# Patient Record
Sex: FEMALE | Race: WHITE | NOT HISPANIC OR LATINO | URBAN - METROPOLITAN AREA
[De-identification: names, ages, dates, MRNs, and addresses within clinical notes are randomized per-mention and may not be internally consistent; named-entity substitution may affect disease eponyms.]

---

## 2017-12-06 ENCOUNTER — EMERGENCY (EMERGENCY)
Facility: HOSPITAL | Age: 82
LOS: 1 days | Discharge: HOME | End: 2017-12-06
Attending: EMERGENCY MEDICINE | Admitting: EMERGENCY MEDICINE
Payer: MEDICARE

## 2017-12-06 VITALS
OXYGEN SATURATION: 97 % | TEMPERATURE: 97 F | SYSTOLIC BLOOD PRESSURE: 163 MMHG | HEART RATE: 66 BPM | RESPIRATION RATE: 18 BRPM | DIASTOLIC BLOOD PRESSURE: 84 MMHG

## 2017-12-06 VITALS
RESPIRATION RATE: 18 BRPM | HEART RATE: 69 BPM | DIASTOLIC BLOOD PRESSURE: 63 MMHG | TEMPERATURE: 98 F | SYSTOLIC BLOOD PRESSURE: 127 MMHG

## 2017-12-06 DIAGNOSIS — R11.2 NAUSEA WITH VOMITING, UNSPECIFIED: ICD-10-CM

## 2017-12-06 DIAGNOSIS — Z88.0 ALLERGY STATUS TO PENICILLIN: ICD-10-CM

## 2017-12-06 DIAGNOSIS — N39.0 URINARY TRACT INFECTION, SITE NOT SPECIFIED: ICD-10-CM

## 2017-12-06 DIAGNOSIS — Z79.01 LONG TERM (CURRENT) USE OF ANTICOAGULANTS: ICD-10-CM

## 2017-12-06 DIAGNOSIS — Z84.89 FAMILY HISTORY OF OTHER SPECIFIED CONDITIONS: Chronic | ICD-10-CM

## 2017-12-06 DIAGNOSIS — Z79.899 OTHER LONG TERM (CURRENT) DRUG THERAPY: ICD-10-CM

## 2017-12-06 DIAGNOSIS — Z91.018 ALLERGY TO OTHER FOODS: ICD-10-CM

## 2017-12-06 DIAGNOSIS — Z88.8 ALLERGY STATUS TO OTHER DRUGS, MEDICAMENTS AND BIOLOGICAL SUBSTANCES STATUS: ICD-10-CM

## 2017-12-06 DIAGNOSIS — R19.7 DIARRHEA, UNSPECIFIED: ICD-10-CM

## 2017-12-06 LAB
ALBUMIN SERPL ELPH-MCNC: 4 G/DL — SIGNIFICANT CHANGE UP (ref 3.3–5)
ALP SERPL-CCNC: 69 U/L — SIGNIFICANT CHANGE UP (ref 40–120)
ALT FLD-CCNC: 19 U/L — SIGNIFICANT CHANGE UP (ref 10–45)
ANION GAP SERPL CALC-SCNC: 16 MMOL/L — SIGNIFICANT CHANGE UP (ref 5–17)
APPEARANCE UR: CLEAR — SIGNIFICANT CHANGE UP
APTT BLD: 38.4 SEC — HIGH (ref 27.5–37.4)
AST SERPL-CCNC: 29 U/L — SIGNIFICANT CHANGE UP (ref 10–40)
BASOPHILS NFR BLD AUTO: 0.2 % — SIGNIFICANT CHANGE UP (ref 0–2)
BILIRUB DIRECT SERPL-MCNC: <0.2 MG/DL — SIGNIFICANT CHANGE UP (ref 0–0.2)
BILIRUB INDIRECT FLD-MCNC: >0.1 MG/DL — LOW (ref 0.2–1)
BILIRUB SERPL-MCNC: 0.3 MG/DL — SIGNIFICANT CHANGE UP (ref 0.2–1.2)
BILIRUB SERPL-MCNC: 0.3 MG/DL — SIGNIFICANT CHANGE UP (ref 0.2–1.2)
BILIRUB UR-MCNC: NEGATIVE — SIGNIFICANT CHANGE UP
BUN SERPL-MCNC: 23 MG/DL — SIGNIFICANT CHANGE UP (ref 7–23)
CALCIUM SERPL-MCNC: 9.8 MG/DL — SIGNIFICANT CHANGE UP (ref 8.4–10.5)
CHLORIDE SERPL-SCNC: 101 MMOL/L — SIGNIFICANT CHANGE UP (ref 96–108)
CO2 SERPL-SCNC: 21 MMOL/L — LOW (ref 22–31)
COLOR SPEC: YELLOW — SIGNIFICANT CHANGE UP
CREAT SERPL-MCNC: 0.79 MG/DL — SIGNIFICANT CHANGE UP (ref 0.5–1.3)
DIFF PNL FLD: (no result)
EOSINOPHIL NFR BLD AUTO: 1.9 % — SIGNIFICANT CHANGE UP (ref 0–6)
GLUCOSE SERPL-MCNC: 116 MG/DL — HIGH (ref 70–99)
GLUCOSE UR QL: NEGATIVE — SIGNIFICANT CHANGE UP
HCT VFR BLD CALC: 41.2 % — SIGNIFICANT CHANGE UP (ref 34.5–45)
HGB BLD-MCNC: 13.6 G/DL — SIGNIFICANT CHANGE UP (ref 11.5–15.5)
INR BLD: 1.89 — HIGH (ref 0.88–1.16)
KETONES UR-MCNC: NEGATIVE — SIGNIFICANT CHANGE UP
LACTATE SERPL-SCNC: 1.7 MMOL/L — SIGNIFICANT CHANGE UP (ref 0.5–2)
LEUKOCYTE ESTERASE UR-ACNC: (no result)
LIDOCAIN IGE QN: 60 U/L — SIGNIFICANT CHANGE UP (ref 7–60)
LYMPHOCYTES # BLD AUTO: 16.8 % — SIGNIFICANT CHANGE UP (ref 13–44)
MAGNESIUM SERPL-MCNC: 2.1 MG/DL — SIGNIFICANT CHANGE UP (ref 1.6–2.6)
MCHC RBC-ENTMCNC: 31.9 PG — SIGNIFICANT CHANGE UP (ref 27–34)
MCHC RBC-ENTMCNC: 33 G/DL — SIGNIFICANT CHANGE UP (ref 32–36)
MCV RBC AUTO: 96.7 FL — SIGNIFICANT CHANGE UP (ref 80–100)
MONOCYTES NFR BLD AUTO: 7 % — SIGNIFICANT CHANGE UP (ref 2–14)
NEUTROPHILS NFR BLD AUTO: 74.1 % — SIGNIFICANT CHANGE UP (ref 43–77)
NITRITE UR-MCNC: POSITIVE
PH UR: 6 — SIGNIFICANT CHANGE UP (ref 5–8)
PLATELET # BLD AUTO: 198 K/UL — SIGNIFICANT CHANGE UP (ref 150–400)
POTASSIUM SERPL-MCNC: 4.3 MMOL/L — SIGNIFICANT CHANGE UP (ref 3.5–5.3)
POTASSIUM SERPL-SCNC: 4.3 MMOL/L — SIGNIFICANT CHANGE UP (ref 3.5–5.3)
PROT SERPL-MCNC: 8 G/DL — SIGNIFICANT CHANGE UP (ref 6–8.3)
PROT UR-MCNC: NEGATIVE MG/DL — SIGNIFICANT CHANGE UP
PROTHROM AB SERPL-ACNC: 21.3 SEC — HIGH (ref 9.8–12.7)
RBC # BLD: 4.26 M/UL — SIGNIFICANT CHANGE UP (ref 3.8–5.2)
RBC # FLD: 14.3 % — SIGNIFICANT CHANGE UP (ref 10.3–16.9)
SODIUM SERPL-SCNC: 138 MMOL/L — SIGNIFICANT CHANGE UP (ref 135–145)
SP GR SPEC: 1.02 — SIGNIFICANT CHANGE UP (ref 1–1.03)
UROBILINOGEN FLD QL: 0.2 E.U./DL — SIGNIFICANT CHANGE UP
WBC # BLD: 9.8 K/UL — SIGNIFICANT CHANGE UP (ref 3.8–10.5)
WBC # FLD AUTO: 9.8 K/UL — SIGNIFICANT CHANGE UP (ref 3.8–10.5)

## 2017-12-06 PROCEDURE — 83735 ASSAY OF MAGNESIUM: CPT

## 2017-12-06 PROCEDURE — 96374 THER/PROPH/DIAG INJ IV PUSH: CPT | Mod: XU

## 2017-12-06 PROCEDURE — 36415 COLL VENOUS BLD VENIPUNCTURE: CPT

## 2017-12-06 PROCEDURE — 82248 BILIRUBIN DIRECT: CPT

## 2017-12-06 PROCEDURE — 74177 CT ABD & PELVIS W/CONTRAST: CPT | Mod: 26

## 2017-12-06 PROCEDURE — 80053 COMPREHEN METABOLIC PANEL: CPT

## 2017-12-06 PROCEDURE — 85730 THROMBOPLASTIN TIME PARTIAL: CPT

## 2017-12-06 PROCEDURE — 93010 ELECTROCARDIOGRAM REPORT: CPT

## 2017-12-06 PROCEDURE — 81001 URINALYSIS AUTO W/SCOPE: CPT

## 2017-12-06 PROCEDURE — 74177 CT ABD & PELVIS W/CONTRAST: CPT

## 2017-12-06 PROCEDURE — 82247 BILIRUBIN TOTAL: CPT

## 2017-12-06 PROCEDURE — 93005 ELECTROCARDIOGRAM TRACING: CPT

## 2017-12-06 PROCEDURE — 99284 EMERGENCY DEPT VISIT MOD MDM: CPT | Mod: 25

## 2017-12-06 PROCEDURE — 83690 ASSAY OF LIPASE: CPT

## 2017-12-06 PROCEDURE — 87086 URINE CULTURE/COLONY COUNT: CPT

## 2017-12-06 PROCEDURE — 85610 PROTHROMBIN TIME: CPT

## 2017-12-06 PROCEDURE — 96375 TX/PRO/DX INJ NEW DRUG ADDON: CPT

## 2017-12-06 PROCEDURE — 85025 COMPLETE CBC W/AUTO DIFF WBC: CPT

## 2017-12-06 PROCEDURE — 83605 ASSAY OF LACTIC ACID: CPT

## 2017-12-06 RX ORDER — SODIUM CHLORIDE 9 MG/ML
1500 INJECTION INTRAMUSCULAR; INTRAVENOUS; SUBCUTANEOUS ONCE
Qty: 0 | Refills: 0 | Status: COMPLETED | OUTPATIENT
Start: 2017-12-06 | End: 2017-12-06

## 2017-12-06 RX ORDER — ONDANSETRON 8 MG/1
4 TABLET, FILM COATED ORAL ONCE
Qty: 0 | Refills: 0 | Status: COMPLETED | OUTPATIENT
Start: 2017-12-06 | End: 2017-12-06

## 2017-12-06 RX ORDER — CIPROFLOXACIN LACTATE 400MG/40ML
400 VIAL (ML) INTRAVENOUS EVERY 12 HOURS
Qty: 0 | Refills: 0 | Status: DISCONTINUED | OUTPATIENT
Start: 2017-12-06 | End: 2017-12-06

## 2017-12-06 RX ORDER — CIPROFLOXACIN LACTATE 400MG/40ML
400 VIAL (ML) INTRAVENOUS ONCE
Qty: 0 | Refills: 0 | Status: COMPLETED | OUTPATIENT
Start: 2017-12-06 | End: 2017-12-06

## 2017-12-06 RX ORDER — IOHEXOL 300 MG/ML
50 INJECTION, SOLUTION INTRAVENOUS ONCE
Qty: 0 | Refills: 0 | Status: COMPLETED | OUTPATIENT
Start: 2017-12-06 | End: 2017-12-06

## 2017-12-06 RX ADMIN — SODIUM CHLORIDE 1500 MILLILITER(S): 9 INJECTION INTRAMUSCULAR; INTRAVENOUS; SUBCUTANEOUS at 03:05

## 2017-12-06 RX ADMIN — ONDANSETRON 4 MILLIGRAM(S): 8 TABLET, FILM COATED ORAL at 03:00

## 2017-12-06 RX ADMIN — Medication 200 MILLIGRAM(S): at 03:15

## 2017-12-06 RX ADMIN — IOHEXOL 50 MILLILITER(S): 300 INJECTION, SOLUTION INTRAVENOUS at 03:14

## 2017-12-06 NOTE — ED PROVIDER NOTE - ATTENDING CONTRIBUTION TO CARE
89yo F n/v/d. seen by pa, not presented to me but I was available for consultation if needed. 88yo F n/v/d. VSS, abdomen soft nontende, pt awake and alert. labs wnl, ct neg, dc home w/ plan for abx and outpatient followup

## 2017-12-06 NOTE — ED PROVIDER NOTE - MEDICAL DECISION MAKING DETAILS
supportive care + labs CT study and antibiotics in ED with expectant management and plan per pmd supportive care + labs CT ( neg )  study and antibiotics in ED with expectant management and plan per pmd to d/c home now and will f/u urine culture for continued abx course as needed

## 2017-12-07 LAB
CULTURE RESULTS: SIGNIFICANT CHANGE UP
SPECIMEN SOURCE: SIGNIFICANT CHANGE UP

## 2019-02-07 ENCOUNTER — INPATIENT (INPATIENT)
Facility: HOSPITAL | Age: 84
LOS: 7 days | Discharge: HOME HEALTH | DRG: 243 | End: 2019-02-15
Attending: INTERNAL MEDICINE | Admitting: INTERNAL MEDICINE
Payer: MEDICARE

## 2019-02-07 VITALS
DIASTOLIC BLOOD PRESSURE: 88 MMHG | OXYGEN SATURATION: 92 % | TEMPERATURE: 98 F | SYSTOLIC BLOOD PRESSURE: 153 MMHG | RESPIRATION RATE: 24 BRPM | HEART RATE: 91 BPM

## 2019-02-07 DIAGNOSIS — I50.813 ACUTE ON CHRONIC RIGHT HEART FAILURE: ICD-10-CM

## 2019-02-07 DIAGNOSIS — I10 ESSENTIAL (PRIMARY) HYPERTENSION: ICD-10-CM

## 2019-02-07 DIAGNOSIS — I50.33 ACUTE ON CHRONIC DIASTOLIC (CONGESTIVE) HEART FAILURE: ICD-10-CM

## 2019-02-07 DIAGNOSIS — Z84.89 FAMILY HISTORY OF OTHER SPECIFIED CONDITIONS: Chronic | ICD-10-CM

## 2019-02-07 DIAGNOSIS — E03.9 HYPOTHYROIDISM, UNSPECIFIED: ICD-10-CM

## 2019-02-07 DIAGNOSIS — Z98.890 OTHER SPECIFIED POSTPROCEDURAL STATES: ICD-10-CM

## 2019-02-07 DIAGNOSIS — I48.0 PAROXYSMAL ATRIAL FIBRILLATION: ICD-10-CM

## 2019-02-07 DIAGNOSIS — E78.00 PURE HYPERCHOLESTEROLEMIA, UNSPECIFIED: ICD-10-CM

## 2019-02-07 DIAGNOSIS — N39.0 URINARY TRACT INFECTION, SITE NOT SPECIFIED: ICD-10-CM

## 2019-02-07 DIAGNOSIS — I50.9 HEART FAILURE, UNSPECIFIED: ICD-10-CM

## 2019-02-07 LAB
ANION GAP SERPL CALC-SCNC: 13 MMOL/L — SIGNIFICANT CHANGE UP (ref 5–17)
APPEARANCE UR: CLEAR — SIGNIFICANT CHANGE UP
APTT BLD: 37.1 SEC — HIGH (ref 27.5–36.3)
BACTERIA # UR AUTO: PRESENT /HPF
BASE EXCESS BLDV CALC-SCNC: -3.2 MMOL/L — SIGNIFICANT CHANGE UP
BASOPHILS # BLD AUTO: 0 K/UL — SIGNIFICANT CHANGE UP (ref 0–0.2)
BASOPHILS NFR BLD AUTO: 0 % — SIGNIFICANT CHANGE UP (ref 0–2)
BILIRUB UR-MCNC: NEGATIVE — SIGNIFICANT CHANGE UP
BUN SERPL-MCNC: 20 MG/DL — SIGNIFICANT CHANGE UP (ref 7–23)
CALCIUM SERPL-MCNC: 9.4 MG/DL — SIGNIFICANT CHANGE UP (ref 8.4–10.5)
CHLORIDE SERPL-SCNC: 109 MMOL/L — HIGH (ref 96–108)
CO2 SERPL-SCNC: 19 MMOL/L — LOW (ref 22–31)
COLOR SPEC: YELLOW — SIGNIFICANT CHANGE UP
CREAT SERPL-MCNC: 0.86 MG/DL — SIGNIFICANT CHANGE UP (ref 0.5–1.3)
DIFF PNL FLD: ABNORMAL
EOSINOPHIL # BLD AUTO: 0 K/UL — SIGNIFICANT CHANGE UP (ref 0–0.5)
EOSINOPHIL NFR BLD AUTO: 0 % — SIGNIFICANT CHANGE UP (ref 0–6)
EPI CELLS # UR: ABNORMAL /HPF (ref 0–5)
GIANT PLATELETS BLD QL SMEAR: PRESENT — SIGNIFICANT CHANGE UP
GLUCOSE SERPL-MCNC: 140 MG/DL — HIGH (ref 70–99)
GLUCOSE UR QL: NEGATIVE — SIGNIFICANT CHANGE UP
HCO3 BLDV-SCNC: 23 MMOL/L — SIGNIFICANT CHANGE UP (ref 20–27)
HCT VFR BLD CALC: 41.5 % — SIGNIFICANT CHANGE UP (ref 34.5–45)
HGB BLD-MCNC: 13.1 G/DL — SIGNIFICANT CHANGE UP (ref 11.5–15.5)
INR BLD: 1.82 — HIGH (ref 0.88–1.16)
KETONES UR-MCNC: NEGATIVE — SIGNIFICANT CHANGE UP
LEUKOCYTE ESTERASE UR-ACNC: ABNORMAL
LYMPHOCYTES # BLD AUTO: 0.58 K/UL — LOW (ref 1–3.3)
LYMPHOCYTES # BLD AUTO: 4.3 % — LOW (ref 13–44)
MANUAL SMEAR VERIFICATION: SIGNIFICANT CHANGE UP
MCHC RBC-ENTMCNC: 31.6 GM/DL — LOW (ref 32–36)
MCHC RBC-ENTMCNC: 32.1 PG — SIGNIFICANT CHANGE UP (ref 27–34)
MCV RBC AUTO: 101.7 FL — HIGH (ref 80–100)
MONOCYTES # BLD AUTO: 0.35 K/UL — SIGNIFICANT CHANGE UP (ref 0–0.9)
MONOCYTES NFR BLD AUTO: 2.6 % — SIGNIFICANT CHANGE UP (ref 2–14)
NEUTROPHILS # BLD AUTO: 12.5 K/UL — HIGH (ref 1.8–7.4)
NEUTROPHILS NFR BLD AUTO: 88.8 % — HIGH (ref 43–77)
NEUTS BAND # BLD: 4.3 % — SIGNIFICANT CHANGE UP (ref 0–8)
NITRITE UR-MCNC: NEGATIVE — SIGNIFICANT CHANGE UP
NRBC # BLD: 1 /100 — HIGH (ref 0–0)
NRBC # BLD: SIGNIFICANT CHANGE UP /100 WBCS (ref 0–0)
NT-PROBNP SERPL-SCNC: 326 PG/ML — HIGH (ref 0–300)
OVALOCYTES BLD QL SMEAR: SLIGHT — SIGNIFICANT CHANGE UP
PCO2 BLDV: 47 MMHG — SIGNIFICANT CHANGE UP (ref 41–51)
PH BLDV: 7.31 — LOW (ref 7.32–7.43)
PH UR: 6 — SIGNIFICANT CHANGE UP (ref 5–8)
PLAT MORPH BLD: ABNORMAL
PLATELET # BLD AUTO: 163 K/UL — SIGNIFICANT CHANGE UP (ref 150–400)
PO2 BLDV: 29 MMHG — SIGNIFICANT CHANGE UP
POTASSIUM SERPL-MCNC: 4.1 MMOL/L — SIGNIFICANT CHANGE UP (ref 3.5–5.3)
POTASSIUM SERPL-SCNC: 4.1 MMOL/L — SIGNIFICANT CHANGE UP (ref 3.5–5.3)
PROT UR-MCNC: NEGATIVE MG/DL — SIGNIFICANT CHANGE UP
PROTHROM AB SERPL-ACNC: 21 SEC — HIGH (ref 10–12.9)
RAPID RVP RESULT: SIGNIFICANT CHANGE UP
RBC # BLD: 4.08 M/UL — SIGNIFICANT CHANGE UP (ref 3.8–5.2)
RBC # FLD: 14 % — SIGNIFICANT CHANGE UP (ref 10.3–14.5)
RBC BLD AUTO: ABNORMAL
RBC CASTS # UR COMP ASSIST: ABNORMAL /HPF
SAO2 % BLDV: 46 % — SIGNIFICANT CHANGE UP
SODIUM SERPL-SCNC: 141 MMOL/L — SIGNIFICANT CHANGE UP (ref 135–145)
SP GR SPEC: 1.02 — SIGNIFICANT CHANGE UP (ref 1–1.03)
TROPONIN T SERPL-MCNC: <0.01 NG/ML — SIGNIFICANT CHANGE UP (ref 0–0.01)
UROBILINOGEN FLD QL: 0.2 E.U./DL — SIGNIFICANT CHANGE UP
WBC # BLD: 13.43 K/UL — HIGH (ref 3.8–10.5)
WBC # FLD AUTO: 13.43 K/UL — HIGH (ref 3.8–10.5)
WBC UR QL: < 5 /HPF — SIGNIFICANT CHANGE UP

## 2019-02-07 PROCEDURE — 71046 X-RAY EXAM CHEST 2 VIEWS: CPT | Mod: 26

## 2019-02-07 PROCEDURE — 99285 EMERGENCY DEPT VISIT HI MDM: CPT | Mod: 25

## 2019-02-07 PROCEDURE — 93010 ELECTROCARDIOGRAM REPORT: CPT

## 2019-02-07 PROCEDURE — 71275 CT ANGIOGRAPHY CHEST: CPT | Mod: 26

## 2019-02-07 RX ORDER — ONDANSETRON 8 MG/1
4 TABLET, FILM COATED ORAL EVERY 8 HOURS
Qty: 0 | Refills: 0 | Status: DISCONTINUED | OUTPATIENT
Start: 2019-02-07 | End: 2019-02-15

## 2019-02-07 RX ORDER — ONDANSETRON 8 MG/1
4 TABLET, FILM COATED ORAL ONCE
Qty: 0 | Refills: 0 | Status: COMPLETED | OUTPATIENT
Start: 2019-02-07 | End: 2019-02-07

## 2019-02-07 RX ORDER — FUROSEMIDE 40 MG
60 TABLET ORAL ONCE
Qty: 0 | Refills: 0 | Status: COMPLETED | OUTPATIENT
Start: 2019-02-07 | End: 2019-02-07

## 2019-02-07 RX ORDER — CEFTRIAXONE 500 MG/1
1 INJECTION, POWDER, FOR SOLUTION INTRAMUSCULAR; INTRAVENOUS ONCE
Qty: 0 | Refills: 0 | Status: COMPLETED | OUTPATIENT
Start: 2019-02-07 | End: 2019-02-07

## 2019-02-07 RX ORDER — ATORVASTATIN CALCIUM 80 MG/1
20 TABLET, FILM COATED ORAL AT BEDTIME
Qty: 0 | Refills: 0 | Status: DISCONTINUED | OUTPATIENT
Start: 2019-02-07 | End: 2019-02-15

## 2019-02-07 RX ORDER — LEVOTHYROXINE SODIUM 125 MCG
75 TABLET ORAL DAILY
Qty: 0 | Refills: 0 | Status: DISCONTINUED | OUTPATIENT
Start: 2019-02-07 | End: 2019-02-15

## 2019-02-07 RX ORDER — SERTRALINE 25 MG/1
50 TABLET, FILM COATED ORAL DAILY
Qty: 0 | Refills: 0 | Status: DISCONTINUED | OUTPATIENT
Start: 2019-02-07 | End: 2019-02-15

## 2019-02-07 RX ORDER — AZITHROMYCIN 500 MG/1
500 TABLET, FILM COATED ORAL ONCE
Qty: 0 | Refills: 0 | Status: COMPLETED | OUTPATIENT
Start: 2019-02-07 | End: 2019-02-07

## 2019-02-07 RX ORDER — ACYCLOVIR SODIUM 500 MG
400 VIAL (EA) INTRAVENOUS DAILY
Qty: 0 | Refills: 0 | Status: DISCONTINUED | OUTPATIENT
Start: 2019-02-08 | End: 2019-02-15

## 2019-02-07 RX ORDER — RALOXIFENE HYDROCHLORIDE 60 MG/1
60 TABLET, COATED ORAL DAILY
Qty: 0 | Refills: 0 | Status: DISCONTINUED | OUTPATIENT
Start: 2019-02-07 | End: 2019-02-11

## 2019-02-07 RX ADMIN — ONDANSETRON 4 MILLIGRAM(S): 8 TABLET, FILM COATED ORAL at 19:21

## 2019-02-07 RX ADMIN — CEFTRIAXONE 100 GRAM(S): 500 INJECTION, POWDER, FOR SOLUTION INTRAMUSCULAR; INTRAVENOUS at 12:50

## 2019-02-07 RX ADMIN — SERTRALINE 50 MILLIGRAM(S): 25 TABLET, FILM COATED ORAL at 17:16

## 2019-02-07 RX ADMIN — Medication 75 MICROGRAM(S): at 17:15

## 2019-02-07 RX ADMIN — ATORVASTATIN CALCIUM 20 MILLIGRAM(S): 80 TABLET, FILM COATED ORAL at 21:34

## 2019-02-07 RX ADMIN — AZITHROMYCIN 255 MILLIGRAM(S): 500 TABLET, FILM COATED ORAL at 09:49

## 2019-02-07 RX ADMIN — RALOXIFENE HYDROCHLORIDE 60 MILLIGRAM(S): 60 TABLET, COATED ORAL at 17:15

## 2019-02-07 RX ADMIN — ONDANSETRON 4 MILLIGRAM(S): 8 TABLET, FILM COATED ORAL at 07:53

## 2019-02-07 RX ADMIN — Medication 60 MILLIGRAM(S): at 07:54

## 2019-02-07 NOTE — H&P ADULT - HISTORY OF PRESENT ILLNESS
Ms. Oconnor is a 89 year-old female PMHx HTN, HLD, hypothyroidism, s/p MVr with Dr. 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 Upon arrival to the ER, mild leukocytosis WBC 13.4, INR 1.82, , Cr 0.86, troponin NEG, UA trace leuks NEG nitrites. EKG SR with 1st degree AVB - no acute ischemic changes noted. CXR with R sided patchy infiltrate and vascular congestion. CT-A NEG for PE but small bilateral pleural effusions. Treated with 60mg IV lasix with brisk UOP and 4mg Zofran for nausea. Seen and examined by  team for admission. Patient accompanied by her regular HHA. Briefly, report R side chest discomfort and nausea last night with dry heaves. Her aid called 911 after pt seemed to be shorter of breath. Of note, pt denies recent illness/sick contacts and has traveled to [facility] over the last few weeks. (+) flu and pneumonia vaccines. Uses a rollator and WC at times and reports an exercise tolerance of 1-1.5 blocks before tiring. Denies any recent changes to ET and denies recent episodes like the one overnight. Unable to find report of most recent TTE and cardiac w/u in the past in records. At this time patient is clinically stable and awaiting transfer to  for further management. Ms. Oconnor is a 89 year-old female PMHx HTN, HLD, hypothyroidism, s/p MVr with Dr. 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 Upon arrival to the ER, mild leukocytosis WBC 13.4, INR 1.82, , Cr 0.86, troponin NEG, UA trace leuks NEG nitrites. EKG SR with 1st degree AVB - no acute ischemic changes noted. CXR with R sided patchy infiltrate and vascular congestion. CT-A NEG for PE but small bilateral pleural effusions. Treated with 60mg IV lasix with brisk UOP and 4mg Zofran for nausea. Seen and examined by  team for admission. Patient accompanied by her regular HHA. Briefly, report R side chest discomfort and nausea last night with dry heaves. Her aid called 911 after pt seemed to be shorter of breath. Of note, pt denies recent illness/sick contacts and has traveled to [facility] over the last few weeks. (+) flu and pneumonia vaccines. Uses a rollator and WC at times and reports an exercise tolerance of 1-1.5 blocks before tiring. Denies any recent changes to ET and denies recent episodes like the one overnight. Unable to find report of most recent TTE and cardiac w/u in the past in records. At this time patient is clinically stable and awaiting transfer to  for further management. Ms. Oconnor is a 89 year-old female PMHx HTN, HLD, hypothyroidism, s/p MVr with Dr. 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 Upon arrival to the ER, mild leukocytosis WBC 13.4, INR 1.82, , Cr 0.86, troponin NEG, UA trace leuks NEG nitrites. EKG SR with 1st degree AVB - no acute ischemic changes noted. CXR with R sided patchy infiltrate and vascular congestion. CT-A NEG for PE but small bilateral pleural effusions. Treated with 60mg IV lasix with brisk UOP, 1 gram IV rocephin/500mg Zithromax, and 4mg Zofran for nausea. Seen and examined by  team for admission. Patient accompanied by her regular HHA. Briefly, report R side chest discomfort and nausea last night with dry heaves. Her aid called 911 after pt seemed to be shorter of breath. Of note, pt denies recent illness/sick contacts and has traveled to [facility] over the last few weeks. (+) flu and pneumonia vaccines. Uses a rollator and WC at times and reports an exercise tolerance of 1-1.5 blocks before tiring. Denies any recent changes to ET and denies recent episodes like the one overnight. Unable to find report of most recent TTE and cardiac w/u in the past in records. At this time patient is clinically stable and awaiting transfer to  for further management.

## 2019-02-07 NOTE — ED ADULT NURSE NOTE - NSIMPLEMENTINTERV_GEN_ALL_ED
Implemented All Universal Safety Interventions:  [place] to call system. Call bell, personal items and telephone within reach. Instruct patient to call for assistance. Room bathroom lighting operational. Non-slip footwear when patient is off stretcher. Physically safe environment: no spills, clutter or unnecessary equipment. Stretcher in lowest position, wheels locked, appropriate side rails in place.

## 2019-02-07 NOTE — PROGRESS NOTE ADULT - SUBJECTIVE AND OBJECTIVE BOX
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 non-edematous weight gain.  she's maintained on warfarin related to a.fib history, and takes  furosemide ?? every other day   She presents now with nausea since botox injection to bladder on 2/6, and  aide says she had blue lips this am at home and called 911  CXR/CTA-suggests CHF, and she got I-V furosemide in ER  Exam tonite-few rales at bases normally not there  agree with at least mild CHF  will direneae  give zofran for nausea

## 2019-02-07 NOTE — ED ADULT NURSE NOTE - OBJECTIVE STATEMENT
89 y/o female c/o sob, nausea and weakness. As per home health aide, pt was given antibiotics for treatment for UTI last night. Pt was fine, went to sleep. Pt was found early this morning w/ blue lips and SOB. Pt has cardiac history w/ ablation. Pt was given O2 by NC 4L by EMS on arrival. Pt sats 90% w/ Oxygen but w/o 84-81%. Pt speaks clear, MAEx4, non ambulatory, unlabored breathing at this time. Abd obese soft nt but pt reports distended. Skin dry warm.

## 2019-02-07 NOTE — ED PROVIDER NOTE - PROGRESS NOTE DETAILS
INR subtherapeutic, will consider cta if no other source for pt's sob/tachypnea. Trop, bnp neg, cxr w patchy infiltrate - abx ordered; inr subtherapeutic.  CTA ordered to eval for pe and abnl cxr.  UA w/o wbc, + leuk esterase.  RVP pending. INR subtherapeutic, will consider cta if no other source for pt's sob/tachypnea.  Pt feeling better after lasix. Pt discussed w Dr North - reports h/o uti after last botox injection; pt w/o wbc in ua but + leuk esterase; pt received abx that should cover uti, u cx pending.  CT w chf appearance.  Pt discussed w Dr Dorsey - agrees w admission and wants pt admitted to tele bed.

## 2019-02-07 NOTE — H&P ADULT - PROBLEM SELECTOR PROBLEM 1
Acute on chronic congestive heart failure, unspecified heart failure type Acute on chronic diastolic (congestive) heart failure

## 2019-02-07 NOTE — H&P ADULT - NSHPSOCIALHISTORY_GEN_ALL_CORE
[place] [facility] two [place] a [place] and has a  at her home three times a week.   Occasional wine spritzer  No smoking or illicits hx

## 2019-02-07 NOTE — ED PROVIDER NOTE - DIAGNOSTIC INTERPRETATION
ER Physician:  Delia Director  CHEST XRAY INTERPRETATION: patchy infiltrate r and possibly l, heart shadow normal, bony structures intact

## 2019-02-07 NOTE — H&P ADULT - ASSESSMENT
89 year-old female PMHx HTN, HLD, hypothyroidism, s/p MVr with Dr. 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 CXR with R sided patchy infiltrate and vascular congestion. CT-A NEG for PE but small bilateral pleural effusions. Treated with 60mg IV lasix now stable and admitted to [facility] for further management. 89 year-old female PMHx HTN, HLD, hypothyroidism, s/p MVr with Dr. 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 CXR with R sided patchy infiltrate and vascular congestion. CT-A NEG for PE but small bilateral pleural effusions. Treated with 60mg IV lasix now stable and admitted to [facility] for further management.

## 2019-02-07 NOTE — ED PROVIDER NOTE - MEDICAL DECISION MAKING DETAILS
Pt c/o resolved n/v w/o abd pain, dysuria since botox injection and noted to be tachypneic w low sat on exam (improves w o2).  Pt w crackles on exam.  ? chf, ? viral uri (reports mild uri sx x sev wks), pna (afebrile), doubt pe since on coumadin.  Pt also w ? uti s/p botox procedure.  Plan labs, ua/cx, rvp, cxr, monitor, lasix for presumed chf, ekg, reassess.

## 2019-02-07 NOTE — H&P ADULT - PROBLEM SELECTOR PLAN 1
SOB overnight, CXR and CT with vascular congestion now s/p 60mg IV lasix with resolution of symptoms. Bilateral LE edema  -continue lasix at 40mg QD, goal net neg 500ml-1L  -continue spironolactone, K+ wnl  -strict I&O  -fluid restrict 1L

## 2019-02-07 NOTE — H&P ADULT - NSHPPHYSICALEXAM_GEN_ALL_CORE
PHYSICAL EXAM:  Awake, talkative, in NAD  AXOX3, follows commands, appropriate  Neck supple, no JVD noted  PEERL, nasal/buccal mucosa moist and well perfused  BS diminished bases bilaterally, unlabored, symmetrical  S1/S2, no S3, RRR, no M/G/R noted  Abdomen soft, non tender, non distended. last BM 2/6  2+ bilateral LE edema noted, perfusion brisk  Pulses palpable throughout  Skin warm and dry, no rashes/lesions noted

## 2019-02-07 NOTE — H&P ADULT - PROBLEM SELECTOR PLAN 4
UA trace leuks, (+) bacteria, (NEG) nitrites. Given IV abx in ER  -urine culture pending  -continue abx  -Will call Dr. Amos office to update.

## 2019-02-07 NOTE — ED ADULT TRIAGE NOTE - CHIEF COMPLAINT QUOTE
pt. c/o vomiting, diarrhea, shortness of breath, pt. id on 4 l/min by ems, with saturation 92%, pt. c/o back pain.

## 2019-02-07 NOTE — ED PROVIDER NOTE - OBJECTIVE STATEMENT
89 yo female h/o hld, htn, hypothyroid, s/p mitral valve replacement on coumadin, bladder dysfunction s/p botox injection yesterday c/o n/v x 1 followed by dry heaves x 2 (nonbloody) w nl bm this am and no abd pain.  + mild dysuria.  Pt received abx yest before procedure and is on macrobid now.  Per hha, pt w similar reaction after last injection.  Pt also c/o mild sob since last pm w/o cp, palpitations.  + le edema similar to baseline, on lasix.  + slight dry cough x 1 wk and rhinorrhea x 2 wk.  No fever, travel, sick contacts.  Pt notes itching R mid back w/o pain. 89 yo female h/o hld, htn, hypothyroid, s/p mitral valve replacement on coumadin, bladder dysfunction s/p botox injection yesterday c/o n/v x 1 followed by dry heaves x 2 (nonbloody) w nl bm this am and no abd pain.  No n/v now.  + mild dysuria.  Pt received abx yest before procedure and is on macrobid now.   Per hha, pt w similar reaction after last injection.  Pt also c/o mild sob since last pm w/o cp, palpitations.  + le edema similar to baseline, on lasix.  + slight dry cough x 1 wk and rhinorrhea x 2 wk.  No fever, travel, sick contacts.  Pt notes itching R mid back w/o pain.

## 2019-02-07 NOTE — H&P ADULT - NSHPLABSRESULTS_GEN_ALL_CORE
LABS:                          13.1   13.43 )-----------( 163      ( 07 Feb 2019 07:50 )             41.5                              02-07    141  |  109<H>  |  20  ----------------------------<  140<H>  4.1   |  19<L>  |  0.86    Ca    9.4      07 Feb 2019 07:50                              PT/INR - ( 07 Feb 2019 07:50 )   PT: 21.0 sec;   INR: 1.82          PTT - ( 07 Feb 2019 07:50 )  PTT:37.1 sec  CAPILLARY BLOOD GLUCOSE        CARDIAC MARKERS ( 07 Feb 2019 07:50 )  x     / <0.01 ng/mL / x     / x     / x

## 2019-02-08 DIAGNOSIS — I50.9 HEART FAILURE, UNSPECIFIED: ICD-10-CM

## 2019-02-08 LAB
ALBUMIN SERPL ELPH-MCNC: 3.7 G/DL — SIGNIFICANT CHANGE UP (ref 3.3–5)
ALP SERPL-CCNC: 57 U/L — SIGNIFICANT CHANGE UP (ref 40–120)
ALT FLD-CCNC: 12 U/L — SIGNIFICANT CHANGE UP (ref 10–45)
ANION GAP SERPL CALC-SCNC: 10 MMOL/L — SIGNIFICANT CHANGE UP (ref 5–17)
AST SERPL-CCNC: 17 U/L — SIGNIFICANT CHANGE UP (ref 10–40)
BASOPHILS # BLD AUTO: 0.01 K/UL — SIGNIFICANT CHANGE UP (ref 0–0.2)
BASOPHILS NFR BLD AUTO: 0.1 % — SIGNIFICANT CHANGE UP (ref 0–2)
BILIRUB SERPL-MCNC: 0.7 MG/DL — SIGNIFICANT CHANGE UP (ref 0.2–1.2)
BUN SERPL-MCNC: 19 MG/DL — SIGNIFICANT CHANGE UP (ref 7–23)
CALCIUM SERPL-MCNC: 9.2 MG/DL — SIGNIFICANT CHANGE UP (ref 8.4–10.5)
CHLORIDE SERPL-SCNC: 105 MMOL/L — SIGNIFICANT CHANGE UP (ref 96–108)
CO2 SERPL-SCNC: 26 MMOL/L — SIGNIFICANT CHANGE UP (ref 22–31)
CREAT SERPL-MCNC: 0.93 MG/DL — SIGNIFICANT CHANGE UP (ref 0.5–1.3)
CULTURE RESULTS: NO GROWTH — SIGNIFICANT CHANGE UP
EOSINOPHIL # BLD AUTO: 0.28 K/UL — SIGNIFICANT CHANGE UP (ref 0–0.5)
EOSINOPHIL NFR BLD AUTO: 3.3 % — SIGNIFICANT CHANGE UP (ref 0–6)
GLUCOSE SERPL-MCNC: 109 MG/DL — HIGH (ref 70–99)
HCT VFR BLD CALC: 36 % — SIGNIFICANT CHANGE UP (ref 34.5–45)
HGB BLD-MCNC: 11.5 G/DL — SIGNIFICANT CHANGE UP (ref 11.5–15.5)
IMM GRANULOCYTES NFR BLD AUTO: 0.1 % — SIGNIFICANT CHANGE UP (ref 0–1.5)
INR BLD: 1.29 — HIGH (ref 0.88–1.16)
LYMPHOCYTES # BLD AUTO: 1.7 K/UL — SIGNIFICANT CHANGE UP (ref 1–3.3)
LYMPHOCYTES # BLD AUTO: 20.3 % — SIGNIFICANT CHANGE UP (ref 13–44)
MAGNESIUM SERPL-MCNC: 2 MG/DL — SIGNIFICANT CHANGE UP (ref 1.6–2.6)
MCHC RBC-ENTMCNC: 31.9 GM/DL — LOW (ref 32–36)
MCHC RBC-ENTMCNC: 32.3 PG — SIGNIFICANT CHANGE UP (ref 27–34)
MCV RBC AUTO: 101.1 FL — HIGH (ref 80–100)
MONOCYTES # BLD AUTO: 0.64 K/UL — SIGNIFICANT CHANGE UP (ref 0–0.9)
MONOCYTES NFR BLD AUTO: 7.7 % — SIGNIFICANT CHANGE UP (ref 2–14)
NEUTROPHILS # BLD AUTO: 5.72 K/UL — SIGNIFICANT CHANGE UP (ref 1.8–7.4)
NEUTROPHILS NFR BLD AUTO: 68.5 % — SIGNIFICANT CHANGE UP (ref 43–77)
PLATELET # BLD AUTO: 169 K/UL — SIGNIFICANT CHANGE UP (ref 150–400)
POTASSIUM SERPL-MCNC: 3.4 MMOL/L — LOW (ref 3.5–5.3)
POTASSIUM SERPL-SCNC: 3.4 MMOL/L — LOW (ref 3.5–5.3)
PROT SERPL-MCNC: 6.6 G/DL — SIGNIFICANT CHANGE UP (ref 6–8.3)
PROTHROM AB SERPL-ACNC: 14.7 SEC — HIGH (ref 10–12.9)
RBC # BLD: 3.56 M/UL — LOW (ref 3.8–5.2)
RBC # FLD: 14 % — SIGNIFICANT CHANGE UP (ref 10.3–14.5)
SODIUM SERPL-SCNC: 141 MMOL/L — SIGNIFICANT CHANGE UP (ref 135–145)
SPECIMEN SOURCE: SIGNIFICANT CHANGE UP
WBC # BLD: 8.36 K/UL — SIGNIFICANT CHANGE UP (ref 3.8–10.5)
WBC # FLD AUTO: 8.36 K/UL — SIGNIFICANT CHANGE UP (ref 3.8–10.5)

## 2019-02-08 RX ORDER — FUROSEMIDE 40 MG
40 TABLET ORAL ONCE
Qty: 0 | Refills: 0 | Status: COMPLETED | OUTPATIENT
Start: 2019-02-08 | End: 2019-02-08

## 2019-02-08 RX ORDER — WARFARIN SODIUM 2.5 MG/1
6 TABLET ORAL ONCE
Qty: 0 | Refills: 0 | Status: DISCONTINUED | OUTPATIENT
Start: 2019-02-08 | End: 2019-02-08

## 2019-02-08 RX ORDER — FUROSEMIDE 40 MG
40 TABLET ORAL DAILY
Qty: 0 | Refills: 0 | Status: DISCONTINUED | OUTPATIENT
Start: 2019-02-09 | End: 2019-02-10

## 2019-02-08 RX ORDER — WARFARIN SODIUM 2.5 MG/1
3 TABLET ORAL ONCE
Qty: 0 | Refills: 0 | Status: COMPLETED | OUTPATIENT
Start: 2019-02-08 | End: 2019-02-08

## 2019-02-08 RX ORDER — POTASSIUM CHLORIDE 20 MEQ
40 PACKET (EA) ORAL ONCE
Qty: 0 | Refills: 0 | Status: COMPLETED | OUTPATIENT
Start: 2019-02-08 | End: 2019-02-08

## 2019-02-08 RX ADMIN — Medication 400 MILLIGRAM(S): at 11:29

## 2019-02-08 RX ADMIN — Medication 40 MILLIGRAM(S): at 09:10

## 2019-02-08 RX ADMIN — SERTRALINE 50 MILLIGRAM(S): 25 TABLET, FILM COATED ORAL at 11:29

## 2019-02-08 RX ADMIN — ATORVASTATIN CALCIUM 20 MILLIGRAM(S): 80 TABLET, FILM COATED ORAL at 21:47

## 2019-02-08 RX ADMIN — Medication 40 MILLIEQUIVALENT(S): at 09:22

## 2019-02-08 RX ADMIN — RALOXIFENE HYDROCHLORIDE 60 MILLIGRAM(S): 60 TABLET, COATED ORAL at 11:29

## 2019-02-08 RX ADMIN — WARFARIN SODIUM 3 MILLIGRAM(S): 2.5 TABLET ORAL at 21:47

## 2019-02-08 RX ADMIN — Medication 75 MICROGRAM(S): at 09:09

## 2019-02-08 NOTE — PHYSICAL THERAPY INITIAL EVALUATION ADULT - ADDITIONAL COMMENTS
Pt lives at home with 24 hr/7days live in with elevator access, no AWA. PTA: supervision/assist as needed with functional mobility with RW.

## 2019-02-08 NOTE — PHYSICAL THERAPY INITIAL EVALUATION ADULT - GENERAL OBSERVATIONS, REHAB EVAL
Rec'd pt supine in bed, in no acute distress, +heplock, cleared for PT and OOB activity by SARA Gomez.

## 2019-02-08 NOTE — PROGRESS NOTE ADULT - SUBJECTIVE AND OBJECTIVE BOX
CARDIOLOGY NP PROGRESS NOTE    Subjective: Patient seen and examined by me. Feels better after diuresis, hypoxic on RA to 88%. Denies complaints at this time.  Remainder ROS otherwise negative.    Overnight Events: Diuresed overnight.     TELEMETRY: SR 80's    VITAL SIGNS:  T(C): 36.7 (02-08-19 @ 15:19), Max: 36.7 (02-08-19 @ 15:19)  HR: 86 (02-08-19 @ 15:19) (70 - 86)  BP: 128/55 (02-08-19 @ 15:19) (111/60 - 130/70)  RR: 20 (02-08-19 @ 15:19) (16 - 20)  SpO2: 92% (02-08-19 @ 15:19) (90% - 96%)      PHYSICAL EXAM:  Awake, talkative, in NAD  AXOX3, follows commands, appropriate  Neck supple, no JVD noted  PEERL, nasal/buccal mucosa moist and well perfused  BS clear right, LLL crackles, unlabored, symmetrical, 2L NC  S1/S2, no S3, RRR, no M/G/R noted  Abdomen soft, non tender, non distended  No edema noted, perfusion brisk  Pulses palpable throughout  Skin warm and dry, no rashes/lesions noted    LABS:                   11.5   8.36  )-----------( 169      ( 08 Feb 2019 06:01 )             36.0                              02-08    141  |  105  |  19  ----------------------------<  109<H>  3.4<L>   |  26  |  0.93    Ca    9.2      08 Feb 2019 06:01  Mg     2.0     02-08    TPro  6.6  /  Alb  3.7  /  TBili  0.7  /  DBili  x   /  AST  17  /  ALT  12  /  AlkPhos  57  02-08    LIVER FUNCTIONS - ( 08 Feb 2019 06:01 )  Alb: 3.7 g/dL / Pro: 6.6 g/dL / ALK PHOS: 57 U/L / ALT: 12 U/L / AST: 17 U/L / GGT: x                                 PT/INR - ( 07 Feb 2019 07:50 )   PT: 21.0 sec;   INR: 1.82          PTT - ( 07 Feb 2019 07:50 )  PTT:37.1 sec  CAPILLARY BLOOD GLUCOSE    CARDIAC MARKERS ( 07 Feb 2019 07:50 )  x     / <0.01 ng/mL / x     / x     / x        Allergies:  chlorhexidine topical (Rash)  fish (Other)  penicillin (Hives)    MEDICATIONS  (STANDING):  acyclovir   Oral Tab/Cap 400 milliGRAM(s) Oral daily  atorvastatin 20 milliGRAM(s) Oral at bedtime  levothyroxine 75 MICROGram(s) Oral daily  raloxifene 60 milliGRAM(s) Oral daily  sertraline 50 milliGRAM(s) Oral daily    MEDICATIONS  (PRN):  ondansetron Injectable 4 milliGRAM(s) IV Push every 8 hours PRN Nausea and/or Vomiting      DIAGNOSTIC TESTS:

## 2019-02-08 NOTE — PHYSICAL THERAPY INITIAL EVALUATION ADULT - PERTINENT HX OF CURRENT PROBLEM, REHAB EVAL
89 yo F presented to [facility] ER with report of abdominal discomfort, nausea and 1 episode of vomiting (dry heaves) around midnight.

## 2019-02-09 LAB
ALBUMIN SERPL ELPH-MCNC: 4 G/DL — SIGNIFICANT CHANGE UP (ref 3.3–5)
ALP SERPL-CCNC: 62 U/L — SIGNIFICANT CHANGE UP (ref 40–120)
ALT FLD-CCNC: 13 U/L — SIGNIFICANT CHANGE UP (ref 10–45)
ANION GAP SERPL CALC-SCNC: 11 MMOL/L — SIGNIFICANT CHANGE UP (ref 5–17)
APTT BLD: 29.2 SEC — SIGNIFICANT CHANGE UP (ref 27.5–36.3)
APTT BLD: 52.7 SEC — HIGH (ref 27.5–36.3)
APTT BLD: 57.8 SEC — HIGH (ref 27.5–36.3)
AST SERPL-CCNC: 18 U/L — SIGNIFICANT CHANGE UP (ref 10–40)
BILIRUB SERPL-MCNC: 0.8 MG/DL — SIGNIFICANT CHANGE UP (ref 0.2–1.2)
BUN SERPL-MCNC: 20 MG/DL — SIGNIFICANT CHANGE UP (ref 7–23)
CALCIUM SERPL-MCNC: 9.5 MG/DL — SIGNIFICANT CHANGE UP (ref 8.4–10.5)
CHLORIDE SERPL-SCNC: 102 MMOL/L — SIGNIFICANT CHANGE UP (ref 96–108)
CO2 SERPL-SCNC: 26 MMOL/L — SIGNIFICANT CHANGE UP (ref 22–31)
CREAT SERPL-MCNC: 0.88 MG/DL — SIGNIFICANT CHANGE UP (ref 0.5–1.3)
GLUCOSE SERPL-MCNC: 107 MG/DL — HIGH (ref 70–99)
HCT VFR BLD CALC: 39.5 % — SIGNIFICANT CHANGE UP (ref 34.5–45)
HGB BLD-MCNC: 12.6 G/DL — SIGNIFICANT CHANGE UP (ref 11.5–15.5)
INR BLD: 1.18 — HIGH (ref 0.88–1.16)
MAGNESIUM SERPL-MCNC: 2 MG/DL — SIGNIFICANT CHANGE UP (ref 1.6–2.6)
MCHC RBC-ENTMCNC: 31.9 GM/DL — LOW (ref 32–36)
MCHC RBC-ENTMCNC: 32.4 PG — SIGNIFICANT CHANGE UP (ref 27–34)
MCV RBC AUTO: 101.5 FL — HIGH (ref 80–100)
NRBC # BLD: 0 /100 WBCS — SIGNIFICANT CHANGE UP (ref 0–0)
PLATELET # BLD AUTO: 166 K/UL — SIGNIFICANT CHANGE UP (ref 150–400)
POTASSIUM SERPL-MCNC: 3.9 MMOL/L — SIGNIFICANT CHANGE UP (ref 3.5–5.3)
POTASSIUM SERPL-SCNC: 3.9 MMOL/L — SIGNIFICANT CHANGE UP (ref 3.5–5.3)
PROT SERPL-MCNC: 7.2 G/DL — SIGNIFICANT CHANGE UP (ref 6–8.3)
PROTHROM AB SERPL-ACNC: 13.4 SEC — HIGH (ref 10–12.9)
RBC # BLD: 3.89 M/UL — SIGNIFICANT CHANGE UP (ref 3.8–5.2)
RBC # FLD: 13.8 % — SIGNIFICANT CHANGE UP (ref 10.3–14.5)
SODIUM SERPL-SCNC: 139 MMOL/L — SIGNIFICANT CHANGE UP (ref 135–145)
WBC # BLD: 8.1 K/UL — SIGNIFICANT CHANGE UP (ref 3.8–10.5)
WBC # FLD AUTO: 8.1 K/UL — SIGNIFICANT CHANGE UP (ref 3.8–10.5)

## 2019-02-09 PROCEDURE — 93010 ELECTROCARDIOGRAM REPORT: CPT

## 2019-02-09 PROCEDURE — 71045 X-RAY EXAM CHEST 1 VIEW: CPT | Mod: 26

## 2019-02-09 RX ORDER — METOPROLOL TARTRATE 50 MG
5 TABLET ORAL ONCE
Qty: 0 | Refills: 0 | Status: COMPLETED | OUTPATIENT
Start: 2019-02-09 | End: 2019-02-09

## 2019-02-09 RX ORDER — HEPARIN SODIUM 5000 [USP'U]/ML
4400 INJECTION INTRAVENOUS; SUBCUTANEOUS ONCE
Qty: 0 | Refills: 0 | Status: COMPLETED | OUTPATIENT
Start: 2019-02-09 | End: 2019-02-09

## 2019-02-09 RX ORDER — METOPROLOL TARTRATE 50 MG
50 TABLET ORAL ONCE
Qty: 0 | Refills: 0 | Status: COMPLETED | OUTPATIENT
Start: 2019-02-09 | End: 2019-02-09

## 2019-02-09 RX ORDER — WARFARIN SODIUM 2.5 MG/1
7.5 TABLET ORAL ONCE
Qty: 0 | Refills: 0 | Status: COMPLETED | OUTPATIENT
Start: 2019-02-09 | End: 2019-02-09

## 2019-02-09 RX ORDER — MAGNESIUM HYDROXIDE 400 MG/1
30 TABLET, CHEWABLE ORAL DAILY
Qty: 0 | Refills: 0 | Status: DISCONTINUED | OUTPATIENT
Start: 2019-02-09 | End: 2019-02-15

## 2019-02-09 RX ORDER — SENNA PLUS 8.6 MG/1
2 TABLET ORAL AT BEDTIME
Qty: 0 | Refills: 0 | Status: DISCONTINUED | OUTPATIENT
Start: 2019-02-09 | End: 2019-02-15

## 2019-02-09 RX ORDER — HEPARIN SODIUM 5000 [USP'U]/ML
900 INJECTION INTRAVENOUS; SUBCUTANEOUS
Qty: 25000 | Refills: 0 | Status: DISCONTINUED | OUTPATIENT
Start: 2019-02-09 | End: 2019-02-09

## 2019-02-09 RX ORDER — HEPARIN SODIUM 5000 [USP'U]/ML
1100 INJECTION INTRAVENOUS; SUBCUTANEOUS
Qty: 25000 | Refills: 0 | Status: DISCONTINUED | OUTPATIENT
Start: 2019-02-09 | End: 2019-02-11

## 2019-02-09 RX ORDER — LANOLIN ALCOHOL/MO/W.PET/CERES
3 CREAM (GRAM) TOPICAL ONCE
Qty: 0 | Refills: 0 | Status: COMPLETED | OUTPATIENT
Start: 2019-02-09 | End: 2019-02-09

## 2019-02-09 RX ORDER — HEPARIN SODIUM 5000 [USP'U]/ML
1000 INJECTION INTRAVENOUS; SUBCUTANEOUS
Qty: 25000 | Refills: 0 | Status: DISCONTINUED | OUTPATIENT
Start: 2019-02-09 | End: 2019-02-09

## 2019-02-09 RX ORDER — METOPROLOL TARTRATE 50 MG
50 TABLET ORAL EVERY 6 HOURS
Qty: 0 | Refills: 0 | Status: DISCONTINUED | OUTPATIENT
Start: 2019-02-09 | End: 2019-02-10

## 2019-02-09 RX ORDER — DOCUSATE SODIUM 100 MG
100 CAPSULE ORAL
Qty: 0 | Refills: 0 | Status: DISCONTINUED | OUTPATIENT
Start: 2019-02-09 | End: 2019-02-15

## 2019-02-09 RX ADMIN — Medication 50 MILLIGRAM(S): at 23:17

## 2019-02-09 RX ADMIN — HEPARIN SODIUM 11 UNIT(S)/HR: 5000 INJECTION INTRAVENOUS; SUBCUTANEOUS at 22:54

## 2019-02-09 RX ADMIN — MAGNESIUM HYDROXIDE 30 MILLILITER(S): 400 TABLET, CHEWABLE ORAL at 11:21

## 2019-02-09 RX ADMIN — Medication 5 MILLIGRAM(S): at 15:37

## 2019-02-09 RX ADMIN — Medication 40 MILLIGRAM(S): at 06:22

## 2019-02-09 RX ADMIN — ATORVASTATIN CALCIUM 20 MILLIGRAM(S): 80 TABLET, FILM COATED ORAL at 21:26

## 2019-02-09 RX ADMIN — RALOXIFENE HYDROCHLORIDE 60 MILLIGRAM(S): 60 TABLET, COATED ORAL at 11:21

## 2019-02-09 RX ADMIN — WARFARIN SODIUM 7.5 MILLIGRAM(S): 2.5 TABLET ORAL at 21:26

## 2019-02-09 RX ADMIN — HEPARIN SODIUM 4400 UNIT(S): 5000 INJECTION INTRAVENOUS; SUBCUTANEOUS at 11:51

## 2019-02-09 RX ADMIN — Medication 50 MILLIGRAM(S): at 10:50

## 2019-02-09 RX ADMIN — Medication 400 MILLIGRAM(S): at 11:21

## 2019-02-09 RX ADMIN — Medication 5 MILLIGRAM(S): at 11:51

## 2019-02-09 RX ADMIN — HEPARIN SODIUM 9 UNIT(S)/HR: 5000 INJECTION INTRAVENOUS; SUBCUTANEOUS at 11:51

## 2019-02-09 RX ADMIN — Medication 3 MILLIGRAM(S): at 23:17

## 2019-02-09 RX ADMIN — SENNA PLUS 2 TABLET(S): 8.6 TABLET ORAL at 21:26

## 2019-02-09 RX ADMIN — SERTRALINE 50 MILLIGRAM(S): 25 TABLET, FILM COATED ORAL at 11:20

## 2019-02-09 RX ADMIN — Medication 100 MILLIGRAM(S): at 21:26

## 2019-02-09 RX ADMIN — Medication 50 MILLIGRAM(S): at 17:39

## 2019-02-09 RX ADMIN — Medication 100 MILLIGRAM(S): at 11:22

## 2019-02-09 RX ADMIN — Medication 75 MICROGRAM(S): at 06:22

## 2019-02-09 NOTE — PROGRESS NOTE ADULT - SUBJECTIVE AND OBJECTIVE BOX
CARDIOLOGY NP PROGRESS NOTE    Subjective:    Overnight Events:     TELEMETRY:  EKG:      VITAL SIGNS:  T(C): 36.7 (02-09-19 @ 18:17), Max: 36.8 (02-08-19 @ 20:40)  HR: 112 (02-09-19 @ 17:40) (68 - 138)  BP: 116/69 (02-09-19 @ 17:40) (105/51 - 159/89)  RR: 18 (02-09-19 @ 17:40) (18 - 21)  SpO2: 92% (02-09-19 @ 17:40) (90% - 97%)  Wt(kg): --    I&O's Summary    08 Feb 2019 07:01  -  09 Feb 2019 07:00  --------------------------------------------------------  IN: 0 mL / OUT: 1100 mL / NET: -1100 mL    09 Feb 2019 07:01  -  09 Feb 2019 19:07  --------------------------------------------------------  IN: 434 mL / OUT: 280 mL / NET: 154 mL          PHYSICAL EXAM:    General: A/ox 3, No acute Distress  Neck: Supple, NO JVD  Cardiac: S1 S2, No M/R/G  Pulmonary: CTAB, Breathing unlabored, No Rhonchi/Rales/Wheezing  Abdomen: Soft, Non -tender, +BS x 4 quads  Extremities: No Rashes, No edema  Neuro: A/o x 3, No focal deficits          LABS:                          12.6   8.10  )-----------( 166      ( 09 Feb 2019 07:06 )             39.5                              02-09    139  |  102  |  20  ----------------------------<  107<H>  3.9   |  26  |  0.88    Ca    9.5      09 Feb 2019 07:05  Mg     2.0     02-09    TPro  7.2  /  Alb  4.0  /  TBili  0.8  /  DBili  x   /  AST  18  /  ALT  13  /  AlkPhos  62  02-09    LIVER FUNCTIONS - ( 09 Feb 2019 07:05 )  Alb: 4.0 g/dL / Pro: 7.2 g/dL / ALK PHOS: 62 U/L / ALT: 13 U/L / AST: 18 U/L / GGT: x                                 PT/INR - ( 09 Feb 2019 07:06 )   PT: 13.4 sec;   INR: 1.18          PTT - ( 09 Feb 2019 18:19 )  PTT:52.7 sec  CAPILLARY BLOOD GLUCOSE                  chlorhexidine topical (Rash)  fish (Other)  penicillin (Hives)    MEDICATIONS  (STANDING):  acyclovir   Oral Tab/Cap 400 milliGRAM(s) Oral daily  atorvastatin 20 milliGRAM(s) Oral at bedtime  docusate sodium 100 milliGRAM(s) Oral two times a day  furosemide   Injectable 40 milliGRAM(s) IV Push daily  heparin  Infusion 900 Unit(s)/Hr (9 mL/Hr) IV Continuous <Continuous>  levothyroxine 75 MICROGram(s) Oral daily  metoprolol tartrate 50 milliGRAM(s) Oral every 6 hours  raloxifene 60 milliGRAM(s) Oral daily  senna 2 Tablet(s) Oral at bedtime  sertraline 50 milliGRAM(s) Oral daily  warfarin 7.5 milliGRAM(s) Oral once    MEDICATIONS  (PRN):  magnesium hydroxide Suspension 30 milliLiter(s) Oral daily PRN Constipation  ondansetron Injectable 4 milliGRAM(s) IV Push every 8 hours PRN Nausea and/or Vomiting        DIAGNOSTIC TESTS: CARDIOLOGY NP PROGRESS NOTE    Subjective: Pt seen and examined this AM. States feeling good, reports feeling a little weak. Instructed pt to get out of the bed to the chair with meals. Denies chest pain, pressure, palpitations, SOB, dizziness, or lightheadedness.     Overnight Events: none    TELEMETRY: Atachy/flutter rates up to 140s this AM       VITAL SIGNS:  T(C): 36.7 (02-09-19 @ 18:17), Max: 36.8 (02-08-19 @ 20:40)  HR: 112 (02-09-19 @ 17:40) (68 - 138)  BP: 116/69 (02-09-19 @ 17:40) (105/51 - 159/89)  RR: 18 (02-09-19 @ 17:40) (18 - 21)  SpO2: 92% (02-09-19 @ 17:40) (90% - 97%)  Wt(kg): --    I&O's Summary    08 Feb 2019 07:01  -  09 Feb 2019 07:00  --------------------------------------------------------  IN: 0 mL / OUT: 1100 mL / NET: -1100 mL    09 Feb 2019 07:01  -  09 Feb 2019 19:07  --------------------------------------------------------  IN: 434 mL / OUT: 280 mL / NET: 154 mL          PHYSICAL EXAM:    General: A/ox 3, No acute Distress  Neck: Supple, NO JVD  Cardiac: S1 S2, No M/R/G  Pulmonary: bibasilar crackles noted, no wheezing or rhonchi   Abdomen: Soft, Non -tender, +BS x 4 quads  Extremities: No Rashes, No edema  Neuro: A/o x 3, No focal deficits          LABS:                          12.6   8.10  )-----------( 166      ( 09 Feb 2019 07:06 )             39.5                              02-09    139  |  102  |  20  ----------------------------<  107<H>  3.9   |  26  |  0.88    Ca    9.5      09 Feb 2019 07:05  Mg     2.0     02-09    TPro  7.2  /  Alb  4.0  /  TBili  0.8  /  DBili  x   /  AST  18  /  ALT  13  /  AlkPhos  62  02-09    LIVER FUNCTIONS - ( 09 Feb 2019 07:05 )  Alb: 4.0 g/dL / Pro: 7.2 g/dL / ALK PHOS: 62 U/L / ALT: 13 U/L / AST: 18 U/L / GGT: x                                 PT/INR - ( 09 Feb 2019 07:06 )   PT: 13.4 sec;   INR: 1.18          PTT - ( 09 Feb 2019 18:19 )  PTT:52.7 sec  CAPILLARY BLOOD GLUCOSE                  chlorhexidine topical (Rash)  fish (Other)  penicillin (Hives)    MEDICATIONS  (STANDING):  acyclovir   Oral Tab/Cap 400 milliGRAM(s) Oral daily  atorvastatin 20 milliGRAM(s) Oral at bedtime  docusate sodium 100 milliGRAM(s) Oral two times a day  furosemide   Injectable 40 milliGRAM(s) IV Push daily  heparin  Infusion 900 Unit(s)/Hr (9 mL/Hr) IV Continuous <Continuous>  levothyroxine 75 MICROGram(s) Oral daily  metoprolol tartrate 50 milliGRAM(s) Oral every 6 hours  raloxifene 60 milliGRAM(s) Oral daily  senna 2 Tablet(s) Oral at bedtime  sertraline 50 milliGRAM(s) Oral daily  warfarin 7.5 milliGRAM(s) Oral once    MEDICATIONS  (PRN):  magnesium hydroxide Suspension 30 milliLiter(s) Oral daily PRN Constipation  ondansetron Injectable 4 milliGRAM(s) IV Push every 8 hours PRN Nausea and/or Vomiting        DIAGNOSTIC TESTS:

## 2019-02-09 NOTE — PROGRESS NOTE ADULT - SUBJECTIVE AND OBJECTIVE BOX
popped into a.tach/flutter this am-with high rates-  given I-V and PO metoprolol and heparinized given INR of 1.1  exam and CXR OK  keep rate <100 with beta blockers  keep heparin umtil INR >2 for 24 hours  allow OOB as tolerated

## 2019-02-09 NOTE — PROGRESS NOTE ADULT - PROBLEM SELECTOR PLAN 4
UA trace leuks, (+) bacteria, (NEG) nitrites. Given IV abx in ER  -urine culture pending  -continue abx  -Will call Dr. Amos office to update. UA trace leuks, (+) bacteria, (NEG) nitrites. Given IV abx in ER  -urine culture pending  -Received 2 doses of Ceftriaxone   -Will call Dr. Amos office to update.

## 2019-02-10 DIAGNOSIS — Z02.9 ENCOUNTER FOR ADMINISTRATIVE EXAMINATIONS, UNSPECIFIED: ICD-10-CM

## 2019-02-10 DIAGNOSIS — R11.0 NAUSEA: ICD-10-CM

## 2019-02-10 DIAGNOSIS — R63.8 OTHER SYMPTOMS AND SIGNS CONCERNING FOOD AND FLUID INTAKE: ICD-10-CM

## 2019-02-10 LAB
ANION GAP SERPL CALC-SCNC: 11 MMOL/L — SIGNIFICANT CHANGE UP (ref 5–17)
APTT BLD: 70.9 SEC — HIGH (ref 27.5–36.3)
BUN SERPL-MCNC: 23 MG/DL — SIGNIFICANT CHANGE UP (ref 7–23)
CALCIUM SERPL-MCNC: 9.7 MG/DL — SIGNIFICANT CHANGE UP (ref 8.4–10.5)
CHLORIDE SERPL-SCNC: 101 MMOL/L — SIGNIFICANT CHANGE UP (ref 96–108)
CO2 SERPL-SCNC: 26 MMOL/L — SIGNIFICANT CHANGE UP (ref 22–31)
CREAT SERPL-MCNC: 0.93 MG/DL — SIGNIFICANT CHANGE UP (ref 0.5–1.3)
GLUCOSE SERPL-MCNC: 114 MG/DL — HIGH (ref 70–99)
HCT VFR BLD CALC: 43.4 % — SIGNIFICANT CHANGE UP (ref 34.5–45)
HGB BLD-MCNC: 14.1 G/DL — SIGNIFICANT CHANGE UP (ref 11.5–15.5)
INR BLD: 1.32 — HIGH (ref 0.88–1.16)
MAGNESIUM SERPL-MCNC: 2.2 MG/DL — SIGNIFICANT CHANGE UP (ref 1.6–2.6)
MCHC RBC-ENTMCNC: 32.5 GM/DL — SIGNIFICANT CHANGE UP (ref 32–36)
MCHC RBC-ENTMCNC: 32.6 PG — SIGNIFICANT CHANGE UP (ref 27–34)
MCV RBC AUTO: 100.5 FL — HIGH (ref 80–100)
NRBC # BLD: 0 /100 WBCS — SIGNIFICANT CHANGE UP (ref 0–0)
PLATELET # BLD AUTO: 173 K/UL — SIGNIFICANT CHANGE UP (ref 150–400)
POTASSIUM SERPL-MCNC: 4.2 MMOL/L — SIGNIFICANT CHANGE UP (ref 3.5–5.3)
POTASSIUM SERPL-SCNC: 4.2 MMOL/L — SIGNIFICANT CHANGE UP (ref 3.5–5.3)
PROTHROM AB SERPL-ACNC: 15.1 SEC — HIGH (ref 10–12.9)
RBC # BLD: 4.32 M/UL — SIGNIFICANT CHANGE UP (ref 3.8–5.2)
RBC # FLD: 13.2 % — SIGNIFICANT CHANGE UP (ref 10.3–14.5)
SODIUM SERPL-SCNC: 138 MMOL/L — SIGNIFICANT CHANGE UP (ref 135–145)
WBC # BLD: 9.93 K/UL — SIGNIFICANT CHANGE UP (ref 3.8–10.5)
WBC # FLD AUTO: 9.93 K/UL — SIGNIFICANT CHANGE UP (ref 3.8–10.5)

## 2019-02-10 PROCEDURE — 93010 ELECTROCARDIOGRAM REPORT: CPT

## 2019-02-10 PROCEDURE — 74018 RADEX ABDOMEN 1 VIEW: CPT | Mod: 26

## 2019-02-10 RX ORDER — ONDANSETRON 8 MG/1
4 TABLET, FILM COATED ORAL ONCE
Qty: 0 | Refills: 0 | Status: COMPLETED | OUTPATIENT
Start: 2019-02-10 | End: 2019-02-11

## 2019-02-10 RX ORDER — METOPROLOL TARTRATE 50 MG
5 TABLET ORAL ONCE
Qty: 0 | Refills: 0 | Status: COMPLETED | OUTPATIENT
Start: 2019-02-10 | End: 2019-02-10

## 2019-02-10 RX ORDER — DIGOXIN 250 MCG
0.25 TABLET ORAL EVERY 6 HOURS
Qty: 0 | Refills: 0 | Status: COMPLETED | OUTPATIENT
Start: 2019-02-10 | End: 2019-02-10

## 2019-02-10 RX ORDER — WARFARIN SODIUM 2.5 MG/1
7.5 TABLET ORAL ONCE
Qty: 0 | Refills: 0 | Status: COMPLETED | OUTPATIENT
Start: 2019-02-10 | End: 2019-02-10

## 2019-02-10 RX ORDER — METOPROLOL TARTRATE 50 MG
25 TABLET ORAL EVERY 8 HOURS
Qty: 0 | Refills: 0 | Status: DISCONTINUED | OUTPATIENT
Start: 2019-02-10 | End: 2019-02-10

## 2019-02-10 RX ORDER — PANTOPRAZOLE SODIUM 20 MG/1
40 TABLET, DELAYED RELEASE ORAL
Qty: 0 | Refills: 0 | Status: DISCONTINUED | OUTPATIENT
Start: 2019-02-10 | End: 2019-02-15

## 2019-02-10 RX ORDER — LANOLIN ALCOHOL/MO/W.PET/CERES
3 CREAM (GRAM) TOPICAL ONCE
Qty: 0 | Refills: 0 | Status: COMPLETED | OUTPATIENT
Start: 2019-02-10 | End: 2019-02-10

## 2019-02-10 RX ORDER — ONDANSETRON 8 MG/1
4 TABLET, FILM COATED ORAL ONCE
Qty: 0 | Refills: 0 | Status: COMPLETED | OUTPATIENT
Start: 2019-02-10 | End: 2019-02-10

## 2019-02-10 RX ORDER — NYSTATIN CREAM 100000 [USP'U]/G
1 CREAM TOPICAL
Qty: 0 | Refills: 0 | Status: DISCONTINUED | OUTPATIENT
Start: 2019-02-10 | End: 2019-02-15

## 2019-02-10 RX ORDER — METOPROLOL TARTRATE 50 MG
25 TABLET ORAL EVERY 6 HOURS
Qty: 0 | Refills: 0 | Status: DISCONTINUED | OUTPATIENT
Start: 2019-02-10 | End: 2019-02-11

## 2019-02-10 RX ADMIN — Medication 100 MILLIGRAM(S): at 06:55

## 2019-02-10 RX ADMIN — Medication 0.25 MILLIGRAM(S): at 14:06

## 2019-02-10 RX ADMIN — Medication 3 MILLIGRAM(S): at 21:33

## 2019-02-10 RX ADMIN — SENNA PLUS 2 TABLET(S): 8.6 TABLET ORAL at 21:33

## 2019-02-10 RX ADMIN — Medication 0.25 MILLIGRAM(S): at 23:53

## 2019-02-10 RX ADMIN — WARFARIN SODIUM 7.5 MILLIGRAM(S): 2.5 TABLET ORAL at 21:33

## 2019-02-10 RX ADMIN — Medication 100 MILLIGRAM(S): at 17:03

## 2019-02-10 RX ADMIN — Medication 25 MILLIGRAM(S): at 23:53

## 2019-02-10 RX ADMIN — RALOXIFENE HYDROCHLORIDE 60 MILLIGRAM(S): 60 TABLET, COATED ORAL at 11:15

## 2019-02-10 RX ADMIN — Medication 0.25 MILLIGRAM(S): at 17:03

## 2019-02-10 RX ADMIN — Medication 75 MICROGRAM(S): at 06:55

## 2019-02-10 RX ADMIN — Medication 400 MILLIGRAM(S): at 11:15

## 2019-02-10 RX ADMIN — ONDANSETRON 4 MILLIGRAM(S): 8 TABLET, FILM COATED ORAL at 13:39

## 2019-02-10 RX ADMIN — NYSTATIN CREAM 1 APPLICATION(S): 100000 CREAM TOPICAL at 17:03

## 2019-02-10 RX ADMIN — Medication 25 MILLIGRAM(S): at 17:03

## 2019-02-10 RX ADMIN — HEPARIN SODIUM 11 UNIT(S)/HR: 5000 INJECTION INTRAVENOUS; SUBCUTANEOUS at 06:55

## 2019-02-10 RX ADMIN — ATORVASTATIN CALCIUM 20 MILLIGRAM(S): 80 TABLET, FILM COATED ORAL at 21:33

## 2019-02-10 RX ADMIN — NYSTATIN CREAM 1 APPLICATION(S): 100000 CREAM TOPICAL at 11:15

## 2019-02-10 RX ADMIN — Medication 25 MILLIGRAM(S): at 14:07

## 2019-02-10 RX ADMIN — SERTRALINE 50 MILLIGRAM(S): 25 TABLET, FILM COATED ORAL at 11:15

## 2019-02-10 RX ADMIN — Medication 5 MILLIGRAM(S): at 13:41

## 2019-02-10 NOTE — PROGRESS NOTE ADULT - SUBJECTIVE AND OBJECTIVE BOX
looks OK-but still in a.flutter/PAT with rate in 110-120 range-periods of lowish BP making it hard to get the beta-blockers in  lungs clear  digitalize  afraid to start amio without EMERALD given MVR and INR at 1.1 the other day  will get EP help tomorrow if still in arrhythmia-with idea for EMERALD/cversion  nausea??? since botox injection into bladder 4 days ago-give zofran  ??ileus-I hear bowel sound, and she moved bowels this am

## 2019-02-10 NOTE — PROGRESS NOTE ADULT - PROBLEM SELECTOR PLAN 4
UA trace leuks, (+) bacteria, (NEG) nitrites. Given IV abx in ER  -Urine culture no growth final report   -Received 2 doses of Ceftriaxone   -Will call Dr. Amos office to update.

## 2019-02-10 NOTE — PROGRESS NOTE ADULT - SUBJECTIVE AND OBJECTIVE BOX
CARDIOLOGY NP PROGRESS NOTE    Subjective: Pt seen and examined this morning. c/o feeling nauseated, had BM this AM. Denies chest pain, SOB, palpitations, dizziness, fever, chills, abdominal pain, or nausea.     Overnight Events: SBP around 3AM, 80s. Metoprolol Tartrate was held at 6AM. BP fluctuated 80s to low 100s.      TELEMETRY: atachy rate 100s up to 120s   EKG: Atachy rate 123      VITAL SIGNS:  T(C): 36.2 (02-10-19 @ 13:52), Max: 36.7 (02-09-19 @ 18:17)  HR: 112 (02-10-19 @ 17:04) (110 - 114)  BP: 141/84 (02-10-19 @ 17:04) (79/62 - 151/74)  RR: 17 (02-10-19 @ 17:04) (16 - 18)  SpO2: 92% (02-10-19 @ 17:04) (91% - 97%)  Wt(kg): --    I&O's Summary    09 Feb 2019 07:01  -  10 Feb 2019 07:00  --------------------------------------------------------  IN: 582 mL / OUT: 705 mL / NET: -123 mL    10 Feb 2019 07:01  -  10 Feb 2019 17:43  --------------------------------------------------------  IN: 428 mL / OUT: 350 mL / NET: 78 mL          PHYSICAL EXAM:    General: A/ox 3, No acute Distress  Neck: Supple, NO JVD  Cardiac: S1 S2, No M/R/G  Pulmonary: CTAB, Breathing unlabored, No Rhonchi/Rales/Wheezing  Abdomen: Soft, Non -tender, +BS x 4 quads  Extremities: Rash under left breast   Neuro: A/o x 3, No focal deficits          LABS:                          14.1   9.93  )-----------( 173      ( 10 Feb 2019 07:01 )             43.4                              02-10    138  |  101  |  23  ----------------------------<  114<H>  4.2   |  26  |  0.93    Ca    9.7      10 Feb 2019 07:01  Mg     2.2     02-10    TPro  7.2  /  Alb  4.0  /  TBili  0.8  /  DBili  x   /  AST  18  /  ALT  13  /  AlkPhos  62  02-09    LIVER FUNCTIONS - ( 09 Feb 2019 07:05 )  Alb: 4.0 g/dL / Pro: 7.2 g/dL / ALK PHOS: 62 U/L / ALT: 13 U/L / AST: 18 U/L / GGT: x                                 PT/INR - ( 10 Feb 2019 07:01 )   PT: 15.1 sec;   INR: 1.32          PTT - ( 10 Feb 2019 07:01 )  PTT:70.9 sec  CAPILLARY BLOOD GLUCOSE                  chlorhexidine topical (Rash)  fish (Other)  penicillin (Hives)    MEDICATIONS  (STANDING):  acyclovir   Oral Tab/Cap 400 milliGRAM(s) Oral daily  atorvastatin 20 milliGRAM(s) Oral at bedtime  digoxin  Injectable 0.25 milliGRAM(s) IV Push every 6 hours  docusate sodium 100 milliGRAM(s) Oral two times a day  heparin  Infusion 1100 Unit(s)/Hr (11 mL/Hr) IV Continuous <Continuous>  levothyroxine 75 MICROGram(s) Oral daily  metoprolol tartrate 25 milliGRAM(s) Oral every 6 hours  nystatin Powder 1 Application(s) Topical two times a day  pantoprazole    Tablet 40 milliGRAM(s) Oral before breakfast  raloxifene 60 milliGRAM(s) Oral daily  senna 2 Tablet(s) Oral at bedtime  sertraline 50 milliGRAM(s) Oral daily  warfarin 7.5 milliGRAM(s) Oral once    MEDICATIONS  (PRN):  magnesium hydroxide Suspension 30 milliLiter(s) Oral daily PRN Constipation  ondansetron Injectable 4 milliGRAM(s) IV Push every 8 hours PRN Nausea and/or Vomiting

## 2019-02-11 LAB
ALBUMIN SERPL ELPH-MCNC: 3.4 G/DL — SIGNIFICANT CHANGE UP (ref 3.3–5)
ALP SERPL-CCNC: 70 U/L — SIGNIFICANT CHANGE UP (ref 40–120)
ALT FLD-CCNC: 11 U/L — SIGNIFICANT CHANGE UP (ref 10–45)
AMYLASE P1 CFR SERPL: 111 U/L — SIGNIFICANT CHANGE UP (ref 25–125)
ANION GAP SERPL CALC-SCNC: 10 MMOL/L — SIGNIFICANT CHANGE UP (ref 5–17)
APTT BLD: 103.1 SEC — HIGH (ref 27.5–36.3)
APTT BLD: 81 SEC — HIGH (ref 27.5–36.3)
APTT BLD: 86.8 SEC — HIGH (ref 27.5–36.3)
AST SERPL-CCNC: 16 U/L — SIGNIFICANT CHANGE UP (ref 10–40)
BILIRUB SERPL-MCNC: 0.2 MG/DL — SIGNIFICANT CHANGE UP (ref 0.2–1.2)
BUN SERPL-MCNC: 23 MG/DL — SIGNIFICANT CHANGE UP (ref 7–23)
CALCIUM SERPL-MCNC: 9.7 MG/DL — SIGNIFICANT CHANGE UP (ref 8.4–10.5)
CHLORIDE SERPL-SCNC: 101 MMOL/L — SIGNIFICANT CHANGE UP (ref 96–108)
CK MB CFR SERPL CALC: 1.4 NG/ML — SIGNIFICANT CHANGE UP (ref 0–6.7)
CK SERPL-CCNC: 40 U/L — SIGNIFICANT CHANGE UP (ref 25–170)
CO2 SERPL-SCNC: 24 MMOL/L — SIGNIFICANT CHANGE UP (ref 22–31)
CREAT SERPL-MCNC: 0.97 MG/DL — SIGNIFICANT CHANGE UP (ref 0.5–1.3)
GLUCOSE SERPL-MCNC: 104 MG/DL — HIGH (ref 70–99)
HCT VFR BLD CALC: 41.8 % — SIGNIFICANT CHANGE UP (ref 34.5–45)
HGB BLD-MCNC: 13.7 G/DL — SIGNIFICANT CHANGE UP (ref 11.5–15.5)
INR BLD: 1.9 — HIGH (ref 0.88–1.16)
LIDOCAIN IGE QN: 44 U/L — SIGNIFICANT CHANGE UP (ref 7–60)
MAGNESIUM SERPL-MCNC: 2.1 MG/DL — SIGNIFICANT CHANGE UP (ref 1.6–2.6)
MCHC RBC-ENTMCNC: 32.8 GM/DL — SIGNIFICANT CHANGE UP (ref 32–36)
MCHC RBC-ENTMCNC: 33 PG — SIGNIFICANT CHANGE UP (ref 27–34)
MCV RBC AUTO: 100.7 FL — HIGH (ref 80–100)
NRBC # BLD: 0 /100 WBCS — SIGNIFICANT CHANGE UP (ref 0–0)
PLATELET # BLD AUTO: 172 K/UL — SIGNIFICANT CHANGE UP (ref 150–400)
POTASSIUM SERPL-MCNC: 4.4 MMOL/L — SIGNIFICANT CHANGE UP (ref 3.5–5.3)
POTASSIUM SERPL-SCNC: 4.4 MMOL/L — SIGNIFICANT CHANGE UP (ref 3.5–5.3)
PROT SERPL-MCNC: 6.8 G/DL — SIGNIFICANT CHANGE UP (ref 6–8.3)
PROTHROM AB SERPL-ACNC: 21.9 SEC — HIGH (ref 10–12.9)
RBC # BLD: 4.15 M/UL — SIGNIFICANT CHANGE UP (ref 3.8–5.2)
RBC # FLD: 13.1 % — SIGNIFICANT CHANGE UP (ref 10.3–14.5)
SODIUM SERPL-SCNC: 135 MMOL/L — SIGNIFICANT CHANGE UP (ref 135–145)
TROPONIN T SERPL-MCNC: <0.01 NG/ML — SIGNIFICANT CHANGE UP (ref 0–0.01)
WBC # BLD: 9.34 K/UL — SIGNIFICANT CHANGE UP (ref 3.8–10.5)
WBC # FLD AUTO: 9.34 K/UL — SIGNIFICANT CHANGE UP (ref 3.8–10.5)

## 2019-02-11 PROCEDURE — 93321 DOPPLER ECHO F-UP/LMTD STD: CPT | Mod: 26,59

## 2019-02-11 PROCEDURE — 93306 TTE W/DOPPLER COMPLETE: CPT | Mod: 26,59

## 2019-02-11 PROCEDURE — 93325 DOPPLER ECHO COLOR FLOW MAPG: CPT | Mod: 26,59

## 2019-02-11 PROCEDURE — 93010 ELECTROCARDIOGRAM REPORT: CPT

## 2019-02-11 PROCEDURE — 93312 ECHO TRANSESOPHAGEAL: CPT | Mod: 26

## 2019-02-11 RX ORDER — HEPARIN SODIUM 5000 [USP'U]/ML
900 INJECTION INTRAVENOUS; SUBCUTANEOUS
Qty: 25000 | Refills: 0 | Status: DISCONTINUED | OUTPATIENT
Start: 2019-02-11 | End: 2019-02-12

## 2019-02-11 RX ORDER — DIGOXIN 250 MCG
0.25 TABLET ORAL DAILY
Refills: 0 | Status: DISCONTINUED | OUTPATIENT
Start: 2019-02-12 | End: 2019-02-12

## 2019-02-11 RX ORDER — DIGOXIN 250 MCG
0.25 TABLET ORAL ONCE
Qty: 0 | Refills: 0 | Status: COMPLETED | OUTPATIENT
Start: 2019-02-11 | End: 2019-02-11

## 2019-02-11 RX ORDER — WARFARIN SODIUM 2.5 MG/1
5 TABLET ORAL ONCE
Qty: 0 | Refills: 0 | Status: COMPLETED | OUTPATIENT
Start: 2019-02-11 | End: 2019-02-11

## 2019-02-11 RX ORDER — METOPROLOL TARTRATE 50 MG
50 TABLET ORAL EVERY 8 HOURS
Refills: 0 | Status: DISCONTINUED | OUTPATIENT
Start: 2019-02-11 | End: 2019-02-13

## 2019-02-11 RX ADMIN — PANTOPRAZOLE SODIUM 40 MILLIGRAM(S): 20 TABLET, DELAYED RELEASE ORAL at 06:38

## 2019-02-11 RX ADMIN — Medication 50 MILLIGRAM(S): at 18:36

## 2019-02-11 RX ADMIN — Medication 400 MILLIGRAM(S): at 11:39

## 2019-02-11 RX ADMIN — Medication 25 MILLIGRAM(S): at 06:38

## 2019-02-11 RX ADMIN — Medication 0.25 MILLIGRAM(S): at 07:46

## 2019-02-11 RX ADMIN — Medication 100 MILLIGRAM(S): at 06:38

## 2019-02-11 RX ADMIN — SENNA PLUS 2 TABLET(S): 8.6 TABLET ORAL at 21:49

## 2019-02-11 RX ADMIN — HEPARIN SODIUM 9 UNIT(S)/HR: 5000 INJECTION INTRAVENOUS; SUBCUTANEOUS at 08:43

## 2019-02-11 RX ADMIN — NYSTATIN CREAM 1 APPLICATION(S): 100000 CREAM TOPICAL at 06:39

## 2019-02-11 RX ADMIN — Medication 75 MICROGRAM(S): at 06:38

## 2019-02-11 RX ADMIN — NYSTATIN CREAM 1 APPLICATION(S): 100000 CREAM TOPICAL at 18:36

## 2019-02-11 RX ADMIN — ONDANSETRON 4 MILLIGRAM(S): 8 TABLET, FILM COATED ORAL at 08:43

## 2019-02-11 RX ADMIN — ATORVASTATIN CALCIUM 20 MILLIGRAM(S): 80 TABLET, FILM COATED ORAL at 21:49

## 2019-02-11 RX ADMIN — WARFARIN SODIUM 5 MILLIGRAM(S): 2.5 TABLET ORAL at 21:49

## 2019-02-11 RX ADMIN — Medication 25 MILLIGRAM(S): at 11:38

## 2019-02-11 RX ADMIN — SERTRALINE 50 MILLIGRAM(S): 25 TABLET, FILM COATED ORAL at 11:38

## 2019-02-11 NOTE — PROGRESS NOTE ADULT - SUBJECTIVE AND OBJECTIVE BOX
persisten a.flutter with rate 110-115 despite beta-blockers and  digoxin loading  afraid to give amio pre-EMERALD/CVersion given   INR of 1.1 the other day  get EP help   nausea somewhat better-belly film looks OK  2/10 EKG without signs for ischemia  check 1 more troponin

## 2019-02-11 NOTE — CONSULT NOTE ADULT - SUBJECTIVE AND OBJECTIVE BOX
Incomplete Note      HISTORY OF PRESENT ILLNESS:   HPI:  Ms. Oconnor is a 89 year-old female PMHx HTN, HLD, hypothyroidism, s/p MVr with Dr. 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 EKG SR with 1st degree AVB - no acute ischemic changes. CXR with R sided patchy infiltrate and vascular congestion.  CT-A NEG for PE but small bilateral pleural effusions.  Pt has been treated for decompensated heart failure and went into Atrial flutter since admission.  EP now now consulted for aid in management of Aflutter despite Dig and Betablockade.  Pt has been managed on Heparin as INR (home coumadin) fell to 1.18 two days ago.    Pt reports feeling well, improvement in SOB, no dizziness, still persistence nausea.     PAST MEDICAL AND SURGICAL HISTORY:  PAST MEDICAL & SURGICAL HISTORY:  Hypothyroid  High cholesterol  HTN (hypertension)  FH: mitral valve repair  Afib on A/c      FAMILY HISTORY: FAMILY HISTORY:  No pertinent family history in first degree relatives      SOCIAL HISTORY:     REVIEW OF SYSTEM:     Denies dizziness, cp, sob, palpitations fever, chills, cough or sputum production  + Nausea, no vomiting or diarrhea    ALLERGY:  chlorhexidine topical (Rash)  fish (Other)  penicillin (Hives)      INPATIENT MEDICATIONS:  acyclovir   Oral Tab/Cap 400 milliGRAM(s) Oral daily  atorvastatin 20 milliGRAM(s) Oral at bedtime  docusate sodium 100 milliGRAM(s) Oral two times a day  heparin  Infusion 900 Unit(s)/Hr IV Continuous <Continuous>  levothyroxine 75 MICROGram(s) Oral daily  magnesium hydroxide Suspension 30 milliLiter(s) Oral daily PRN  metoprolol tartrate 25 milliGRAM(s) Oral every 6 hours  nystatin Powder 1 Application(s) Topical two times a day  ondansetron Injectable 4 milliGRAM(s) IV Push every 8 hours PRN  pantoprazole    Tablet 40 milliGRAM(s) Oral before breakfast  senna 2 Tablet(s) Oral at bedtime  sertraline 50 milliGRAM(s) Oral daily      VITAL SIGNS:   T(C): 36.2 (02-11-19 @ 10:12), Max: 36.7 (02-10-19 @ 22:39)  HR: 114 (02-11-19 @ 08:34) (110 - 114)  BP: 102/58 (02-11-19 @ 08:34) (102/58 - 148/79)  RR: 17 (02-11-19 @ 08:34) (16 - 18)  SpO2: 92% (02-11-19 @ 08:34) (91% - 95%)  Wt(kg): --    PHYSICAL EXAM:   Appearance: comfortable siting out of bed, in chair  CVS: +S1, S2, no JVD  Pulm: Mild atelectatic bs which cleared with deep inspiration  Abd:  Soft, NT/ND, + BS	  Ext: trace edema    LABS:                        13.7   9.34  )-----------( 172      ( 11 Feb 2019 07:11 )             41.8     02-11    135  |  101  |  23  ----------------------------<  104<H>  4.4   |  24  |  0.97    Ca    9.7      11 Feb 2019 07:11  Mg     2.1     02-11    TPro  6.8  /  Alb  3.4  /  TBili  0.2  /  DBili  x   /  AST  16  /  ALT  11  /  AlkPhos  70  02-11    PT/INR - ( 11 Feb 2019 07:11 )   PT: 21.9 sec;   INR: 1.90          PTT - ( 11 Feb 2019 07:11 )  PTT:103.1 sec  TSH  Troponin   LIVER FUNCTIONS - ( 11 Feb 2019 07:11 )  Alb: 3.4 g/dL / Pro: 6.8 g/dL / ALK PHOS: 70 U/L / ALT: 11 U/L / AST: 16 U/L / GGT: x             EKG:  ekg on admission sinus 1st degree av block    Telemetry:  Atrial flutter rate 113-114    ECHO:    89 y.o female pmh of afib (subtheraputic on coumadin), rheumatic mitral stenosis s/p MVR 2016 presented with decompensated hf now found to be in atrial flutter s/p dig load and Metoprolol    Atrial Flutter - Discussed plan for EMERALD/DCCV with patient.  Son not at bedside during time of discussion  In mean time would continue Metoprolol 25mg Q6 hrs, continue Heparin Bridge to coumadin per primary team  Recommend check TSH, trend ptt/INR, 12 lead ekg, TTE. HISTORY OF PRESENT ILLNESS:   HPI:  Ms. Oconnor is a 89 year-old female PMHx HTN, HLD, hypothyroidism, s/p MVr with Dr. 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 EKG SR with 1st degree AVB - no acute ischemic changes. CXR with R sided patchy infiltrate and vascular congestion.  CT-A NEG for PE but small bilateral pleural effusions.  Pt has been treated for decompensated heart failure and went into Atrial flutter since admission.  EP now now consulted for aid in management of Aflutter despite Dig and Betablockade.  Pt has been managed on Heparin as INR (home coumadin) fell to 1.18 two days ago.    Pt reports feeling well, improvement in SOB, no dizziness, still persistence nausea.     PAST MEDICAL AND SURGICAL HISTORY:  PAST MEDICAL & SURGICAL HISTORY:  Hypothyroid  High cholesterol  HTN (hypertension)  FH: mitral valve repair  Afib on A/c      FAMILY HISTORY: FAMILY HISTORY:  No pertinent family history in first degree relatives      SOCIAL HISTORY:     REVIEW OF SYSTEM:     Denies dizziness, cp, sob, palpitations fever, chills, cough or sputum production  + Nausea, no vomiting or diarrhea    ALLERGY:  chlorhexidine topical (Rash)  fish (Other)  penicillin (Hives)      INPATIENT MEDICATIONS:  acyclovir   Oral Tab/Cap 400 milliGRAM(s) Oral daily  atorvastatin 20 milliGRAM(s) Oral at bedtime  docusate sodium 100 milliGRAM(s) Oral two times a day  heparin  Infusion 900 Unit(s)/Hr IV Continuous <Continuous>  levothyroxine 75 MICROGram(s) Oral daily  magnesium hydroxide Suspension 30 milliLiter(s) Oral daily PRN  metoprolol tartrate 25 milliGRAM(s) Oral every 6 hours  nystatin Powder 1 Application(s) Topical two times a day  ondansetron Injectable 4 milliGRAM(s) IV Push every 8 hours PRN  pantoprazole    Tablet 40 milliGRAM(s) Oral before breakfast  senna 2 Tablet(s) Oral at bedtime  sertraline 50 milliGRAM(s) Oral daily      VITAL SIGNS:   T(C): 36.2 (02-11-19 @ 10:12), Max: 36.7 (02-10-19 @ 22:39)  HR: 114 (02-11-19 @ 08:34) (110 - 114)  BP: 102/58 (02-11-19 @ 08:34) (102/58 - 148/79)  RR: 17 (02-11-19 @ 08:34) (16 - 18)  SpO2: 92% (02-11-19 @ 08:34) (91% - 95%)  Wt(kg): --    PHYSICAL EXAM:   Appearance: comfortable siting out of bed, in chair.  AAOx3  CVS: +S1, S2, no JVD  Pulm: Mild atelectatic bs which cleared with deep inspiration  Abd:  Soft, NT/ND, + BS	  Ext: trace edema    LABS:                        13.7   9.34  )-----------( 172      ( 11 Feb 2019 07:11 )             41.8     02-11    135  |  101  |  23  ----------------------------<  104<H>  4.4   |  24  |  0.97    Ca    9.7      11 Feb 2019 07:11  Mg     2.1     02-11    TPro  6.8  /  Alb  3.4  /  TBili  0.2  /  DBili  x   /  AST  16  /  ALT  11  /  AlkPhos  70  02-11    PT/INR - ( 11 Feb 2019 07:11 )   PT: 21.9 sec;   INR: 1.90          PTT - ( 11 Feb 2019 07:11 )  PTT:103.1 sec  TSH  Troponin   LIVER FUNCTIONS - ( 11 Feb 2019 07:11 )  Alb: 3.4 g/dL / Pro: 6.8 g/dL / ALK PHOS: 70 U/L / ALT: 11 U/L / AST: 16 U/L / GGT: x             EKG:  ekg on admission sinus 1st degree av block    Telemetry:  Atrial flutter rate 113-114    ECHO:    89 y.o female pmh of afib (subtheraputic on coumadin), rheumatic mitral stenosis s/p MVR 2016 presented with decompensated hf now found to be in atrial flutter s/p dig load and Metoprolol    Atrial Flutter - Discussed plan for EMERALD/DCCV with patient and family.  In mean time would continue Metoprolol 25mg Q6 hrs, continue Heparin Bridge to coumadin per primary team  Recommend check TSH, trend ptt/INR, 12 lead ekg, TTE.  Plan for EMERALD/DCCV  Would D/C Digoxin.

## 2019-02-11 NOTE — PROGRESS NOTE ADULT - SUBJECTIVE AND OBJECTIVE BOX
CARDIOLOGY NP PROGRESS NOTE    Subjective: Pt seen and examined at bedside. Reports feeling a little better. S/p large brown colored BM this morning. Denies sob at this time, sitting comfortably in chair. Denies chest pain, lightheadedness, dizziness, palpitations, fever, chills.  Remainder ROS otherwise negative.    Overnight Events: S/p Digoxin load 1g completed this AM for aflutter HR 110s.     TELEMETRY: Aflutter 110s           VITAL SIGNS:  T(C): 36.2 (02-11-19 @ 10:12), Max: 36.7 (02-10-19 @ 22:39)  HR: 114 (02-11-19 @ 08:34) (110 - 114)  BP: 102/58 (02-11-19 @ 08:34) (102/58 - 148/79)  RR: 17 (02-11-19 @ 08:34) (16 - 18)  SpO2: 92% (02-11-19 @ 08:34) (91% - 95%)  Wt(kg): --    I&O's Summary    10 Feb 2019 07:01  -  11 Feb 2019 07:00  --------------------------------------------------------  IN: 560 mL / OUT: 550 mL / NET: 10 mL    11 Feb 2019 07:01  -  11 Feb 2019 10:54  --------------------------------------------------------  IN: 22 mL / OUT: 0 mL / NET: 22 mL          PHYSICAL EXAM:    General: A/ox 3, No acute Distress  Neck: Supple, NO JVD  Cardiac: S1 S2, No M/R/G  Pulmonary: CTAB, Breathing unlabored, No Rhonchi/Rales/Wheezing  Abdomen: Soft, Non -tender, +BS x 4 quads  Extremities: No Rashes, No edema  Neuro: A/o x 3, No focal deficits          LABS:                          13.7   9.34  )-----------( 172      ( 11 Feb 2019 07:11 )             41.8                              02-11    135  |  101  |  23  ----------------------------<  104<H>  4.4   |  24  |  0.97    Ca    9.7      11 Feb 2019 07:11  Mg     2.1     02-11    TPro  6.8  /  Alb  3.4  /  TBili  0.2  /  DBili  x   /  AST  16  /  ALT  11  /  AlkPhos  70  02-11    LIVER FUNCTIONS - ( 11 Feb 2019 07:11 )  Alb: 3.4 g/dL / Pro: 6.8 g/dL / ALK PHOS: 70 U/L / ALT: 11 U/L / AST: 16 U/L / GGT: x                                 PT/INR - ( 11 Feb 2019 07:11 )   PT: 21.9 sec;   INR: 1.90          PTT - ( 11 Feb 2019 07:11 )  PTT:103.1 sec  CAPILLARY BLOOD GLUCOSE        CARDIAC MARKERS ( 11 Feb 2019 07:11 )  x     / <0.01 ng/mL / 40 U/L / x     / 1.4 ng/mL          Allergies:  chlorhexidine topical (Rash)  fish (Other)  penicillin (Hives)    MEDICATIONS  (STANDING):  acyclovir   Oral Tab/Cap 400 milliGRAM(s) Oral daily  atorvastatin 20 milliGRAM(s) Oral at bedtime  docusate sodium 100 milliGRAM(s) Oral two times a day  heparin  Infusion 900 Unit(s)/Hr (9 mL/Hr) IV Continuous <Continuous>  levothyroxine 75 MICROGram(s) Oral daily  metoprolol tartrate 25 milliGRAM(s) Oral every 6 hours  nystatin Powder 1 Application(s) Topical two times a day  pantoprazole    Tablet 40 milliGRAM(s) Oral before breakfast  senna 2 Tablet(s) Oral at bedtime  sertraline 50 milliGRAM(s) Oral daily    MEDICATIONS  (PRN):  magnesium hydroxide Suspension 30 milliLiter(s) Oral daily PRN Constipation  ondansetron Injectable 4 milliGRAM(s) IV Push every 8 hours PRN Nausea and/or Vomiting        DIAGNOSTIC TESTS:

## 2019-02-12 LAB
ANION GAP SERPL CALC-SCNC: 9 MMOL/L — SIGNIFICANT CHANGE UP (ref 5–17)
APTT BLD: 69.8 SEC — HIGH (ref 27.5–36.3)
BUN SERPL-MCNC: 22 MG/DL — SIGNIFICANT CHANGE UP (ref 7–23)
CALCIUM SERPL-MCNC: 9.7 MG/DL — SIGNIFICANT CHANGE UP (ref 8.4–10.5)
CHLORIDE SERPL-SCNC: 103 MMOL/L — SIGNIFICANT CHANGE UP (ref 96–108)
CHOLEST SERPL-MCNC: 182 MG/DL — SIGNIFICANT CHANGE UP (ref 10–199)
CO2 SERPL-SCNC: 27 MMOL/L — SIGNIFICANT CHANGE UP (ref 22–31)
CREAT SERPL-MCNC: 1.01 MG/DL — SIGNIFICANT CHANGE UP (ref 0.5–1.3)
GLUCOSE SERPL-MCNC: 99 MG/DL — SIGNIFICANT CHANGE UP (ref 70–99)
HBA1C BLD-MCNC: 5.6 % — SIGNIFICANT CHANGE UP (ref 4–5.6)
HCT VFR BLD CALC: 42.2 % — SIGNIFICANT CHANGE UP (ref 34.5–45)
HDLC SERPL-MCNC: 39 MG/DL — LOW
HGB BLD-MCNC: 13.3 G/DL — SIGNIFICANT CHANGE UP (ref 11.5–15.5)
INR BLD: 2.58 — HIGH (ref 0.88–1.16)
LIPID PNL WITH DIRECT LDL SERPL: 106 MG/DL — SIGNIFICANT CHANGE UP
MAGNESIUM SERPL-MCNC: 2.1 MG/DL — SIGNIFICANT CHANGE UP (ref 1.6–2.6)
MCHC RBC-ENTMCNC: 31.5 GM/DL — LOW (ref 32–36)
MCHC RBC-ENTMCNC: 32.2 PG — SIGNIFICANT CHANGE UP (ref 27–34)
MCV RBC AUTO: 102.2 FL — HIGH (ref 80–100)
NRBC # BLD: 0 /100 WBCS — SIGNIFICANT CHANGE UP (ref 0–0)
PLATELET # BLD AUTO: 172 K/UL — SIGNIFICANT CHANGE UP (ref 150–400)
POTASSIUM SERPL-MCNC: 4.6 MMOL/L — SIGNIFICANT CHANGE UP (ref 3.5–5.3)
POTASSIUM SERPL-SCNC: 4.6 MMOL/L — SIGNIFICANT CHANGE UP (ref 3.5–5.3)
PROTHROM AB SERPL-ACNC: 30 SEC — HIGH (ref 10–12.9)
RBC # BLD: 4.13 M/UL — SIGNIFICANT CHANGE UP (ref 3.8–5.2)
RBC # FLD: 13.3 % — SIGNIFICANT CHANGE UP (ref 10.3–14.5)
SODIUM SERPL-SCNC: 139 MMOL/L — SIGNIFICANT CHANGE UP (ref 135–145)
TOTAL CHOLESTEROL/HDL RATIO MEASUREMENT: 4.7 RATIO — SIGNIFICANT CHANGE UP (ref 3.3–7.1)
TRIGL SERPL-MCNC: 184 MG/DL — HIGH (ref 10–149)
TSH SERPL-MCNC: 4.62 UIU/ML — SIGNIFICANT CHANGE UP (ref 0.35–4.94)
WBC # BLD: 10.12 K/UL — SIGNIFICANT CHANGE UP (ref 3.8–10.5)
WBC # FLD AUTO: 10.12 K/UL — SIGNIFICANT CHANGE UP (ref 3.8–10.5)

## 2019-02-12 PROCEDURE — 93010 ELECTROCARDIOGRAM REPORT: CPT

## 2019-02-12 PROCEDURE — 71045 X-RAY EXAM CHEST 1 VIEW: CPT | Mod: 26

## 2019-02-12 PROCEDURE — 99233 SBSQ HOSP IP/OBS HIGH 50: CPT

## 2019-02-12 RX ORDER — DIGOXIN 250 MCG
0.12 TABLET ORAL DAILY
Qty: 0 | Refills: 0 | Status: DISCONTINUED | OUTPATIENT
Start: 2019-02-13 | End: 2019-02-13

## 2019-02-12 RX ADMIN — SENNA PLUS 2 TABLET(S): 8.6 TABLET ORAL at 21:46

## 2019-02-12 RX ADMIN — Medication 100 MILLIGRAM(S): at 06:32

## 2019-02-12 RX ADMIN — NYSTATIN CREAM 1 APPLICATION(S): 100000 CREAM TOPICAL at 06:33

## 2019-02-12 RX ADMIN — Medication 400 MILLIGRAM(S): at 10:23

## 2019-02-12 RX ADMIN — Medication 50 MILLIGRAM(S): at 10:23

## 2019-02-12 RX ADMIN — NYSTATIN CREAM 1 APPLICATION(S): 100000 CREAM TOPICAL at 17:24

## 2019-02-12 RX ADMIN — Medication 50 MILLIGRAM(S): at 01:16

## 2019-02-12 RX ADMIN — SERTRALINE 50 MILLIGRAM(S): 25 TABLET, FILM COATED ORAL at 10:23

## 2019-02-12 RX ADMIN — Medication 75 MICROGRAM(S): at 06:32

## 2019-02-12 RX ADMIN — PANTOPRAZOLE SODIUM 40 MILLIGRAM(S): 20 TABLET, DELAYED RELEASE ORAL at 06:32

## 2019-02-12 RX ADMIN — ATORVASTATIN CALCIUM 20 MILLIGRAM(S): 80 TABLET, FILM COATED ORAL at 21:46

## 2019-02-12 RX ADMIN — Medication 50 MILLIGRAM(S): at 17:24

## 2019-02-12 RX ADMIN — Medication 0.25 MILLIGRAM(S): at 06:32

## 2019-02-12 NOTE — PROGRESS NOTE ADULT - SUBJECTIVE AND OBJECTIVE BOX
Subjective:  EMERALD yesterday + for MYKE sludge.  No plans for DCCV at this time  Pt with multiple episodes of > 3 second asymptomatic pause on Dig and Metoprolol.      PAST MEDICAL AND SURGICAL HISTORY:  PAST MEDICAL & SURGICAL HISTORY:  Hypothyroid  High cholesterol  HTN (hypertension)  FH: mitral valve repair  Afib      FAMILY HISTORY: FAMILY HISTORY:  No pertinent family history in first degree relatives      SOCIAL HISTORY:     REVIEW OF SYSTEM:     ALLERGY:  chlorhexidine topical (Rash)  fish (Other)  penicillin (Hives)      INPATIENT MEDICATIONS:  acyclovir   Oral Tab/Cap 400 milliGRAM(s) Oral daily  atorvastatin 20 milliGRAM(s) Oral at bedtime  docusate sodium 100 milliGRAM(s) Oral two times a day  levothyroxine 75 MICROGram(s) Oral daily  magnesium hydroxide Suspension 30 milliLiter(s) Oral daily PRN  metoprolol tartrate 50 milliGRAM(s) Oral every 8 hours  nystatin Powder 1 Application(s) Topical two times a day  ondansetron Injectable 4 milliGRAM(s) IV Push every 8 hours PRN  pantoprazole    Tablet 40 milliGRAM(s) Oral before breakfast  senna 2 Tablet(s) Oral at bedtime  sertraline 50 milliGRAM(s) Oral daily      VITAL SIGNS:   T(C): 36.9 (02-12-19 @ 09:04), Max: 36.9 (02-12-19 @ 09:04)  HR: 88 (02-12-19 @ 10:13) (60 - 114)  BP: 114/50 (02-12-19 @ 10:13) (91/52 - 144/74)  RR: 18 (02-12-19 @ 10:13) (17 - 18)  SpO2: 93% (02-12-19 @ 10:13) (91% - 98%)  Wt(kg): --    PHYSICAL EXAM:   Appearance:  pleasant in bed comfortable, AAOx3	  CVS: irregular, no jvd  Pulm: CTA B/L   Abd:  Soft, NT/ND, + BS	  Ext: No sig edema    LABS:                        13.3   10.12 )-----------( 172      ( 12 Feb 2019 07:36 )             42.2     02-12    139  |  103  |  22  ----------------------------<  99  4.6   |  27  |  1.01    Ca    9.7      12 Feb 2019 07:36  Mg     2.1     02-12    TPro  6.8  /  Alb  3.4  /  TBili  0.2  /  DBili  x   /  AST  16  /  ALT  11  /  AlkPhos  70  02-11    PT/INR - ( 12 Feb 2019 07:36 )   PT: 30.0 sec;   INR: 2.58          PTT - ( 12 Feb 2019 07:36 )  PTT:69.8 sec  TSH  Troponin   LIVER FUNCTIONS - ( 11 Feb 2019 07:11 )  Alb: 3.4 g/dL / Pro: 6.8 g/dL / ALK PHOS: 70 U/L / ALT: 11 U/L / AST: 16 U/L / GGT: x             Telemetry:  rate controlled atrial flutter, multiple pauses    ECHO: < from: Echocardiogram (02.11.19 @ 12:44) >  Left ventricular hypertrophy presentThe left ventricular ejection   fraction is   estimated to be 50-55%The left atrial size is normal. Right atrial size   is   normal.The right ventricle is normal in size and function.There is mild   aortic   valve thickening. There is trace aortic regurgitation.There is a   bioprosthetic   mitral valve. No mitral regurgitation noted.The mean pressure gradient   is 5   mmHg, at a heart rate of 111 bpm.There was insufficient TR detected from   which   to calculate pulmonary artery systolic pressure.  No aortic root   dilatation.The inferior vena cava is normal in size (<2.1 cm) with normal   inspiratory collapse (>50%) consistent with normal right atrial pressure.   There is no pericardial effusion.    < end of copied text >  < from: EMERALD w/Doppler (02.11.19 @ 16:59) >  Severe spontaneous echo contrast in left atrial cavity   and left atrial appendage with sludge formation.   Left atrial appendage   apex is not well visualized with possible apical ligature. Apical thrombus   cannot be ruled out.  Injection of contrast documented an interatrial shunt.There   is mild aortic valve thickening. There is trace aortic regurgitation.There   is a bioprosthetic mitral valve. There is trace mitral regurgitation. The mean   pressure gradient is 4 mmHg.  Structurally normal tricuspid valve.There   is  trace tricuspid regurgitation.Structurally normal pulmonic valve. No   pulmonic  regurgitation noted.The right ventricular systolic function is normal.The   overall left ventricular ejection fraction is probably normal.Mild   atherosclerotic plaque(s) in the descending aorta.Mild atherosclerotic   plaque(s) in the aortic arch.There is no pericardial effusion.    < end of copied text >    89 y.o female pmh of afib (subtheraputic on coumadin), rheumatic mitral stenosis s/p MVR 2016 presented initially with decompensated hf now found to be in atrial flutter s/p dig load and Metoprolol    Atrial Flutter - pt with +sludge in MYKE yesterday on EMERALD with recently subtheraputic INR   Pt w/ >3 second pauses on medical therapy.  Pt for Dual Chamber ppm placement today    Continue Metoprolol 50mg Q8hrs  Decrease Dig to 0.125 daily  Continue Coumadin goal INR 2-3    Case Discussed with Patient, Patient's Son and EP Attending

## 2019-02-12 NOTE — PROGRESS NOTE ADULT - SUBJECTIVE AND OBJECTIVE BOX
EMERALD findings a problem-dense smoke sludge certainly contra-indicates  C-V and usually doesn't resolve with A-C  rates now <100 with pauses-may need PPM to allow for   consistent rate < 100 on drugs

## 2019-02-12 NOTE — PROGRESS NOTE ADULT - SUBJECTIVE AND OBJECTIVE BOX
CARDIOLOGY NP PROGRESS NOTE    Subjective: Pt seen and examined at bedside. Reports feeling nauseous this AM after eating breakfast. Otherwise feels okay. Denies chest pain, sob, lightheadedness, dizziness, palpitations.  Remainder ROS otherwise negative.    Overnight Events:     TELEMETRY: Aflutter 40-110s, 2.7-3.6 sec pauses noted while in Aflutter overnight    EKG:      VITAL SIGNS:  T(C): 36.9 (02-12-19 @ 09:04), Max: 36.9 (02-12-19 @ 09:04)  HR: 60 (02-12-19 @ 08:55) (60 - 114)  BP: 91/52 (02-12-19 @ 08:55) (91/52 - 144/74)  RR: 18 (02-12-19 @ 08:55) (17 - 18)  SpO2: 94% (02-12-19 @ 08:55) (91% - 98%)  Wt(kg): --    I&O's Summary    11 Feb 2019 07:01  -  12 Feb 2019 07:00  --------------------------------------------------------  IN: 176 mL / OUT: 200 mL / NET: -24 mL          PHYSICAL EXAM:    General: A/ox 3, No acute Distress  Neck: Supple, NO JVD  Cardiac: S1 S2, No M/R/G  Pulmonary: CTAB, Breathing unlabored, No Rhonchi/Rales/Wheezing  Abdomen: Soft, Non -tender, +BS x 4 quads  Extremities: No Rashes, No edema  Neuro: A/o x 3, No focal deficits          LABS:                          13.3   10.12 )-----------( 172      ( 12 Feb 2019 07:36 )             42.2                              02-12    139  |  103  |  22  ----------------------------<  99  4.6   |  27  |  1.01    Ca    9.7      12 Feb 2019 07:36  Mg     2.1     02-12    TPro  6.8  /  Alb  3.4  /  TBili  0.2  /  DBili  x   /  AST  16  /  ALT  11  /  AlkPhos  70  02-11    LIVER FUNCTIONS - ( 11 Feb 2019 07:11 )  Alb: 3.4 g/dL / Pro: 6.8 g/dL / ALK PHOS: 70 U/L / ALT: 11 U/L / AST: 16 U/L / GGT: x                                 PT/INR - ( 12 Feb 2019 07:36 )   PT: 30.0 sec;   INR: 2.58          PTT - ( 12 Feb 2019 07:36 )  PTT:69.8 sec  CAPILLARY BLOOD GLUCOSE        CARDIAC MARKERS ( 11 Feb 2019 07:11 )  x     / <0.01 ng/mL / 40 U/L / x     / 1.4 ng/mL          Allergies:  chlorhexidine topical (Rash)  fish (Other)  penicillin (Hives)    MEDICATIONS  (STANDING):  acyclovir   Oral Tab/Cap 400 milliGRAM(s) Oral daily  atorvastatin 20 milliGRAM(s) Oral at bedtime  digoxin     Tablet 0.25 milliGRAM(s) Oral daily  docusate sodium 100 milliGRAM(s) Oral two times a day  levothyroxine 75 MICROGram(s) Oral daily  metoprolol tartrate 50 milliGRAM(s) Oral every 8 hours  nystatin Powder 1 Application(s) Topical two times a day  pantoprazole    Tablet 40 milliGRAM(s) Oral before breakfast  senna 2 Tablet(s) Oral at bedtime  sertraline 50 milliGRAM(s) Oral daily    MEDICATIONS  (PRN):  magnesium hydroxide Suspension 30 milliLiter(s) Oral daily PRN Constipation  ondansetron Injectable 4 milliGRAM(s) IV Push every 8 hours PRN Nausea and/or Vomiting        DIAGNOSTIC TESTS: CARDIOLOGY NP PROGRESS NOTE    Subjective: Pt seen and examined at bedside. Reports feeling nauseous this AM after eating breakfast. Otherwise feels okay. Denies chest pain, sob, lightheadedness, dizziness, palpitations.  Remainder ROS otherwise negative.    Overnight Events: Had EMERALD yesterday noting sludge in MYKE, DCCV was therefore canceled. Increased metoprolol to 50 TID in evening for better rate control.    TELEMETRY: Aflutter w/ HR 40-110s, 2.7sec - 3.6sec pauses noted while in Aflutter overnight    EKG:      VITAL SIGNS:  T(C): 36.9 (02-12-19 @ 09:04), Max: 36.9 (02-12-19 @ 09:04)  HR: 60 (02-12-19 @ 08:55) (60 - 114)  BP: 91/52 (02-12-19 @ 08:55) (91/52 - 144/74)  RR: 18 (02-12-19 @ 08:55) (17 - 18)  SpO2: 94% (02-12-19 @ 08:55) (91% - 98%)  Wt(kg): --    I&O's Summary    11 Feb 2019 07:01  -  12 Feb 2019 07:00  --------------------------------------------------------  IN: 176 mL / OUT: 200 mL / NET: -24 mL          PHYSICAL EXAM:    General: A/ox 3, No acute Distress  Neck: Supple, NO JVD  Cardiac: S1 S2, No M/R/G  Pulmonary: CTA [place], Breathing unlabored on 2L NC, No Rhonchi/Rales/Wheezing  Abdomen: Soft, Non -tender, +BS x 4 quads  Extremities: No Rashes, No edema  Neuro: A/o x 3, No focal deficits          LABS:                          13.3   10.12 )-----------( 172      ( 12 Feb 2019 07:36 )             42.2                              02-12    139  |  103  |  22  ----------------------------<  99  4.6   |  27  |  1.01    Ca    9.7      12 Feb 2019 07:36  Mg     2.1     02-12    TPro  6.8  /  Alb  3.4  /  TBili  0.2  /  DBili  x   /  AST  16  /  ALT  11  /  AlkPhos  70  02-11    LIVER FUNCTIONS - ( 11 Feb 2019 07:11 )  Alb: 3.4 g/dL / Pro: 6.8 g/dL / ALK PHOS: 70 U/L / ALT: 11 U/L / AST: 16 U/L / GGT: x                                 PT/INR - ( 12 Feb 2019 07:36 )   PT: 30.0 sec;   INR: 2.58          PTT - ( 12 Feb 2019 07:36 )  PTT:69.8 sec  CAPILLARY BLOOD GLUCOSE        CARDIAC MARKERS ( 11 Feb 2019 07:11 )  x     / <0.01 ng/mL / 40 U/L / x     / 1.4 ng/mL          Allergies:  chlorhexidine topical (Rash)  fish (Other)  penicillin (Hives)    MEDICATIONS  (STANDING):  acyclovir   Oral Tab/Cap 400 milliGRAM(s) Oral daily  atorvastatin 20 milliGRAM(s) Oral at bedtime  digoxin     Tablet 0.25 milliGRAM(s) Oral daily  docusate sodium 100 milliGRAM(s) Oral two times a day  levothyroxine 75 MICROGram(s) Oral daily  metoprolol tartrate 50 milliGRAM(s) Oral every 8 hours  nystatin Powder 1 Application(s) Topical two times a day  pantoprazole    Tablet 40 milliGRAM(s) Oral before breakfast  senna 2 Tablet(s) Oral at bedtime  sertraline 50 milliGRAM(s) Oral daily    MEDICATIONS  (PRN):  magnesium hydroxide Suspension 30 milliLiter(s) Oral daily PRN Constipation  ondansetron Injectable 4 milliGRAM(s) IV Push every 8 hours PRN Nausea and/or Vomiting        DIAGNOSTIC TESTS:

## 2019-02-12 NOTE — DIETITIAN INITIAL EVALUATION ADULT. - ENERGY NEEDS
Height: 5'2" Weight: 161.8 lbs (2/10), 161.5 lbs (2/11), 158.2 lbs (2/12),  lbs+/-10%, %%, BMI 29.5,   IBW used to calculate EER as per pt's current body weight >120% of IBW   Estimated nutrient needs based on [facility] SOC for maintenance in older adults  Fluids per team discretion

## 2019-02-12 NOTE — PROGRESS NOTE ADULT - PROBLEM SELECTOR PLAN 4
UA trace leuks, (+) bacteria, (NEG) nitrites. Given IV abx in ER  -Urine culture no growth final report   -Received 2 doses of Ceftriaxone

## 2019-02-12 NOTE — DIETITIAN INITIAL EVALUATION ADULT. - OTHER INFO
89 y.o F from home with PMHx of HTN, HLD, Hypothyroidism, s/p MVr, pAF on Coumadin, Bladder Dysfunction. Pt c/o abdominal discomfort, nausea, vomiting 1x, admitted for acute diastolic CHF exacerbation c/b Aflutter w RVR. 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 Denies having food allergy to any other fish. Denies any recent weight loss. Pt takes Coumadin (started 5 years ago, is aware of FDI with Vitamin K), Lasix, Lipitor, Synthroid per home meds and takes Vitamin D, Vitamin C and Vitamin B12 and sometimes Fish Oil at home. NPO 2/12 for PPM implantation today, coumadin being held. Previously on DASH/TLC diet, consumed good >75% from breakfast meal today then c/o nausea. Received Zofran IV. Denies V/D/C or pain. +BM 2/11. (Colace, MoM, Senna). Skin intact. Nutrition edu provided to pt with handouts. 89 y.o F from home with PMHx of HTN, HLD, Hypothyroidism, s/p MVr, pAF on Coumadin, Bladder Dysfunction. Pt c/o abdominal discomfort, nausea, vomiting 1x, admitted for acute diastolic CHF exacerbation c/b Aflutter w RVR. 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 Denies having food allergy to any other fish. Denies any recent weight loss. Pt takes Coumadin (started 5 years ago, is aware of FDI with Vitamin K), Lasix, Lipitor, Synthroid per home meds and takes Vitamin D, Vitamin C and Vitamin B12 and sometimes Fish Oil at home. NPO 2/12 for PPM implantation today, coumadin being held. Previously on DASH/TLC diet, consumed good >75% from breakfast meal today then c/o nausea. Received Zofran IV. Denies V/D/C or pain. +BM 2/11. (Colace, MoM, Senna). Skin intact. Nutrition edu provided to pt with handouts. Please consider checking B12 and Folate status. RD will f/u per moderate risk protocol.

## 2019-02-13 ENCOUNTER — TRANSCRIPTION ENCOUNTER (OUTPATIENT)
Age: 84
End: 2019-02-13

## 2019-02-13 LAB
ANION GAP SERPL CALC-SCNC: 11 MMOL/L — SIGNIFICANT CHANGE UP (ref 5–17)
APTT BLD: 37.2 SEC — HIGH (ref 27.5–36.3)
BASOPHILS # BLD AUTO: 0.03 K/UL — SIGNIFICANT CHANGE UP (ref 0–0.2)
BASOPHILS NFR BLD AUTO: 0.3 % — SIGNIFICANT CHANGE UP (ref 0–2)
BUN SERPL-MCNC: 22 MG/DL — SIGNIFICANT CHANGE UP (ref 7–23)
CALCIUM SERPL-MCNC: 9.7 MG/DL — SIGNIFICANT CHANGE UP (ref 8.4–10.5)
CHLORIDE SERPL-SCNC: 101 MMOL/L — SIGNIFICANT CHANGE UP (ref 96–108)
CO2 SERPL-SCNC: 25 MMOL/L — SIGNIFICANT CHANGE UP (ref 22–31)
CREAT SERPL-MCNC: 0.95 MG/DL — SIGNIFICANT CHANGE UP (ref 0.5–1.3)
EOSINOPHIL # BLD AUTO: 0.61 K/UL — HIGH (ref 0–0.5)
EOSINOPHIL NFR BLD AUTO: 6.7 % — HIGH (ref 0–6)
FOLATE SERPL-MCNC: 7 NG/ML — SIGNIFICANT CHANGE UP
GLUCOSE SERPL-MCNC: 107 MG/DL — HIGH (ref 70–99)
HCT VFR BLD CALC: 41.2 % — SIGNIFICANT CHANGE UP (ref 34.5–45)
HGB BLD-MCNC: 13.4 G/DL — SIGNIFICANT CHANGE UP (ref 11.5–15.5)
IMM GRANULOCYTES NFR BLD AUTO: 0.4 % — SIGNIFICANT CHANGE UP (ref 0–1.5)
INR BLD: 2.25 — HIGH (ref 0.88–1.16)
LYMPHOCYTES # BLD AUTO: 2.2 K/UL — SIGNIFICANT CHANGE UP (ref 1–3.3)
LYMPHOCYTES # BLD AUTO: 24.1 % — SIGNIFICANT CHANGE UP (ref 13–44)
MAGNESIUM SERPL-MCNC: 2.1 MG/DL — SIGNIFICANT CHANGE UP (ref 1.6–2.6)
MCHC RBC-ENTMCNC: 32.5 GM/DL — SIGNIFICANT CHANGE UP (ref 32–36)
MCHC RBC-ENTMCNC: 32.8 PG — SIGNIFICANT CHANGE UP (ref 27–34)
MCV RBC AUTO: 100.7 FL — HIGH (ref 80–100)
MONOCYTES # BLD AUTO: 0.84 K/UL — SIGNIFICANT CHANGE UP (ref 0–0.9)
MONOCYTES NFR BLD AUTO: 9.2 % — SIGNIFICANT CHANGE UP (ref 2–14)
NEUTROPHILS # BLD AUTO: 5.41 K/UL — SIGNIFICANT CHANGE UP (ref 1.8–7.4)
NEUTROPHILS NFR BLD AUTO: 59.3 % — SIGNIFICANT CHANGE UP (ref 43–77)
NRBC # BLD: 0 /100 WBCS — SIGNIFICANT CHANGE UP (ref 0–0)
PLATELET # BLD AUTO: 162 K/UL — SIGNIFICANT CHANGE UP (ref 150–400)
POTASSIUM SERPL-MCNC: 4.5 MMOL/L — SIGNIFICANT CHANGE UP (ref 3.5–5.3)
POTASSIUM SERPL-SCNC: 4.5 MMOL/L — SIGNIFICANT CHANGE UP (ref 3.5–5.3)
PROTHROM AB SERPL-ACNC: 26.1 SEC — HIGH (ref 10–12.9)
RBC # BLD: 4.09 M/UL — SIGNIFICANT CHANGE UP (ref 3.8–5.2)
RBC # FLD: 13.2 % — SIGNIFICANT CHANGE UP (ref 10.3–14.5)
SODIUM SERPL-SCNC: 137 MMOL/L — SIGNIFICANT CHANGE UP (ref 135–145)
VIT B12 SERPL-MCNC: 1325 PG/ML — HIGH (ref 232–1245)
WBC # BLD: 9.13 K/UL — SIGNIFICANT CHANGE UP (ref 3.8–10.5)
WBC # FLD AUTO: 9.13 K/UL — SIGNIFICANT CHANGE UP (ref 3.8–10.5)

## 2019-02-13 PROCEDURE — 33208 INSRT HEART PM ATRIAL & VENT: CPT | Mod: KX

## 2019-02-13 RX ORDER — LANOLIN ALCOHOL/MO/W.PET/CERES
3 CREAM (GRAM) TOPICAL AT BEDTIME
Qty: 0 | Refills: 0 | Status: DISCONTINUED | OUTPATIENT
Start: 2019-02-13 | End: 2019-02-15

## 2019-02-13 RX ORDER — DIGOXIN 250 MCG
0.12 TABLET ORAL DAILY
Qty: 0 | Refills: 0 | Status: DISCONTINUED | OUTPATIENT
Start: 2019-02-14 | End: 2019-02-14

## 2019-02-13 RX ORDER — WARFARIN SODIUM 2.5 MG/1
5 TABLET ORAL ONCE
Qty: 0 | Refills: 0 | Status: COMPLETED | OUTPATIENT
Start: 2019-02-13 | End: 2019-02-13

## 2019-02-13 RX ORDER — VANCOMYCIN HCL 1 G
1000 VIAL (EA) INTRAVENOUS EVERY 24 HOURS
Qty: 0 | Refills: 0 | Status: COMPLETED | OUTPATIENT
Start: 2019-02-13 | End: 2019-02-14

## 2019-02-13 RX ORDER — METOPROLOL TARTRATE 50 MG
100 TABLET ORAL EVERY 12 HOURS
Qty: 0 | Refills: 0 | Status: DISCONTINUED | OUTPATIENT
Start: 2019-02-13 | End: 2019-02-14

## 2019-02-13 RX ADMIN — Medication 100 MILLIGRAM(S): at 19:34

## 2019-02-13 RX ADMIN — Medication 3 MILLIGRAM(S): at 23:26

## 2019-02-13 RX ADMIN — WARFARIN SODIUM 5 MILLIGRAM(S): 2.5 TABLET ORAL at 22:28

## 2019-02-13 RX ADMIN — SERTRALINE 50 MILLIGRAM(S): 25 TABLET, FILM COATED ORAL at 13:12

## 2019-02-13 RX ADMIN — ATORVASTATIN CALCIUM 20 MILLIGRAM(S): 80 TABLET, FILM COATED ORAL at 22:28

## 2019-02-13 RX ADMIN — Medication 50 MILLIGRAM(S): at 03:42

## 2019-02-13 RX ADMIN — Medication 50 MILLIGRAM(S): at 13:12

## 2019-02-13 RX ADMIN — Medication 100 MILLIGRAM(S): at 17:22

## 2019-02-13 RX ADMIN — Medication 100 MILLIGRAM(S): at 05:55

## 2019-02-13 RX ADMIN — Medication 75 MICROGRAM(S): at 05:55

## 2019-02-13 RX ADMIN — PANTOPRAZOLE SODIUM 40 MILLIGRAM(S): 20 TABLET, DELAYED RELEASE ORAL at 05:55

## 2019-02-13 RX ADMIN — Medication 400 MILLIGRAM(S): at 13:13

## 2019-02-13 RX ADMIN — NYSTATIN CREAM 1 APPLICATION(S): 100000 CREAM TOPICAL at 17:22

## 2019-02-13 RX ADMIN — Medication 250 MILLIGRAM(S): at 08:48

## 2019-02-13 RX ADMIN — Medication 0.12 MILLIGRAM(S): at 05:57

## 2019-02-13 RX ADMIN — SENNA PLUS 2 TABLET(S): 8.6 TABLET ORAL at 22:28

## 2019-02-13 RX ADMIN — NYSTATIN CREAM 1 APPLICATION(S): 100000 CREAM TOPICAL at 05:56

## 2019-02-13 NOTE — PROGRESS NOTE ADULT - SUBJECTIVE AND OBJECTIVE BOX
Brief note, full note to follow.    Uncomplicated dual chamber pacemaker implanted via two left axillary punctures (second rib technique, under fluoroscopic guidance).  Excellent sensing and pacing  Programmed DDD    Vancomycin one gram per protocol.  chest x-ray and eCG. Brief note, full note to follow.    Uncomplicated dual chamber [employer] (MRI compatible) pacemaker implanted via two left axillary punctures (second rib technique, under fluoroscopic guidance).  Excellent sensing and pacing  Programmed DDD    Vancomycin one gram per protocol.  chest x-ray and eCG.

## 2019-02-13 NOTE — DISCHARGE NOTE ADULT - CARE PLAN
Principal Discharge DX:	Diastolic congestive heart failure, unspecified HF chronicity  Goal:	Maintain adequate quality of life.  Assessment and plan of treatment:	You were treated for an acute on chronic diastolic heart failure exacerbation. You were given lasix to off load fluid in your lungs. You have required oxygen off and on and will be going home with oxygen as needed for dyspnea on exertion.  Secondary Diagnosis:	Essential hypertension  Assessment and plan of treatment:	continue all medications as prescribed and follow up with Dr. Dorsey in 1 week.  Secondary Diagnosis:	High cholesterol  Assessment and plan of treatment:	continue Atorvastatin 20mg daily.  Secondary Diagnosis:	Paroxysmal A-fib  Assessment and plan of treatment:	Your heart developed a Principal Discharge DX:	Diastolic congestive heart failure, unspecified HF chronicity  Goal:	Maintain adequate quality of life.  Assessment and plan of treatment:	You were treated for an acute on chronic diastolic heart failure exacerbation. You were given lasix to off load fluid in your lungs. You have required oxygen off and on and will be going home with oxygen as needed for dyspnea on exertion.  Secondary Diagnosis:	Essential hypertension  Assessment and plan of treatment:	continue all medications as prescribed and follow up with Dr. Dorsey in 1 week.  Secondary Diagnosis:	High cholesterol  Assessment and plan of treatment:	continue Atorvastatin 20mg daily.  Secondary Diagnosis:	Paroxysmal A-fib  Assessment and plan of treatment:	Your heart developed  Atrial flutter and were evaluate by the Electrophysiology team. You had a dual chamber pacemaker inserted and were started on a medication called Digoxin to help with rate control. You will continue to take coumadin for anticoagulation (goal INR 2-3). You will follow up with Dr. De La Torre in March (see below for appointment date and time).

## 2019-02-13 NOTE — DISCHARGE NOTE ADULT - PATIENT PORTAL LINK FT
You can access the [facility] Patient Portal, offered by [facility], by registering with the following website: [email or web address]

## 2019-02-13 NOTE — DISCHARGE NOTE ADULT - PROVIDER TOKENS
PROVIDER:[TOKEN:[[id number]]],PROVIDER:[TOKEN:[[id number]]],PROVIDER:[TOKEN:[[id number]]]

## 2019-02-13 NOTE — DISCHARGE NOTE ADULT - MEDICATION SUMMARY - MEDICATIONS TO TAKE
I will START or STAY ON the medications listed below when I get home from the hospital:    Home oxygen 2 liters via nasal canulla as needed for dyspnea.   -- 2 liter(s) into nose prn, As Needed -for shortness of breath and/or wheezing   -- Indication: For Dyspnea    spironolactone 25 mg oral tablet  --  by mouth   -- Indication: For Diuretic    digoxin 125 mcg (0.125 mg) oral tablet  -- 1 tab(s) by mouth every 24 hours  -- Indication: For Atrial Flutter    Coumadin 3 mg oral tablet  -- 1.5 tab(s) by mouth Monday and Thursday, 1 tab all other days.   -- Indication: For Stroke prevention    Zoloft 50 mg oral tablet  -- 1 tab(s) by mouth once a day  -- Indication: For Mood    Lipitor 20 mg oral tablet  -- 1 tab(s) by mouth once a day (at bedtime)  -- Indication: For Cholesterol    acyclovir 400 mg oral tablet  --  by mouth   -- Indication: For Preventive    Toprol-XL 25 mg oral tablet, extended release  -- 3 tab(s) by mouth every 12 hours  -- Indication: For Heart rate and blood pressure.     Lasix 40 mg oral tablet  -- 1 tab(s) by mouth every other day (at bedtime)  -- Indication: For Diuretic    docusate sodium 100 mg oral capsule  -- 1 cap(s) by mouth 2 times a day  -- Indication: For Prevent constipation    Synthroid 75 mcg (0.075 mg) oral tablet  -- 1 tab(s) by mouth once a day  -- Indication: For Hypothyroidism

## 2019-02-13 NOTE — PROGRESS NOTE ADULT - SUBJECTIVE AND OBJECTIVE BOX
SUBJECTIVE:  No events over night,  Pt continued in Aflutter with variable conduction.      MEDICATIONS:  acyclovir   Oral Tab/Cap 400 milliGRAM(s) Oral daily  atorvastatin 20 milliGRAM(s) Oral at bedtime  digoxin     Tablet 0.125 milliGRAM(s) Oral daily  docusate sodium 100 milliGRAM(s) Oral two times a day  levothyroxine 75 MICROGram(s) Oral daily  magnesium hydroxide Suspension 30 milliLiter(s) Oral daily PRN  metoprolol tartrate 50 milliGRAM(s) Oral every 8 hours  nystatin Powder 1 Application(s) Topical two times a day  ondansetron Injectable 4 milliGRAM(s) IV Push every 8 hours PRN  pantoprazole    Tablet 40 milliGRAM(s) Oral before breakfast  senna 2 Tablet(s) Oral at bedtime  sertraline 50 milliGRAM(s) Oral daily  vancomycin  IVPB 1000 milliGRAM(s) IV Intermittent every 24 hours  	    PHYSICAL EXAM:  T(C): 35.8 (02-13-19 @ 07:22), Max: 36.3 (02-12-19 @ 14:16)  HR: 86 (02-13-19 @ 11:39) (68 - 92)  BP: 132/49 (02-13-19 @ 11:39) (100/67 - 132/49)  RR: 18 (02-13-19 @ 11:39) (18 - 18)  SpO2: 92% (02-13-19 @ 11:39) (92% - 97%)  Wt(kg): --    GEN: Awake, comfortable. No acute distress.   HEENT: Moist mucous membranes.   Neck: No JVD   CV: irregular,  No murmurs, rubs, or gallops appreciated.   RESP: Clear to auscultation bilaterally  ABD: Soft, Non-tender,  EXT: Warm, well-perfused extremities. No significant edema  NEURO: No focal deficits.    I&O's Summary    12 Feb 2019 07:01  -  13 Feb 2019 07:00  --------------------------------------------------------  IN: 420 mL / OUT: 0 mL / NET: 420 mL      Height (cm): 157.4 (02-13 @ 07:22)  Weight (kg): 73.1 (02-13 @ 07:22)  BMI (kg/m2): 29.5 (02-13 @ 07:22)  BSA (m2): 1.74 (02-13 @ 07:22)      LABS:	 	    CARDIAC MARKERS:                                  13.4   9.13  )-----------( 162      ( 13 Feb 2019 07:36 )             41.2     02-13    137  |  101  |  22  ----------------------------<  107<H>  4.5   |  25  |  0.95    Ca    9.7      13 Feb 2019 07:36  Mg     2.1     02-13      Telemetry:  atrial flutter, variable conduction    ECHO: < from: Echocardiogram (02.11.19 @ 12:44) >  Left ventricular hypertrophy presentThe left ventricular ejection   fraction is   estimated to be 50-55%The left atrial size is normal. Right atrial size   is   normal.The right ventricle is normal in size and function.There is mild   aortic   valve thickening. There is trace aortic regurgitation.There is a   bioprosthetic   mitral valve. No mitral regurgitation noted.The mean pressure gradient   is 5   mmHg, at a heart rate of 111 bpm.There was insufficient TR detected from   which   to calculate pulmonary artery systolic pressure.  No aortic root   dilatation.The inferior vena cava is normal in size (<2.1 cm) with normal   inspiratory collapse (>50%) consistent with normal right atrial pressure.   There is no pericardial effusion.    < end of copied text >  < from: EMERALD w/Doppler (02.11.19 @ 16:59) >  Severe spontaneous echo contrast in left atrial cavity   and left atrial appendage with sludge formation.   Left atrial appendage   apex is not well visualized with possible apical ligature. Apical thrombus   cannot be ruled out.  Injection of contrast documented an interatrial shunt.There   is mild aortic valve thickening. There is trace aortic regurgitation.There   is a bioprosthetic mitral valve. There is trace mitral regurgitation. The mean   pressure gradient is 4 mmHg.  Structurally normal tricuspid valve.There   is  trace tricuspid regurgitation.Structurally normal pulmonic valve. No   pulmonic  regurgitation noted.The right ventricular systolic function is normal.The   overall left ventricular ejection fraction is probably normal.Mild   atherosclerotic plaque(s) in the descending aorta.Mild atherosclerotic   plaque(s) in the aortic arch.There is no pericardial effusion.        ASSESSMENT/PLAN:    89 y.o female pmh of afib (subtheraputic on coumadin), rheumatic mitral stenosis s/p MVR 2016 presented initially with decompensated hf now found to be in atrial flutter s/p dig load and Metoprolol    Atrial Flutter - pt with +sludge in MYKE on EMERALD with recently subtheraputic INR  Pt for Dual Chamber ppm placement today due to multiple >3 second pauses on medical therapy    Continue Metoprolol 50mg Q8hrs, for rate control, monitor HR for titration  Dig to 0.125 daily  Continue Coumadin goal INR 2-3 SUBJECTIVE:  No events over night,  Pt continued in Aflutter with variable conduction.      MEDICATIONS:  acyclovir   Oral Tab/Cap 400 milliGRAM(s) Oral daily  atorvastatin 20 milliGRAM(s) Oral at bedtime  digoxin     Tablet 0.125 milliGRAM(s) Oral daily  docusate sodium 100 milliGRAM(s) Oral two times a day  levothyroxine 75 MICROGram(s) Oral daily  magnesium hydroxide Suspension 30 milliLiter(s) Oral daily PRN  metoprolol tartrate 50 milliGRAM(s) Oral every 8 hours  nystatin Powder 1 Application(s) Topical two times a day  ondansetron Injectable 4 milliGRAM(s) IV Push every 8 hours PRN  pantoprazole    Tablet 40 milliGRAM(s) Oral before breakfast  senna 2 Tablet(s) Oral at bedtime  sertraline 50 milliGRAM(s) Oral daily  vancomycin  IVPB 1000 milliGRAM(s) IV Intermittent every 24 hours  	    PHYSICAL EXAM:  T(C): 35.8 (02-13-19 @ 07:22), Max: 36.3 (02-12-19 @ 14:16)  HR: 86 (02-13-19 @ 11:39) (68 - 92)  BP: 132/49 (02-13-19 @ 11:39) (100/67 - 132/49)  RR: 18 (02-13-19 @ 11:39) (18 - 18)  SpO2: 92% (02-13-19 @ 11:39) (92% - 97%)  Wt(kg): --    GEN: Awake, comfortable. No acute distress.   HEENT: Moist mucous membranes.   Neck: No JVD   CV: irregular,  No murmurs, rubs, or gallops appreciated.   RESP: Clear to auscultation bilaterally  ABD: Soft, Non-tender,  EXT: Warm, well-perfused extremities. No significant edema  NEURO: No focal deficits.    I&O's Summary    12 Feb 2019 07:01  -  13 Feb 2019 07:00  --------------------------------------------------------  IN: 420 mL / OUT: 0 mL / NET: 420 mL      Height (cm): 157.4 (02-13 @ 07:22)  Weight (kg): 73.1 (02-13 @ 07:22)  BMI (kg/m2): 29.5 (02-13 @ 07:22)  BSA (m2): 1.74 (02-13 @ 07:22)      LABS:	 	    CARDIAC MARKERS:                                  13.4   9.13  )-----------( 162      ( 13 Feb 2019 07:36 )             41.2     02-13    137  |  101  |  22  ----------------------------<  107<H>  4.5   |  25  |  0.95    Ca    9.7      13 Feb 2019 07:36  Mg     2.1     02-13      Telemetry:  atrial flutter, variable conduction    ECHO: < from: Echocardiogram (02.11.19 @ 12:44) >  Left ventricular hypertrophy presentThe left ventricular ejection   fraction is   estimated to be 50-55%The left atrial size is normal. Right atrial size   is   normal.The right ventricle is normal in size and function.There is mild   aortic   valve thickening. There is trace aortic regurgitation.There is a   bioprosthetic   mitral valve. No mitral regurgitation noted.The mean pressure gradient   is 5   mmHg, at a heart rate of 111 bpm.There was insufficient TR detected from   which   to calculate pulmonary artery systolic pressure.  No aortic root   dilatation.The inferior vena cava is normal in size (<2.1 cm) with normal   inspiratory collapse (>50%) consistent with normal right atrial pressure.   There is no pericardial effusion.    < end of copied text >  < from: EMERALD w/Doppler (02.11.19 @ 16:59) >  Severe spontaneous echo contrast in left atrial cavity   and left atrial appendage with sludge formation.   Left atrial appendage   apex is not well visualized with possible apical ligature. Apical thrombus   cannot be ruled out.  Injection of contrast documented an interatrial shunt.There   is mild aortic valve thickening. There is trace aortic regurgitation.There   is a bioprosthetic mitral valve. There is trace mitral regurgitation. The mean   pressure gradient is 4 mmHg.  Structurally normal tricuspid valve.There   is  trace tricuspid regurgitation.Structurally normal pulmonic valve. No   pulmonic  regurgitation noted.The right ventricular systolic function is normal.The   overall left ventricular ejection fraction is probably normal.Mild   atherosclerotic plaque(s) in the descending aorta.Mild atherosclerotic   plaque(s) in the aortic arch.There is no pericardial effusion.        ASSESSMENT/PLAN:    89 y.o female pmh of afib (subtheraputic on coumadin), rheumatic mitral stenosis s/p MVR 2016 presented initially with decompensated hf now found to be in atrial flutter s/p dig load and Metoprolol    Atrial Flutter - pt with +sludge in MYKE on EMERALD with recently subtheraputic INR  Pt for Dual Chamber ppm placement today due to multiple >3 second pauses on medical therapy    Recommend convert Metoprolol 50 q8 to Toprol XL 100mg BID  Dig to 0.125 daily, check am dig level  Continue Coumadin goal INR 2-3  AM CXR PA/Lateral  Case Discussed with EP Attending

## 2019-02-13 NOTE — DISCHARGE NOTE ADULT - HOSPITAL COURSE
89 year-old female PMHx HTN, HLD, hypothyroidism, s/p MVr with Dr. Leroy in 2016, pAF (on Coumadin), bladder dysfunction s/p botox injection 2/6/19 who presented to [facility] ER with report of abdominal discomfort, nausea and 1 episode of vomiting (dry heaves) around midnight. Upon arrival to the ER, mild leukocytosis WBC 13.4, INR 1.82, , Cr 0.86, troponin NEG, UA trace leuks NEG nitrites. EKG SR with 1st degree AVB - no acute ischemic changes noted. CXR with R sided patchy infiltrate and vascular congestion. CT-A NEG for PE but small bilateral pleural effusions. Treated with 60mg IV lasix with brisk UOP, 1 gram IV rocephin/500mg Zithromax, and 4mg Zofran for nausea. Seen and examined by  team for admission. Patient accompanied by her regular HHA. Briefly, report R side chest discomfort and nausea last night with dry heaves. Her aid called 911 after pt seemed to be shorter of breath. Of note, pt denies recent illness/sick contacts and has traveled to [facility] over the last few weeks. (+) flu and pneumonia vaccines. Uses a rollator and WC at times and reports an exercise tolerance of 1-1.5 blocks before tiring. Denies any recent changes to ET and denies recent episodes like the one overnight. Unable to find report of most recent TTE and cardiac w/u in the past in records. At this time patient is clinically stable and awaiting transfer to  for further management. 89 year-old female PMHx HTN, HLD, hypothyroidism, s/p MVr with Dr. Leroy in 2016, pAF (on Coumadin), bladder dysfunction s/p botox injection 2/6/19 who presented to [facility] ER with report of abdominal discomfort, nausea and 1 episode of vomiting (dry heaves) around midnight. Upon arrival to the ER, mild leukocytosis WBC 13.4, INR 1.82, , Cr 0.86, troponin NEG, UA trace leuks NEG nitrites. EKG SR with 1st degree AVB - no acute ischemic changes noted. CXR with R sided patchy infiltrate and vascular congestion. CT-A NEG for PE but small bilateral pleural effusions. Treated with 60mg IV lasix with brisk UOP, 1 gram IV rocephin/500mg Zithromax, and 4mg Zofran for nausea. Seen and examined by  team for admission. Patient accompanied by her regular HHA. Briefly, report R side chest discomfort and nausea last night with dry heaves. Her aid called 911 after pt seemed to be shorter of breath. Of note, pt denies recent illness/sick contacts and has traveled to [facility] over the last few weeks. (+) flu and pneumonia vaccines. Uses a rollator and WC at times and reports an exercise tolerance of 1-1.5 blocks before tiring. Denies any recent changes to ET and denies recent episodes like the one overnight. Unable to find report of most recent TTE and cardiac w/u in the past in records. At this time patient is clinically stable and awaiting transfer to  for further management.   Hospital course was complicated by AFib with RVR and pauses, after which EP was consulted and PPM was placed. Patient was loaded with digoxin however maintenance dose was decreased due to pause. Pt has been now medically optimized and cleared from EP stand point for discharge. 89 year-old female PMHx chronic diastolic CHF (EF 55%), HTN, HLD, hypothyroidism, s/p MVr with Dr. Leroy in 2016, pAF (on Coumadin), bladder dysfunction s/p botox injection 2/6/19 who presented to [facility] ER with report of abdominal discomfort, nausea and 1 episode of vomiting (dry heaves) around midnight. Upon arrival to the ER, mild leukocytosis WBC 13.4, INR 1.82, , Cr 0.86, troponin NEG, UA trace leuks NEG nitrites. EKG SR with 1st degree AVB - no acute ischemic changes noted. CXR with R sided patchy infiltrate and vascular congestion. CT-A NEG for PE but small bilateral pleural effusions. Treated with 60mg IV lasix with brisk UOP, 1 gram IV rocephin/500mg Zithromax, and 4mg Zofran for nausea. Admitted to  for acute on chronic diastolic CHF exacerbation. Briefly, report R side chest discomfort and nausea last night with dry heaves. Her aid called 911 after pt seemed to be shorter of breath. Of note, pt denies recent illness/sick contacts and has traveled to [facility] over the last few weeks. (+) flu and pneumonia vaccines. Uses a rollator and WC at times and reports an exercise tolerance of 1-1.5 blocks before tiring. Denies any recent changes to ET and denies recent episodes like the one overnight. Her diuretics were optimized and she remained on coumadin with therapeutic INR. Hospital course was complicated by AFib with RVR and pauses, after which EP was consulted and PPM was placed. Patient was loaded with digoxin however maintenance dose was decreased due to pause. Pt has been now medically optimized and cleared from EP stand point for discharge. She will be sent home with oxygen as needed.     Discharge Physical Exam:   VITAL SIGNS:  T(C): 36.5   HR: 90  BP: 104/63  RR: 20   SpO2: 92%     PHYSICAL EXAM:  Awake, talaktive, in NAD  AXOX3, follows commands, appropriate  Neck supple, no JVD noted  PEERL, nasal/buccal mucosa moist and well perfused  BS diminished but clear bilaterally, unlabored, symmetrical  S1/S2, no S3, no M/G/R noted.   Abdomen soft, non tender, non distended  No edema noted, perfusion brisk  Pulses palpable throughout  Skin warm and dry, no rashes/lesions noted  PPM incision C/D/I, no signs of infection/no swelling/no bleeding.       LABS:                    12.9   9.17  )-----------( 155      ( 15 Feb 2019 06:44 )             40.9                              02-15    138  |  103  |  17  ----------------------------<  105<H>  3.9   |  25  |  0.83    Ca    9.2      15 Feb 2019 06:44  Mg     2.0     02-15  PT/INR - ( 15 Feb 2019 06:44 )   PT: 33.4 sec;   INR: 2.86

## 2019-02-13 NOTE — DISCHARGE NOTE ADULT - MEDICATION SUMMARY - MEDICATIONS TO STOP TAKING
I will STOP taking the medications listed below when I get home from the hospital:    Evista 60 mg oral tablet  -- 1 tab(s) by mouth once a day    nitrofurantoin macrocrystals 100 mg oral capsule  -- 1 cap(s) by mouth 2 times a day  -- Finish all this medication unless otherwise directed by prescriber.  May discolor urine or feces.  Take with food or milk.

## 2019-02-13 NOTE — DISCHARGE NOTE ADULT - ADDITIONAL INSTRUCTIONS
Ney Dorsey (MD)  Cardiovascular Disease; Internal Medicine [address] [facility] [address] [place] [address] Phone: [phone]  Follow Up Time: Tuesday, Feb 19th 12pm.     Ag Johnson)  Internal Medicine [address] [place] [address] Phone: [phone]  Follow Up Time: Call ASAP to make a follow up appointment.     Mayito De La Torre  Cardiac Electrophysiology; Cardiovascular Disease; Internal Medicine [address] [place] [address] Phone: [phone]  Follow Up Time: March 14th 2019 2 pm

## 2019-02-13 NOTE — DISCHARGE NOTE ADULT - CARE PROVIDER_API CALL
Ney Dorsey)  Cardiovascular Disease; Internal Medicine  [address] [facility] [address] [place] [address]  Phone: [phone]  Fax: [phone]  Follow Up Time:     Ag Johnson)  Internal Medicine  [address] [place] [address]  Phone: [phone]  Fax: [phone]  Follow Up Time:     Mayito De La Torre)  Cardiac Electrophysiology; Cardiovascular Disease; Internal Medicine  [address] [place] [address]  Phone: [phone]  Fax: [phone]  Follow Up Time:

## 2019-02-13 NOTE — DISCHARGE NOTE ADULT - PLAN OF CARE
Maintain adequate quality of life. You were treated for an acute on chronic diastolic heart failure exacerbation. You were given lasix to off load fluid in your lungs. You have required oxygen off and on and will be going home with oxygen as needed for dyspnea on exertion. continue all medications as prescribed and follow up with Dr. Dorsey in 1 week. continue Atorvastatin 20mg daily. Your heart developed a Your heart developed  Atrial flutter and were evaluate by the Electrophysiology team. You had a dual chamber pacemaker inserted and were started on a medication called Digoxin to help with rate control. You will continue to take coumadin for anticoagulation (goal INR 2-3). You will follow up with Dr. De La Torre in March (see below for appointment date and time).

## 2019-02-13 NOTE — PROGRESS NOTE ADULT - SUBJECTIVE AND OBJECTIVE BOX
CARDIOLOGY NP PROGRESS NOTE    Subjective: Pt seen and examined at bedside. Reports feeling well. Denies chest pain, sob, lightheadedness, dizziness, palpitations.  Remainder ROS otherwise negative.    Overnight Events: NPO s/p MN for PPM implant today    TELEMETRY:            VITAL SIGNS:  T(C): 36.4 (02-13-19 @ 13:26), Max: 36.4 (02-13-19 @ 13:26)  HR: 86 (02-13-19 @ 11:39) (68 - 92)  BP: 132/49 (02-13-19 @ 11:39) (100/67 - 132/49)  RR: 18 (02-13-19 @ 11:39) (18 - 18)  SpO2: 92% (02-13-19 @ 11:39) (92% - 97%)  Wt(kg): --    I&O's Summary    12 Feb 2019 07:01  -  13 Feb 2019 07:00  --------------------------------------------------------  IN: 420 mL / OUT: 0 mL / NET: 420 mL          PHYSICAL EXAM:    General: A/ox 3, No acute Distress  Neck: Supple, NO JVD  Cardiac: S1 S2, No M/R/G  Pulmonary: CTA Adama, Breathing unlabored on 2L NC, No Rhonchi/Rales/Wheezing  Abdomen: Soft, Non -tender, +BS x 4 quads  Extremities: No Rashes, No edema  Neuro: A/o x 3, No focal deficits        LABS:                          13.4   9.13  )-----------( 162      ( 13 Feb 2019 07:36 )             41.2                              02-13    137  |  101  |  22  ----------------------------<  107<H>  4.5   |  25  |  0.95    Ca    9.7      13 Feb 2019 07:36  Mg     2.1     02-13                              PT/INR - ( 13 Feb 2019 07:36 )   PT: 26.1 sec;   INR: 2.25          PTT - ( 13 Feb 2019 07:36 )  PTT:37.2 sec  CAPILLARY BLOOD GLUCOSE                Allergies:  chlorhexidine topical (Rash)  fish (Other)  penicillin (Hives)    MEDICATIONS  (STANDING):  acyclovir   Oral Tab/Cap 400 milliGRAM(s) Oral daily  atorvastatin 20 milliGRAM(s) Oral at bedtime  digoxin     Tablet 0.125 milliGRAM(s) Oral daily  docusate sodium 100 milliGRAM(s) Oral two times a day  levothyroxine 75 MICROGram(s) Oral daily  metoprolol tartrate 50 milliGRAM(s) Oral every 8 hours  nystatin Powder 1 Application(s) Topical two times a day  pantoprazole    Tablet 40 milliGRAM(s) Oral before breakfast  senna 2 Tablet(s) Oral at bedtime  sertraline 50 milliGRAM(s) Oral daily  vancomycin  IVPB 1000 milliGRAM(s) IV Intermittent every 24 hours    MEDICATIONS  (PRN):  magnesium hydroxide Suspension 30 milliLiter(s) Oral daily PRN Constipation  ondansetron Injectable 4 milliGRAM(s) IV Push every 8 hours PRN Nausea and/or Vomiting        DIAGNOSTIC TESTS:

## 2019-02-14 LAB
ANION GAP SERPL CALC-SCNC: 12 MMOL/L — SIGNIFICANT CHANGE UP (ref 5–17)
APTT BLD: 33.8 SEC — SIGNIFICANT CHANGE UP (ref 27.5–36.3)
BUN SERPL-MCNC: 18 MG/DL — SIGNIFICANT CHANGE UP (ref 7–23)
CALCIUM SERPL-MCNC: 9.4 MG/DL — SIGNIFICANT CHANGE UP (ref 8.4–10.5)
CHLORIDE SERPL-SCNC: 103 MMOL/L — SIGNIFICANT CHANGE UP (ref 96–108)
CO2 SERPL-SCNC: 25 MMOL/L — SIGNIFICANT CHANGE UP (ref 22–31)
CREAT SERPL-MCNC: 0.88 MG/DL — SIGNIFICANT CHANGE UP (ref 0.5–1.3)
DIGOXIN SERPL-MCNC: 1.2 NG/ML — SIGNIFICANT CHANGE UP (ref 0.8–2)
GLUCOSE SERPL-MCNC: 100 MG/DL — HIGH (ref 70–99)
HCT VFR BLD CALC: 40.7 % — SIGNIFICANT CHANGE UP (ref 34.5–45)
HGB BLD-MCNC: 13.1 G/DL — SIGNIFICANT CHANGE UP (ref 11.5–15.5)
INR BLD: 1.97 — HIGH (ref 0.88–1.16)
MAGNESIUM SERPL-MCNC: 2 MG/DL — SIGNIFICANT CHANGE UP (ref 1.6–2.6)
MCHC RBC-ENTMCNC: 32.2 GM/DL — SIGNIFICANT CHANGE UP (ref 32–36)
MCHC RBC-ENTMCNC: 32.5 PG — SIGNIFICANT CHANGE UP (ref 27–34)
MCV RBC AUTO: 101 FL — HIGH (ref 80–100)
NRBC # BLD: 0 /100 WBCS — SIGNIFICANT CHANGE UP (ref 0–0)
PLATELET # BLD AUTO: 148 K/UL — LOW (ref 150–400)
POTASSIUM SERPL-MCNC: 4.4 MMOL/L — SIGNIFICANT CHANGE UP (ref 3.5–5.3)
POTASSIUM SERPL-SCNC: 4.4 MMOL/L — SIGNIFICANT CHANGE UP (ref 3.5–5.3)
PROTHROM AB SERPL-ACNC: 22.7 SEC — HIGH (ref 10–12.9)
RBC # BLD: 4.03 M/UL — SIGNIFICANT CHANGE UP (ref 3.8–5.2)
RBC # FLD: 13.3 % — SIGNIFICANT CHANGE UP (ref 10.3–14.5)
SODIUM SERPL-SCNC: 140 MMOL/L — SIGNIFICANT CHANGE UP (ref 135–145)
WBC # BLD: 10.15 K/UL — SIGNIFICANT CHANGE UP (ref 3.8–10.5)
WBC # FLD AUTO: 10.15 K/UL — SIGNIFICANT CHANGE UP (ref 3.8–10.5)

## 2019-02-14 PROCEDURE — 71046 X-RAY EXAM CHEST 2 VIEWS: CPT | Mod: 26

## 2019-02-14 RX ORDER — WARFARIN SODIUM 2.5 MG/1
5 TABLET ORAL ONCE
Qty: 0 | Refills: 0 | Status: COMPLETED | OUTPATIENT
Start: 2019-02-14 | End: 2019-02-14

## 2019-02-14 RX ORDER — DIGOXIN 250 MCG
0.25 TABLET ORAL ONCE
Qty: 0 | Refills: 0 | Status: COMPLETED | OUTPATIENT
Start: 2019-02-14 | End: 2019-02-14

## 2019-02-14 RX ORDER — DIGOXIN 250 MCG
0.12 TABLET ORAL EVERY 24 HOURS
Qty: 0 | Refills: 0 | Status: DISCONTINUED | OUTPATIENT
Start: 2019-02-14 | End: 2019-02-14

## 2019-02-14 RX ORDER — DIGOXIN 250 MCG
0.12 TABLET ORAL EVERY 24 HOURS
Qty: 0 | Refills: 0 | Status: DISCONTINUED | OUTPATIENT
Start: 2019-02-14 | End: 2019-02-15

## 2019-02-14 RX ORDER — ACETAMINOPHEN 500 MG
650 TABLET ORAL EVERY 6 HOURS
Qty: 0 | Refills: 0 | Status: DISCONTINUED | OUTPATIENT
Start: 2019-02-14 | End: 2019-02-15

## 2019-02-14 RX ORDER — METOPROLOL TARTRATE 50 MG
75 TABLET ORAL EVERY 12 HOURS
Qty: 0 | Refills: 0 | Status: DISCONTINUED | OUTPATIENT
Start: 2019-02-14 | End: 2019-02-15

## 2019-02-14 RX ORDER — BENZOCAINE AND MENTHOL 5; 1 G/100ML; G/100ML
1 LIQUID ORAL
Qty: 0 | Refills: 0 | Status: DISCONTINUED | OUTPATIENT
Start: 2019-02-14 | End: 2019-02-15

## 2019-02-14 RX ADMIN — Medication 75 MILLIGRAM(S): at 16:03

## 2019-02-14 RX ADMIN — NYSTATIN CREAM 1 APPLICATION(S): 100000 CREAM TOPICAL at 05:51

## 2019-02-14 RX ADMIN — Medication 400 MILLIGRAM(S): at 12:20

## 2019-02-14 RX ADMIN — Medication 3 MILLIGRAM(S): at 21:32

## 2019-02-14 RX ADMIN — NYSTATIN CREAM 1 APPLICATION(S): 100000 CREAM TOPICAL at 18:50

## 2019-02-14 RX ADMIN — Medication 100 MILLIGRAM(S): at 05:51

## 2019-02-14 RX ADMIN — Medication 0.25 MILLIGRAM(S): at 16:04

## 2019-02-14 RX ADMIN — PANTOPRAZOLE SODIUM 40 MILLIGRAM(S): 20 TABLET, DELAYED RELEASE ORAL at 05:51

## 2019-02-14 RX ADMIN — ATORVASTATIN CALCIUM 20 MILLIGRAM(S): 80 TABLET, FILM COATED ORAL at 21:32

## 2019-02-14 RX ADMIN — SERTRALINE 50 MILLIGRAM(S): 25 TABLET, FILM COATED ORAL at 12:20

## 2019-02-14 RX ADMIN — Medication 100 MILLIGRAM(S): at 18:50

## 2019-02-14 RX ADMIN — Medication 650 MILLIGRAM(S): at 18:49

## 2019-02-14 RX ADMIN — Medication 75 MICROGRAM(S): at 05:51

## 2019-02-14 RX ADMIN — Medication 250 MILLIGRAM(S): at 07:11

## 2019-02-14 RX ADMIN — WARFARIN SODIUM 5 MILLIGRAM(S): 2.5 TABLET ORAL at 21:32

## 2019-02-14 NOTE — PROGRESS NOTE ADULT - ASSESSMENT
89 year-old female PMHx HTN, HLD, hypothyroidism, s/p MV Replacement with Dr. Leroy in 2016, pAF (on Coumadin), bladder dysfunction s/p botox injection 2/6/19 who presented to [facility] ER with report of abdominal discomfort, nausea and 1 episode of vomiting (dry heaves). CXR with R sided patchy infiltrate and vascular congestion. CT-A NEG for PE but small bilateral pleural effusions. Admitted to tele [facility] for acute diastolic CHF exacerbation s/p IV diuresis. Hospital course c/b Aflutter w/ RVR now better rate controlled but w/ significant pauses, EP following, pending PPM implantation today.
89 year-old female PMHx HTN, HLD, hypothyroidism, s/p MVr with Dr. Leroy in 2016, pAF (on Coumadin), bladder dysfunction s/p botox injection 2/6/19 who presented to [facility] ER with report of abdominal discomfort, nausea and 1 episode of vomiting (dry heaves) around midnight. CXR with R sided patchy infiltrate and vascular congestion. CT-A NEG for PE but small bilateral pleural effusions. Optimizing diuretics with IV lasix, remains hypoxic on RA to 88%. Admitted to [facility] for further management.
89 year-old female PMHx HTN, HLD, hypothyroidism, s/p MVr with Dr. Leroy in 2016, pAF (on Coumadin), bladder dysfunction s/p botox injection 2/6/19 who presented to [facility] ER with report of abdominal discomfort, nausea and 1 episode of vomiting (dry heaves) around midnight. CXR with R sided patchy infiltrate and vascular congestion. CT-A NEG for PE but small bilateral pleural effusions. Optimizing diuretics with IV lasix, remains hypoxic on RA to 88%. Admitted to [facility] for further management.
89 year-old female PMHx HTN, HLD, hypothyroidism, s/p MVr with Dr. Leroy in 2016, pAF (on Coumadin), bladder dysfunction s/p botox injection 2/6/19 who presented to [facility] ER with report of abdominal discomfort, nausea and 1 episode of vomiting (dry heaves). CXR with R sided patchy infiltrate and vascular congestion. CT-A NEG for PE but small bilateral pleural effusions. Optimizing diuretics with IV lasix, remains hypoxic on RA to 88%. Admitted to [facility] for further management.
89 year-old female PMHx HTN, HLD, hypothyroidism, s/p MV Replacement with Dr. Leroy in 2016, pAF (on Coumadin), bladder dysfunction s/p botox injection 2/6/19 who presented to [facility] ER with report of abdominal discomfort, nausea and 1 episode of vomiting (dry heaves). CXR with R sided patchy infiltrate and vascular congestion. CT-A NEG for PE but small bilateral pleural effusions. Admitted to tele [facility] for acute diastolic CHF exacerbation s/p IV diuresis. Hospital course c/b Aflutter w/ RVR now better rate controlled but w/ significant pauses, EP following, pending PPM implantation today.
89 year-old female PMHx HTN, HLD, hypothyroidism, s/p MV Replacement with Dr. Leroy in 2016, pAF (on Coumadin), bladder dysfunction s/p botox injection 2/6/19 who presented to [facility] ER with report of abdominal discomfort, nausea and 1 episode of vomiting (dry heaves). CXR with R sided patchy infiltrate and vascular congestion. CT-A NEG for PE but small bilateral pleural effusions. Admitted to tele [facility] for acute diastolic CHF exacerbation s/p IV diuresis. Hospital course c/b Aflutter w/ RVR now better rate controlled but w/ significant pauses, EP following, pending PPM implantation today.
89 year-old female PMHx HTN, HLD, hypothyroidism, s/p MVr with Dr. Leroy in 2016, pAF (on Coumadin), bladder dysfunction s/p botox injection 2/6/19 who presented to [facility] ER with report of abdominal discomfort, nausea and 1 episode of vomiting (dry heaves) around midnight. CXR with R sided patchy infiltrate and vascular congestion. CT-A NEG for PE but small bilateral pleural effusions. Optimizing diuretics with IV lasix, remains hypoxic on RA to 88%. Admitted to [facility] for further management.

## 2019-02-14 NOTE — PROGRESS NOTE ADULT - SUBJECTIVE AND OBJECTIVE BOX
SUBJECTIVE:    MEDICATIONS:  acetaminophen   Tablet .. 650 milliGRAM(s) Oral every 6 hours PRN  acyclovir   Oral Tab/Cap 400 milliGRAM(s) Oral daily  atorvastatin 20 milliGRAM(s) Oral at bedtime  benzocaine 15 mG/menthol 3.6 mG (Sugar-Free) Lozenge 1 Lozenge Oral four times a day PRN  digoxin     Tablet 0.125 milliGRAM(s) Oral every 24 hours  docusate sodium 100 milliGRAM(s) Oral two times a day  levothyroxine 75 MICROGram(s) Oral daily  magnesium hydroxide Suspension 30 milliLiter(s) Oral daily PRN  melatonin 3 milliGRAM(s) Oral at bedtime  metoprolol succinate ER 75 milliGRAM(s) Oral every 12 hours  nystatin Powder 1 Application(s) Topical two times a day  ondansetron Injectable 4 milliGRAM(s) IV Push every 8 hours PRN  pantoprazole    Tablet 40 milliGRAM(s) Oral before breakfast  senna 2 Tablet(s) Oral at bedtime  sertraline 50 milliGRAM(s) Oral daily  warfarin 5 milliGRAM(s) Oral once  	    PHYSICAL EXAM:  T(C): 35.9 (02-14-19 @ 14:20), Max: 37.6 (02-14-19 @ 05:07)  HR: 86 (02-14-19 @ 11:31) (68 - 122)  BP: 116/66 (02-14-19 @ 11:31) (68/37 - 162/72)  RR: 18 (02-14-19 @ 11:31) (18 - 18)  SpO2: 89% (02-14-19 @ 11:31) (89% - 96%)  Wt(kg): --    GEN: Awake, comfortable. No acute distress.   HEENT: dry mucus membranes  Neck: No JVD   CV: irregular, no murmur appreciated.  chest wall incision clean   RESP: Clear to auscultation bilaterally  ABD: Soft, Non-tender, Non-distended.   EXT: Warm, well-perfused extremities.   NEURO: No focal deficits.    I&O's Summary    13 Feb 2019 07:01  -  14 Feb 2019 07:00  --------------------------------------------------------  IN: 200 mL / OUT: 0 mL / NET: 200 mL    14 Feb 2019 07:01  -  14 Feb 2019 16:34  --------------------------------------------------------  IN: 180 mL / OUT: 0 mL / NET: 180 mL            LABS:	 	    CARDIAC MARKERS:                                  13.1   10.15 )-----------( 148      ( 14 Feb 2019 07:08 )             40.7     02-14    140  |  103  |  18  ----------------------------<  100<H>  4.4   |  25  |  0.88    Ca    9.4      14 Feb 2019 07:08  Mg     2.0     02-14        TELEMETRY:  a flutter, variable conduction      ASSESSMENT/PLAN:  89 y.o female pmh of afib (subtheraputic on coumadin), rheumatic mitral stenosis s/p MVR 2016 presented initially with decompensated hf now found to be in atrial flutter s/p dig load and Metoprolol    Atrial Flutter - pt with +sludge in MYKE on EMERALD with recently subtheraputic INR  s/p Dual chamber PPM, incision site clean, normal functioning ppm  Dig to 0.125 daily, Toprol reduced to 75mg BID for hypotension.  Encourage po hydration   Continue Coumadin goal INR 2-3 SUBJECTIVE:  Pt feeling well.  Early AM symptomatic hypotension SBP 80 and patient reported lightheadedness. self resolved      MEDICATIONS:  acetaminophen   Tablet .. 650 milliGRAM(s) Oral every 6 hours PRN  acyclovir   Oral Tab/Cap 400 milliGRAM(s) Oral daily  atorvastatin 20 milliGRAM(s) Oral at bedtime  benzocaine 15 mG/menthol 3.6 mG (Sugar-Free) Lozenge 1 Lozenge Oral four times a day PRN  digoxin     Tablet 0.125 milliGRAM(s) Oral every 24 hours  docusate sodium 100 milliGRAM(s) Oral two times a day  levothyroxine 75 MICROGram(s) Oral daily  magnesium hydroxide Suspension 30 milliLiter(s) Oral daily PRN  melatonin 3 milliGRAM(s) Oral at bedtime  metoprolol succinate ER 75 milliGRAM(s) Oral every 12 hours  nystatin Powder 1 Application(s) Topical two times a day  ondansetron Injectable 4 milliGRAM(s) IV Push every 8 hours PRN  pantoprazole    Tablet 40 milliGRAM(s) Oral before breakfast  senna 2 Tablet(s) Oral at bedtime  sertraline 50 milliGRAM(s) Oral daily  warfarin 5 milliGRAM(s) Oral once  	    PHYSICAL EXAM:  T(C): 35.9 (02-14-19 @ 14:20), Max: 37.6 (02-14-19 @ 05:07)  HR: 86 (02-14-19 @ 11:31) (68 - 122)  BP: 116/66 (02-14-19 @ 11:31) (68/37 - 162/72)  RR: 18 (02-14-19 @ 11:31) (18 - 18)  SpO2: 89% (02-14-19 @ 11:31) (89% - 96%)  Wt(kg): --    GEN: Awake, comfortable. No acute distress.   HEENT: dry mucus membranes  Neck: No JVD   CV: irregular, no murmur appreciated.  chest wall incision clean   RESP: Clear to auscultation bilaterally  ABD: Soft, Non-tender, Non-distended.   EXT: Warm, well-perfused extremities.   NEURO: No focal deficits.    I&O's Summary    13 Feb 2019 07:01  -  14 Feb 2019 07:00  --------------------------------------------------------  IN: 200 mL / OUT: 0 mL / NET: 200 mL    14 Feb 2019 07:01  -  14 Feb 2019 16:34  --------------------------------------------------------  IN: 180 mL / OUT: 0 mL / NET: 180 mL                                    13.1   10.15 )-----------( 148      ( 14 Feb 2019 07:08 )             40.7     02-14    140  |  103  |  18  ----------------------------<  100<H>  4.4   |  25  |  0.88    Ca    9.4      14 Feb 2019 07:08  Mg     2.0     02-14        TELEMETRY:  a flutter, variable conduction      ASSESSMENT/PLAN:  89 y.o female pmh of afib (subtheraputic on coumadin), rheumatic mitral stenosis s/p MVR 2016 presented initially with decompensated hf now found to be in atrial flutter s/p dig load and Metoprolol    Atrial Flutter - pt with +sludge in MYKE on EMERALD with recently subtheraputic INR  s/p Dual chamber PPM, incision site clean, normal functioning ppm  Dig to 0.125 daily, Toprol reduced to 75mg BID for hypotension.  Encourage po hydration   Continue Coumadin goal INR 2-3

## 2019-02-14 NOTE — PROGRESS NOTE ADULT - PROBLEM SELECTOR PLAN 3
Pt is stable
Pt is stable  -Echo w/ EF 50-55%, LVH, trace AI, bioprosthetic MVR, no MR
Pt is stable  -F/u echo to evaluate MVR
no murmur on exam, stable.   -repeat TTE
Pt is stable  -Echo w/ EF 50-55%, LVH, trace AI, bioprosthetic MVR, no MR
Pt is stable
Pt is stable  -Echo w/ EF 50-55%, LVH, trace AI, bioprosthetic MVR, no MR

## 2019-02-14 NOTE — PROGRESS NOTE ADULT - PROBLEM SELECTOR PLAN 5
-continue Toprol 25mg BID  -Cont lasix
Epiosodes of hypotensive to SBP 80s, improved to 110-140s after lasix held.  -Monitor BP  -Encourage PO fluids  -c/w Metoprolol 50mg TID
Hypotensive this AM, SBP 80s  -Monitor BP  -Encourage an additional cup of water today
Hypotensive yesterday SBP 80s, improved to 110-140s  -Monitor BP  -Encourage PO fluids
Epiosodes of hypotensive to SBP 80s, improved to 110-140s after lasix held.  -Monitor BP  -Encourage PO fluids  -c/w Metoprolol 50mg TID
BP is stable     -continue Metoprolol Tartrate   -Cont lasix
Epiosodes of hypotensive to SBP 80s, improved to 110-140s after lasix held.  -Monitor BP  -Encourage PO fluids  -c/w Metoprolol 50mg TID

## 2019-02-14 NOTE — PROGRESS NOTE ADULT - SUBJECTIVE AND OBJECTIVE BOX
s/p pacer  can go home from my perspective  meds should be reviewed with Pablo  keep off Ranexa  higher dose metoprolol  digoxin  warfarin as before

## 2019-02-14 NOTE — PROGRESS NOTE ADULT - SUBJECTIVE AND OBJECTIVE BOX
EPS Device interrogation    Indication: post implant    Device model: 	[employer] Accolade 		Implanting Physician: Dr. Jones    Functioning Mode: DDD			    Underlying Rhythm: A.Fib /VS    Pacemaker dependency:  No    Battery status: 11 years  Interrogating parameters:   				RA			RV			LV    Sense:                                         1.8 mV                          13.7  mV    Threshold:                                      not done A.fib         0.5 V@ 0.5 ms    Pacing Impedance:                            [id number]                      [id number]    Shock Impedance:                                             n/a    Events/Alert:  none    Parameter change: 	none    Normal function PPM  site of implant dry/clean, no hematoma, no bleeding, no swelling  Follow up Dr. De La Torre 3/14/19 @ 2pm  [ ]EPS attending: Interrogation reviewed. Agree with above.

## 2019-02-14 NOTE — PROGRESS NOTE ADULT - PROBLEM SELECTOR PLAN 8
c/o nausea this AM. Denies vomiting, abdominal pain, diarrhea or constipation. Had BM this AM. On exam, BS active, soft, non-tender, slight distention.   -Received Zofran 4mg IV x 1   -If continue to have nausea, obtain Abdominal Xray discussed with Dr. Dorsey  -continue bowel regiman
c/o nausea. Denies vomiting, abdominal pain, diarrhea or constipation. Had large BM this AM. On exam, BS active, soft, non-tender, slight distention.   -Received Zofran 4mg IV x 1   -If continue to have nausea, Abdominal Xray unremarkable  -continue bowel regiman

## 2019-02-14 NOTE — PROGRESS NOTE ADULT - PROBLEM SELECTOR PLAN 7
-continue levothyroxine 75mcg qd
-continue levothyroxine 75mcg qd
-continue synthroid
-continue synthroid    ##FEN  No IV fluids  Keep K>4, Mag>2  DVT: SCD's  Diet: DASH
-continue levothyroxine 75mcg qd
-continue levothyroxine 75mcg qd
-continue synthroid    ##FEN  No IV fluids  Keep K>4, Mag>2  DVT: SCD's  Diet: DASH

## 2019-02-14 NOTE — PROGRESS NOTE ADULT - PROBLEM SELECTOR PROBLEM 5
Essential hypertension
Cold/Sinus
Essential hypertension
Essential hypertension
General

## 2019-02-14 NOTE — PROGRESS NOTE ADULT - PROBLEM SELECTOR PLAN 2
Not well controlled. On 2/9 in AM, on tele HR up to 140s atachy/aflutter. Pt symptomatic c/o dizziness and nausea. INR this AM, 1.32. JOCELYN VASc: 5. Today, tele atachy rate 110s up to 120s  -Daily EKGs  -Continue Heparin gtt   -Daily Coumadin dosing, 7.5mg po tonight. Daily PTT/PT/INR   -Takes Metoprolol Succinate at home 25mg po bid. Reduced dose Metoprolol Tartrate 50mg po Q6hr to 25mg po Q6hr.   -Received Metoprolol Tartrate 5mg IV x 1  -Start Digoxin load 0.25mg IV x 3, 1st dose now, then Q6hr x 2. Discuss with Dr. Dorsey oral dose in AM.   -consider EP consult in AM for further evaluation
Not well controlled. On 2/9 in AM, on tele HR up to 140s atachy/aflutter. Pt symptomatic c/o dizziness and nausea. INR this AM, 1.9. JOCELYN VASc 5. Currently tele Atach vs Aflutter HR 110s - 120s  -Daily EKGs  -Continue Heparin gtt   -Daily Coumadin dosing, 7.5mg po tonight. Daily PTT/PT/INR   -Takes Metoprolol Succinate at home 25mg po bid. Reduced dose Metoprolol Tartrate 50mg po Q6hr to 25mg po Q6hr.   -Received Metoprolol Tartrate 5mg IV x 1  -S/p Digoxin load 0.25mg IV x 4, completed this AM. Discuss with Dr. Dorsey maintenance oral dose tomorrow.   - EP consult for possible EMERALD/DCCV
On 2/9 in AM, on tele HR up to 140s atachy/aflutter. Pt symptomatic c/o dizziness and nausea. JOCELYN VASc 5. Currently tele noting Aflutter HR 40-110s, w/ 3.6 sec pauses overnight.  -Daily EKGs  -D/c'ed Heparin gtt as INR is now therapeutic 2.5  -Daily Coumadin dosing, consider HOLDING tonight after PPM implant. Daily PTT/PT/INR   -C/w Metoprolol tartrate 50mg TID  -Decrease Digoxin 0.25mg to 0.125mg given significant pauses on tele  - EP following. Pending PPM implant today
currently in SR, INR 1.82  -continue coumadin (goal 2)  -will give 5mg tonight
On 2/9 in AM, on tele HR up to 140s atachy/aflutter. Pt symptomatic c/o dizziness and nausea. JOCELYN VASc 5. Currently tele noting Aflutter HR 40-110s, w/ 3.6 sec pauses overnight.  -Daily EKGs  -D/c'ed Heparin gtt as INR is now therapeutic 2.5  -Daily Coumadin dosing, consider HOLDING tonight after PPM implant. Daily PTT/PT/INR   -C/w Metoprolol tartrate 50mg TID  -Decrease Digoxin 0.25mg to 0.125mg given significant pauses on tele, now being held pending digoxin level  - EP following, will fu recs, if cleared from EP standpoint pt be dc'ed today
On 2/9 in AM, on tele HR up to 140s atachy/aflutter. Pt symptomatic c/o dizziness and nausea. JOCELYN VASc 5. Currently tele noting Aflutter HR 40-110s, w/ 3.6 sec pauses overnight.  -Daily EKGs  -D/c'ed Heparin gtt as INR is now therapeutic 2.5  -Daily Coumadin dosing, consider HOLDING tonight after PPM implant. Daily PTT/PT/INR   -C/w Metoprolol tartrate 50mg TID  -Decrease Digoxin 0.25mg to 0.125mg given significant pauses on tele  - EP following. Pending PPM implant today
This AM, on tele HR up to 140s atachy/aflutter. Pt symptomatic c/o dizziness and nausea. INR this 1.1. JOCELYN VASc: 5.   -Start Heparin gtt   -Daily Coumadin dosing, 7.5mg po tonight. Daily PTT/PT/INR   -Takes Metoprolol Succinate at home 25mg po bid. Started Metoprolol Tartrate 50mg po Q6hr.   -Metoprolol Tartrate 5mg IV pushes prn to keep HR <100-discussed with Dr. Dorsey

## 2019-02-14 NOTE — PROGRESS NOTE ADULT - SUBJECTIVE AND OBJECTIVE BOX
INTERVAL HPI/OVERNIGHT EVENTS:  FRANCISCO  Pt was seen and examined at the bedside. She was observed to be sitting down comfortably.   Pt c/o pain at the PPM site, ow no complaints.    REVIEW OF SYSTEMS:    CONSTITUTIONAL: No fever, weight loss, or fatigue  EYES: No eye pain, visual disturbances, or discharge  ENMT:  No difficulty hearing, tinnitus, vertigo; No sinus or throat pain  NECK: No pain or stiffness  BREASTS: No pain, masses, or nipple discharge  RESPIRATORY: No cough, wheezing, chills or hemoptysis; No shortness of breath  CARDIOVASCULAR: No chest pain, palpitations, dizziness, or leg swelling  GASTROINTESTINAL: No abdominal or epigastric pain. No nausea, vomiting, or hematemesis; No diarrhea or constipation. No melena or hematochezia.  GENITOURINARY: No dysuria, frequency, hematuria, or incontinence  NEUROLOGICAL: No headaches, memory loss, loss of strength, numbness, or tremors  SKIN: No itching, burning, rashes, or lesions   LYMPH NODES: No enlarged glands  ENDOCRINE: No heat or cold intolerance; No hair loss  MUSCULOSKELETAL: No joint pain or swelling; No muscle, back, or extremity pain  PSYCHIATRIC: No depression, anxiety, mood swings, or difficulty sleeping  HEME/LYMPH: No easy bruising, or bleeding gums  ALLERY AND IMMUNOLOGIC: No hives or eczema      VITAL SIGNS:  T(F): 99.6 (02-14-19 @ 05:07)  HR: 100 (02-14-19 @ 08:58)  BP: 86/62 (02-14-19 @ 08:58)  RR: 18 (02-14-19 @ 08:58)  SpO2: 92% (02-14-19 @ 08:58)  Wt(kg): --  I&O's Summary    13 Feb 2019 07:01  -  14 Feb 2019 07:00  --------------------------------------------------------  IN: 200 mL / OUT: 0 mL / NET: 200 mL      PHYSICAL EXAM:  Constitutional: NAD, well-groomed, well-developed  HEENT: PERRLA, EOMI, Normal Hearing, MMM  Neck: No LAD, No JVD, PPM insertion site on left clean and dry  Back: Normal spine flexure, No CVA tenderness  Respiratory: CTAB  Cardiovascular: S1 and S2, IR, no M/G/R  Gastrointestinal: BS+, soft, NT/ND  Extremities: No peripheral edema  Vascular: 2+ peripheral pulses  Neurological: A/O x 3, no focal deficits  Psychiatric: Normal mood, normal affect  Musculoskeletal: 5/5 strength b/l upper and lower extremities  Skin: No rashes        MEDICATIONS  (STANDING):  acyclovir   Oral Tab/Cap 400 milliGRAM(s) Oral daily  atorvastatin 20 milliGRAM(s) Oral at bedtime  digoxin     Tablet 0.125 milliGRAM(s) Oral daily  docusate sodium 100 milliGRAM(s) Oral two times a day  levothyroxine 75 MICROGram(s) Oral daily  melatonin 3 milliGRAM(s) Oral at bedtime  metoprolol succinate  milliGRAM(s) Oral every 12 hours  nystatin Powder 1 Application(s) Topical two times a day  pantoprazole    Tablet 40 milliGRAM(s) Oral before breakfast  senna 2 Tablet(s) Oral at bedtime  sertraline 50 milliGRAM(s) Oral daily    MEDICATIONS  (PRN):  magnesium hydroxide Suspension 30 milliLiter(s) Oral daily PRN Constipation  ondansetron Injectable 4 milliGRAM(s) IV Push every 8 hours PRN Nausea and/or Vomiting      Allergies    chlorhexidine topical (Rash)  fish (Other)  penicillin (Hives)    Intolerances        LABS:                        13.1   10.15 )-----------( 148      ( 14 Feb 2019 07:08 )             40.7     02-14    140  |  103  |  18  ----------------------------<  100<H>  4.4   |  25  |  0.88    Ca    9.4      14 Feb 2019 07:08  Mg     2.0     02-14      PT/INR - ( 14 Feb 2019 07:08 )   PT: 22.7 sec;   INR: 1.97          PTT - ( 14 Feb 2019 07:08 )  PTT:33.8 sec      RADIOLOGY & ADDITIONAL TESTS:

## 2019-02-14 NOTE — PROGRESS NOTE ADULT - PROBLEM SELECTOR PLAN 9
Diet: DASH  Replete electrolytes prn   No IVF  DVT Prevention: Hep gtt started on 2/9
Diet: DASH  Replete electrolytes prn   No IVF  DVT Prevention: Hep gtt started on 2/9
Diet: DASH/TLC  Replete electrolytes prn   No IVF  DVT Prevention: on Coumadin

## 2019-02-14 NOTE — PROGRESS NOTE ADULT - PROBLEM SELECTOR PROBLEM 6
High cholesterol

## 2019-02-14 NOTE — PROGRESS NOTE ADULT - PROBLEM SELECTOR PROBLEM 2
Paroxysmal A-fib

## 2019-02-14 NOTE — PROGRESS NOTE ADULT - PROBLEM SELECTOR PROBLEM 3
S/P mitral valve repair

## 2019-02-15 VITALS — TEMPERATURE: 98 F

## 2019-02-15 LAB
ANION GAP SERPL CALC-SCNC: 10 MMOL/L — SIGNIFICANT CHANGE UP (ref 5–17)
APTT BLD: 35.9 SEC — SIGNIFICANT CHANGE UP (ref 27.5–36.3)
BASOPHILS # BLD AUTO: 0.05 K/UL — SIGNIFICANT CHANGE UP (ref 0–0.2)
BASOPHILS NFR BLD AUTO: 0.5 % — SIGNIFICANT CHANGE UP (ref 0–2)
BUN SERPL-MCNC: 17 MG/DL — SIGNIFICANT CHANGE UP (ref 7–23)
CALCIUM SERPL-MCNC: 9.2 MG/DL — SIGNIFICANT CHANGE UP (ref 8.4–10.5)
CHLORIDE SERPL-SCNC: 103 MMOL/L — SIGNIFICANT CHANGE UP (ref 96–108)
CO2 SERPL-SCNC: 25 MMOL/L — SIGNIFICANT CHANGE UP (ref 22–31)
CREAT SERPL-MCNC: 0.83 MG/DL — SIGNIFICANT CHANGE UP (ref 0.5–1.3)
EOSINOPHIL # BLD AUTO: 0.75 K/UL — HIGH (ref 0–0.5)
EOSINOPHIL NFR BLD AUTO: 8.2 % — HIGH (ref 0–6)
GLUCOSE SERPL-MCNC: 105 MG/DL — HIGH (ref 70–99)
HCT VFR BLD CALC: 40.9 % — SIGNIFICANT CHANGE UP (ref 34.5–45)
HGB BLD-MCNC: 12.9 G/DL — SIGNIFICANT CHANGE UP (ref 11.5–15.5)
IMM GRANULOCYTES NFR BLD AUTO: 0.4 % — SIGNIFICANT CHANGE UP (ref 0–1.5)
INR BLD: 2.86 — HIGH (ref 0.88–1.16)
LYMPHOCYTES # BLD AUTO: 2.2 K/UL — SIGNIFICANT CHANGE UP (ref 1–3.3)
LYMPHOCYTES # BLD AUTO: 24 % — SIGNIFICANT CHANGE UP (ref 13–44)
MAGNESIUM SERPL-MCNC: 2 MG/DL — SIGNIFICANT CHANGE UP (ref 1.6–2.6)
MCHC RBC-ENTMCNC: 31.5 GM/DL — LOW (ref 32–36)
MCHC RBC-ENTMCNC: 32.1 PG — SIGNIFICANT CHANGE UP (ref 27–34)
MCV RBC AUTO: 101.7 FL — HIGH (ref 80–100)
MONOCYTES # BLD AUTO: 0.91 K/UL — HIGH (ref 0–0.9)
MONOCYTES NFR BLD AUTO: 9.9 % — SIGNIFICANT CHANGE UP (ref 2–14)
NEUTROPHILS # BLD AUTO: 5.22 K/UL — SIGNIFICANT CHANGE UP (ref 1.8–7.4)
NEUTROPHILS NFR BLD AUTO: 57 % — SIGNIFICANT CHANGE UP (ref 43–77)
NRBC # BLD: 0 /100 WBCS — SIGNIFICANT CHANGE UP (ref 0–0)
PLATELET # BLD AUTO: 155 K/UL — SIGNIFICANT CHANGE UP (ref 150–400)
POTASSIUM SERPL-MCNC: 3.9 MMOL/L — SIGNIFICANT CHANGE UP (ref 3.5–5.3)
POTASSIUM SERPL-SCNC: 3.9 MMOL/L — SIGNIFICANT CHANGE UP (ref 3.5–5.3)
PROTHROM AB SERPL-ACNC: 33.4 SEC — HIGH (ref 10–12.9)
RBC # BLD: 4.02 M/UL — SIGNIFICANT CHANGE UP (ref 3.8–5.2)
RBC # FLD: 13.5 % — SIGNIFICANT CHANGE UP (ref 10.3–14.5)
SODIUM SERPL-SCNC: 138 MMOL/L — SIGNIFICANT CHANGE UP (ref 135–145)
WBC # BLD: 9.17 K/UL — SIGNIFICANT CHANGE UP (ref 3.8–10.5)
WBC # FLD AUTO: 9.17 K/UL — SIGNIFICANT CHANGE UP (ref 3.8–10.5)

## 2019-02-15 PROCEDURE — 71045 X-RAY EXAM CHEST 1 VIEW: CPT | Mod: 26

## 2019-02-15 PROCEDURE — 71046 X-RAY EXAM CHEST 2 VIEWS: CPT | Mod: 26

## 2019-02-15 RX ORDER — METOPROLOL TARTRATE 50 MG
1 TABLET ORAL
Qty: 0 | Refills: 0 | COMMUNITY

## 2019-02-15 RX ORDER — METOPROLOL TARTRATE 50 MG
3 TABLET ORAL
Qty: 180 | Refills: 0
Start: 2019-02-15 | End: 2019-03-16

## 2019-02-15 RX ORDER — DOCUSATE SODIUM 100 MG
1 CAPSULE ORAL
Qty: 60 | Refills: 0
Start: 2019-02-15 | End: 2019-03-16

## 2019-02-15 RX ORDER — DIGOXIN 250 MCG
1 TABLET ORAL
Qty: 30 | Refills: 1
Start: 2019-02-15 | End: 2019-04-15

## 2019-02-15 RX ORDER — POTASSIUM CHLORIDE 20 MEQ
40 PACKET (EA) ORAL ONCE
Qty: 0 | Refills: 0 | Status: COMPLETED | OUTPATIENT
Start: 2019-02-15 | End: 2019-02-15

## 2019-02-15 RX ADMIN — Medication 0.12 MILLIGRAM(S): at 12:09

## 2019-02-15 RX ADMIN — Medication 40 MILLIEQUIVALENT(S): at 12:14

## 2019-02-15 RX ADMIN — Medication 400 MILLIGRAM(S): at 12:09

## 2019-02-15 RX ADMIN — Medication 100 MILLIGRAM(S): at 06:48

## 2019-02-15 RX ADMIN — PANTOPRAZOLE SODIUM 40 MILLIGRAM(S): 20 TABLET, DELAYED RELEASE ORAL at 06:48

## 2019-02-15 RX ADMIN — Medication 75 MICROGRAM(S): at 06:48

## 2019-02-15 RX ADMIN — NYSTATIN CREAM 1 APPLICATION(S): 100000 CREAM TOPICAL at 06:48

## 2019-02-15 RX ADMIN — SERTRALINE 50 MILLIGRAM(S): 25 TABLET, FILM COATED ORAL at 12:09

## 2019-02-15 RX ADMIN — Medication 75 MILLIGRAM(S): at 06:48

## 2019-02-15 NOTE — PROGRESS NOTE ADULT - PROBLEM SELECTOR PROBLEM 1
Acute on chronic congestive heart failure, unspecified heart failure type
Acute on chronic diastolic (congestive) heart failure
Acute on chronic diastolic (congestive) heart failure
Acute on chronic right-sided congestive heart failure
Paroxysmal A-fib
CHF (congestive heart failure)

## 2019-02-15 NOTE — PROGRESS NOTE ADULT - REASON FOR ADMISSION
Shortness of breath

## 2019-02-15 NOTE — PROGRESS NOTE ADULT - SUBJECTIVE AND OBJECTIVE BOX
looks OK  low BP yesterday prompted keeping her here and lowering metoprolol dose  rate control seems OK-not perfect-may require A-V aye ablation going forward  for now-lungs clear, exam OK, discussed D/C meds with staff and patient/aide  and plan is to go home

## 2019-02-15 NOTE — PROGRESS NOTE ADULT - PROBLEM SELECTOR PLAN 1
see note
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 CXR with vascular congestion and pleural effusions. S/p diuresis w/ good outpt.  SpO2 RA 94%. Episodes of hypotensive to 80s, BP improved to 110-140s.  -Lasix d/c'd as pt is euvolemic  -Echo w/ EF 50-55%, LVH, trace AI, bioprosthetic MVR in place  -Chest PT and incentive spirometry  -Daily weights, strict I/Os, FR 1L/day
CXR with vascular congestion and pleural effusions. S/p diuresis with good output but remains hypoxic on RA.   -Continue diuresis with 40mg IV lasix  -chest PT and incentive spirometry  -call radiology to evaluate CXR
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 CXR with vascular congestion and pleural effusions. S/p diuresis w/ good outpt. This AM, sat RA 94%. Hypotensive yesterday tp 80s, BP improved to 110-140s.  -Lasix d/c'd as pt is euvolemic  -TTE today to evaluate LV function and MVR  -Chest PT and incentive spirometry  -Daily weights, strict I/Os, FR 1L/day
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 CXR with vascular congestion and pleural effusions. S/p diuresis w/ good outpt. This AM, sat RA 95%. This AM, SBP 80s and improved to 90s to 110s. d/c Lasix and encouraged pt to drink extra cup of water.   -Lasix d/c'd as pt is euvolemic and hypotensive this AM.   -chest PT and incentive spirometry  -Daily weights, strict I/Os, FR 1L/day  -TTE on Monday to evaluate LV function   -
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 CXR with vascular congestion and pleural effusions. S/p diuresis w/ good outpt.  SpO2 RA 94%. Episodes of hypotensive to 80s, BP improved to 110-140s.  -Lasix d/c'd as pt is euvolemic  -Echo w/ EF 50-55%, LVH, trace AI, bioprosthetic MVR in place  -Chest PT and incentive spirometry  -Daily weights, strict I/Os, FR 1L/day
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 CXR with vascular congestion and pleural effusions. S/p diuresis w/ good outpt.  SpO2 RA 94%. Episodes of hypotensive to 80s, BP improved to 110-140s.  -Lasix d/c'd as pt is euvolemic  -Echo w/ EF 50-55%, LVH, trace AI, bioprosthetic MVR in place  -Chest PT and incentive spirometry  -Daily weights, strict I/Os, FR 1L/day
CXR with vascular congestion and pleural effusions. S/p diuresis with good output but remains hypoxic on RA. This AM, sat RA OOB to the chair 90-92%. Placed on 2L NC as O2 saturation decreased to 86-88% RA. Sat returned to 92=94% 2L NC   -Continue diuresis with Lasix 40mg IV daily   -chest PT and incentive spirometry  -pCXR in AM-f/u results  -Daily weights, strict I/Os, FR 1L/day

## 2019-02-20 ENCOUNTER — APPOINTMENT (OUTPATIENT)
Age: 84
End: 2019-02-20

## 2019-02-22 DIAGNOSIS — I47.1 SUPRAVENTRICULAR TACHYCARDIA: ICD-10-CM

## 2019-02-22 DIAGNOSIS — I51.89 OTHER ILL-DEFINED HEART DISEASES: ICD-10-CM

## 2019-02-22 DIAGNOSIS — E03.9 HYPOTHYROIDISM, UNSPECIFIED: ICD-10-CM

## 2019-02-22 DIAGNOSIS — I50.9 HEART FAILURE, UNSPECIFIED: ICD-10-CM

## 2019-02-22 DIAGNOSIS — I48.92 UNSPECIFIED ATRIAL FLUTTER: ICD-10-CM

## 2019-02-22 DIAGNOSIS — Z95.3 PRESENCE OF XENOGENIC HEART VALVE: ICD-10-CM

## 2019-02-22 DIAGNOSIS — N32.9 BLADDER DISORDER, UNSPECIFIED: ICD-10-CM

## 2019-02-22 DIAGNOSIS — I49.5 SICK SINUS SYNDROME: ICD-10-CM

## 2019-02-22 DIAGNOSIS — N39.0 URINARY TRACT INFECTION, SITE NOT SPECIFIED: ICD-10-CM

## 2019-02-22 DIAGNOSIS — I11.0 HYPERTENSIVE HEART DISEASE WITH HEART FAILURE: ICD-10-CM

## 2019-02-22 DIAGNOSIS — I42.8 OTHER CARDIOMYOPATHIES: ICD-10-CM

## 2019-02-22 DIAGNOSIS — I48.0 PAROXYSMAL ATRIAL FIBRILLATION: ICD-10-CM

## 2019-02-22 DIAGNOSIS — I50.33 ACUTE ON CHRONIC DIASTOLIC (CONGESTIVE) HEART FAILURE: ICD-10-CM

## 2019-02-22 DIAGNOSIS — I95.9 HYPOTENSION, UNSPECIFIED: ICD-10-CM

## 2019-02-22 DIAGNOSIS — E78.5 HYPERLIPIDEMIA, UNSPECIFIED: ICD-10-CM

## 2019-02-22 DIAGNOSIS — Z79.01 LONG TERM (CURRENT) USE OF ANTICOAGULANTS: ICD-10-CM

## 2019-02-25 ENCOUNTER — EMERGENCY (EMERGENCY)
Facility: HOSPITAL | Age: 84
LOS: 1 days | Discharge: HOME | End: 2019-02-25
Attending: EMERGENCY MEDICINE | Admitting: EMERGENCY MEDICINE
Payer: MEDICARE

## 2019-02-25 VITALS
OXYGEN SATURATION: 95 % | RESPIRATION RATE: 18 BRPM | HEART RATE: 120 BPM | SYSTOLIC BLOOD PRESSURE: 118 MMHG | DIASTOLIC BLOOD PRESSURE: 70 MMHG | TEMPERATURE: 98 F

## 2019-02-25 DIAGNOSIS — Z84.89 FAMILY HISTORY OF OTHER SPECIFIED CONDITIONS: Chronic | ICD-10-CM

## 2019-02-25 DIAGNOSIS — E78.5 HYPERLIPIDEMIA, UNSPECIFIED: ICD-10-CM

## 2019-02-25 DIAGNOSIS — R10.9 UNSPECIFIED ABDOMINAL PAIN: ICD-10-CM

## 2019-02-25 DIAGNOSIS — Z79.01 LONG TERM (CURRENT) USE OF ANTICOAGULANTS: ICD-10-CM

## 2019-02-25 DIAGNOSIS — N39.0 URINARY TRACT INFECTION, SITE NOT SPECIFIED: ICD-10-CM

## 2019-02-25 DIAGNOSIS — I10 ESSENTIAL (PRIMARY) HYPERTENSION: ICD-10-CM

## 2019-02-25 DIAGNOSIS — Z79.899 OTHER LONG TERM (CURRENT) DRUG THERAPY: ICD-10-CM

## 2019-02-25 DIAGNOSIS — Z88.0 ALLERGY STATUS TO PENICILLIN: ICD-10-CM

## 2019-02-25 DIAGNOSIS — Z91.013 ALLERGY TO SEAFOOD: ICD-10-CM

## 2019-02-25 LAB
BASOPHILS # BLD AUTO: 0.04 K/UL — SIGNIFICANT CHANGE UP (ref 0–0.2)
BASOPHILS NFR BLD AUTO: 0.2 % — SIGNIFICANT CHANGE UP (ref 0–2)
EOSINOPHIL # BLD AUTO: 0.32 K/UL — SIGNIFICANT CHANGE UP (ref 0–0.5)
EOSINOPHIL NFR BLD AUTO: 1.9 % — SIGNIFICANT CHANGE UP (ref 0–6)
HCT VFR BLD CALC: 37.7 % — SIGNIFICANT CHANGE UP (ref 34.5–45)
HGB BLD-MCNC: 12.2 G/DL — SIGNIFICANT CHANGE UP (ref 11.5–15.5)
IMM GRANULOCYTES NFR BLD AUTO: 0.4 % — SIGNIFICANT CHANGE UP (ref 0–1.5)
LYMPHOCYTES # BLD AUTO: 1.58 K/UL — SIGNIFICANT CHANGE UP (ref 1–3.3)
LYMPHOCYTES # BLD AUTO: 9.2 % — LOW (ref 13–44)
MCHC RBC-ENTMCNC: 32.2 PG — SIGNIFICANT CHANGE UP (ref 27–34)
MCHC RBC-ENTMCNC: 32.4 GM/DL — SIGNIFICANT CHANGE UP (ref 32–36)
MCV RBC AUTO: 99.5 FL — SIGNIFICANT CHANGE UP (ref 80–100)
MONOCYTES # BLD AUTO: 1.44 K/UL — HIGH (ref 0–0.9)
MONOCYTES NFR BLD AUTO: 8.4 % — SIGNIFICANT CHANGE UP (ref 2–14)
NEUTROPHILS # BLD AUTO: 13.66 K/UL — HIGH (ref 1.8–7.4)
NEUTROPHILS NFR BLD AUTO: 79.9 % — HIGH (ref 43–77)
NRBC # BLD: 0 /100 WBCS — SIGNIFICANT CHANGE UP (ref 0–0)
PLATELET # BLD AUTO: 199 K/UL — SIGNIFICANT CHANGE UP (ref 150–400)
RBC # BLD: 3.79 M/UL — LOW (ref 3.8–5.2)
RBC # FLD: 14.4 % — SIGNIFICANT CHANGE UP (ref 10.3–14.5)
WBC # BLD: 17.11 K/UL — HIGH (ref 3.8–10.5)
WBC # FLD AUTO: 17.11 K/UL — HIGH (ref 3.8–10.5)

## 2019-02-25 PROCEDURE — 99285 EMERGENCY DEPT VISIT HI MDM: CPT | Mod: 25

## 2019-02-25 PROCEDURE — 71045 X-RAY EXAM CHEST 1 VIEW: CPT | Mod: 26

## 2019-02-25 PROCEDURE — 93010 ELECTROCARDIOGRAM REPORT: CPT

## 2019-02-25 RX ORDER — ONDANSETRON 8 MG/1
4 TABLET, FILM COATED ORAL ONCE
Qty: 0 | Refills: 0 | Status: COMPLETED | OUTPATIENT
Start: 2019-02-25 | End: 2019-02-25

## 2019-02-25 RX ADMIN — ONDANSETRON 4 MILLIGRAM(S): 8 TABLET, FILM COATED ORAL at 23:47

## 2019-02-25 NOTE — ED PROVIDER NOTE - RATE
Physical Therapy Progress Note     \"Bill\"  Visit Count: 9  Plan of Care Dates: Initial: 8/3/18 Through: 9/28/18  Insurance Information: MEDICARE/PARTA AND B  ID#: [id number]     PHYSICAL THERAPY / SPEECH THERAPY CAP - $ 0            OCCUPATIONAL THERAPY CAP - $ 0  ALL VISITS BASED ON MEDICAL NECESSITY  PER [employer]     PART B DEDUCTIBLE $ 183 / $ 0 REMAINING  Next Referring Provider Visit: 9/13/18    Referred by: David Fried MD  Medical Diagnosis (from order):  Status post total right knee replacement using cement [Z96.651]  Treatment Diagnosis: Knee Symptoms with Pain, Impaired Joint Mobility, Impaired Range of Motion, Impaired Motor Function/Muscle Performance, Impaired Gait/Locomotion Deficits and Impaired Mobility  Insurance: 1. MEDICARE  2. WPS    Date of Surgery: 7/31/18; surgery performed: right total knee arthroplasty; rehabilitation guidelines: yes  Diagnosis Precautions: Weight Bearing As Tolerated Right Lower Extremity  Chart reviewed: Relevant co-morbidities, allergies, tests and medications:  color blind, depression, hyperlipidemia, osteoarthritis    SUBJECTIVE   Current Pain: 1/10 to Right knee. Tightness in knee today. Back on Tramadol because he wasn't sleeping well. He is also going to  more Meloxicam today.      Functional Change: No specific improvements noted since last session as he was sore from taking a walk.    OBJECTIVE   Range of Motion (degrees)    Norm Left Right Right Right Right Right  Right Right    Date   Initial Initial 8/6/18 8/14/18 8/20/18 8/27/18 8/31/18 9/10/18   Knee Flexion 135  136 90 AAROM: 98 AAROM: 105 AAROM: 113 AROM   113 AROM: 114  AA: 117 AROM 105   Knee Extension 0-5 Hyper 2 Lacks 4 Lacks 1 - Lacks 1   0 0   standard testing positions unless otherwise noted; Key: ranges are reported in active range of motion unless noted as AA=active assistive or P=passive range of motion, * denotes pain   Comments: Only those motions that were assessed are  noted.     Strength (out of 5)    Left Right Right Right   Date Initial Initial 8/31/18 9/10   Hip Flexion 5 3 4+ 4+   Hip Extension          Hip Abduction          Hip Glut Medius          Hip Adduction           Hip Internal Rotation          Hip External Rotation          Knee Flexion 5   4+ 5-   Knee Extension 5 2+ 5 5   Ankle Dorsiflexion 5 5      Ankle Plantar flexion 5 5      Ankle Inversion          Ankle Eversion          standard testing positions unless otherwise noted,* denotes pain  Comments: Only muscle strength that was assessed are noted.     Outcome Measures:   Lower Extremity Functional Scale:  (0=extreme difficulty; 80=no difficulty)  Eval: 22  8/31/18: 44  9/10/18: 45      ASSESSMENT   Knee flexion range of motion decreased, improved after hamstring stretching. Pt stated he was stiff coming into the session. Met knee extension range of motion. Met balance goal. Met knee extension strength goal and 1/2 grade before meeting knee flexion strength goal. Overall, patient is progressing well. Did have a slight set back with flexion range of motion due to stiffness, but when completing range of motion soft end feel.   Pain after treatment: 2/10 to Right knee.    Result of above outlined education: Needs reinforcement    Goals:       To be obtained by end of this plan of care:  1. HEP: Pt independent with modified and progressed HEP. Ongoing  2. Pain: Patient will decrease involved knee pain to 0/10  to aid in normalization of gait for independent living. MET 8/31/18  3. ROM: Patient will increase involved knee AROM to 0-0-120 degrees to aid in normalization of gait for independent living, reciprocal stair ambulation and squatting for lifting for household independent living. Partially met: extension met only  4. Strength: Patient will increase involved knee strength to 5/5 to aid in squatting for lifting for household independent living. Met with knee extension, almost met with knee flexion  5. Pt will  be able to amb modified independent/independent with no assistive device for 30 minutes without  pain/difficulty to improve function in grocery shopping, amb to physician appointments. MET 18  6. Pt will be able to ascend and descend 1 flight of stairs reciprocal without  pain/difficulty. MET 18  7. Improve SLS balance to 10 seconds to improve ability to amb on level and unlevel surfaces to improve function in community interaction, reduce risk for falls. Met  8. Lower Extremity Functional Scale: Patient will complete form to reflect an improved score to greater than or equal to 65 (0=extreme difficulty; 80=no difficulty) to indicate pt reported improvement in function/disability/impairment. Progressin    PLAN   1 more visit scheduled, Monitor range of motion next session, finalize HEP/educate on knee flexion exercises to improve/maintain range of motion.            118

## 2019-02-25 NOTE — ED PROVIDER NOTE - PROGRESS NOTE DETAILS
CT and UA shows cystitis. VSS, pt feels better, sleeping comfortably. D/w on call nurse program who will arrange for prompt oupt follow up within 24 hrs. Rx for cefpodoxime sent to pharmacy.

## 2019-02-25 NOTE — ED PROVIDER NOTE - OBJECTIVE STATEMENT
89F with a PMHx of afib/aflutter, (recent admit for acutely decompensated right sided HF, s/p dual chamber PPM (on Dig and toprol)), rheumatic mitral stenosis s/p MVR 2016, HTN, HLD who p/w 89F with a PMHx of afib/aflutter, (recent admit for acutely decompensated right sided HF, s/p dual chamber PPM (on Dig and toprol)), rheumatic mitral stenosis s/p MVR 2016, HTN, HLD who p/w nausea, right sided flank pain, urinary frequency, dysuria and suprapubic discomfort x 1 day. No f/c, no CP/SOB. She has been compliant with her medications.   PMD Elizabeth/Sunita

## 2019-02-25 NOTE — ED PROVIDER NOTE - CLINICAL SUMMARY MEDICAL DECISION MAKING FREE TEXT BOX
89F with above PMHx who p/w urinary complaints and R flank pain, found to be tachy (Sinus tach on EKg with no ischemia) and normotensive on exam, afebrile, no focal neuro deficits, +TTP right LQ and suprapubic on exam. Plan: Labs, UA/UCx, CT a/p to r/o intrab infection, will hold on IVFs as pt with h/o CHf and delicate fluid balance, she does not appear dehydrated or septic at this moment. Dispo pending workup.

## 2019-02-25 NOTE — ED PROVIDER NOTE - PHYSICAL EXAMINATION
GEN: Elderly, NTAF, well nourished, awake, alert, oriented to person, place, time/situation and in no apparent distress.  ENT: Airway patent, Nasal mucosa clear. Mouth with normal mucosa.  EYES: Clear bilaterally.  RESPIRATORY: Breathing comfortably with normal RR. No w/c/r.  CARDIAC: Regular rate and rhythm  ABDOMEN: Soft, obese, +TTP in RLQ and R flank, +bowel sounds, no rebound, rigidity, or guarding. No distention.  MSK: Range of motion is not limited, no deformities noted.  NEURO: Alert and oriented, x 3 no focal deficits.  SKIN: Skin normal color for race, warm, dry and intact. No evidence of rash.  PSYCH: Alert and oriented to person, place, time/situation. normal mood and affect. no apparent risk to self or others. GEN: Elderly, NTAF, well nourished, awake, alert, oriented to person, place, time/situation and in no apparent distress.  ENT: Airway patent, Nasal mucosa clear. Mouth with normal mucosa.  EYES: Clear bilaterally.  RESPIRATORY: Breathing comfortably with normal RR. No w/c/r.  CARDIAC: Regular rate and rhythm  ABDOMEN: Soft, obese, +TTP in RLQ and R flank, +bowel sounds, no rebound, rigidity, or guarding. No distention. No CVAt  MSK: Range of motion is not limited, no deformities noted.  NEURO: Alert and oriented, x 3 no focal deficits.  SKIN: Skin normal color for race, warm, dry and intact. No evidence of rash.  PSYCH: Alert and oriented to person, place, time/situation. normal mood and affect. no apparent risk to self or others.

## 2019-02-25 NOTE — ED ADULT TRIAGE NOTE - CHIEF COMPLAINT QUOTE
pt. was brought in from home, pt. is A&Ox3, c/o Rt flank and back pain with painful urination, nausea, pt. had pacemaker implanted 2 weeks ago, tachycardic in triage.

## 2019-02-25 NOTE — ED ADULT NURSE NOTE - NSIMPLEMENTINTERV_GEN_ALL_ED
Implemented All Fall Risk Interventions:  [place] to call system. Call bell, personal items and telephone within reach. Instruct patient to call for assistance. Room bathroom lighting operational. Non-slip footwear when patient is off stretcher. Physically safe environment: no spills, clutter or unnecessary equipment. Stretcher in lowest position, wheels locked, appropriate side rails in place. Provide visual cue, wrist band, yellow gown, etc. Monitor gait and stability. Monitor for mental status changes and reorient to person, place, and time. Review medications for side effects contributing to fall risk. Reinforce activity limits and safety measures with patient and family.

## 2019-02-26 VITALS
DIASTOLIC BLOOD PRESSURE: 68 MMHG | RESPIRATION RATE: 18 BRPM | OXYGEN SATURATION: 94 % | SYSTOLIC BLOOD PRESSURE: 128 MMHG | HEART RATE: 75 BPM | TEMPERATURE: 98 F

## 2019-02-26 LAB
ALBUMIN SERPL ELPH-MCNC: 3.9 G/DL — SIGNIFICANT CHANGE UP (ref 3.3–5)
ALP SERPL-CCNC: 63 U/L — SIGNIFICANT CHANGE UP (ref 40–120)
ALT FLD-CCNC: 21 U/L — SIGNIFICANT CHANGE UP (ref 10–45)
ANION GAP SERPL CALC-SCNC: 13 MMOL/L — SIGNIFICANT CHANGE UP (ref 5–17)
APPEARANCE UR: ABNORMAL
APTT BLD: 38.2 SEC — HIGH (ref 27.5–36.3)
AST SERPL-CCNC: 24 U/L — SIGNIFICANT CHANGE UP (ref 10–40)
BILIRUB SERPL-MCNC: 0.5 MG/DL — SIGNIFICANT CHANGE UP (ref 0.2–1.2)
BILIRUB UR-MCNC: NEGATIVE — SIGNIFICANT CHANGE UP
BUN SERPL-MCNC: 18 MG/DL — SIGNIFICANT CHANGE UP (ref 7–23)
CALCIUM SERPL-MCNC: 9.6 MG/DL — SIGNIFICANT CHANGE UP (ref 8.4–10.5)
CHLORIDE SERPL-SCNC: 108 MMOL/L — SIGNIFICANT CHANGE UP (ref 96–108)
CK SERPL-CCNC: 56 U/L — SIGNIFICANT CHANGE UP (ref 25–170)
CO2 SERPL-SCNC: 21 MMOL/L — LOW (ref 22–31)
COLOR SPEC: YELLOW — SIGNIFICANT CHANGE UP
CREAT SERPL-MCNC: 1.06 MG/DL — SIGNIFICANT CHANGE UP (ref 0.5–1.3)
DIFF PNL FLD: ABNORMAL
GLUCOSE SERPL-MCNC: 123 MG/DL — HIGH (ref 70–99)
GLUCOSE UR QL: NEGATIVE — SIGNIFICANT CHANGE UP
INR BLD: 2.83 — HIGH (ref 0.88–1.16)
KETONES UR-MCNC: NEGATIVE — SIGNIFICANT CHANGE UP
LEUKOCYTE ESTERASE UR-ACNC: ABNORMAL
NITRITE UR-MCNC: POSITIVE
NT-PROBNP SERPL-SCNC: 1446 PG/ML — HIGH (ref 0–300)
PH UR: 8.5 — HIGH (ref 5–8)
POTASSIUM SERPL-MCNC: 3.8 MMOL/L — SIGNIFICANT CHANGE UP (ref 3.5–5.3)
POTASSIUM SERPL-SCNC: 3.8 MMOL/L — SIGNIFICANT CHANGE UP (ref 3.5–5.3)
PROT SERPL-MCNC: 7.3 G/DL — SIGNIFICANT CHANGE UP (ref 6–8.3)
PROT UR-MCNC: 100 MG/DL
PROTHROM AB SERPL-ACNC: 33 SEC — HIGH (ref 10–12.9)
SODIUM SERPL-SCNC: 142 MMOL/L — SIGNIFICANT CHANGE UP (ref 135–145)
SP GR SPEC: 1.02 — SIGNIFICANT CHANGE UP (ref 1–1.03)
TROPONIN T SERPL-MCNC: <0.01 NG/ML — SIGNIFICANT CHANGE UP (ref 0–0.01)
UROBILINOGEN FLD QL: 0.2 E.U./DL — SIGNIFICANT CHANGE UP

## 2019-02-26 PROCEDURE — 96375 TX/PRO/DX INJ NEW DRUG ADDON: CPT

## 2019-02-26 PROCEDURE — 93005 ELECTROCARDIOGRAM TRACING: CPT

## 2019-02-26 PROCEDURE — 85025 COMPLETE CBC W/AUTO DIFF WBC: CPT

## 2019-02-26 PROCEDURE — 87186 SC STD MICRODIL/AGAR DIL: CPT

## 2019-02-26 PROCEDURE — 82550 ASSAY OF CK (CPK): CPT

## 2019-02-26 PROCEDURE — 74176 CT ABD & PELVIS W/O CONTRAST: CPT

## 2019-02-26 PROCEDURE — 80053 COMPREHEN METABOLIC PANEL: CPT

## 2019-02-26 PROCEDURE — 83880 ASSAY OF NATRIURETIC PEPTIDE: CPT

## 2019-02-26 PROCEDURE — 96374 THER/PROPH/DIAG INJ IV PUSH: CPT

## 2019-02-26 PROCEDURE — 81001 URINALYSIS AUTO W/SCOPE: CPT

## 2019-02-26 PROCEDURE — 71045 X-RAY EXAM CHEST 1 VIEW: CPT

## 2019-02-26 PROCEDURE — 74176 CT ABD & PELVIS W/O CONTRAST: CPT | Mod: 26

## 2019-02-26 PROCEDURE — 85610 PROTHROMBIN TIME: CPT

## 2019-02-26 PROCEDURE — 84484 ASSAY OF TROPONIN QUANT: CPT

## 2019-02-26 PROCEDURE — 87086 URINE CULTURE/COLONY COUNT: CPT

## 2019-02-26 PROCEDURE — 36415 COLL VENOUS BLD VENIPUNCTURE: CPT

## 2019-02-26 PROCEDURE — 99284 EMERGENCY DEPT VISIT MOD MDM: CPT | Mod: 25

## 2019-02-26 PROCEDURE — 85730 THROMBOPLASTIN TIME PARTIAL: CPT

## 2019-02-26 RX ORDER — CEFPODOXIME PROXETIL 100 MG
1 TABLET ORAL
Qty: 20 | Refills: 0
Start: 2019-02-26 | End: 2019-03-07

## 2019-02-26 RX ORDER — CEFTRIAXONE 500 MG/1
1 INJECTION, POWDER, FOR SOLUTION INTRAMUSCULAR; INTRAVENOUS ONCE
Qty: 0 | Refills: 0 | Status: COMPLETED | OUTPATIENT
Start: 2019-02-26 | End: 2019-02-26

## 2019-02-26 RX ADMIN — CEFTRIAXONE 100 GRAM(S): 500 INJECTION, POWDER, FOR SOLUTION INTRAMUSCULAR; INTRAVENOUS at 02:50

## 2019-02-28 LAB
-  AMPICILLIN/SULBACTAM: SIGNIFICANT CHANGE UP
-  AMPICILLIN: SIGNIFICANT CHANGE UP
-  CEFAZOLIN: SIGNIFICANT CHANGE UP
-  CEFTRIAXONE: SIGNIFICANT CHANGE UP
-  CIPROFLOXACIN: SIGNIFICANT CHANGE UP
-  GENTAMICIN: SIGNIFICANT CHANGE UP
-  NITROFURANTOIN: SIGNIFICANT CHANGE UP
-  PIPERACILLIN/TAZOBACTAM: SIGNIFICANT CHANGE UP
-  TOBRAMYCIN: SIGNIFICANT CHANGE UP
-  TRIMETHOPRIM/SULFAMETHOXAZOLE: SIGNIFICANT CHANGE UP
CULTURE RESULTS: SIGNIFICANT CHANGE UP
METHOD TYPE: SIGNIFICANT CHANGE UP
ORGANISM # SPEC MICROSCOPIC CNT: SIGNIFICANT CHANGE UP
ORGANISM # SPEC MICROSCOPIC CNT: SIGNIFICANT CHANGE UP
SPECIMEN SOURCE: SIGNIFICANT CHANGE UP

## 2019-02-28 PROCEDURE — 82803 BLOOD GASES ANY COMBINATION: CPT

## 2019-02-28 PROCEDURE — 85730 THROMBOPLASTIN TIME PARTIAL: CPT

## 2019-02-28 PROCEDURE — C1898: CPT

## 2019-02-28 PROCEDURE — 80061 LIPID PANEL: CPT

## 2019-02-28 PROCEDURE — 36415 COLL VENOUS BLD VENIPUNCTURE: CPT

## 2019-02-28 PROCEDURE — 93312 ECHO TRANSESOPHAGEAL: CPT

## 2019-02-28 PROCEDURE — 71045 X-RAY EXAM CHEST 1 VIEW: CPT

## 2019-02-28 PROCEDURE — 82607 VITAMIN B-12: CPT

## 2019-02-28 PROCEDURE — 84443 ASSAY THYROID STIM HORMONE: CPT

## 2019-02-28 PROCEDURE — 83036 HEMOGLOBIN GLYCOSYLATED A1C: CPT

## 2019-02-28 PROCEDURE — 87798 DETECT AGENT NOS DNA AMP: CPT

## 2019-02-28 PROCEDURE — 85610 PROTHROMBIN TIME: CPT

## 2019-02-28 PROCEDURE — C1894: CPT

## 2019-02-28 PROCEDURE — 82550 ASSAY OF CK (CPK): CPT

## 2019-02-28 PROCEDURE — 99285 EMERGENCY DEPT VISIT HI MDM: CPT | Mod: 25

## 2019-02-28 PROCEDURE — 87581 M.PNEUMON DNA AMP PROBE: CPT

## 2019-02-28 PROCEDURE — 93005 ELECTROCARDIOGRAM TRACING: CPT

## 2019-02-28 PROCEDURE — 71275 CT ANGIOGRAPHY CHEST: CPT

## 2019-02-28 PROCEDURE — 80053 COMPREHEN METABOLIC PANEL: CPT

## 2019-02-28 PROCEDURE — 83735 ASSAY OF MAGNESIUM: CPT

## 2019-02-28 PROCEDURE — 96375 TX/PRO/DX INJ NEW DRUG ADDON: CPT | Mod: XU

## 2019-02-28 PROCEDURE — 87086 URINE CULTURE/COLONY COUNT: CPT

## 2019-02-28 PROCEDURE — 82746 ASSAY OF FOLIC ACID SERUM: CPT

## 2019-02-28 PROCEDURE — 85025 COMPLETE CBC W/AUTO DIFF WBC: CPT

## 2019-02-28 PROCEDURE — C1785: CPT

## 2019-02-28 PROCEDURE — 71046 X-RAY EXAM CHEST 2 VIEWS: CPT

## 2019-02-28 PROCEDURE — 80162 ASSAY OF DIGOXIN TOTAL: CPT

## 2019-02-28 PROCEDURE — 97116 GAIT TRAINING THERAPY: CPT

## 2019-02-28 PROCEDURE — 93306 TTE W/DOPPLER COMPLETE: CPT

## 2019-02-28 PROCEDURE — 87486 CHLMYD PNEUM DNA AMP PROBE: CPT

## 2019-02-28 PROCEDURE — 82553 CREATINE MB FRACTION: CPT

## 2019-02-28 PROCEDURE — 87633 RESP VIRUS 12-25 TARGETS: CPT

## 2019-02-28 PROCEDURE — 96374 THER/PROPH/DIAG INJ IV PUSH: CPT | Mod: XU

## 2019-02-28 PROCEDURE — 83880 ASSAY OF NATRIURETIC PEPTIDE: CPT

## 2019-02-28 PROCEDURE — 82150 ASSAY OF AMYLASE: CPT

## 2019-02-28 PROCEDURE — 83690 ASSAY OF LIPASE: CPT

## 2019-02-28 PROCEDURE — 85027 COMPLETE CBC AUTOMATED: CPT

## 2019-02-28 PROCEDURE — 74018 RADEX ABDOMEN 1 VIEW: CPT

## 2019-02-28 PROCEDURE — 81001 URINALYSIS AUTO W/SCOPE: CPT

## 2019-02-28 PROCEDURE — 80048 BASIC METABOLIC PNL TOTAL CA: CPT

## 2019-02-28 PROCEDURE — 84484 ASSAY OF TROPONIN QUANT: CPT

## 2019-03-14 ENCOUNTER — APPOINTMENT (OUTPATIENT)
Dept: HEART AND VASCULAR | Facility: CLINIC | Age: 84
End: 2019-03-14
Payer: MEDICARE

## 2019-03-14 VITALS — DIASTOLIC BLOOD PRESSURE: 63 MMHG | SYSTOLIC BLOOD PRESSURE: 160 MMHG | HEART RATE: 58 BPM

## 2019-03-14 PROCEDURE — 99212 OFFICE O/P EST SF 10 MIN: CPT | Mod: 25

## 2019-03-14 PROCEDURE — 93280 PM DEVICE PROGR EVAL DUAL: CPT

## 2019-03-18 RX ORDER — SPIRONOLACTONE 25 MG/1
25 TABLET ORAL
Refills: 0 | Status: ACTIVE | COMMUNITY
Start: 2019-03-18

## 2019-03-18 RX ORDER — WARFARIN SODIUM 4 MG/1
4 TABLET ORAL
Refills: 0 | Status: ACTIVE | COMMUNITY
Start: 2019-03-18

## 2019-03-18 RX ORDER — LEVOTHYROXINE SODIUM 75 UG/1
75 TABLET ORAL
Refills: 0 | Status: ACTIVE | COMMUNITY
Start: 2019-03-18

## 2019-03-18 RX ORDER — METOPROLOL SUCCINATE 25 MG/1
25 TABLET, EXTENDED RELEASE ORAL
Refills: 0 | Status: ACTIVE | COMMUNITY
Start: 2019-03-18

## 2019-03-18 RX ORDER — ATORVASTATIN CALCIUM 20 MG/1
20 TABLET, FILM COATED ORAL
Refills: 0 | Status: ACTIVE | COMMUNITY
Start: 2019-03-18

## 2019-03-18 RX ORDER — SERTRALINE HYDROCHLORIDE 50 MG/1
50 TABLET, FILM COATED ORAL
Refills: 0 | Status: ACTIVE | COMMUNITY
Start: 2019-03-18

## 2019-03-18 RX ORDER — FUROSEMIDE 40 MG/1
40 TABLET ORAL
Refills: 0 | Status: ACTIVE | COMMUNITY
Start: 2019-03-18

## 2019-03-18 RX ORDER — DIGOXIN 125 UG/1
125 TABLET ORAL
Refills: 0 | Status: ACTIVE | COMMUNITY
Start: 2019-03-18

## 2019-03-18 NOTE — PHYSICAL EXAM
[General Appearance - Well Developed] : well developed [Normal Appearance] : normal appearance [Well Groomed] : well groomed [General Appearance - In No Acute Distress] : no acute distress [No Deformities] : no deformities [General Appearance - Well Nourished] : well nourished [Heart Rate And Rhythm] : heart rate and rhythm were normal [Heart Sounds] : normal S1 and S2 [] : no respiratory distress [Exaggerated Use Of Accessory Muscles For Inspiration] : no accessory muscle use [Respiration, Rhythm And Depth] : normal respiratory rhythm and effort [Left Infraclavicular] : left infraclavicular area [Clean] : clean [Dry] : dry [Well-Healed] : well-healed [Palpable Crepitus] : no palpable crepitus [Bleeding] : no active bleeding [Purulent Drainage] : no purulent drainage [Foul Odor] : no foul smell [Serosanguineous Drainage] : no serosanquineous drainage [Serous Drainage] : no serous drainage [Erythema] : not erythematous [Tender] : not tender [Warm] : not warm [Indurated] : not indurated [Fluctuant] : not fluctuant [Cyanosis, Localized] : no localized cyanosis

## 2019-03-18 NOTE — REVIEW OF SYSTEMS
[Fever] : no fever [Headache] : no headache [Chills] : no chills [Feeling Fatigued] : not feeling fatigued [see HPI] : see HPI [Negative] : Heme/Lymph

## 2019-03-18 NOTE — DISCUSSION/SUMMARY
[Pacemaker Function Normal] : normal pacemaker function [FreeTextEntry1] : 89 year-old female with chronic diastolic CHF (EF 55%), HTN, HLD, hypothyroidism, s/p MVr and MYKE closure (incompletely closed, with sludge) with Dr. Leroy in 2016, pAF (on Coumadin) and tachy mamie syndrome s/p pacemaker who presents for follow up.  Pacemaker incision is well healed.  Interrogation reveals normal function.  All measured data is within normal limits.  She has asymptomatic atrial fibrillation that is now rate controlled on medications.  She is on oral anticoagulation and has regular INRS. She will follow up in 4 months or sooner if needed and knows to call with any questions or concerns.

## 2019-03-18 NOTE — PROCEDURE
[No] : not [NSR] : normal sinus rhythm [Pacemaker] : pacemaker [Threshold Testing Performed] : Threshold testing was performed [DDD] : DDD [Lead Imp:  ___ohms] : lead impedance was [unfilled] ohms [Sensing Amplitude ___mv] : sensing amplitude was [unfilled] mv [___V @] : [unfilled] V [___ ms] : [unfilled] ms [None] : none [de-identified] : [facility]  [de-identified] : [employer] [de-identified] : [id number] [de-identified] : 2/13/19 [de-identified] : 50/120 [de-identified] : AP/ <1%\par 44% afib [de-identified] : 15 years

## 2019-03-18 NOTE — HISTORY OF PRESENT ILLNESS
[Palpitations] : no palpitations [SOB] : no dyspnea [Syncope] : no syncope [Dizziness] : no dizziness [Shoulder Pain] : no shoulder pain [Chest Pain] : no chest pain or discomfort [Pain at Site] : no pain at device site [Swelling at Site] : no swelling at device site [Erythema at Site] : no erythema at device site [de-identified] : 89 year-old female with chronic diastolic CHF (EF 55%), HTN, HLD, hypothyroidism, s/p MVr and MYKE closure (incompletely closed, with sludge) with Dr. Leroy in 2016, pAF (on Coumadin) and tachy mamie syndrome s/p pacemaker who presents for follow up. \par \par Since discharge from the hospital she has been doing well.  No chest pain, SOB, syncope, near syncope, orthopnea, palpitations.  No device related complaints

## 2019-08-15 ENCOUNTER — APPOINTMENT (OUTPATIENT)
Dept: HEART AND VASCULAR | Facility: CLINIC | Age: 84
End: 2019-08-15
Payer: MEDICARE

## 2019-08-15 VITALS
HEART RATE: 60 BPM | HEIGHT: 62 IN | WEIGHT: 135 LBS | SYSTOLIC BLOOD PRESSURE: 123 MMHG | DIASTOLIC BLOOD PRESSURE: 60 MMHG | BODY MASS INDEX: 24.84 KG/M2

## 2019-08-15 PROCEDURE — 93280 PM DEVICE PROGR EVAL DUAL: CPT

## 2019-08-21 NOTE — REVIEW OF SYSTEMS
[see HPI] : see HPI [Negative] : Heme/Lymph [Fever] : no fever [Headache] : no headache [Chills] : no chills [Feeling Fatigued] : not feeling fatigued

## 2019-08-21 NOTE — PROCEDURE
[No] : not [NSR] : normal sinus rhythm [Pacemaker] : pacemaker [DDD] : DDD [Threshold Testing Performed] : Threshold testing was performed [Sensing Amplitude ___mv] : sensing amplitude was [unfilled] mv [Lead Imp:  ___ohms] : lead impedance was [unfilled] ohms [___V @] : [unfilled] V [___ ms] : [unfilled] ms [None] : none [de-identified] : [facility]  [de-identified] : [id number] [de-identified] : [employer] [de-identified] : 50/120 [de-identified] : 2/13/19 [de-identified] : AP <1%\par  2%\par last episode of afib 6/2019 - burden dowm from 44% to 30%\par 44% afib [de-identified] : 15 years

## 2019-08-21 NOTE — HISTORY OF PRESENT ILLNESS
[Palpitations] : no palpitations [SOB] : no dyspnea [Syncope] : no syncope [Dizziness] : no dizziness [Chest Pain] : no chest pain or discomfort [Shoulder Pain] : no shoulder pain [Erythema at Site] : no erythema at device site [Pain at Site] : no pain at device site [de-identified] : 89 year-old female with chronic diastolic CHF (EF 55%), HTN, HLD, hypothyroidism, s/p MVr and MYKE closure (incompletely closed, with sludge) with Dr. Leroy in 2016, pAF (on Coumadin) and tachy mamie syndrome s/p pacemaker who presents for follow up. \par \par Since her last visit she has been doing well.  No chest pain, SOB, syncope, near syncope, orthopnea, palpitations.  No device related issues. [Swelling at Site] : no swelling at device site

## 2019-08-21 NOTE — PHYSICAL EXAM
[General Appearance - Well Developed] : well developed [Normal Appearance] : normal appearance [Well Groomed] : well groomed [General Appearance - Well Nourished] : well nourished [General Appearance - In No Acute Distress] : no acute distress [No Deformities] : no deformities [Heart Sounds] : normal S1 and S2 [Heart Rate And Rhythm] : heart rate and rhythm were normal [] : no respiratory distress [Respiration, Rhythm And Depth] : normal respiratory rhythm and effort [Exaggerated Use Of Accessory Muscles For Inspiration] : no accessory muscle use [Left Infraclavicular] : left infraclavicular area [Clean] : clean [Dry] : dry [Well-Healed] : well-healed [Palpable Crepitus] : no palpable crepitus [Bleeding] : no active bleeding [Foul Odor] : no foul smell [Purulent Drainage] : no purulent drainage [Serosanguineous Drainage] : no serosanquineous drainage [Serous Drainage] : no serous drainage [Erythema] : not erythematous [Warm] : not warm [Tender] : not tender [Indurated] : not indurated [Fluctuant] : not fluctuant

## 2019-08-21 NOTE — DISCUSSION/SUMMARY
[Pacemaker Function Normal] : normal pacemaker function [FreeTextEntry1] : 89 year-old female with chronic diastolic CHF (EF 55%), HTN, HLD, hypothyroidism, s/p MVr and MYKE closure (incompletely closed, with sludge) with Dr. Leroy in 2016, pAF (on Coumadin) and tachy mamie syndrome s/p pacemaker who presents for follow up.  Pacemaker interrogation reveals normal function.  All measured data is within normal limits.  She has asymptomatic atrial fibrillation and all episodes are rate controlled on medications.  She is on Coumadin and has regular INRS. She will follow up in 6 months or sooner if needed and knows to call with any questions or concerns.

## 2019-12-14 ENCOUNTER — EMERGENCY (EMERGENCY)
Facility: HOSPITAL | Age: 84
LOS: 1 days | Discharge: HOME | End: 2019-12-14
Attending: EMERGENCY MEDICINE | Admitting: EMERGENCY MEDICINE
Payer: MEDICARE

## 2019-12-14 VITALS
RESPIRATION RATE: 17 BRPM | DIASTOLIC BLOOD PRESSURE: 78 MMHG | OXYGEN SATURATION: 97 % | HEART RATE: 62 BPM | SYSTOLIC BLOOD PRESSURE: 150 MMHG | TEMPERATURE: 98 F

## 2019-12-14 VITALS
SYSTOLIC BLOOD PRESSURE: 159 MMHG | RESPIRATION RATE: 18 BRPM | OXYGEN SATURATION: 96 % | HEART RATE: 54 BPM | DIASTOLIC BLOOD PRESSURE: 83 MMHG | TEMPERATURE: 98 F

## 2019-12-14 DIAGNOSIS — R06.02 SHORTNESS OF BREATH: ICD-10-CM

## 2019-12-14 DIAGNOSIS — R53.1 WEAKNESS: ICD-10-CM

## 2019-12-14 DIAGNOSIS — N39.0 URINARY TRACT INFECTION, SITE NOT SPECIFIED: ICD-10-CM

## 2019-12-14 DIAGNOSIS — Z84.89 FAMILY HISTORY OF OTHER SPECIFIED CONDITIONS: Chronic | ICD-10-CM

## 2019-12-14 LAB
APPEARANCE UR: CLEAR — SIGNIFICANT CHANGE UP
BILIRUB UR-MCNC: NEGATIVE — SIGNIFICANT CHANGE UP
COLOR SPEC: YELLOW — SIGNIFICANT CHANGE UP
DIFF PNL FLD: NEGATIVE — SIGNIFICANT CHANGE UP
GLUCOSE UR QL: NEGATIVE — SIGNIFICANT CHANGE UP
KETONES UR-MCNC: NEGATIVE — SIGNIFICANT CHANGE UP
LEUKOCYTE ESTERASE UR-ACNC: ABNORMAL
NITRITE UR-MCNC: POSITIVE
PH UR: 7 — SIGNIFICANT CHANGE UP (ref 5–8)
PROT UR-MCNC: ABNORMAL MG/DL
SP GR SPEC: 1.02 — SIGNIFICANT CHANGE UP (ref 1–1.03)
UROBILINOGEN FLD QL: 0.2 E.U./DL — SIGNIFICANT CHANGE UP

## 2019-12-14 PROCEDURE — 85610 PROTHROMBIN TIME: CPT

## 2019-12-14 PROCEDURE — 83690 ASSAY OF LIPASE: CPT

## 2019-12-14 PROCEDURE — 71045 X-RAY EXAM CHEST 1 VIEW: CPT | Mod: 26

## 2019-12-14 PROCEDURE — 99285 EMERGENCY DEPT VISIT HI MDM: CPT | Mod: 25

## 2019-12-14 PROCEDURE — 85025 COMPLETE CBC W/AUTO DIFF WBC: CPT

## 2019-12-14 PROCEDURE — 71045 X-RAY EXAM CHEST 1 VIEW: CPT

## 2019-12-14 PROCEDURE — 93005 ELECTROCARDIOGRAM TRACING: CPT

## 2019-12-14 PROCEDURE — 80053 COMPREHEN METABOLIC PANEL: CPT

## 2019-12-14 PROCEDURE — 81001 URINALYSIS AUTO W/SCOPE: CPT

## 2019-12-14 PROCEDURE — 83880 ASSAY OF NATRIURETIC PEPTIDE: CPT

## 2019-12-14 PROCEDURE — 93010 ELECTROCARDIOGRAM REPORT: CPT

## 2019-12-14 PROCEDURE — 84484 ASSAY OF TROPONIN QUANT: CPT

## 2019-12-14 PROCEDURE — 99283 EMERGENCY DEPT VISIT LOW MDM: CPT | Mod: 25

## 2019-12-14 PROCEDURE — 85730 THROMBOPLASTIN TIME PARTIAL: CPT

## 2019-12-14 PROCEDURE — 36415 COLL VENOUS BLD VENIPUNCTURE: CPT

## 2019-12-14 RX ORDER — CEFPODOXIME PROXETIL 100 MG
100 TABLET ORAL ONCE
Refills: 0 | Status: COMPLETED | OUTPATIENT
Start: 2019-12-14 | End: 2019-12-14

## 2019-12-14 RX ORDER — CEFPODOXIME PROXETIL 100 MG
1 TABLET ORAL
Qty: 14 | Refills: 0
Start: 2019-12-14 | End: 2019-12-20

## 2019-12-14 RX ORDER — SODIUM CHLORIDE 9 MG/ML
500 INJECTION INTRAMUSCULAR; INTRAVENOUS; SUBCUTANEOUS ONCE
Refills: 0 | Status: COMPLETED | OUTPATIENT
Start: 2019-12-14 | End: 2019-12-14

## 2019-12-14 RX ADMIN — Medication 100 MILLIGRAM(S): at 22:40

## 2019-12-14 NOTE — ED PROVIDER NOTE - DIAGNOSTIC INTERPRETATION
CXR read: The heart appears to be within normal limits in transverse diameter.  No acute infiltrates, effusions or evidence of pulmonary vascular congestion.  The chest wall and surrounding bony structures appear normal.  ED Physician: Marilee Bueno) CXR read: The heart appears to be within normal limits in transverse diameter.  No acute infiltrates, effusions or evidence of pulmonary vascular congestion.  The chest wall and surrounding bony structures appear normal. similar in appearance to prior.   ED Physician: Marilee Bueno)

## 2019-12-14 NOTE — ED PROVIDER NOTE - PROGRESS NOTE DETAILS
pt requesting for dc, will treat UTI, BNP, trop negative, CXR w no overt congestion, pt denies sob, chest pain, no ischemic changes on EKG. disc w PMD. pt requesting for dc, will treat UTI, BNP, trop negative, CXR w no overt congestion, pt denies sob, chest pain at present, no ischemic changes on EKG. disc w PMD.

## 2019-12-14 NOTE — ED PROVIDER NOTE - CCCP TRG CHIEF CMPLNT
Subjective   Dipak Marinelli is a 54 y.o. male.   Pt presents today with CC of Weight Check      History of Present Illness   1.  Patient is here for weight check, and encounter for pre-bariatric surgery counseling and education.  He has been untouched with the bariatric surgery clinic.  I have recommended a pulmonology referral and a CT scan of his abdomen and pelvis without contrast to evaluate a bulge in his upper abdomen.  He is currently having no abdominal pain or shortness of breath.  These were ordered by the bariatric clinic.  It is important that Anthony can get these locally as transportation long distances is not optimal for him.  #2 he is a current everyday smoker.  He has been using the nicotine gum though is still smoking approximately 1 pack/day.  He fully intends on quitting no less than 1 month before surgery.  #3 he presented today with forms to be filled out for both his weight loss visit, as well as surgical clearance forms from a weight loss clinic.           The following portions of the patient's history were reviewed and updated as appropriate: allergies, current medications, past family history, past social history, past surgical history and problem list.    Review of Systems   Constitutional: Negative for chills, fever, unexpected weight gain and unexpected weight loss.   HENT: Negative for congestion and sore throat.    Eyes: Negative for blurred vision and visual disturbance.   Respiratory: Negative for cough and wheezing.    Cardiovascular: Negative for chest pain and palpitations.   Gastrointestinal: Negative for abdominal pain, diarrhea and nausea.   Genitourinary: Negative for dysuria.   Musculoskeletal: Negative for arthralgias and neck stiffness.   Skin: Negative for rash.   Neurological: Negative for dizziness, seizures and syncope.   Psychiatric/Behavioral: Negative for self-injury, suicidal ideas and depressed mood.       Objective   Physical Exam   Constitutional: He appears  well-developed and well-nourished.   HENT:   Head: Normocephalic and atraumatic.   Eyes: Conjunctivae are normal.   Neck: Normal range of motion. Neck supple.   Cardiovascular: Normal rate and regular rhythm.   Pulmonary/Chest: Effort normal and breath sounds normal. No respiratory distress. He has no wheezes.   Abdominal: Soft. Bowel sounds are normal. He exhibits no distension.   Nursing note and vitals reviewed.        Assessment/Plan   Dipak was seen today for weight check.    Diagnoses and all orders for this visit:    Current every day smoker  He is going to continue the Nicorette gum.  If he has some difficulty affording this medication we will come up with something different.  He will need to quit smoking prior to surgery.  It was explained to him by both his bariatric surgery Dr. as well as myself that this is not negotiable, he must quit smoking.  Weight loss counseling, encounter for  Forms filled out and can be found scanned in his chart.  They will be sent to his weight loss clinic.  Encounter for pre-bariatric surgery counseling and education  He must quit smoking.  Pulmonology referral was sent by his specialist, records indicate that they have attempted to call and schedule him for both pulmonology appointment and for his CT scan of his abdomen and pelvis, they were unable to reach him.  I attempted to call him after his appointment today, I also was unable to reach him.  We will reorder these if needed.  He needs a pulmonology referral for long time smoking evaluation.  He needs a CT scan of his abdomen and pelvis without contrast as ordered by his bariatric surgeon.  Epigastric mass  His bariatric surgeon is concerned about an epigastric mass.  He will need CT scan of his abdomen and pelvis without contrast soon to rule out contraindications to surgery.  Administrative encounter  20 minutes spent on filling out various paperwork for his surgery.               Dipak Marinelli is a current cigarettes  user.  He currently smokes 1 pack of cigarettes per day for a duration of 35 years. I have educated him on the risk of diseases from using tobacco products such as cancer, COPD and heart diease.     I advised him to quit and he is willing to quit. We have discussed the following method/s for tobacco cessation:  Counseling.  I spent 7 minutes counseling the patient.        Patient's Body mass index is 40.54 kg/m². BMI is above normal parameters. Recommendations include: exercise counseling and nutrition counseling.   abdominal pain

## 2019-12-14 NOTE — ED PROVIDER NOTE - PATIENT PORTAL LINK FT
You can access the FollowMyHealth Patient Portal offered by [facility] by registering at the following website: [email or web address]. By joining [facility]’s FollowMyHealth portal, you will also be able to view your health information using other applications (apps) compatible with our system.

## 2019-12-14 NOTE — ED ADULT NURSE NOTE - OBJECTIVE STATEMENT
Pt came to ED complaining of bilateral lower abd pain for weeks. Pt reports epigastric pain after eating meal today. Pt directed to ED by MD Johnson.  pt currently denies chest pain, SOB,  symptoms.  Pt lung sounds clear.  positive PMS x4 extremities.  Pt reports nausea, denies dizziness and vomiting.

## 2019-12-14 NOTE — ED ADULT NURSE NOTE - NSIMPLEMENTINTERV_GEN_ALL_ED
Implemented All Fall Risk Interventions:  [place] to call system. Call bell, personal items and telephone within reach. Instruct patient to call for assistance. Room bathroom lighting operational. Non-slip footwear when patient is off stretcher. Physically safe environment: no spills, clutter or unnecessary equipment. Stretcher in lowest position, wheels locked, appropriate side rails in place. Provide visual cue, wrist band, yellow gown, etc. Monitor gait and stability. Monitor for mental status changes and reorient to person, place, and time. Review medications for side effects contributing to fall risk. Reinforce activity limits and safety measures with patient and family.

## 2019-12-14 NOTE — ED PROVIDER NOTE - CLINICAL SUMMARY MEDICAL DECISION MAKING FREE TEXT BOX
Pt w episode of sob, now resolved and generalized feelings of unwell, well appearing, vss, given medical hx, will obtain labs, cxr, ua, gentle hydration and reassess.

## 2019-12-14 NOTE — ED PROVIDER NOTE - OBJECTIVE STATEMENT
89 y/o F with PMHx of CHF, HTN, HLD, hypothyroidism, mitral valve repair, presents to the ED for an episode of SOB that has resolved PTA. Also states generalized feelings of unwellness, but with no chest pain, fever, chills, increased leg swelling. Also has associated abdominal cramping ongoing for more than a month. 89 y/o F with PMHx of CHF, HTN, HLD, hypothyroidism, mitral valve repair, presents to the ED for an episode of SOB that has resolved now, occurred PTA for a few seconds while at home.  Also states generalized feelings of unwell, but with no chest pain, fever, chills, increased leg swelling. Also has associated lower abdominal cramping ongoing for more than a month. Has had previous UTIs. no n/v/d/f/c, prev admissions for CHF/UTIs. 89 y/o F with PMHx of CHF, HTN, HLD, hypothyroidism, mitral valve repair, presents to the ED for an episode of SOB that has resolved now, occurred PTA for a few seconds while at home.  Also states generalized feelings of unwell, but with no chest pain, fever, chills, increased leg swelling. Also has associated lower abdominal cramping ongoing for more than a month. Has had previous UTIs. no cough,n/v/d/f/c, prev admissions for CHF/UTIs.

## 2019-12-14 NOTE — ED ADULT NURSE NOTE - CHPI ED NUR SYMPTOMS NEG
no abdominal distension/no vomiting/no fever/no hematuria/no blood in stool/no burning urination/no chills/no dysuria/no diarrhea

## 2020-02-13 ENCOUNTER — APPOINTMENT (OUTPATIENT)
Dept: HEART AND VASCULAR | Facility: CLINIC | Age: 85
End: 2020-02-13
Payer: MEDICARE

## 2020-02-13 VITALS
SYSTOLIC BLOOD PRESSURE: 120 MMHG | DIASTOLIC BLOOD PRESSURE: 52 MMHG | BODY MASS INDEX: 24.84 KG/M2 | HEART RATE: 54 BPM | WEIGHT: 135 LBS | HEIGHT: 62 IN

## 2020-02-13 PROCEDURE — 99212 OFFICE O/P EST SF 10 MIN: CPT | Mod: 25

## 2020-02-13 PROCEDURE — 93280 PM DEVICE PROGR EVAL DUAL: CPT

## 2020-02-20 NOTE — PHYSICAL EXAM
[General Appearance - Well Developed] : well developed [Normal Appearance] : normal appearance [Well Groomed] : well groomed [General Appearance - Well Nourished] : well nourished [General Appearance - In No Acute Distress] : no acute distress [No Deformities] : no deformities [Heart Sounds] : normal S1 and S2 [Heart Rate And Rhythm] : heart rate and rhythm were normal [] : no respiratory distress [Respiration, Rhythm And Depth] : normal respiratory rhythm and effort [Exaggerated Use Of Accessory Muscles For Inspiration] : no accessory muscle use [Left Infraclavicular] : left infraclavicular area [Clean] : clean [Dry] : dry [Well-Healed] : well-healed [Bleeding] : no active bleeding [Palpable Crepitus] : no palpable crepitus [Foul Odor] : no foul smell [Purulent Drainage] : no purulent drainage [Serosanguineous Drainage] : no serosanquineous drainage [Serous Drainage] : no serous drainage [Erythema] : not erythematous [Indurated] : not indurated [Tender] : not tender [Warm] : not warm [Fluctuant] : not fluctuant

## 2020-02-20 NOTE — ADDENDUM
[FreeTextEntry1] : I, Mayito De La Torre, hereby attest that the medical record entry for today accurately signatures/notations that I made in my capacity as Attending Physician when I treated/diagnosed the above patient.  I do hereby attest that this information is true, accurate and complete to the best of my knowledge and I understand that any falsification, omission, or concealment of material fact may subject me to administrative, civil, or criminal liability. I was present for the entire visit with the patient and I was involved in all the critical and key components of the visit.

## 2020-02-20 NOTE — DISCUSSION/SUMMARY
[Pacemaker Function Normal] : normal pacemaker function [FreeTextEntry1] : 89 year-old female with chronic diastolic CHF (EF 55%), HTN, HLD, hypothyroidism, s/p MVr and MYKE closure (incompletely closed, with sludge) with Dr. Leroy in 2016, pAF (on Coumadin) and tachy mamie syndrome s/p pacemaker who presents for follow up.  Pacemaker interrogation reveals normal function.  All measured data is within normal limits.  She has asymptomatic atrial fibrillation and all episodes are rate controlled on medications, with a stable burden.  She is on Coumadin and has regular INRS. She will follow up in 6 months or sooner if needed and knows to call with any questions or concerns.

## 2020-02-20 NOTE — PROCEDURE
[No] : not [NSR] : normal sinus rhythm [Pacemaker] : pacemaker [DDD] : DDD [Threshold Testing Performed] : Threshold testing was performed [Lead Imp:  ___ohms] : lead impedance was [unfilled] ohms [___V @] : [unfilled] V [Sensing Amplitude ___mv] : sensing amplitude was [unfilled] mv [None] : none [___ ms] : [unfilled] ms [de-identified] : [facility]  [de-identified] : Accolade MRI [de-identified] : [id number] [de-identified] : 2/13/19 [de-identified] : 50/120 [de-identified] : 14 years [de-identified] : AP 6%\par  2%\par afib burden 13% (last visit 17%)

## 2020-02-20 NOTE — HISTORY OF PRESENT ILLNESS
[Palpitations] : no palpitations [SOB] : no dyspnea [Syncope] : no syncope [Chest Pain] : no chest pain or discomfort [Dizziness] : no dizziness [Erythema at Site] : no erythema at device site [Pain at Site] : no pain at device site [Shoulder Pain] : no shoulder pain [Swelling at Site] : no swelling at device site [de-identified] : 89 year-old female with chronic diastolic CHF (EF 55%), HTN, HLD, hypothyroidism, s/p MVr and MYKE closure (incompletely closed, with sludge) with Dr. Leroy in 2016, pAF (on Coumadin) and tachy mamie syndrome s/p pacemaker who presents for follow up. \par \par Since her last visit she has been doing well.  No chest pain, SOB, syncope, near syncope, orthopnea, palpitations.  No device related complaints

## 2020-08-13 ENCOUNTER — APPOINTMENT (OUTPATIENT)
Dept: HEART AND VASCULAR | Facility: CLINIC | Age: 85
End: 2020-08-13
Payer: MEDICARE

## 2020-08-13 PROCEDURE — 99441: CPT | Mod: 95

## 2020-08-13 NOTE — HISTORY OF PRESENT ILLNESS
[Home] : at home, [unfilled] , at the time of the visit. [Medical Office: ([facility])___] : at the medical office located in  [Verbal consent obtained from patient] : the patient, [unfilled] [FreeTextEntry1] : 89 year-old female with chronic diastolic CHF (EF 55%), HTN, HLD, hypothyroidism, s/p MVr and MYKE closure (incompletely closed, with sludge) with Dr. Leroy in 2016, pAF (on Coumadin) and tachy mamie syndrome s/p pacemaker who presents for follow up. \par \par Since her last visit she has been doing well. No chest pain, SOB, syncope, near syncope, orthopnea, palpitations. No device related complaints. \par  \par Active Problems\par Atrial fibrillation (427.31) (I48.91)\par Tachy-mamie syndrome (427.81) (I49.5)\par \par Current Meds\par Coumadin 4 MG Oral Tablet\par Digoxin 125 MCG Oral Tablet\par Lasix 40 MG Oral Tablet\par Lipitor 20 MG Oral Tablet\par Spironolactone 25 MG Oral Tablet\par Synthroid 75 MCG Oral Tablet\par Toprol XL 25 MG Oral Tablet Extended Release 24 Hour\par Zoloft 50 MG Oral Tablet\par \par History Reviewed\par History reviewed. Medications and allergies reviewed. \par  \par Review of Systems\par \par Constitutional: no fever, no chills and not feeling fatigued. \par Cardiovascular: see HPI. \par Respiratory, Integumentary, Neurological, Psychiatric and Heme/Lymph are otherwise negative. \par \par Remote Interrogation reviewed\par Device Check The patient is not pacemaker dependent. \par Underlying Rhythm: normal sinus rhythm. \par Device Type: pacemaker \par Pacemaker/ICD : [employer]. \par Model: Accolade MRI. Serial Number: [id number]. Date of Implant: 2/13/19. \par Mode: DDD. Rate: 50/120. \par Battery Status: 13.5 years. \par Measurement: Threshold testing was performed. \par Atrial: lead impedance was 841 ohms. sensing amplitude was 3 mv. Pacing Threshold: not tested. \par Rt Ventricle:. lead impedance was 718 ohms. sensing amplitude was 13 mv. Pacing Threshold: not tested. \par Program Changes: none. \par Comments:. AP 4%\par  1%\par afib burden 13% (last visit 17%). \par  \par Discussion/Summary\par The impression is normal pacemaker function. \par 89 year-old female with chronic diastolic CHF (EF 55%), HTN, HLD, hypothyroidism, s/p MVr and MYKE closure (incompletely closed, with sludge) with Dr. Leroy in 2016, pAF (on Coumadin) and tachy mamie syndrome s/p pacemaker who presents for follow up. Remote Pacemaker interrogation reveals normal function. All measured data is within normal limits. She has asymptomatic atrial fibrillation and all episodes are rate controlled on medications, with a stable burden. She is on Coumadin and has regular INRS. She will follow up in 6 months or sooner if needed and knows to call with any questions or concerns. \par

## 2021-02-17 ENCOUNTER — APPOINTMENT (OUTPATIENT)
Dept: HEART AND VASCULAR | Facility: CLINIC | Age: 86
End: 2021-02-17
Payer: MEDICARE

## 2021-02-17 ENCOUNTER — NON-APPOINTMENT (OUTPATIENT)
Age: 86
End: 2021-02-17

## 2021-02-17 PROCEDURE — 93296 REM INTERROG EVL PM/IDS: CPT

## 2021-02-17 PROCEDURE — 93294 REM INTERROG EVL PM/LDLS PM: CPT

## 2021-05-19 ENCOUNTER — APPOINTMENT (OUTPATIENT)
Dept: HEART AND VASCULAR | Facility: CLINIC | Age: 86
End: 2021-05-19
Payer: MEDICARE

## 2021-05-19 ENCOUNTER — NON-APPOINTMENT (OUTPATIENT)
Age: 86
End: 2021-05-19

## 2021-05-19 PROCEDURE — 93294 REM INTERROG EVL PM/LDLS PM: CPT

## 2021-05-19 PROCEDURE — 93296 REM INTERROG EVL PM/IDS: CPT

## 2021-06-24 ENCOUNTER — APPOINTMENT (OUTPATIENT)
Dept: HEART AND VASCULAR | Facility: CLINIC | Age: 86
End: 2021-06-24
Payer: MEDICARE

## 2021-06-24 VITALS
SYSTOLIC BLOOD PRESSURE: 110 MMHG | WEIGHT: 135 LBS | DIASTOLIC BLOOD PRESSURE: 54 MMHG | HEIGHT: 62 IN | HEART RATE: 54 BPM | BODY MASS INDEX: 24.84 KG/M2

## 2021-06-24 PROCEDURE — 93280 PM DEVICE PROGR EVAL DUAL: CPT

## 2021-06-24 PROCEDURE — 99211 OFF/OP EST MAY X REQ PHY/QHP: CPT | Mod: 25

## 2021-06-28 NOTE — REASON FOR VISIT
[Follow-up Device Check] : is here today for a follow-up device check visit for [Formal Caregiver] : formal caregiver

## 2021-06-30 NOTE — HISTORY OF PRESENT ILLNESS
[Palpitations] : no palpitations [SOB] : no dyspnea [Syncope] : no syncope [Dizziness] : no dizziness [Chest Pain] : no chest pain or discomfort [Shoulder Pain] : no shoulder pain [Pain at Site] : no pain at device site [Erythema at Site] : no erythema at device site [Swelling at Site] : no swelling at device site [de-identified] : 89 year-old female with chronic diastolic CHF (EF 55%), HTN, HLD, hypothyroidism, s/p MVr and MYKE closure (incompletely closed, with sludge) with Dr. Leroy in 2016, pAF (on Coumadin) and tachy mamie syndrome s/p pacemaker who presents for follow up. \par \par Since her last visit she has been doing well.  No chest pain, SOB, syncope, near syncope, orthopnea, palpitations.  No device related issues

## 2021-06-30 NOTE — DISCUSSION/SUMMARY
[Pacemaker Function Normal] : normal pacemaker function [FreeTextEntry1] : 89 year-old female with chronic diastolic CHF (EF 55%), HTN, HLD, hypothyroidism, s/p MVr and MYKE closure (incompletely closed, with sludge) with Dr. Leroy in 2016, pAF (on Coumadin) and tachy mamie syndrome s/p pacemaker who presents for follow up.  Pacemaker interrogation reveals normal function.  All measured data is within normal limits.  She has asymptomatic atrial fibrillation and all episodes are rate controlled on medications, with a stable burden.  She is on Coumadin and has regular INR checks. She will follow up in 6 months or sooner if needed and knows to call with any questions or concerns.

## 2021-06-30 NOTE — PHYSICAL EXAM
[General Appearance - Well Developed] : well developed [Normal Appearance] : normal appearance [Well Groomed] : well groomed [General Appearance - Well Nourished] : well nourished [No Deformities] : no deformities [General Appearance - In No Acute Distress] : no acute distress [Heart Rate And Rhythm] : heart rate and rhythm were normal [] : no respiratory distress [Respiration, Rhythm And Depth] : normal respiratory rhythm and effort [Exaggerated Use Of Accessory Muscles For Inspiration] : no accessory muscle use [Left Infraclavicular] : left infraclavicular area [Clean] : clean [Dry] : dry [Well-Healed] : well-healed [Palpable Crepitus] : no palpable crepitus [Bleeding] : no active bleeding [Foul Odor] : no foul smell [Purulent Drainage] : no purulent drainage [Serosanguineous Drainage] : no serosanquineous drainage [Serous Drainage] : no serous drainage [Erythema] : not erythematous [Warm] : not warm [Tender] : not tender [Indurated] : not indurated [Fluctuant] : not fluctuant

## 2021-06-30 NOTE — PROCEDURE
[No] : not [NSR] : normal sinus rhythm [Pacemaker] : pacemaker [DDD] : DDD [Threshold Testing Performed] : Threshold testing was performed [Lead Imp:  ___ohms] : lead impedance was [unfilled] ohms [Sensing Amplitude ___mv] : sensing amplitude was [unfilled] mv [___V @] : [unfilled] V [___ ms] : [unfilled] ms [None] : none [de-identified] : [facility]  [de-identified] : [employer] MRI [de-identified] : [id number] [de-identified] : 2/13/19 [de-identified] : 50/120 [de-identified] : 14 years [de-identified] : AP 8%\par  <1%\par afib <!%

## 2021-06-30 NOTE — REVIEW OF SYSTEMS
[Fever] : no fever [Chills] : no chills [Feeling Fatigued] : not feeling fatigued [SOB] : no shortness of breath [Cough] : no cough [Abdominal Pain] : no abdominal pain [Rash] : no rash [Dizziness] : no dizziness [Under Stress] : not under stress [Easy Bleeding] : no tendency for easy bleeding

## 2021-08-25 ENCOUNTER — NON-APPOINTMENT (OUTPATIENT)
Age: 86
End: 2021-08-25

## 2021-08-25 ENCOUNTER — APPOINTMENT (OUTPATIENT)
Dept: HEART AND VASCULAR | Facility: CLINIC | Age: 86
End: 2021-08-25
Payer: MEDICARE

## 2021-08-25 PROCEDURE — 93294 REM INTERROG EVL PM/LDLS PM: CPT

## 2021-08-25 PROCEDURE — 93296 REM INTERROG EVL PM/IDS: CPT

## 2021-11-04 NOTE — ED ADULT TRIAGE NOTE - NS ED NURSE AMBULANCES
Per patient he wants no physical therapy at this time. Per him soon as he started to move his wound is bleeding and dont think PT is appropriate at this time.  Per patient he will contact the kel when  he is ready for physical therapy
Other

## 2021-12-01 ENCOUNTER — NON-APPOINTMENT (OUTPATIENT)
Age: 86
End: 2021-12-01

## 2021-12-01 ENCOUNTER — APPOINTMENT (OUTPATIENT)
Dept: HEART AND VASCULAR | Facility: CLINIC | Age: 86
End: 2021-12-01
Payer: MEDICARE

## 2021-12-01 PROCEDURE — 93296 REM INTERROG EVL PM/IDS: CPT | Mod: NC

## 2021-12-01 PROCEDURE — 93294 REM INTERROG EVL PM/LDLS PM: CPT | Mod: NC

## 2021-12-16 ENCOUNTER — APPOINTMENT (OUTPATIENT)
Dept: HEART AND VASCULAR | Facility: CLINIC | Age: 86
End: 2021-12-16
Payer: MEDICARE

## 2021-12-16 VITALS
WEIGHT: 135 LBS | SYSTOLIC BLOOD PRESSURE: 95 MMHG | DIASTOLIC BLOOD PRESSURE: 65 MMHG | BODY MASS INDEX: 24.84 KG/M2 | HEIGHT: 62 IN | HEART RATE: 105 BPM

## 2021-12-16 PROCEDURE — 93280 PM DEVICE PROGR EVAL DUAL: CPT

## 2021-12-16 PROCEDURE — 99213 OFFICE O/P EST LOW 20 MIN: CPT | Mod: 25

## 2021-12-22 NOTE — DISCUSSION/SUMMARY
[Pacemaker Function Normal] : normal pacemaker function [FreeTextEntry1] : 89 year-old female with chronic diastolic CHF (EF 55%), HTN, HLD, hypothyroidism, s/p MVr and MYKE closure (incompletely closed, with sludge) with Dr. Leroy in 2016, pAF (on Coumadin) and tachy mamie syndrome s/p pacemaker who presents for follow up.  Pacemaker interrogation reveals normal function.  All measured data is within normal limits.  She has asymptomatic rate controlled atrial tachycardiac today that we could not pace her out of.  She is on Coumadin and has regular INR checks. She will follow up in 6 months or sooner if needed and knows to call with any questions or concerns.

## 2021-12-22 NOTE — REVIEW OF SYSTEMS
[Fever] : no fever [Chills] : no chills [SOB] : no shortness of breath [Chest Discomfort] : no chest discomfort [Syncope] : no syncope [Cough] : no cough [Abdominal Pain] : no abdominal pain [Rash] : no rash [Under Stress] : not under stress [Easy Bleeding] : no tendency for easy bleeding

## 2021-12-22 NOTE — PROCEDURE
[No] : not [Pacemaker] : pacemaker [DDD] : DDD [Threshold Testing Performed] : Threshold testing was performed [Lead Imp:  ___ohms] : lead impedance was [unfilled] ohms [Sensing Amplitude ___mv] : sensing amplitude was [unfilled] mv [___V @] : [unfilled] V [___ ms] : [unfilled] ms [None] : none [de-identified] : AT [de-identified] : [facility]  [de-identified] : [employer] MRI [de-identified] : [id number] [de-identified] : 2/13/19 [de-identified] : 50/120 [de-identified] : 9.5 years [de-identified] : AP 8%\par  <1%\par in AT tried to ATP out of it and could not

## 2021-12-22 NOTE — PHYSICAL EXAM
[General Appearance - Well Developed] : well developed [Normal Appearance] : normal appearance [Well Groomed] : well groomed [General Appearance - Well Nourished] : well nourished [No Deformities] : no deformities [General Appearance - In No Acute Distress] : no acute distress [Heart Rate And Rhythm] : heart rate and rhythm were normal [] : no respiratory distress [Respiration, Rhythm And Depth] : normal respiratory rhythm and effort [Exaggerated Use Of Accessory Muscles For Inspiration] : no accessory muscle use [Left Infraclavicular] : left infraclavicular area [Clean] : clean [Dry] : dry [Well-Healed] : well-healed [Auscultation Breath Sounds / Voice Sounds] : lungs were clear to auscultation bilaterally [Palpable Crepitus] : no palpable crepitus [Bleeding] : no active bleeding [Foul Odor] : no foul smell [Purulent Drainage] : no purulent drainage [Serosanguineous Drainage] : no serosanquineous drainage [Serous Drainage] : no serous drainage [Erythema] : not erythematous [Warm] : not warm [Tender] : not tender [Indurated] : not indurated [Fluctuant] : not fluctuant

## 2021-12-22 NOTE — HISTORY OF PRESENT ILLNESS
[Palpitations] : no palpitations [SOB] : no dyspnea [Syncope] : no syncope [Dizziness] : no dizziness [Chest Pain] : no chest pain or discomfort [Shoulder Pain] : no shoulder pain [Pain at Site] : no pain at device site [Erythema at Site] : no erythema at device site [Swelling at Site] : no swelling at device site [de-identified] : 89 year-old female with chronic diastolic CHF (EF 55%), HTN, HLD, hypothyroidism, s/p MVr and MYKE closure (incompletely closed, with sludge) with Dr. Leroy in 2016, pAF (on Coumadin) and tachy mamie syndrome s/p pacemaker who presents for follow up. \par \par Since her last visit she has been doing well.  No chest pain, SOB, syncope, near syncope, orthopnea, palpitations.  No device related complaints.  She is on Coumadin.

## 2022-03-02 ENCOUNTER — APPOINTMENT (OUTPATIENT)
Dept: HEART AND VASCULAR | Facility: CLINIC | Age: 87
End: 2022-03-02
Payer: MEDICARE

## 2022-03-02 ENCOUNTER — NON-APPOINTMENT (OUTPATIENT)
Age: 87
End: 2022-03-02

## 2022-03-02 PROCEDURE — 93294 REM INTERROG EVL PM/LDLS PM: CPT

## 2022-03-02 PROCEDURE — 93296 REM INTERROG EVL PM/IDS: CPT

## 2022-06-16 ENCOUNTER — APPOINTMENT (OUTPATIENT)
Dept: HEART AND VASCULAR | Facility: CLINIC | Age: 87
End: 2022-06-16
Payer: MEDICARE

## 2022-06-16 VITALS
BODY MASS INDEX: 24.84 KG/M2 | HEART RATE: 55 BPM | DIASTOLIC BLOOD PRESSURE: 62 MMHG | HEIGHT: 62 IN | WEIGHT: 135 LBS | SYSTOLIC BLOOD PRESSURE: 117 MMHG

## 2022-06-16 PROCEDURE — 93280 PM DEVICE PROGR EVAL DUAL: CPT

## 2022-06-29 NOTE — DISCUSSION/SUMMARY
[Pacemaker Function Normal] : normal pacemaker function [FreeTextEntry1] : 89 year-old female with chronic diastolic CHF (EF 55%), HTN, HLD, hypothyroidism, s/p MVr and MYKE closure (incompletely closed, with sludge) with Dr. Leroy in 2016, pAF (on Coumadin) and tachy mamie syndrome s/p pacemaker who presents for follow up.  Pacemaker interrogation reveals normal function.  All measured data is within normal limits.  She is in NSR today and on Coumadin and has regular INR checks. She will follow up in 6 months or sooner if needed and knows to call with any questions or concerns.

## 2022-06-29 NOTE — PHYSICAL EXAM
[General Appearance - Well Developed] : well developed [Normal Appearance] : normal appearance [Well Groomed] : well groomed [General Appearance - Well Nourished] : well nourished [No Deformities] : no deformities [General Appearance - In No Acute Distress] : no acute distress [Heart Rate And Rhythm] : heart rate and rhythm were normal [] : no respiratory distress [Respiration, Rhythm And Depth] : normal respiratory rhythm and effort [Exaggerated Use Of Accessory Muscles For Inspiration] : no accessory muscle use [Auscultation Breath Sounds / Voice Sounds] : lungs were clear to auscultation bilaterally [Left Infraclavicular] : left infraclavicular area [Clean] : clean [Dry] : dry [Well-Healed] : well-healed [Palpable Crepitus] : no palpable crepitus [Bleeding] : no active bleeding [Foul Odor] : no foul smell [Purulent Drainage] : no purulent drainage [Serous Drainage] : no serous drainage [Erythema] : not erythematous [Warm] : not warm [Tender] : not tender [Indurated] : not indurated [Fluctuant] : not fluctuant

## 2022-06-29 NOTE — HISTORY OF PRESENT ILLNESS
[Palpitations] : no palpitations [SOB] : no dyspnea [Syncope] : no syncope [Dizziness] : no dizziness [Chest Pain] : no chest pain or discomfort [Shoulder Pain] : no shoulder pain [Pain at Site] : no pain at device site [Erythema at Site] : no erythema at device site [Swelling at Site] : no swelling at device site [de-identified] : 89 year-old female with chronic diastolic CHF (EF 55%), HTN, HLD, hypothyroidism, s/p MVr and MYKE closure (incompletely closed, with sludge) with Dr. Leroy in 2016, pAF (on Coumadin) and tachy mamie syndrome s/p pacemaker who presents for follow up. \par \par Since her last visit she has been doing well and has no complaints.  No chest pain, SOB, syncope, near syncope, orthopnea, palpitations.  No device related issues.  She is on Coumadin.

## 2022-06-29 NOTE — PROCEDURE
[No] : not [Pacemaker] : pacemaker [DDD] : DDD [Threshold Testing Performed] : Threshold testing was performed [Lead Imp:  ___ohms] : lead impedance was [unfilled] ohms [Sensing Amplitude ___mv] : sensing amplitude was [unfilled] mv [___V @] : [unfilled] V [None] : none [NSR] : normal sinus rhythm [___ ms] : [unfilled] ms [de-identified] : [facility]  [de-identified] : [employer] [facility] [de-identified] : [id number] [de-identified] : 2/13/19 [de-identified] : 50/120 [de-identified] : 9 years [de-identified] : AP10%\par  <1%\par

## 2022-08-12 NOTE — ED ADULT TRIAGE NOTE - NS ED NURSE BANDS TYPE
Physical    HPI:    No acute issues     Diet: fruit and vegetable daily; meat eater; fast food 2 times per week; pop rarely -- doesn't have it in the house  Exercise: plays baseball, rides bikes    Immunizations up to date so far  Doesn't get influenza vaccines  Not sure about HPV vaccines  Declines covid vaccine     Sleeps 8 hours per night  Stays up late playing video games  Takes naps sometimes on the couch  No trouble falling asleep or staying asleep     Doesn't see an eye doctor -- no glasses  Sees dentist twice yearly  Se[facility] [place]    Had reactive airway disease with sports around 4th-5th grade but is gone now    No concussions  No palpitations, chest pain  No loss of consciousness during sports  No coordination problems   No family history of early heart disease      History reviewed. No pertinent past medical history.     History reviewed. No pertinent surgical history.    Current Outpatient Medications   Medication Sig Dispense Refill   • albuterol 108 (90 Base) MCG/ACT inhaler Inhale 2 puffs once every 4 to 6 hours for wheezing, chest tightness or shortness of breath. 1 Inhaler 1   • EPINEPHrine (EPIPEN JR) 0.15 MG/0.3ML auto-injector Inject 0.3 mLs into the muscle 1 time as needed for Anaphylaxis. 2 each 3     No current facility-administered medications for this visit.     ALLERGIES:   Allergen Reactions   • Nuts   (Food) HIVES and PRURITUS       family history includes Asthma in his father; Blood Disorder in his father.  Social History     Tobacco Use   • Smoking status: Never Smoker   • Smokeless tobacco: Never Used   Substance Use Topics   • Alcohol use: Never   • Drug use: Never         Review of Systems   Constitutional: Negative for appetite change, chills, fatigue and fever.   HENT: Negative for congestion, ear discharge, ear pain, hearing loss, mouth sores, postnasal drip, rhinorrhea, sinus pressure, sinus pain, sore throat and trouble swallowing.    Eyes:  Negative for pain, discharge, redness, itching and visual disturbance.   Respiratory: Negative for cough, shortness of breath and wheezing.    Cardiovascular: Negative for chest pain, palpitations and leg swelling.   Gastrointestinal: Negative for abdominal pain, constipation, diarrhea, nausea and vomiting.   Genitourinary: Negative for dysuria, frequency and hematuria.   Musculoskeletal: Negative for arthralgias, joint swelling and neck pain.   Skin: Negative for rash and wound.   Allergic/Immunologic: Negative for environmental allergies.   Neurological: Negative for dizziness, weakness, light-headedness, numbness and headaches.   Psychiatric/Behavioral: Negative for sleep disturbance.        Physical Exam  Vitals and nursing note reviewed.   Constitutional:       General: He is not in acute distress.     Appearance: Normal appearance. He is well-developed. He is not ill-appearing or diaphoretic.   HENT:      Head: Normocephalic and atraumatic.      Right Ear: Tympanic membrane, ear canal and external ear normal.      Left Ear: Tympanic membrane, ear canal and external ear normal.      Nose: Nose normal. No congestion or rhinorrhea.      Mouth/Throat:      Mouth: Mucous membranes are moist.      Pharynx: Oropharynx is clear. No oropharyngeal exudate or posterior oropharyngeal erythema.   Eyes:      General:         Right eye: No discharge.         Left eye: No discharge.      Conjunctiva/sclera: Conjunctivae normal.      Pupils: Pupils are equal, round, and reactive to light.   Cardiovascular:      Rate and Rhythm: Normal rate and regular rhythm.      Pulses: Normal pulses.      Heart sounds: Normal heart sounds. No murmur heard.  Pulmonary:      Effort: Pulmonary effort is normal. No respiratory distress.      Breath sounds: Normal breath sounds. No wheezing, rhonchi or rales.   Abdominal:      General: Bowel sounds are normal. There is no distension.      Palpations: Abdomen is soft. There is no mass.       Tenderness: There is no abdominal tenderness. There is no guarding or rebound.   Musculoskeletal:         General: No swelling, tenderness or deformity.      Cervical back: Normal range of motion and neck supple. No rigidity. No muscular tenderness.   Lymphadenopathy:      Cervical: No cervical adenopathy.   Skin:     General: Skin is warm and dry.      Findings: No erythema, lesion or rash.   Neurological:      Mental Status: He is alert.      Coordination: Coordination normal.      Gait: Gait normal.      Deep Tendon Reflexes: Reflexes normal.          Assessment/Plan:    Well adolescent visit  No growth or developmental concerns. No parental concerns. Exam is not worrying. Up to date on vaccines other influenza vaccines which he declines. Declines covid vaccine. Declines HPV vaccines. No reason to withhold from school or sports. Has not required inhaler in 4 years, but has one on hand in case of exercise induced asthma.  -sports and school forms completed          Morales Vasquez MD     Name band;

## 2022-09-19 ENCOUNTER — APPOINTMENT (OUTPATIENT)
Dept: HEART AND VASCULAR | Facility: CLINIC | Age: 87
End: 2022-09-19

## 2022-09-19 ENCOUNTER — NON-APPOINTMENT (OUTPATIENT)
Age: 87
End: 2022-09-19

## 2022-09-19 PROCEDURE — 93296 REM INTERROG EVL PM/IDS: CPT

## 2022-09-19 PROCEDURE — 93294 REM INTERROG EVL PM/LDLS PM: CPT

## 2022-12-15 ENCOUNTER — APPOINTMENT (OUTPATIENT)
Dept: HEART AND VASCULAR | Facility: CLINIC | Age: 87
End: 2022-12-15

## 2022-12-15 VITALS — DIASTOLIC BLOOD PRESSURE: 63 MMHG | HEART RATE: 53 BPM | SYSTOLIC BLOOD PRESSURE: 131 MMHG | TEMPERATURE: 97.2 F

## 2022-12-15 PROCEDURE — 93280 PM DEVICE PROGR EVAL DUAL: CPT

## 2022-12-20 NOTE — DISCUSSION/SUMMARY
[Pacemaker Function Normal] : normal pacemaker function [FreeTextEntry1] : 89 year-old female with chronic diastolic CHF (EF 55%), HTN, HLD, hypothyroidism, s/p MVr and MYKE closure (incompletely closed, with sludge) with Dr. Leroy in 2016, pAF (on Coumadin) and tachy mamie syndrome s/p pacemaker who presents for follow up.  Pacemaker interrogation reveals normal function.  All measured data is within normal limits.  She is maintained on Coumadin and has regular INR checks. She will follow up in 6 months or sooner if needed and knows to call with any questions or concerns.

## 2022-12-20 NOTE — REVIEW OF SYSTEMS
[Negative] : Neurological [Fever] : no fever [Weight Gain (___ Lbs)] : no recent weight gain [Chills] : no chills [Weight Loss (___ Lbs)] : no recent weight loss [SOB] : no shortness of breath [Chest Discomfort] : no chest discomfort [Palpitations] : no palpitations [Syncope] : no syncope [Cough] : no cough [Abdominal Pain] : no abdominal pain [Rash] : no rash [Depression] : no depression [Under Stress] : not under stress [Easy Bleeding] : no tendency for easy bleeding

## 2022-12-20 NOTE — HISTORY OF PRESENT ILLNESS
[Palpitations] : no palpitations [SOB] : no dyspnea [Syncope] : no syncope [Dizziness] : no dizziness [Chest Pain] : no chest pain or discomfort [Shoulder Pain] : no shoulder pain [Pain at Site] : no pain at device site [Erythema at Site] : no erythema at device site [Swelling at Site] : no swelling at device site [de-identified] : 89 year-old female with chronic diastolic CHF (EF 55%), HTN, HLD, hypothyroidism, s/p MVr and MYKE closure (incompletely closed, with sludge) with Dr. Leroy in 2016, pAF (on Coumadin) and tachy mamie syndrome s/p pacemaker who presents for follow up. \par \par Since her last visit she has been doing well and has no complaints.  No chest pain, SOB, syncope, near syncope, orthopnea, palpitations.  No device related issues.  She remains on Coumadin.

## 2022-12-20 NOTE — ADDENDUM
[FreeTextEntry1] : I, Mayito De La Torre, hereby attest that the medical record entry for this patient accurately reflects signatures/notations that I made on the Date of Service in my capacity as an Attending Physician when I treated/diagnosed the above patient. I do hereby attest that this information is true, accurate and complete to the best of my knowledge and I understand that any falsification, omission, or concealment of material fact may subject me to administrative, civil, or, criminal liability. I agree with the note as written by my PA in its entirety.\par I was present for the entire visit and supervised the entire visit and agree with the plan as outlined.\par \par \par I, Jt Mcdaniels, am scribing for and the presence of Dr. De La Torre the following sections: HPI, PMH,Family/social history, ROS, Physical Exam, Assessment / Plan.\par

## 2022-12-20 NOTE — PROCEDURE
[No] : not [NSR] : normal sinus rhythm [Pacemaker] : pacemaker [DDD] : DDD [Threshold Testing Performed] : Threshold testing was performed [Lead Imp:  ___ohms] : lead impedance was [unfilled] ohms [Sensing Amplitude ___mv] : sensing amplitude was [unfilled] mv [___V @] : [unfilled] V [___ ms] : [unfilled] ms [None] : none [de-identified] : [facility]  [de-identified] : [employer] [facility] [de-identified] : [id number] [de-identified] : 2/13/19 [de-identified] : 50/120 [de-identified] : 12.5years [de-identified] : AP1%\par  <1%\par

## 2023-03-22 ENCOUNTER — NON-APPOINTMENT (OUTPATIENT)
Age: 88
End: 2023-03-22

## 2023-03-22 ENCOUNTER — APPOINTMENT (OUTPATIENT)
Dept: HEART AND VASCULAR | Facility: CLINIC | Age: 88
End: 2023-03-22
Payer: MEDICARE

## 2023-03-22 PROCEDURE — 93296 REM INTERROG EVL PM/IDS: CPT

## 2023-03-22 PROCEDURE — 93294 REM INTERROG EVL PM/LDLS PM: CPT

## 2023-05-15 VITALS
SYSTOLIC BLOOD PRESSURE: 170 MMHG | HEIGHT: 62 IN | RESPIRATION RATE: 22 BRPM | DIASTOLIC BLOOD PRESSURE: 67 MMHG | OXYGEN SATURATION: 93 % | TEMPERATURE: 98 F | HEART RATE: 84 BPM

## 2023-05-15 LAB
ALBUMIN SERPL ELPH-MCNC: 3.8 G/DL — SIGNIFICANT CHANGE UP (ref 3.3–5)
ALP SERPL-CCNC: 379 U/L — HIGH (ref 40–120)
ALT FLD-CCNC: 639 U/L — HIGH (ref 10–45)
ANION GAP SERPL CALC-SCNC: 11 MMOL/L — SIGNIFICANT CHANGE UP (ref 5–17)
APTT BLD: 30.5 SEC — SIGNIFICANT CHANGE UP (ref 27.5–35.5)
AST SERPL-CCNC: 925 U/L — HIGH (ref 10–40)
BASE EXCESS BLDV CALC-SCNC: 0.5 MMOL/L — SIGNIFICANT CHANGE UP (ref -2–3)
BASOPHILS # BLD AUTO: 0.02 K/UL — SIGNIFICANT CHANGE UP (ref 0–0.2)
BASOPHILS NFR BLD AUTO: 0.2 % — SIGNIFICANT CHANGE UP (ref 0–2)
BILIRUB SERPL-MCNC: 1.6 MG/DL — HIGH (ref 0.2–1.2)
BUN SERPL-MCNC: 16 MG/DL — SIGNIFICANT CHANGE UP (ref 7–23)
CA-I SERPL-SCNC: 1.23 MMOL/L — SIGNIFICANT CHANGE UP (ref 1.15–1.33)
CALCIUM SERPL-MCNC: 9.8 MG/DL — SIGNIFICANT CHANGE UP (ref 8.4–10.5)
CHLORIDE SERPL-SCNC: 100 MMOL/L — SIGNIFICANT CHANGE UP (ref 96–108)
CO2 BLDV-SCNC: 28.5 MMOL/L — HIGH (ref 22–26)
CO2 SERPL-SCNC: 26 MMOL/L — SIGNIFICANT CHANGE UP (ref 22–31)
CREAT SERPL-MCNC: 0.63 MG/DL — SIGNIFICANT CHANGE UP (ref 0.5–1.3)
EGFR: 82 ML/MIN/1.73M2 — SIGNIFICANT CHANGE UP
EOSINOPHIL # BLD AUTO: 0.03 K/UL — SIGNIFICANT CHANGE UP (ref 0–0.5)
EOSINOPHIL NFR BLD AUTO: 0.3 % — SIGNIFICANT CHANGE UP (ref 0–6)
GAS PNL BLDV: 133 MMOL/L — LOW (ref 136–145)
GAS PNL BLDV: SIGNIFICANT CHANGE UP
GLUCOSE SERPL-MCNC: 140 MG/DL — HIGH (ref 70–99)
HCO3 BLDV-SCNC: 27 MMOL/L — SIGNIFICANT CHANGE UP (ref 22–29)
HCT VFR BLD CALC: 42.5 % — SIGNIFICANT CHANGE UP (ref 34.5–45)
HGB BLD-MCNC: 13.9 G/DL — SIGNIFICANT CHANGE UP (ref 11.5–15.5)
IMM GRANULOCYTES NFR BLD AUTO: 0.3 % — SIGNIFICANT CHANGE UP (ref 0–0.9)
INR BLD: 1.32 — HIGH (ref 0.88–1.16)
LACTATE SERPL-SCNC: 2.4 MMOL/L — HIGH (ref 0.5–2)
LACTATE SERPL-SCNC: 2.8 MMOL/L — HIGH (ref 0.5–2)
LYMPHOCYTES # BLD AUTO: 0.57 K/UL — LOW (ref 1–3.3)
LYMPHOCYTES # BLD AUTO: 5.1 % — LOW (ref 13–44)
MCHC RBC-ENTMCNC: 31.7 PG — SIGNIFICANT CHANGE UP (ref 27–34)
MCHC RBC-ENTMCNC: 32.7 GM/DL — SIGNIFICANT CHANGE UP (ref 32–36)
MCV RBC AUTO: 96.8 FL — SIGNIFICANT CHANGE UP (ref 80–100)
MONOCYTES # BLD AUTO: 0.84 K/UL — SIGNIFICANT CHANGE UP (ref 0–0.9)
MONOCYTES NFR BLD AUTO: 7.5 % — SIGNIFICANT CHANGE UP (ref 2–14)
NEUTROPHILS # BLD AUTO: 9.65 K/UL — HIGH (ref 1.8–7.4)
NEUTROPHILS NFR BLD AUTO: 86.6 % — HIGH (ref 43–77)
NRBC # BLD: 0 /100 WBCS — SIGNIFICANT CHANGE UP (ref 0–0)
NT-PROBNP SERPL-SCNC: 541 PG/ML — HIGH (ref 0–300)
PCO2 BLDV: 50 MMHG — HIGH (ref 39–42)
PH BLDV: 7.34 — SIGNIFICANT CHANGE UP (ref 7.32–7.43)
PLATELET # BLD AUTO: 171 K/UL — SIGNIFICANT CHANGE UP (ref 150–400)
PO2 BLDV: <33 MMHG — SIGNIFICANT CHANGE UP (ref 25–45)
POTASSIUM BLDV-SCNC: 5.5 MMOL/L — HIGH (ref 3.5–5.1)
POTASSIUM SERPL-MCNC: 4.9 MMOL/L — SIGNIFICANT CHANGE UP (ref 3.5–5.3)
POTASSIUM SERPL-SCNC: 4.9 MMOL/L — SIGNIFICANT CHANGE UP (ref 3.5–5.3)
PROT SERPL-MCNC: 7.5 G/DL — SIGNIFICANT CHANGE UP (ref 6–8.3)
PROTHROM AB SERPL-ACNC: 15.8 SEC — HIGH (ref 10.5–13.4)
RAPID RVP RESULT: SIGNIFICANT CHANGE UP
RBC # BLD: 4.39 M/UL — SIGNIFICANT CHANGE UP (ref 3.8–5.2)
RBC # FLD: 13.5 % — SIGNIFICANT CHANGE UP (ref 10.3–14.5)
SAO2 % BLDV: 42.9 % — LOW (ref 67–88)
SARS-COV-2 RNA SPEC QL NAA+PROBE: SIGNIFICANT CHANGE UP
SODIUM SERPL-SCNC: 137 MMOL/L — SIGNIFICANT CHANGE UP (ref 135–145)
TROPONIN T SERPL-MCNC: 0.01 NG/ML — SIGNIFICANT CHANGE UP (ref 0–0.01)
WBC # BLD: 11.14 K/UL — HIGH (ref 3.8–10.5)
WBC # FLD AUTO: 11.14 K/UL — HIGH (ref 3.8–10.5)

## 2023-05-15 PROCEDURE — 74177 CT ABD & PELVIS W/CONTRAST: CPT | Mod: 26,MA

## 2023-05-15 PROCEDURE — 76705 ECHO EXAM OF ABDOMEN: CPT | Mod: 26

## 2023-05-15 PROCEDURE — 71045 X-RAY EXAM CHEST 1 VIEW: CPT | Mod: 26

## 2023-05-15 PROCEDURE — 99285 EMERGENCY DEPT VISIT HI MDM: CPT

## 2023-05-15 RX ORDER — SODIUM CHLORIDE 9 MG/ML
1000 INJECTION INTRAMUSCULAR; INTRAVENOUS; SUBCUTANEOUS ONCE
Refills: 0 | Status: COMPLETED | OUTPATIENT
Start: 2023-05-15 | End: 2023-05-15

## 2023-05-15 RX ORDER — AZTREONAM 2 G
2000 VIAL (EA) INJECTION ONCE
Refills: 0 | Status: COMPLETED | OUTPATIENT
Start: 2023-05-15 | End: 2023-05-15

## 2023-05-15 RX ORDER — ACETAMINOPHEN 500 MG
1000 TABLET ORAL ONCE
Refills: 0 | Status: COMPLETED | OUTPATIENT
Start: 2023-05-15 | End: 2023-05-15

## 2023-05-15 RX ORDER — VANCOMYCIN HCL 1 G
1000 VIAL (EA) INTRAVENOUS ONCE
Refills: 0 | Status: COMPLETED | OUTPATIENT
Start: 2023-05-15 | End: 2023-05-15

## 2023-05-15 RX ORDER — ACETAMINOPHEN 500 MG
650 TABLET ORAL ONCE
Refills: 0 | Status: COMPLETED | OUTPATIENT
Start: 2023-05-15 | End: 2023-05-15

## 2023-05-15 RX ADMIN — Medication 400 MILLIGRAM(S): at 21:34

## 2023-05-15 RX ADMIN — Medication 1000 MILLIGRAM(S): at 22:02

## 2023-05-15 RX ADMIN — Medication 250 MILLIGRAM(S): at 20:00

## 2023-05-15 RX ADMIN — SODIUM CHLORIDE 1000 MILLILITER(S): 9 INJECTION INTRAMUSCULAR; INTRAVENOUS; SUBCUTANEOUS at 20:00

## 2023-05-15 RX ADMIN — Medication 1000 MILLIGRAM(S): at 21:10

## 2023-05-15 RX ADMIN — SODIUM CHLORIDE 1000 MILLILITER(S): 9 INJECTION INTRAMUSCULAR; INTRAVENOUS; SUBCUTANEOUS at 21:10

## 2023-05-15 NOTE — ED ADULT NURSE NOTE - NSFALLRISKFACTORS_ED_ALL_ED
Age: 85 years old or older/Bone Condition: Including osteoporosis, prolonged steroid use or metastatic bone disease/cancer/Coagulation: Bleeding disorder either through use of anticoagulants or underlying clinical condition(s)

## 2023-05-15 NOTE — ED PROVIDER NOTE - NS ED ROS FT
Positive: Nausea, vomiting, shortness of breath   negative: Fever, chills, cough, chest pain, abdominal pain, diarrhea, dysuria, hematuria,, rash, leg edema

## 2023-05-15 NOTE — ED PROVIDER NOTE - PHYSICAL EXAMINATION
GENERAL:  drowsy but arousable to voice, in NAD, non-toxic appearing  ENMT: Airway patent  EYES: conjunctiva clear  CARDIAC: Regular rate, regular rhythm.  Heart sounds S1, S2, no S3, S4. No murmurs, rubs or gallops.  RESPIRATORY: Breath sounds clear and equal in bilateral anterior lung fields, no wheezes/ronchi/stridor; pt breathing and speaking comfortably with no increased WOB, no accessory mm. use, no intercostal retractions, no nasal flaring  GI: +guarding in all 4 quadrants, pt states "your hands are cold" and when hands warmed up pt non-tender in all 4 quadrants   SKIN: warm and dry, no rashes  PSYCH: awake, alert, calm and cooperative   EXTREMITIES: no BLE edema

## 2023-05-15 NOTE — ED ADULT TRIAGE NOTE - OTHER COMPLAINTS
Per son patient had sudden onset abdominal discomfort, vomiting and SOB. Denies chest pain. Per EMS, O2 sat 83% on room air.

## 2023-05-15 NOTE — ED PROVIDER NOTE - CLINICAL SUMMARY MEDICAL DECISION MAKING FREE TEXT BOX
89-year-old female with past medical history of hyperlipidemia, hypertension, hypothyroidism, past surgical history of mitral valve repair (on Coumadin) brought in by EMS, per triage note: "Per son patient had sudden onset abdominal discomfort, vomiting and SOB. Denies chest pain. Per EMS, O2 sat 83% on room air." 89-year-old female with past medical history of hyperlipidemia, hypertension, hypothyroidism, past surgical history of mitral valve repair (on Coumadin) brought in by EMS, per triage note: "Per son patient had sudden onset abdominal discomfort, vomiting and SOB. Denies chest pain. Per EMS, O2 sat 83% on room air." On exam, blood pressure 170/67, respiratory rate 22, O2 sat 93% on 6 L nasal cannula, rectal temp 101.1 Fahrenheit, patient is drowsy but arousable to voice, on initial physical exam when palpating abdomen patient had guarding diffusely throughout abdomen, however she stated that my hands were cold and when her abdomen was palpated again with warm hands patient had no tenderness to palpation throughout her abdomen.  On reevaluation later, patient did have some tenderness diffusely throughout her abdomen with guarding, no rebound.    DDx: Sepsis secondary to pneumonia versus viral illness versus UTI.  Hypoxia secondary to pneumonia versus fluid overload.  Patient is on Coumadin and has no PE risk factors.    Plan:  –Sepsis labs, lactate elevated to 2.4, repeated is 2.8.  Blood cultures sent.  White blood cell count 11  –Chest x-ray: Small right-sided pleural effusion. Mild bibasilar   atelectasis.    –IV fluids  - vancomycin and aztreonam initially given, pt started to have erythema on her face, was in no acute distress, no increased work of breathing no tongue or lip swelling, rash not on other part of her body.  Thought to be from vancomycin, however given patient's history penicillin allergy, aztreonam was ordered due to low cross-reactivity between penicillin and aztreonam, however given patient's rash both vancomycin and aztreonam were discontinued and Levaquin was ordered  –Ofirmev given for fever  –Labs show bilirubin 1.6, , , ALT T639.  Concern for biliary obstruction.  Ultrasound right upper quadrant ordered, pending ultrasound read.  CT abdomen pelvis ordered  –will reassess

## 2023-05-15 NOTE — ED PROVIDER NOTE - OBJECTIVE STATEMENT
89-year-old female with past medical history of hyperlipidemia, hypertension, hypothyroidism, past surgical history of mitral valve repair (on Coumadin) brought in by EMS, per triage note: "Per son patient had sudden onset abdominal discomfort, vomiting and SOB. Denies chest pain. Per EMS, O2 sat 83% on room air."  Per son and home health aide at bedside, patient has no home oxygen use, no history of respiratory illness, never smoker.  Today patient started to have nonbloody nonbilious nausea and vomiting with associated shortness of breath.  No history of similar symptoms.  Patient denied any chest pain, or abdominal pain.  There was no fever, congestion, runny nose, diarrhea, dysuria, hematuria, leg edema, rash as reported by family at bedside.  Patient is denying abdominal pain, chest pain, or shortness of breath.  Per son at bedside, patient's baseline mental status is more alert than current and oriented x2.  No history of similar symptoms.

## 2023-05-15 NOTE — ED ADULT NURSE NOTE - CARDIO ASSESSMENT
bilateral upper extremity ROM was WFL (within functional limits)/bilateral lower extremity ROM was WFL (within functional limits)
---

## 2023-05-15 NOTE — ED ADULT NURSE NOTE - OBJECTIVE STATEMENT
Pt is 89F BIBEMS for episode of SOB, emesis, CP x2hrs PTA. Pt is alert, oriented to person&place only, abdomen soft, skin intact. Continuous cardiac/pulse oximetry monitoring initiated.

## 2023-05-15 NOTE — ED ADULT NURSE REASSESSMENT NOTE - NSFALLHARMRISKINTERV_ED_ALL_ED

## 2023-05-15 NOTE — ED PROVIDER NOTE - PROGRESS NOTE DETAILS
MD Damion: Given elevated transaminitis and elevated bilirubin with sepsis, general surgery was told about patient and are aware of patient, states they will follow patient's imaging of.  They recommended calling pharmacy for recommendations for antibiotics.  Spoke with ED pharmacist recommends cefepime as this will have very low cross-reactivity with penicillin, patient had already received Levaquin, therefore will make note here that cefepime is recommended by pharmacy for possible future dosing.  Patient signed out to night team pending ultrasound.  And CT scan read [facility] -  US - "IMPRESSION:  1.  Gallbladder sludge. No definite cholelithiasis or evidence for acute   cholecystitis.  2.  Mild intrahepatic biliary dilatation and borderline common bile duct   dilatation, increased since 2019.  3.  Probable nonobstructive sludge ball within the common bile duct."    CT "IMPRESSION:  1.   Small bilateral pleural effusions.  2.   Airspace disease/consolidation both lung bases, right greater than  left.  3.   Diffuse wall thickening of the stomach suggestive of a nonspecific gastritis.  4.   Moderate diffuse bladder wall thickening with surrounding edematous   change suggestive of a nonspecific cystitis.  5.   Mild left hydronephrosis. No ureteral stones." ra - imaging results as above. seen by surgery team. admit to sicu for further management. vitals stable at time of admission. ra / yareli  received on sign out. work-up thus far as above.   at time of sign out pending US / CT results and likely surg eval

## 2023-05-15 NOTE — ED ADULT NURSE NOTE - NSFALLFACTORS_ED_ALL_ED
Altered elimination/Disorientation/Impaired gait/IV and/or equipment tethered to patient/Medication side effects/Weakness

## 2023-05-16 ENCOUNTER — INPATIENT (INPATIENT)
Facility: HOSPITAL | Age: 88
LOS: 5 days | Discharge: HOME | DRG: 871 | End: 2023-05-22
Attending: INTERNAL MEDICINE | Admitting: STUDENT IN AN ORGANIZED HEALTH CARE EDUCATION/TRAINING PROGRAM
Payer: MEDICARE

## 2023-05-16 DIAGNOSIS — Z95.0 PRESENCE OF CARDIAC PACEMAKER: ICD-10-CM

## 2023-05-16 DIAGNOSIS — I11.0 HYPERTENSIVE HEART DISEASE WITH HEART FAILURE: ICD-10-CM

## 2023-05-16 DIAGNOSIS — Z79.890 HORMONE REPLACEMENT THERAPY: ICD-10-CM

## 2023-05-16 DIAGNOSIS — I49.5 SICK SINUS SYNDROME: ICD-10-CM

## 2023-05-16 DIAGNOSIS — I50.32 CHRONIC DIASTOLIC (CONGESTIVE) HEART FAILURE: ICD-10-CM

## 2023-05-16 DIAGNOSIS — J69.0 PNEUMONITIS DUE TO INHALATION OF FOOD AND VOMIT: ICD-10-CM

## 2023-05-16 DIAGNOSIS — Z79.01 LONG TERM (CURRENT) USE OF ANTICOAGULANTS: ICD-10-CM

## 2023-05-16 DIAGNOSIS — K83.09 OTHER CHOLANGITIS: ICD-10-CM

## 2023-05-16 DIAGNOSIS — A41.9 SEPSIS, UNSPECIFIED ORGANISM: ICD-10-CM

## 2023-05-16 DIAGNOSIS — E78.5 HYPERLIPIDEMIA, UNSPECIFIED: ICD-10-CM

## 2023-05-16 DIAGNOSIS — Z91.013 ALLERGY TO SEAFOOD: ICD-10-CM

## 2023-05-16 DIAGNOSIS — Z95.2 PRESENCE OF PROSTHETIC HEART VALVE: ICD-10-CM

## 2023-05-16 DIAGNOSIS — E44.0 MODERATE PROTEIN-CALORIE MALNUTRITION: ICD-10-CM

## 2023-05-16 DIAGNOSIS — I48.0 PAROXYSMAL ATRIAL FIBRILLATION: ICD-10-CM

## 2023-05-16 DIAGNOSIS — Z88.8 ALLERGY STATUS TO OTHER DRUGS, MEDICAMENTS AND BIOLOGICAL SUBSTANCES: ICD-10-CM

## 2023-05-16 DIAGNOSIS — J90 PLEURAL EFFUSION, NOT ELSEWHERE CLASSIFIED: ICD-10-CM

## 2023-05-16 DIAGNOSIS — Z84.89 FAMILY HISTORY OF OTHER SPECIFIED CONDITIONS: Chronic | ICD-10-CM

## 2023-05-16 DIAGNOSIS — J96.01 ACUTE RESPIRATORY FAILURE WITH HYPOXIA: ICD-10-CM

## 2023-05-16 DIAGNOSIS — Z88.0 ALLERGY STATUS TO PENICILLIN: ICD-10-CM

## 2023-05-16 DIAGNOSIS — Z53.09 PROCEDURE AND TREATMENT NOT CARRIED OUT BECAUSE OF OTHER CONTRAINDICATION: ICD-10-CM

## 2023-05-16 DIAGNOSIS — N30.90 CYSTITIS, UNSPECIFIED WITHOUT HEMATURIA: ICD-10-CM

## 2023-05-16 LAB
A1C WITH ESTIMATED AVERAGE GLUCOSE RESULT: 5.4 % — SIGNIFICANT CHANGE UP (ref 4–5.6)
ALBUMIN SERPL ELPH-MCNC: 3.3 G/DL — SIGNIFICANT CHANGE UP (ref 3.3–5)
ALP SERPL-CCNC: 309 U/L — HIGH (ref 40–120)
ALT FLD-CCNC: 446 U/L — HIGH (ref 10–45)
ANION GAP SERPL CALC-SCNC: 11 MMOL/L — SIGNIFICANT CHANGE UP (ref 5–17)
APAP SERPL-MCNC: <5 UG/ML — LOW (ref 10–30)
APPEARANCE UR: ABNORMAL
APTT BLD: 30.8 SEC — SIGNIFICANT CHANGE UP (ref 27.5–35.5)
AST SERPL-CCNC: 383 U/L — HIGH (ref 10–40)
BACTERIA # UR AUTO: PRESENT /HPF
BILIRUB SERPL-MCNC: 1.9 MG/DL — HIGH (ref 0.2–1.2)
BILIRUB UR-MCNC: ABNORMAL
BLD GP AB SCN SERPL QL: NEGATIVE — SIGNIFICANT CHANGE UP
BUN SERPL-MCNC: 13 MG/DL — SIGNIFICANT CHANGE UP (ref 7–23)
CALCIUM SERPL-MCNC: 9.3 MG/DL — SIGNIFICANT CHANGE UP (ref 8.4–10.5)
CHLORIDE SERPL-SCNC: 102 MMOL/L — SIGNIFICANT CHANGE UP (ref 96–108)
CO2 SERPL-SCNC: 23 MMOL/L — SIGNIFICANT CHANGE UP (ref 22–31)
COLOR SPEC: YELLOW — SIGNIFICANT CHANGE UP
CREAT SERPL-MCNC: 0.57 MG/DL — SIGNIFICANT CHANGE UP (ref 0.5–1.3)
DIFF PNL FLD: NEGATIVE — SIGNIFICANT CHANGE UP
EGFR: 84 ML/MIN/1.73M2 — SIGNIFICANT CHANGE UP
EPI CELLS # UR: SIGNIFICANT CHANGE UP /HPF (ref 0–5)
ESTIMATED AVERAGE GLUCOSE: 108 MG/DL — SIGNIFICANT CHANGE UP (ref 68–114)
GLUCOSE BLDC GLUCOMTR-MCNC: 100 MG/DL — HIGH (ref 70–99)
GLUCOSE BLDC GLUCOMTR-MCNC: 104 MG/DL — HIGH (ref 70–99)
GLUCOSE SERPL-MCNC: 118 MG/DL — HIGH (ref 70–99)
GLUCOSE UR QL: NEGATIVE — SIGNIFICANT CHANGE UP
GRAM STN FLD: SIGNIFICANT CHANGE UP
HCT VFR BLD CALC: 31.8 % — LOW (ref 34.5–45)
HGB BLD-MCNC: 10.3 G/DL — LOW (ref 11.5–15.5)
INR BLD: 2.05 — HIGH (ref 0.88–1.16)
KETONES UR-MCNC: NEGATIVE — SIGNIFICANT CHANGE UP
LACTATE SERPL-SCNC: 1.6 MMOL/L — SIGNIFICANT CHANGE UP (ref 0.5–2)
LACTATE SERPL-SCNC: 2.3 MMOL/L — HIGH (ref 0.5–2)
LEUKOCYTE ESTERASE UR-ACNC: ABNORMAL
MAGNESIUM SERPL-MCNC: 1.8 MG/DL — SIGNIFICANT CHANGE UP (ref 1.6–2.6)
MCHC RBC-ENTMCNC: 32.4 GM/DL — SIGNIFICANT CHANGE UP (ref 32–36)
MCHC RBC-ENTMCNC: 32.6 PG — SIGNIFICANT CHANGE UP (ref 27–34)
MCV RBC AUTO: 100.6 FL — HIGH (ref 80–100)
NITRITE UR-MCNC: NEGATIVE — SIGNIFICANT CHANGE UP
NRBC # BLD: 0 /100 WBCS — SIGNIFICANT CHANGE UP (ref 0–0)
PH UR: 6.5 — SIGNIFICANT CHANGE UP (ref 5–8)
PHOSPHATE SERPL-MCNC: 3 MG/DL — SIGNIFICANT CHANGE UP (ref 2.5–4.5)
PLATELET # BLD AUTO: 139 K/UL — LOW (ref 150–400)
POTASSIUM SERPL-MCNC: 4.3 MMOL/L — SIGNIFICANT CHANGE UP (ref 3.5–5.3)
POTASSIUM SERPL-SCNC: 4.3 MMOL/L — SIGNIFICANT CHANGE UP (ref 3.5–5.3)
PROT SERPL-MCNC: 6.4 G/DL — SIGNIFICANT CHANGE UP (ref 6–8.3)
PROT UR-MCNC: 30 MG/DL
PROTHROM AB SERPL-ACNC: 24.6 SEC — HIGH (ref 10.5–13.4)
RBC # BLD: 3.16 M/UL — LOW (ref 3.8–5.2)
RBC # FLD: 13.7 % — SIGNIFICANT CHANGE UP (ref 10.3–14.5)
RBC CASTS # UR COMP ASSIST: < 5 /HPF — SIGNIFICANT CHANGE UP
RH IG SCN BLD-IMP: POSITIVE — SIGNIFICANT CHANGE UP
SODIUM SERPL-SCNC: 136 MMOL/L — SIGNIFICANT CHANGE UP (ref 135–145)
SP GR SPEC: >=1.03 — SIGNIFICANT CHANGE UP (ref 1–1.03)
SPECIMEN SOURCE: SIGNIFICANT CHANGE UP
UROBILINOGEN FLD QL: 2 E.U./DL
WBC # BLD: 13.48 K/UL — HIGH (ref 3.8–10.5)
WBC # FLD AUTO: 13.48 K/UL — HIGH (ref 3.8–10.5)
WBC UR QL: < 5 /HPF — SIGNIFICANT CHANGE UP

## 2023-05-16 PROCEDURE — 99222 1ST HOSP IP/OBS MODERATE 55: CPT

## 2023-05-16 PROCEDURE — 93306 TTE W/DOPPLER COMPLETE: CPT | Mod: 26

## 2023-05-16 PROCEDURE — 99221 1ST HOSP IP/OBS SF/LOW 40: CPT

## 2023-05-16 RX ORDER — LEVOTHYROXINE SODIUM 125 MCG
40 TABLET ORAL
Refills: 0 | Status: DISCONTINUED | OUTPATIENT
Start: 2023-05-16 | End: 2023-05-22

## 2023-05-16 RX ORDER — GLUCAGON INJECTION, SOLUTION 0.5 MG/.1ML
1 INJECTION, SOLUTION SUBCUTANEOUS ONCE
Refills: 0 | Status: DISCONTINUED | OUTPATIENT
Start: 2023-05-16 | End: 2023-05-22

## 2023-05-16 RX ORDER — SODIUM CHLORIDE 9 MG/ML
1000 INJECTION, SOLUTION INTRAVENOUS
Refills: 0 | Status: DISCONTINUED | OUTPATIENT
Start: 2023-05-16 | End: 2023-05-22

## 2023-05-16 RX ORDER — PIPERACILLIN AND TAZOBACTAM 4; .5 G/20ML; G/20ML
3.38 INJECTION, POWDER, LYOPHILIZED, FOR SOLUTION INTRAVENOUS ONCE
Refills: 0 | Status: COMPLETED | OUTPATIENT
Start: 2023-05-17 | End: 2023-05-17

## 2023-05-16 RX ORDER — PIPERACILLIN AND TAZOBACTAM 4; .5 G/20ML; G/20ML
3.38 INJECTION, POWDER, LYOPHILIZED, FOR SOLUTION INTRAVENOUS EVERY 8 HOURS
Refills: 0 | Status: DISCONTINUED | OUTPATIENT
Start: 2023-05-17 | End: 2023-05-18

## 2023-05-16 RX ORDER — INSULIN LISPRO 100/ML
VIAL (ML) SUBCUTANEOUS EVERY 6 HOURS
Refills: 0 | Status: DISCONTINUED | OUTPATIENT
Start: 2023-05-16 | End: 2023-05-22

## 2023-05-16 RX ORDER — METOPROLOL TARTRATE 50 MG
1 TABLET ORAL
Refills: 0 | DISCHARGE

## 2023-05-16 RX ORDER — PHYTONADIONE (VIT K1) 5 MG
5 TABLET ORAL ONCE
Refills: 0 | Status: COMPLETED | OUTPATIENT
Start: 2023-05-16 | End: 2023-05-16

## 2023-05-16 RX ORDER — PIPERACILLIN AND TAZOBACTAM 4; .5 G/20ML; G/20ML
3.38 INJECTION, POWDER, LYOPHILIZED, FOR SOLUTION INTRAVENOUS ONCE
Refills: 0 | Status: COMPLETED | OUTPATIENT
Start: 2023-05-16 | End: 2023-05-16

## 2023-05-16 RX ORDER — ONDANSETRON 8 MG/1
4 TABLET, FILM COATED ORAL EVERY 6 HOURS
Refills: 0 | Status: DISCONTINUED | OUTPATIENT
Start: 2023-05-16 | End: 2023-05-22

## 2023-05-16 RX ORDER — HEPARIN SODIUM 5000 [USP'U]/ML
5000 INJECTION INTRAVENOUS; SUBCUTANEOUS ONCE
Refills: 0 | Status: COMPLETED | OUTPATIENT
Start: 2023-05-16 | End: 2023-05-16

## 2023-05-16 RX ORDER — DEXTROSE 50 % IN WATER 50 %
25 SYRINGE (ML) INTRAVENOUS ONCE
Refills: 0 | Status: DISCONTINUED | OUTPATIENT
Start: 2023-05-16 | End: 2023-05-22

## 2023-05-16 RX ORDER — CEFEPIME 1 G/1
INJECTION, POWDER, FOR SOLUTION INTRAMUSCULAR; INTRAVENOUS
Refills: 0 | Status: DISCONTINUED | OUTPATIENT
Start: 2023-05-16 | End: 2023-05-16

## 2023-05-16 RX ORDER — DEXTROSE 50 % IN WATER 50 %
12.5 SYRINGE (ML) INTRAVENOUS ONCE
Refills: 0 | Status: DISCONTINUED | OUTPATIENT
Start: 2023-05-16 | End: 2023-05-22

## 2023-05-16 RX ORDER — CHLORHEXIDINE GLUCONATE 213 G/1000ML
1 SOLUTION TOPICAL
Refills: 0 | Status: DISCONTINUED | OUTPATIENT
Start: 2023-05-16 | End: 2023-05-22

## 2023-05-16 RX ORDER — INSULIN LISPRO 100/ML
VIAL (ML) SUBCUTANEOUS AT BEDTIME
Refills: 0 | Status: DISCONTINUED | OUTPATIENT
Start: 2023-05-16 | End: 2023-05-16

## 2023-05-16 RX ORDER — METRONIDAZOLE 500 MG
500 TABLET ORAL EVERY 8 HOURS
Refills: 0 | Status: DISCONTINUED | OUTPATIENT
Start: 2023-05-16 | End: 2023-05-16

## 2023-05-16 RX ORDER — CEFEPIME 1 G/1
1000 INJECTION, POWDER, FOR SOLUTION INTRAMUSCULAR; INTRAVENOUS ONCE
Refills: 0 | Status: COMPLETED | OUTPATIENT
Start: 2023-05-16 | End: 2023-05-16

## 2023-05-16 RX ORDER — CEFEPIME 1 G/1
1000 INJECTION, POWDER, FOR SOLUTION INTRAMUSCULAR; INTRAVENOUS EVERY 8 HOURS
Refills: 0 | Status: DISCONTINUED | OUTPATIENT
Start: 2023-05-16 | End: 2023-05-16

## 2023-05-16 RX ORDER — WARFARIN SODIUM 2.5 MG/1
1 TABLET ORAL
Qty: 0 | Refills: 0 | DISCHARGE

## 2023-05-16 RX ORDER — METRONIDAZOLE 500 MG
500 TABLET ORAL ONCE
Refills: 0 | Status: COMPLETED | OUTPATIENT
Start: 2023-05-16 | End: 2023-05-16

## 2023-05-16 RX ORDER — DEXTROSE 50 % IN WATER 50 %
15 SYRINGE (ML) INTRAVENOUS ONCE
Refills: 0 | Status: DISCONTINUED | OUTPATIENT
Start: 2023-05-16 | End: 2023-05-22

## 2023-05-16 RX ORDER — SODIUM CHLORIDE 9 MG/ML
1000 INJECTION, SOLUTION INTRAVENOUS
Refills: 0 | Status: DISCONTINUED | OUTPATIENT
Start: 2023-05-16 | End: 2023-05-17

## 2023-05-16 RX ORDER — FUROSEMIDE 40 MG
1 TABLET ORAL
Qty: 0 | Refills: 0 | DISCHARGE

## 2023-05-16 RX ADMIN — SODIUM CHLORIDE 60 MILLILITER(S): 9 INJECTION, SOLUTION INTRAVENOUS at 19:05

## 2023-05-16 RX ADMIN — CHLORHEXIDINE GLUCONATE 1 APPLICATION(S): 213 SOLUTION TOPICAL at 05:15

## 2023-05-16 RX ADMIN — Medication 100 MILLIGRAM(S): at 09:17

## 2023-05-16 RX ADMIN — PIPERACILLIN AND TAZOBACTAM 25 GRAM(S): 4; .5 INJECTION, POWDER, LYOPHILIZED, FOR SOLUTION INTRAVENOUS at 19:06

## 2023-05-16 RX ADMIN — Medication 100 MILLIGRAM(S): at 01:32

## 2023-05-16 RX ADMIN — PIPERACILLIN AND TAZOBACTAM 200 GRAM(S): 4; .5 INJECTION, POWDER, LYOPHILIZED, FOR SOLUTION INTRAVENOUS at 16:01

## 2023-05-16 RX ADMIN — CEFEPIME 100 MILLIGRAM(S): 1 INJECTION, POWDER, FOR SOLUTION INTRAMUSCULAR; INTRAVENOUS at 05:14

## 2023-05-16 RX ADMIN — Medication 500 MILLIGRAM(S): at 02:28

## 2023-05-16 RX ADMIN — HEPARIN SODIUM 5000 UNIT(S): 5000 INJECTION INTRAVENOUS; SUBCUTANEOUS at 05:09

## 2023-05-16 RX ADMIN — Medication 40 MICROGRAM(S): at 18:52

## 2023-05-16 RX ADMIN — Medication 101 MILLIGRAM(S): at 04:48

## 2023-05-16 NOTE — H&P ADULT - NSHPPHYSICALEXAM_GEN_ALL_CORE
Physical Exam    General: NAD, resting comfortably in bed  Cardio: NSR, s1 s2  Pulm: Nonlabored breathing, satting well on 2LNC  Abdomen: Soft, ND, mildly tender to RUQ, no rebound/guarding  Extremities: WWP  Neuro: AAOx1

## 2023-05-16 NOTE — CONSULT NOTE ADULT - ATTENDING COMMENTS
Initial attending contact date  5/16/23    . See fellow note written above for details. I reviewed the fellow documentation. I have personally seen and examined this patient. I reviewed vitals, labs, medications, cardiac studies, and additional imaging. I agree with the above fellow's findings and plans as written above with the following additions/statements.    89yo F w h/o HTN, HLD, hypothyroidism, dementia (AAOx1), tachy-mamie syndrome s/p PPM, mitral valve replacement in 2016 (bioMVR) with appendage closure, Afib on coumadin (last INR was 2) , chronic dHF (EF 50-55%) presenting with signs and symptoms of cholangitis. cardiology consulted for comanagement and preoperative cardiac risk assessment.   -pt with limited functional capacity  -on tele NSR, euvolemic on exam   -chadvasc2 = 5, would bridge with IV heparin while holding coumadin ordinarily, however given age, weight of 35 kg can hold off on bridging   -Given age and co-morbidities, pt considered int-high risk for int risk procedure, would proceed with minimally invasive approach to decrease risk of MACE
Corina Oconnor [id number] 5/16/23 SICU  Ms. Oconnor is a 89 y/o female with HTN, hypothyroidism, mitral valve replacement (on warfarin).  She was in her normal state of health until yesterday. She was wheezing, cold and cough up phlegm - felt better.  ED T101.1F now 97F, SO2 96% on O2 @ 2L/min, HR 55/min, WBC 11.14, Tbil 1.5, AST, /639, lactate 2.4->2.8 ->2.3, RUQ US shows biliary sludge.  She is at her baseline in response.  -elevated transaminases  -gall bladder sludge  -small bilateral pleural effusion  -chronic hypothyroidism  -Mitral valve replacement on warfarin at a subtherapeutic level  >Obtain GI consultation  >check Tylenol and aspirin level  >will need bridging with heparin or Lovenox if no surgical intervention  >no Tylenol for pain control
Seen/discussed with fellow  Imaging reviewed  While there may be a benefit form biliary decompression, specifically ERCP, given her underlying comorbidities and also her current state which appears decompensated for reason other than biliary obstruction, she would most likely further decompensate following an ERCP under general anesthesia. In addition, with her elevated INR, she may require staged procedure requiring her to have another ERCP at a future date. It would be best to consider medical management with Abx, and possible percutaneous decompression.

## 2023-05-16 NOTE — PROGRESS NOTE ADULT - ASSESSMENT
89yo Female pt with PMHx HTN, HLD, Hypothyroidism; PSHx mitral valve replacement (on warfarin), PPM; presented to [facility] with c/o of difficulty breathing. Elevated LFTs, and questionable sludge ball in CBD, concerns for cholangitis. INR 2.05 - on coumadin for mitral valve repair.    Plan:  - GI consult for ERCP  - Cardiology/ EP consult  - Will need to reverse coumadin in the setting of elevated INR and need for ERCP  - Rest of care per SICU

## 2023-05-16 NOTE — CONSULT NOTE ADULT - TIME BILLING
See above for the details.
Probable aspiration PNA R > L; ?cholangitis however no choledocholithiasis on imaging and sludge in CBD described as non obstructive--pending endoscopic evaluation. If/when performed and if evidence of infection encountered, would be send specimen for gram stain and culture. Re: PCN allergy patient reports hives as a baby (i.e. ~89 years ago) probably not a true current allergy and benefits of trial of appropriate antibiotic therapy outweigh risks of unlikely allergic reaction--suggest transition from cefepime/metronidazole to Zosyn and monitor for signs of type 1 hypersensitivity reaction with first dose. Urine Legionella Ag.
as noted above

## 2023-05-16 NOTE — H&P ADULT - ASSESSMENT
89yo Female pt with PMHx HTN, HLD, Hypothyroidism; PSHx mitral valve replacement (on warfarin), PPM; presented to [facility] with c/o of difficulty breathing. Elevated LFTs, and questionable sludge ball in CBD, concerns for cholangitis.    Plan:    -Admit to SICU under Dr. Shukla  -NPO  -IVF  -IV abx  -Cardiology consult  -EP consult  -GI consult  -SQH  -Am labs  -5 of VitK  -ID consult  -GOC discussion with family

## 2023-05-16 NOTE — H&P ADULT - NSHPREVIEWOFSYSTEMS_GEN_ALL_CORE
CONSTITUTIONAL:  No weight loss, fever, chills, weakness or fatigue.  HEENT:  Eyes:  No visual loss, blurred vision, double vision or yellow sclerae. Ears, Nose, Throat:  No hearing loss, sneezing, congestion, runny nose or sore throat.  SKIN:  No rash or itching.  CARDIOVASCULAR:  No chest pain, chest pressure or chest discomfort. No palpitations or edema.  RESPIRATORY:  No shortness of breath, cough or sputum.  GENITOURINARY:  No burning on urination.  NEUROLOGICAL:  No headache, dizziness, syncope, paralysis, ataxia, numbness or tingling in the extremities. No change in bowel or bladder control.  MUSCULOSKELETAL:  No muscle, back pain, joint pain or stiffness.  HEMATOLOGIC:  No anemia, bleeding or bruising.  LYMPHATICS:  No enlarged nodes. No history of splenectomy.  PSYCHIATRIC:  No history of depression or anxiety.  ENDOCRINOLOGIC:  No reports of sweating, cold or heat intolerance. No polyuria or polydipsia.  ALLERGIES:  No history of asthma, hives, eczema or rhinitis.

## 2023-05-16 NOTE — ED ADULT NURSE REASSESSMENT NOTE - NS ED NURSE REASSESS COMMENT FT1
Pt received from day shift RN. Pt A&Ox4. Pt is conversive. Denies any discomfort at present. Assessment ongoing. Will cont to monitor.
Pt wheeled to US w/o distress.
Pt wheeled to floor w/o distress.
Pt developed rashes on the forehead post vancomycin Dr. Bruno aware and at bedside. Pt is awake and alert denies any sob, cp or throat swelling. VS stable.
Pt back from US w/o distress.

## 2023-05-16 NOTE — PATIENT PROFILE ADULT - FALL HARM RISK - HARM RISK INTERVENTIONS
Assistance with ambulation/Assistance OOB with selected safe patient handling equipment/Communicate Risk of Fall with Harm to all staff/Discuss with provider need for PT consult/Monitor gait and stability/Provide patient with walking aids - walker, cane, crutches/Reinforce activity limits and safety measures with patient and family/Review medications for side effects contributing to fall risk/Sit up slowly, dangle for a short time, stand at bedside before walking/Tailored Fall Risk Interventions/Toileting schedule using arm’s reach rule for commode and bathroom/Visual Cue: Yellow wristband and red socks/Bed in lowest position, wheels locked, appropriate side rails in place/Call bell, personal items and telephone in reach/Instruct patient to call for assistance before getting out of bed or chair/Non-slip footwear when patient is out of bed/[place] to call system/Physically safe environment - no spills, clutter or unnecessary equipment/Purposeful Proactive Rounding/Room/bathroom lighting operational, light cord in reach

## 2023-05-16 NOTE — PROGRESS NOTE ADULT - SUBJECTIVE AND OBJECTIVE BOX
SUBJECTIVE: Pt seen and examined at bedside this am by surgery team. Reports some nausea but denies any abdominal pain. Denies f/n/v/cp/sob.    MEDICATIONS  (STANDING):  cefepime   IVPB 1000 milliGRAM(s) IV Intermittent every 8 hours  chlorhexidine 2% Cloths 1 Application(s) Topical <User Schedule>  dextrose 5%. 1000 milliLiter(s) (100 mL/Hr) IV Continuous <Continuous>  dextrose 5%. 1000 milliLiter(s) (50 mL/Hr) IV Continuous <Continuous>  dextrose 50% Injectable 12.5 Gram(s) IV Push once  dextrose 50% Injectable 25 Gram(s) IV Push once  dextrose 50% Injectable 25 Gram(s) IV Push once  glucagon  Injectable 1 milliGRAM(s) IntraMuscular once  insulin lispro (ADMELOG) corrective regimen sliding scale   SubCutaneous three times a day before meals  insulin lispro (ADMELOG) corrective regimen sliding scale   SubCutaneous at bedtime  lactated ringers. 1000 milliLiter(s) (80 mL/Hr) IV Continuous <Continuous>  metroNIDAZOLE  IVPB 500 milliGRAM(s) IV Intermittent every 8 hours    MEDICATIONS  (PRN):  dextrose Oral Gel 15 Gram(s) Oral once PRN Blood Glucose LESS THAN 70 milliGRAM(s)/deciliter      Vital Signs Last 24 Hrs  T(C): 36.8 (16 May 2023 06:06), Max: 38.4 (15 May 2023 19:28)  T(F): 98.2 (16 May 2023 06:06), Max: 101.1 (15 May 2023 19:28)  HR: 50 (16 May 2023 07:00) (50 - 84)  BP: 132/58 (16 May 2023 07:00) (120/53 - 170/67)  BP(mean): 83 (16 May 2023 07:00) (73 - 102)  RR: 18 (16 May 2023 05:00) (18 - 22)  SpO2: 96% (16 May 2023 07:00) (93% - 98%)    Parameters below as of 16 May 2023 06:00  Patient On (Oxygen Delivery Method): nasal cannula  O2 Flow (L/min): 5      Physical Exam  General: NAD, resting comfortably in bed  Pulmonary: Nonlabored breathing, no respiratory distress  CV: NSR  Abd: soft, mildly tender in the RUQ, ND, no guarding,   Extremities: (-) edema, warm, well-perfused      I&O's Detail      LABS:                        10.3   13.48 )-----------( 139      ( 16 May 2023 05:30 )             31.8     05-15    137  |  100  |  16  ----------------------------<  140<H>  4.9   |  26  |  0.63    Ca    9.8      15 May 2023 19:18    TPro  7.5  /  Alb  3.8  /  TBili  1.6<H>  /  DBili  x   /  AST  925<H>  /  ALT  639<H>  /  AlkPhos  379<H>  05-15    PT/INR - ( 16 May 2023 05:30 )   PT: 24.6 sec;   INR: 2.05          PTT - ( 16 May 2023 05:30 )  PTT:30.8 sec  Urinalysis Basic - ( 15 May 2023 19:09 )    Color: Yellow / Appearance: Cloudy / SG: >=1.030 / pH: x  Gluc: x / Ketone: NEGATIVE  / Bili: Small / Urobili: 2.0 E.U./dL   Blood: x / Protein: 30 mg/dL / Nitrite: NEGATIVE   Leuk Esterase: Small / RBC: < 5 /HPF / WBC < 5 /HPF   Sq Epi: x / Non Sq Epi: x / Bacteria: Present /HPF        RADIOLOGY & ADDITIONAL STUDIES:

## 2023-05-16 NOTE — CONSULT NOTE ADULT - ASSESSMENT
***incomplete**** 89yo Female pt with PMHx HTN, HLD, Hypothyroidism; PSHx mitral valve replacement (on warfarin), PPM; presented to [facility] with c/o of sob and abdominal pain. In ED, pt febrile to 101.1 with 11.14 WBC. CXR 5/15 showing R sided pleural effusion, mild bi-basilar atelectasis. CT A+P on 5/15 showing small b/l pleural effusion, airspace disease, consolidation at both lung bases, and stomach wall thickening, & no evidence of biliary obstruction. At time of consult, pt afebrile and WBC 13.48. Pt originally given vanc + aztreonam but developed rash. Currently on cefepime/Flagyl. Pt likely with CA- aspiration pneumonia     Recommendations  - Stop Flagyl and cefepime   - Start zosyn 3.375 IV q8h   - Please obtain a urine legionella antigen     Team 1 will follow.

## 2023-05-16 NOTE — PROGRESS NOTE ADULT - ASSESSMENT
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 Elevated LFTs, and questionable sludge ball in CBD, concerns for cholangitis. CT findings c/w pneumonia likely 2/2 aspiration and possible cystitis as well. Admitted to SICU for HD monitoring.    Neuro: AxOx1 baseline. NTD  HEENT: NTD  CV: MAP > 65; TTE pending. Hx of pAF, PPM, and MVR--hold Warfarin. Cards c/s. hold dig, lasix, spironolactone  Pulm: Wean NC as tolerated, home NC PRN  GI/FEN: NPO, IVF 80, GI consult  : Voids (primafit)  Endo: ISS, home synthroid  Heme: holding warfarin, vit k 5 given (05/16)  ID: Cefepime (5/16-), Flagyl (5/16-); In ED Vancomycin, Aztreonam, Levofloxacin, Flagyl (5/16), ID recs  PPX: SQH pending procedure, SCDs  L/D/T: PIVs  PT/OT: Not ordered  Dispo: SICU

## 2023-05-16 NOTE — CONSULT NOTE ADULT - CONSULT REASON
comanagement of cardiac comorbidities
Hemodynamic monitoring
Suspected acute cholangitis
Antibiotic Recommendations

## 2023-05-16 NOTE — CONSULT NOTE ADULT - SUBJECTIVE AND OBJECTIVE BOX
INFECTIOUS DISEASES INITIAL CONSULT NOTE    HPI:  89yo Female pt with PMHx HTN, HLD, Hypothyroidism; PSHx mitral valve replacement (on warfarin), PPM; presented to [facility] with c/o of difficulty breathing. Patient AAOx1, history obtained from caregiver and son Harvey Oconnor. She was doing well, until yesterday afternoon when she felt cold, and was noted to be wheezing, and feeling weak and tired. After this, she coughed up white phlegm, and felt better. Per caregiver, she didn't c.o abdominal pain, diarrhea, vomit.    In the ED, pt febrile one time to 101.1, most recent T 97.3, nontachycardic, normotensive, and satting 96-97% on 2LNC. Labs significant for wbc 11.14, h/h 13.9/42.5, plt 171, bmp unremarkable, Tbili 1.6, , AST//639, LA 2.4->2.8->2.3, . RUQ u/s with findings consistent with Gallbladder sludge; No definite cholelithiasis or evidence for acute cholecystitis; Mild intrahepatic biliary dilatation and borderline common bile duct dilatation, increased since 2019; Probable nonobstructive sludge ball within the common bile duct. CT A/P with findings consistent with Small bilateral pleural effusions; Airspace disease/consolidation both lung bases, right greater than left; Diffuse wall thickening of the stomach suggestive of a nonspecific gastritis; Moderate diffuse bladder wall thickening with surrounding edematous change suggestive of a nonspecific cystitis; Mild left hydronephrosis; No ureteral stones.    ID addendum: 89 y/o F pmh of HTN, hypothyroidism, mitral valve replacement presented to [facility] for sob and abdominal pain. Caregiver bedside assisting in hpi. States abdominal pain and sob began yesterday. States did vomit yesterday, but has not since she came to the ED. Had not complained of abdominal pain prior and not currently experiencing abdominal pain. No recent exposure to infections or antibiotic use. No recent change in diet or recent travel. In ED pt was febrile to 101.1, has been afebrile since. Received vancomycin & aztreonam in ED after which she developed a rash on her face. Switched to Levaquin and flagyl. Has been receiving cefepime in the ICU. Currently denies n/v/f/c/sob.       LABS:                        13.9   11.14 )-----------( 171      ( 15 May 2023 19:18 )             42.5     05-15    137  |  100  |  16  ----------------------------<  140<H>  4.9   |  26  |  0.63    Ca    9.8      15 May 2023 19:18    TPro  7.5  /  Alb  3.8  /  TBili  1.6<H>  /  DBili  x   /  AST  925<H>  /  ALT  639<H>  /  AlkPhos  379<H>  05-15    PT/INR - ( 15 May 2023 19:18 )   PT: 15.8 sec;   INR: 1.32          PTT - ( 15 May 2023 19:18 )  PTT:30.5 sec    Meds: Warfarin       (16 May 2023 01:44)      PAST MEDICAL & SURGICAL HISTORY:  HTN (hypertension)      High cholesterol      Hypothyroid      FH: mitral valve repair            Review of Systems:   Constitutional, eyes, ENT, cardiovascular, respiratory, gastrointestinal, genitourinary, integumentary, neurological, psychiatric and heme/lymph are otherwise negative other than noted above       ANTIBIOTICS:  MEDICATIONS  (STANDING):  cefepime   IVPB 1000 milliGRAM(s) IV Intermittent every 8 hours  chlorhexidine 2% Cloths 1 Application(s) Topical <User Schedule>  dextrose 5%. 1000 milliLiter(s) (50 mL/Hr) IV Continuous <Continuous>  dextrose 5%. 1000 milliLiter(s) (100 mL/Hr) IV Continuous <Continuous>  dextrose 50% Injectable 12.5 Gram(s) IV Push once  dextrose 50% Injectable 25 Gram(s) IV Push once  dextrose 50% Injectable 25 Gram(s) IV Push once  glucagon  Injectable 1 milliGRAM(s) IntraMuscular once  insulin lispro (ADMELOG) corrective regimen sliding scale   SubCutaneous every 6 hours  lactated ringers. 1000 milliLiter(s) (60 mL/Hr) IV Continuous <Continuous>  levothyroxine Injectable 40 MICROGram(s) IV Push <User Schedule>  metroNIDAZOLE  IVPB 500 milliGRAM(s) IV Intermittent every 8 hours    MEDICATIONS  (PRN):  dextrose Oral Gel 15 Gram(s) Oral once PRN Blood Glucose LESS THAN 70 milliGRAM(s)/deciliter  ondansetron Injectable 4 milliGRAM(s) IV Push every 6 hours PRN Nausea and/or Vomiting      Allergies    chlorhexidine topical (Rash)  fish (Other)  penicillin (Hives)    Intolerances        SOCIAL HISTORY:    FAMILY HISTORY:   no FH leading to current infection    Vital Signs Last 24 Hrs  T(C): 36.6 (16 May 2023 09:00), Max: 38.4 (15 May 2023 19:28)  T(F): 97.9 (16 May 2023 09:00), Max: 101.1 (15 May 2023 19:28)  HR: 57 (16 May 2023 11:00) (50 - 84)  BP: 131/55 (16 May 2023 11:00) (120/53 - 170/67)  BP(mean): 76 (16 May 2023 11:00) (73 - 102)  RR: 18 (16 May 2023 11:00) (18 - 22)  SpO2: 94% (16 May 2023 11:00) (93% - 98%)    Parameters below as of 16 May 2023 08:00  Patient On (Oxygen Delivery Method): nasal cannula  O2 Flow (L/min): 5      05-15-23 @ 07:01  -  05-16-23 @ 07:00  --------------------------------------------------------  IN: 400 mL / OUT: 0 mL / NET: 400 mL    05-16-23 @ 07:01  -  05-16-23 @ 11:42  --------------------------------------------------------  IN: 80 mL / OUT: 0 mL / NET: 80 mL        PHYSICAL EXAM:  Constitutional: NAD, AAOx1  Eyes: the sclera and conjunctiva were normal.   ENT: the ears and nose were normal in appearance.   Neck: the appearance of the neck was normal and the neck was supple.   Pulmonary: no respiratory distress and lungs were clear to auscultation bilaterally.   Heart: heart rate was normal and rhythm regular, normal S1 and S2  Vascular:. there was no peripheral edema  Abdomen: normal bowel sounds, soft, non-tender, mildly distended         LABS:                        10.3   13.48 )-----------( 139      ( 16 May 2023 05:30 )             31.8     05-16    136  |  102  |  13  ----------------------------<  118<H>  4.3   |  23  |  0.57    Ca    9.3      16 May 2023 07:25    TPro  6.4  /  Alb  3.3  /  TBili  1.9<H>  /  DBili  x   /  AST  383<H>  /  ALT  446<H>  /  AlkPhos  309<H>  05-16    PT/INR - ( 16 May 2023 05:30 )   PT: 24.6 sec;   INR: 2.05          PTT - ( 16 May 2023 05:30 )  PTT:30.8 sec  Urinalysis Basic - ( 15 May 2023 19:09 )    Color: Yellow / Appearance: Cloudy / SG: >=1.030 / pH: x  Gluc: x / Ketone: NEGATIVE  / Bili: Small / Urobili: 2.0 E.U./dL   Blood: x / Protein: 30 mg/dL / Nitrite: NEGATIVE   Leuk Esterase: Small / RBC: < 5 /HPF / WBC < 5 /HPF   Sq Epi: x / Non Sq Epi: x / Bacteria: Present /HPF        MICROBIOLOGY:  Culture - Blood (05.15.23 @ 19:17)    Specimen Source: .Blood Blood-Peripheral   Culture Results:   No growth at 12 hours      RADIOLOGY & ADDITIONAL STUDIES:  < from: CT Abdomen and Pelvis w/ IV Cont (05.15.23 @ 23:50) >  IMPRESSION:  1.   Small bilateral pleural effusions.  2.   Airspace disease/consolidation both lung bases, right greater than   left, suspect pneumonia.  3.   Diffuse wall thickening of the stomach suggestive of a nonspecific  gastritis.  4.   Moderate diffuse bladder wall thickening with surrounding edematous   change  suggestive of a nonspecific cystitis.  5.   Mild left hydronephrosis. No ureteral stones.  6. No compelling evidence of biliary distraction.  < end of copied text >    < from: US Abdomen Limited (05.15.23 @ 22:44) >  IMPRESSION:  1.  Gallbladder sludge. No definite cholelithiasis or evidence for acute   cholecystitis.  2.  Mild intrahepatic biliary dilatation and borderline common bile duct   dilatation, increased since 2019.  3.  Probable nonobstructive sludge ball within the common bile duct.  < end of copied text >    < from: Xray Chest 1 View- PORTABLE-Urgent (05.15.23 @ 19:38) >  IMPRESSION: Small right-sided pleural effusion. Mild bibasilar   atelectasis.    < end of copied text >  
SICU Consult Note    HPI:  89yo Female pt with PMHx HTN, HLD, Hypothyroidism; PSHx mitral valve replacement (on warfarin), PPM; presented to [facility] with c/o of difficulty breathing. Patient AAOx1, history obtained from caregiver and son Harvey Oconnor. She was doing well, until yesterday afternoon when she felt cold, and was noted to be wheezing, and feeling weak and tired. After this, she coughed up white phlegm, and felt better. Per caregiver, she didn't c.o abdominal pain, diarrhea, vomit.     Last colonoscopy/EGD - unknown per family member and caregiver.  Denies family hx of IBS, Crohn's, UC, or colon cancer.    Surgery consulted for elevated LFTs.    In the ED, pt febrile one time to 101.1, most recent T 97.3, nontachycardic, normotensive, and satting 96-97% on 2LNC. Labs significant for wbc 11.14, h/h 13.9/42.5, plt 171, bmp unremarkable, Tbili 1.6, , AST//639, LA 2.4->2.8->2.3, . RUQ u/s with findings consistent with Gallbladder sludge; No definite cholelithiasis or evidence for acute cholecystitis; Mild intrahepatic biliary dilatation and borderline common bile duct dilatation, increased since 2019; Probable nonobstructive sludge ball within the common bile duct. CT A/P with findings consistent with Small bilateral pleural effusions; Airspace disease/consolidation both lung bases, right greater than left; Diffuse wall thickening of the stomach suggestive of a nonspecific gastritis; Moderate diffuse bladder wall thickening with surrounding edematous change suggestive of a nonspecific cystitis; Mild left hydronephrosis; No ureteral stones.        LABS:                        13.9   11.14 )-----------( 171      ( 15 May 2023 19:18 )             42.5     05-15    137  |  100  |  16  ----------------------------<  140<H>  4.9   |  26  |  0.63    Ca    9.8      15 May 2023 19:18    TPro  7.5  /  Alb  3.8  /  TBili  1.6<H>  /  DBili  x   /  AST  925<H>  /  ALT  639<H>  /  AlkPhos  379<H>  05-15    PT/INR - ( 15 May 2023 19:18 )   PT: 15.8 sec;   INR: 1.32          PTT - ( 15 May 2023 19:18 )  PTT:30.5 sec    Meds: Warfarin       (16 May 2023 01:44)      Intensivist: Dr. Flores.    SICU Addendum:    89F HPI as above. On exam patient feeling well, no complaints, satting well on 2LNC, vitals wnl. Mildly tender to RUQ, but otherwise no rebound/guarding. Will be admitted to SICU for HD monitoring.        PAST MEDICAL & SURGICAL HISTORY:  HTN (hypertension)      High cholesterol      Hypothyroid      FH: mitral valve repair          MEDICATIONS  (STANDING):  chlorhexidine 2% Cloths 1 Application(s) Topical <User Schedule>    MEDICATIONS  (PRN):      Allergies    chlorhexidine topical (Rash)  fish (Other)  penicillin (Hives)    Intolerances        SOCIAL HISTORY:    FAMILY HISTORY:      Vital Signs Last 24 Hrs  T(C): 36.3 (15 May 2023 22:53), Max: 38.4 (15 May 2023 19:28)  T(F): 97.3 (15 May 2023 22:53), Max: 101.1 (15 May 2023 19:28)  HR: 55 (16 May 2023 01:33) (55 - 84)  BP: 132/75 (16 May 2023 01:33) (129/50 - 170/67)  BP(mean): 102 (15 May 2023 19:28) (102 - 102)  RR: 20 (16 May 2023 01:33) (20 - 22)  SpO2: 95% (16 May 2023 01:33) (93% - 95%)    Parameters below as of 16 May 2023 01:33  Patient On (Oxygen Delivery Method): nasal cannula  O2 Flow (L/min): 5      I&O's Summary      Physical Exam:    General: NAD, resting comfortably in bed  Cardio: NSR, s1 s2  Pulm: Nonlabored breathing, satting well on 2LNC  Abdomen: Soft, ND, mildly tender to RUQ, no rebound/guarding  Extremities: WWP  Neuro: AAOx1    Lines/drains/tubes:    LABS:                        13.9   11.14 )-----------( 171      ( 15 May 2023 19:18 )             42.5     05-15    137  |  100  |  16  ----------------------------<  140<H>  4.9   |  26  |  0.63    Ca    9.8      15 May 2023 19:18    TPro  7.5  /  Alb  3.8  /  TBili  1.6<H>  /  DBili  x   /  AST  925<H>  /  ALT  639<H>  /  AlkPhos  379<H>  05-15    PT/INR - ( 15 May 2023 19:18 )   PT: 15.8 sec;   INR: 1.32          PTT - ( 15 May 2023 19:18 )  PTT:30.5 sec  Urinalysis Basic - ( 15 May 2023 19:09 )    Color: Yellow / Appearance: Cloudy / SG: >=1.030 / pH: x  Gluc: x / Ketone: NEGATIVE  / Bili: Small / Urobili: 2.0 E.U./dL   Blood: x / Protein: 30 mg/dL / Nitrite: NEGATIVE   Leuk Esterase: Small / RBC: < 5 /HPF / WBC < 5 /HPF   Sq Epi: x / Non Sq Epi: x / Bacteria: Present /HPF      CAPILLARY BLOOD GLUCOSE        LIVER FUNCTIONS - ( 15 May 2023 19:18 )  Alb: 3.8 g/dL / Pro: 7.5 g/dL / ALK PHOS: 379 U/L / ALT: 639 U/L / AST: 925 U/L / GGT: x             Cultures:      RADIOLOGY & ADDITIONAL STUDIES:        
HPI obtained through chart review as pt without capacity.    89yo Female pt with PMHx MV Replacement with  in 2016, pAF (on Coumadin), PPM; presented to [facility] with c/o of difficulty breathing. Patient AAOx1, history obtained from caregiver and son Harvey Oconnor. She was doing well, until yesterday afternoon when she felt cold, and was noted to be wheezing, and feeling weak and tired. After this, she coughed up white phlegm, and felt better. Per caregiver, she didn't c.o abdominal pain, diarrhea, vomit.     Last colonoscopy/EGD - unknown per family member and caregiver.  Denies family hx of IBS, Crohn's, UC, or colon cancer.    Surgery consulted initially for elevated LFTs.  GI consulted with concern for acute cholangitis     At bedside, he denies abd pain. No nausea/vomiting    In the ED, pt febrile one time to 101.1, most recent T 97.3, normal HR , normotensive, and O2 96-97% on 2LNC. Labs significant for wbc 11.14, h/h 13.9/42.5, plt 171, bmp unremarkable, Tbili 1.6, , AST//639, LA 2.4->2.8->2.3, .     RUQ u/s with findings consistent with Gallbladder sludge; No definite cholelithiasis or evidence for acute cholecystitis; Mild intrahepatic biliary dilatation and borderline common bile duct dilatation, increased since 2019; Probable nonobstructive sludge ball within the common bile duct.     CT A/P with findings consistent with Small bilateral pleural effusions; Airspace disease/consolidation both lung bases, right greater than left; Diffuse wall thickening of the stomach suggestive of a nonspecific gastritis; Moderate diffuse bladder wall thickening with surrounding edematous change suggestive of a nonspecific cystitis; Mild left hydronephrosis; No ureteral stones.    Allergies    chlorhexidine topical (Rash)  fish (Other)  penicillin (Hives)    Intolerances      Home Medications:  acyclovir 400 mg oral tablet: 1 tab(s) orally once a day (16 May 2023 07:47)  Aldactone 25 mg oral tablet: 1 tab(s) orally once a day (16 May 2023 07:47)  Colace 100 mg oral capsule: 1 cap(s) orally 2 times a day (16 May 2023 07:47)  Coumadin 3 mg oral tablet: 1 tab(s) orally once a day (16 May 2023 07:47)  Lasix 20 mg oral tablet: 1 tab(s) orally 2 times a day (16 May 2023 07:47)  Lipitor 20 mg oral tablet: 1 tab(s) orally once a day (at bedtime) (16 May 2023 07:47)  Synthroid 50 mcg (0.05 mg) oral tablet: 1 tab(s) orally once a day (16 May 2023 07:47)  Toprol-XL 50 mg oral tablet, extended release: 1 tab(s) orally 2 times a day (16 May 2023 07:47)  Zoloft 50 mg oral tablet: 1 tab(s) orally once a day (16 May 2023 07:47)    MEDICATIONS:  MEDICATIONS  (STANDING):  cefepime   IVPB 1000 milliGRAM(s) IV Intermittent every 8 hours  chlorhexidine 2% Cloths 1 Application(s) Topical <User Schedule>  dextrose 5%. 1000 milliLiter(s) (100 mL/Hr) IV Continuous <Continuous>  dextrose 5%. 1000 milliLiter(s) (50 mL/Hr) IV Continuous <Continuous>  dextrose 50% Injectable 12.5 Gram(s) IV Push once  dextrose 50% Injectable 25 Gram(s) IV Push once  dextrose 50% Injectable 25 Gram(s) IV Push once  glucagon  Injectable 1 milliGRAM(s) IntraMuscular once  insulin lispro (ADMELOG) corrective regimen sliding scale   SubCutaneous every 6 hours  lactated ringers. 1000 milliLiter(s) (60 mL/Hr) IV Continuous <Continuous>  levothyroxine Injectable 40 MICROGram(s) IV Push <User Schedule>  metroNIDAZOLE  IVPB 500 milliGRAM(s) IV Intermittent every 8 hours    MEDICATIONS  (PRN):  dextrose Oral Gel 15 Gram(s) Oral once PRN Blood Glucose LESS THAN 70 milliGRAM(s)/deciliter  ondansetron Injectable 4 milliGRAM(s) IV Push every 6 hours PRN Nausea and/or Vomiting    PAST MEDICAL & SURGICAL HISTORY:  HTN (hypertension)      High cholesterol      Hypothyroid      FH: mitral valve repair      REVIEW OF SYSTEMS:  Unable to obtain complete ROS    Vital Signs Last 24 Hrs  T(C): 36.6 (16 May 2023 09:00), Max: 38.4 (15 May 2023 19:28)  T(F): 97.9 (16 May 2023 09:00), Max: 101.1 (15 May 2023 19:28)  HR: 55 (16 May 2023 13:00) (50 - 84)  BP: 151/65 (16 May 2023 13:00) (120/53 - 170/67)  BP(mean): 93 (16 May 2023 13:00) (73 - 102)  RR: 18 (16 May 2023 13:00) (18 - 22)  SpO2: 97% (16 May 2023 13:00) (93% - 98%)    Parameters below as of 16 May 2023 08:00  Patient On (Oxygen Delivery Method): nasal cannula  O2 Flow (L/min): 5      05-15 @ 07:01  -  05-16 @ 07:00  --------------------------------------------------------  IN: 400 mL / OUT: 0 mL / NET: 400 mL    05-16 @ 07:01  -  05-16 @ 13:46  --------------------------------------------------------  IN: 580 mL / OUT: 0 mL / NET: 580 mL        PHYSICAL EXAM:    General: No acute distress. Frail/ill appearing. On NC   Eyes: Anicteric sclerae, moist conjunctivae  HENT: Moist mucous membranes  Neck: Trachea midline, supple  Lungs: Labored breathing  Cardiovascular: RRR  Abdomen: Soft, non-tender non-distended; No rebound or guarding  Neurological: alert to person, not to place/time  Skin: Warm and dry. No obvious rash    LABS:                        10.3   13.48 )-----------( 139      ( 16 May 2023 05:30 )             31.8     05-16    136  |  102  |  13  ----------------------------<  118<H>  4.3   |  23  |  0.57    Ca    9.3      16 May 2023 07:25    TPro  6.4  /  Alb  3.3  /  TBili  1.9<H>  /  DBili  x   /  AST  383<H>  /  ALT  446<H>  /  AlkPhos  309<H>  05-16      Culture Results:   No growth at 12 hours (05-15 @ 19:17)  Culture Results:   No growth at 12 hours (05-15 @ 19:10)    PT/INR - ( 16 May 2023 05:30 )   PT: 24.6 sec;   INR: 2.05          PTT - ( 16 May 2023 05:30 )  PTT:30.8 sec    Culture - Blood (collected 15 May 2023 19:17)  Source: .Blood Blood-Peripheral  Preliminary Report (16 May 2023 09:01):    No growth at 12 hours    Culture - Blood (collected 15 May 2023 19:10)  Source: .Blood Blood-Peripheral  Preliminary Report (16 May 2023 09:01):    No growth at 12 hours      RADIOLOGY & ADDITIONAL STUDIES:     REVIEWED
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 cardiology consulted for comanagement and preoperative cardiac risk assessment.     Meds: Warfarin, spironolactone, lasix digoxin.     ROS: A 10-point review of systems was otherwise negative.    PAST MEDICAL & SURGICAL HISTORY:  HTN (hypertension)  High cholesterol  Hypothyroid  FH: mitral valve repair    SOCIAL HISTORY:  FAMILY HISTORY:    ALLERGIES: 	  chlorhexidine topical (Rash)  fish (Other)  penicillin (Hives)          MEDICATIONS:  chlorhexidine 2% Cloths 1 Application(s) Topical <User Schedule>  dextrose 5%. 1000 milliLiter(s) IV Continuous <Continuous>  dextrose 5%. 1000 milliLiter(s) IV Continuous <Continuous>  dextrose 50% Injectable 12.5 Gram(s) IV Push once  dextrose 50% Injectable 25 Gram(s) IV Push once  dextrose 50% Injectable 25 Gram(s) IV Push once  dextrose Oral Gel 15 Gram(s) Oral once PRN  glucagon  Injectable 1 milliGRAM(s) IntraMuscular once  insulin lispro (ADMELOG) corrective regimen sliding scale   SubCutaneous every 6 hours  lactated ringers. 1000 milliLiter(s) IV Continuous <Continuous>  levothyroxine Injectable 40 MICROGram(s) IV Push <User Schedule>  ondansetron Injectable 4 milliGRAM(s) IV Push every 6 hours PRN  piperacillin/tazobactam IVPB.- 3.375 Gram(s) IV Intermittent once      PHYSICAL EXAM:  T(C): 36.6 (05-16-23 @ 09:00), Max: 38.4 (05-15-23 @ 19:28)  HR: 56 (05-16-23 @ 16:00) (50 - 84)  BP: 134/59 (05-16-23 @ 16:00) (120/53 - 170/67)  RR: 18 (05-16-23 @ 16:00) (18 - 22)  SpO2: 98% (05-16-23 @ 16:00) (93% - 98%)  Wt(kg): --    GEN: elderly, pleasant, AAOx1  HEENT: NCAT, PERRL, EOMI. Mucosa moist. No JVD.   RESP: CTA b/l  CV: RRR, normal s1/s2. No m/r/g.  ABD: exam deferred  EXT: Warm. No edema, clubbing, or cyanosis.       I&O's Summary    15 May 2023 07:01  -  16 May 2023 07:00  --------------------------------------------------------  IN: 400 mL / OUT: 0 mL / NET: 400 mL    16 May 2023 07:01  -  16 May 2023 17:12  --------------------------------------------------------  IN: 700 mL / OUT: 0 mL / NET: 700 mL      Height (cm): 152.4 (05-16 @ 09:00)  Weight (kg): 35.426 (05-16 @ 09:00)  BMI (kg/m2): 15.3 (05-16 @ 09:00)  BSA (m2): 1.25 (05-16 @ 09:00)  LABS:	 	                        10.3   13.48 )-----------( 139      ( 16 May 2023 05:30 )             31.8     05-16    136  |  102  |  13  ----------------------------<  118<H>  4.3   |  23  |  0.57    Ca    9.3      16 May 2023 07:25  Phos  3.0     05-16  Mg     1.8     05-16    TPro  6.4  /  Alb  3.3  /  TBili  1.9<H>  /  DBili  x   /  AST  383<H>  /  ALT  446<H>  /  AlkPhos  309<H>  05-16    CARDIAC MARKERS ( 15 May 2023 19:18 )  x     / 0.01 ng/mL / x     / x     / x          < from: TTE Echo Complete w/ Contrast w/ Doppler (05.16.23 @ 14:05) >   1. Normal left ventricular size and systolic function.   2. Normal right ventricular size and systolic function.   3. Dilated left atrium.   4. Mild aortic regurgitation.   5. Moderate aortic stenosis. The peak transvalvular velocity is 2.80   m/s, the mean transvalvular gradient is 15.00 mmHg, and the LVOT/AV   velocity ratio is 0.53. The aortic valve area (estimated via the   continuity method) is 1.07 cm².   6. Bioprosthetic valve is seen in the mitral position. The mean   transvalvular gradient is 6.00 mmHg at a heart rate of 58 bpm. There is   trace mitral regurgitation.   7. Mild-to-moderate tricuspid regurgitation.   8. Pulmonary hypertension present, pulmonary artery systolic pressure is   83mmHg.   9. No pericardial effusion.          ECG a paced rhythm with native conduction

## 2023-05-16 NOTE — PROGRESS NOTE ADULT - SUBJECTIVE AND OBJECTIVE BOX
24 hr events:  5/16: Admitted with cholangitis. Vit k 5. Cefepime ordered. Tylenol lvl <5    SUBJECTIVE:  Coughing in room w/ bilious staining on gown. Denies n/v. Denies cp/sob. Still has mild belly pain. Endorses frequent flatus.    MEDICATIONS  (STANDING):  cefepime   IVPB 1000 milliGRAM(s) IV Intermittent every 8 hours  chlorhexidine 2% Cloths 1 Application(s) Topical <User Schedule>  dextrose 5%. 1000 milliLiter(s) (50 mL/Hr) IV Continuous <Continuous>  dextrose 5%. 1000 milliLiter(s) (100 mL/Hr) IV Continuous <Continuous>  dextrose 50% Injectable 12.5 Gram(s) IV Push once  dextrose 50% Injectable 25 Gram(s) IV Push once  dextrose 50% Injectable 25 Gram(s) IV Push once  glucagon  Injectable 1 milliGRAM(s) IntraMuscular once  insulin lispro (ADMELOG) corrective regimen sliding scale   SubCutaneous every 6 hours  lactated ringers. 1000 milliLiter(s) (80 mL/Hr) IV Continuous <Continuous>  levothyroxine Injectable 40 MICROGram(s) IV Push <User Schedule>  metroNIDAZOLE  IVPB 500 milliGRAM(s) IV Intermittent every 8 hours    MEDICATIONS  (PRN):  dextrose Oral Gel 15 Gram(s) Oral once PRN Blood Glucose LESS THAN 70 milliGRAM(s)/deciliter  ondansetron Injectable 4 milliGRAM(s) IV Push every 6 hours PRN Nausea and/or Vomiting      ICU Vital Signs Last 24 Hrs  T(C): 36.6 (16 May 2023 09:00), Max: 38.4 (15 May 2023 19:28)  T(F): 97.9 (16 May 2023 09:00), Max: 101.1 (15 May 2023 19:28)  HR: 56 (16 May 2023 09:00) (50 - 84)  BP: 139/64 (16 May 2023 09:00) (120/53 - 170/67)  BP(mean): 92 (16 May 2023 09:00) (73 - 102)  ABP: --  ABP(mean): --  RR: 18 (16 May 2023 08:00) (18 - 22)  SpO2: 97% (16 May 2023 09:00) (93% - 98%)    O2 Parameters below as of 16 May 2023 08:00  Patient On (Oxygen Delivery Method): nasal cannula  O2 Flow (L/min): 5          Physical Exam:  General: NAD  HEENT: NC/AT, EOMI, normal hearing, no oral lesions, neck supple w/o LAD  Pulmonary: Nonlabored breathing, no respiratory distress, CTA-B  Cardiovascular: NSR, no murmurs  Abdominal: soft, moderate RUQ TTP, ND  Extremities: WWP, 5/5 strength x 4, no clubbing/cyanosis/edema  Neuro: A/O x1, CNs II-XII grossly intact, normal motor/sensation, no focal deficits  Pulses: palpable distal pulses      I&O's Summary    15 May 2023 07:01  -  16 May 2023 07:00  --------------------------------------------------------  IN: 400 mL / OUT: 0 mL / NET: 400 mL    16 May 2023 07:01  -  16 May 2023 10:29  --------------------------------------------------------  IN: 80 mL / OUT: 0 mL / NET: 80 mL        LABS:                        10.3   13.48 )-----------( 139      ( 16 May 2023 05:30 )             31.8     05-16    136  |  102  |  13  ----------------------------<  118<H>  4.3   |  23  |  0.57    Ca    9.3      16 May 2023 07:25    TPro  6.4  /  Alb  3.3  /  TBili  1.9<H>  /  DBili  x   /  AST  383<H>  /  ALT  446<H>  /  AlkPhos  309<H>  05-16    PT/INR - ( 16 May 2023 05:30 )   PT: 24.6 sec;   INR: 2.05          PTT - ( 16 May 2023 05:30 )  PTT:30.8 sec  Urinalysis Basic - ( 15 May 2023 19:09 )    Color: Yellow / Appearance: Cloudy / SG: >=1.030 / pH: x  Gluc: x / Ketone: NEGATIVE  / Bili: Small / Urobili: 2.0 E.U./dL   Blood: x / Protein: 30 mg/dL / Nitrite: NEGATIVE   Leuk Esterase: Small / RBC: < 5 /HPF / WBC < 5 /HPF   Sq Epi: x / Non Sq Epi: x / Bacteria: Present /HPF      CAPILLARY BLOOD GLUCOSE      POCT Blood Glucose.: 104 mg/dL (16 May 2023 06:43)    LIVER FUNCTIONS - ( 16 May 2023 07:25 )  Alb: 3.3 g/dL / Pro: 6.4 g/dL / ALK PHOS: 309 U/L / ALT: 446 U/L / AST: 383 U/L / GGT: x             Cultures:Culture Results:   No growth at 12 hours (05-15 @ 19:17)  Culture Results:   No growth at 12 hours (05-15 @ 19:10)

## 2023-05-16 NOTE — H&P ADULT - HISTORY OF PRESENT ILLNESS
89yo Female pt with PMHx HTN, HLD, Hypothyroidism; PSHx mitral valve replacement (on warfarin), PPM; presented to [facility] with c/o of difficulty breathing. Patient AAOx1, history obtained from caregiver and son Harvey Oconnor. She was doing well, until yesterday afternoon when she felt cold, and was noted to be wheezing, and feeling weak and tired. After this, she coughed up white phlegm, and felt better. Per caregiver, she didn't c.o abdominal pain, diarrhea, vomit.     Last colonoscopy/EGD - unknown per family member and caregiver.  Denies family hx of IBS, Crohn's, UC, or colon cancer.    Surgery consulted for elevated LFTs.    In the ED, pt febrile one time to 101.1, most recent T 97.3, nontachycardic, normotensive, and satting 96-97% on 2LNC. Labs significant for wbc 11.14, h/h 13.9/42.5, plt 171, bmp unremarkable, Tbili 1.6, , AST//639, LA 2.4->2.8->2.3, . RUQ u/s with findings consistent with Gallbladder sludge; No definite cholelithiasis or evidence for acute cholecystitis; Mild intrahepatic biliary dilatation and borderline common bile duct dilatation, increased since 2019; Probable nonobstructive sludge ball within the common bile duct. CT A/P with findings consistent with Small bilateral pleural effusions; Airspace disease/consolidation both lung bases, right greater than left; Diffuse wall thickening of the stomach suggestive of a nonspecific gastritis; Moderate diffuse bladder wall thickening with surrounding edematous change suggestive of a nonspecific cystitis; Mild left hydronephrosis; No ureteral stones.        LABS:                        13.9   11.14 )-----------( 171      ( 15 May 2023 19:18 )             42.5     05-15    137  |  100  |  16  ----------------------------<  140<H>  4.9   |  26  |  0.63    Ca    9.8      15 May 2023 19:18    TPro  7.5  /  Alb  3.8  /  TBili  1.6<H>  /  DBili  x   /  AST  925<H>  /  ALT  639<H>  /  AlkPhos  379<H>  05-15    PT/INR - ( 15 May 2023 19:18 )   PT: 15.8 sec;   INR: 1.32          PTT - ( 15 May 2023 19:18 )  PTT:30.5 sec    Meds: Warfarin

## 2023-05-16 NOTE — CONSULT NOTE ADULT - ASSESSMENT
89yo Female pt with hx mitral valve replacement (on warfarin), PPM with acut hypoxic respiratory respiratory failure with elevated liver enzymes and sludge ball in CBD, concerning for acute cholangitis    Imaging reviewed and agree with concern for acute cholangitis.  ERCP to achieve biliary drainage is indicated, however concerned for pts abilitt to tolerate procedure i/s/o frailty, acute hypoxc resp failure as she will require intubation.    INR >2.05 today (increased from yesterday) on warfarin-last dose 5/15- though this may be confounded by some degree of synthetic dysfunction    Recommendations:  -Continue empiric abx  -Resusc per ICU  -Vit K reversal per cardiology/SICU  -Awaiting cardiology procedure risk assessment.    -If considered reasonably medically optimized for ERCP and procedural intervention c/w GOC, will discuss risks v benefits with pts HCP (son) and PCP per son's request  -Percutaneous drainage may be an alternative option pending risk benefit assessment    D/w with Dr. Diana Dutta,   Gastroenterology Fellow  Pager: [phone]  After 5PM or on weekends, please contact [place]  for fellow on call 89yo Female pt with hx mitral valve replacement (on warfarin), PPM with acut hypoxic respiratory respiratory failure with elevated liver enzymes and sludge ball in CBD, concerning for acute cholangitis    Imaging reviewed and agree with concern for acute cholangitis.  ERCP to achieve biliary drainage is indicated, however concerned for pts ability to tolerate procedure i/s/o frailty, acute hypoxc resp failure as she will require intubation.    INR >2.05 today (increased from yesterday) on warfarin-last dose 5/15- though this may be confounded by some degree of synthetic dysfunction    Recommendations:  -Continue empiric abx  -Resusc per ICU  -Vit K reversal per cardiology/SICU.    -Awaiting cardiology procedure risk assessment.    -If considered reasonably medically optimized for ERCP and procedural intervention c/w GOC, will discuss risks v benefits with pts HCP (son) and PCP per son's request  -Percutaneous drainage may be an alternative option pending risk benefit assessment    D/w with Dr. Diana Dutta,   Gastroenterology Fellow  Pager: [phone]  After 5PM or on weekends, please contact [place]  for fellow on call A-T Advancement Flap Text: The defect edges were debeveled with a #15 scalpel blade.  Given the location of the defect, shape of the defect and the proximity to free margins an A-T advancement flap was deemed most appropriate.  Using a sterile surgical marker, an appropriate advancement flap was drawn incorporating the defect and placing the expected incisions within the relaxed skin tension lines where possible.    The area thus outlined was incised deep to adipose tissue with a #15 scalpel blade.  The skin margins were undermined to an appropriate distance in all directions utilizing iris scissors.

## 2023-05-16 NOTE — CONSULT NOTE ADULT - ASSESSMENT
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 cardiology consulted for comanagement and preoperative cardiac risk assessment.     given her age and comorbidities she would be high risk for surgery. would opt for minimally invasive/conservative management unless surgery is emergent or life saving and would consult her family and primary care doctor (Dr. Johnson, prior to proceeding)    #tachy-mamie s/p PPM  #diastolic HF  #bioMVR  #Afib  #HTN    - would reevaluate the risk/benefit of coumadin, discussed this with her son and he would like to run her care by Dr. Johnson as he has been managing her for several decades.   - her metoprolol should be resumed, metoprolol succinate 50mg daily  - okay to withold diuretics (samaria, lasix) while she is NPO  - okay to hold coumadin  89yo F w h/o HTN, HLD, hypothyroidism, dementia (AAOx1), tachy-mamie syndrome s/p PPM, mitral valve replacement in 2016 (bioMVR) with appendage closure, Afib on coumadin (last INR was 2) , chronic dHF (EF 50-55%) presenting with signs and symptoms of cholangitis. cardiology consulted for comanagement and preoperative cardiac risk assessment.     given her age and comorbidities she would be high risk for surgery. would opt for minimally invasive/conservative management unless surgery is emergent or life saving and would consult her family and primary care doctor (Dr. Johnson, prior to proceeding)    #tachy-mamie s/p PPM  #diastolic HF  #bioMVR  #Afib  #HTN    - would reevaluate the risk/benefit of coumadin, discussed this with her son and he would like to run her care by Dr. Johnson as he has been managing her for several decades.   - her metoprolol should be resumed, metoprolol succinate 50mg daily  - okay to withold diuretics (samaria, lasix) while she is NPO  - okay to hold coumadin  89yo F w h/o HTN, HLD, hypothyroidism, dementia (AAOx1), tachy-mamie syndrome s/p PPM, mitral valve replacement in 2016 (bioMVR) with appendage closure, Afib on coumadin (last INR was 2) , chronic dHF (EF 50-55%) presenting with signs and symptoms of cholangitis. cardiology consulted for comanagement and preoperative cardiac risk assessment.     given her age and comorbidities she would be high risk for surgery. would opt for minimally invasive/conservative management unless surgery is emergent or life saving and would consult her family and primary care doctor (Dr. Johnson, prior to proceeding)    #tachy-mamie s/p PPM  #diastolic HF  #bioMVR  #Afib  #HTN  #moderate AS     - would reevaluate the risk/benefit of coumadin, discussed this with her son and he would like to run her care by Dr. Johnson as he has been managing her for several decades.   - her metoprolol should be resumed, metoprolol succinate 50mg daily  - okay to withold diuretics (samaria, lasix) while she is NPO  - okay to hold coumadin

## 2023-05-16 NOTE — H&P ADULT - NSHPLABSRESULTS_GEN_ALL_CORE
.  LABS:                         13.9   11.14 )-----------( 171      ( 15 May 2023 19:18 )             42.5     05-15    137  |  100  |  16  ----------------------------<  140<H>  4.9   |  26  |  0.63    Ca    9.8      15 May 2023 19:18    TPro  7.5  /  Alb  3.8  /  TBili  1.6<H>  /  DBili  x   /  AST  925<H>  /  ALT  639<H>  /  AlkPhos  379<H>  05-15    PT/INR - ( 15 May 2023 19:18 )   PT: 15.8 sec;   INR: 1.32          PTT - ( 15 May 2023 19:18 )  PTT:30.5 sec  Urinalysis Basic - ( 15 May 2023 19:09 )    Color: Yellow / Appearance: Cloudy / SG: >=1.030 / pH: x  Gluc: x / Ketone: NEGATIVE  / Bili: Small / Urobili: 2.0 E.U./dL   Blood: x / Protein: 30 mg/dL / Nitrite: NEGATIVE   Leuk Esterase: Small / RBC: < 5 /HPF / WBC < 5 /HPF   Sq Epi: x / Non Sq Epi: x / Bacteria: Present /HPF        Lactate, Blood: 2.3 mmol/L (05-16 @ 00:13)  Lactate, Blood: 2.8 mmol/L (05-15 @ 22:10)  Lactate, Blood: 2.4 mmol/L (05-15 @ 19:18)      RADIOLOGY, EKG & ADDITIONAL TESTS: Reviewed.

## 2023-05-17 LAB
ALBUMIN SERPL ELPH-MCNC: 3 G/DL — LOW (ref 3.3–5)
ALP SERPL-CCNC: 220 U/L — HIGH (ref 40–120)
ALT FLD-CCNC: 243 U/L — HIGH (ref 10–45)
ANION GAP SERPL CALC-SCNC: 9 MMOL/L — SIGNIFICANT CHANGE UP (ref 5–17)
APTT BLD: 32.4 SEC — SIGNIFICANT CHANGE UP (ref 27.5–35.5)
AST SERPL-CCNC: 160 U/L — HIGH (ref 10–40)
BASOPHILS # BLD AUTO: 0.04 K/UL — SIGNIFICANT CHANGE UP (ref 0–0.2)
BASOPHILS NFR BLD AUTO: 0.3 % — SIGNIFICANT CHANGE UP (ref 0–2)
BILIRUB SERPL-MCNC: 1.3 MG/DL — HIGH (ref 0.2–1.2)
BUN SERPL-MCNC: 12 MG/DL — SIGNIFICANT CHANGE UP (ref 7–23)
CALCIUM SERPL-MCNC: 8.6 MG/DL — SIGNIFICANT CHANGE UP (ref 8.4–10.5)
CHLORIDE SERPL-SCNC: 104 MMOL/L — SIGNIFICANT CHANGE UP (ref 96–108)
CO2 SERPL-SCNC: 24 MMOL/L — SIGNIFICANT CHANGE UP (ref 22–31)
CREAT SERPL-MCNC: 0.67 MG/DL — SIGNIFICANT CHANGE UP (ref 0.5–1.3)
EGFR: 81 ML/MIN/1.73M2 — SIGNIFICANT CHANGE UP
EOSINOPHIL # BLD AUTO: 0.12 K/UL — SIGNIFICANT CHANGE UP (ref 0–0.5)
EOSINOPHIL NFR BLD AUTO: 1 % — SIGNIFICANT CHANGE UP (ref 0–6)
GLUCOSE BLDC GLUCOMTR-MCNC: 105 MG/DL — HIGH (ref 70–99)
GLUCOSE BLDC GLUCOMTR-MCNC: 96 MG/DL — SIGNIFICANT CHANGE UP (ref 70–99)
GLUCOSE SERPL-MCNC: 100 MG/DL — HIGH (ref 70–99)
HCT VFR BLD CALC: 32.9 % — LOW (ref 34.5–45)
HGB BLD-MCNC: 10.9 G/DL — LOW (ref 11.5–15.5)
IMM GRANULOCYTES NFR BLD AUTO: 0.5 % — SIGNIFICANT CHANGE UP (ref 0–0.9)
INR BLD: 1.25 — HIGH (ref 0.88–1.16)
LEGIONELLA AG UR QL: NEGATIVE — SIGNIFICANT CHANGE UP
LYMPHOCYTES # BLD AUTO: 1.15 K/UL — SIGNIFICANT CHANGE UP (ref 1–3.3)
LYMPHOCYTES # BLD AUTO: 9.3 % — LOW (ref 13–44)
MAGNESIUM SERPL-MCNC: 1.7 MG/DL — SIGNIFICANT CHANGE UP (ref 1.6–2.6)
MCHC RBC-ENTMCNC: 31.8 PG — SIGNIFICANT CHANGE UP (ref 27–34)
MCHC RBC-ENTMCNC: 33.1 GM/DL — SIGNIFICANT CHANGE UP (ref 32–36)
MCV RBC AUTO: 95.9 FL — SIGNIFICANT CHANGE UP (ref 80–100)
MONOCYTES # BLD AUTO: 1 K/UL — HIGH (ref 0–0.9)
MONOCYTES NFR BLD AUTO: 8.1 % — SIGNIFICANT CHANGE UP (ref 2–14)
NEUTROPHILS # BLD AUTO: 9.98 K/UL — HIGH (ref 1.8–7.4)
NEUTROPHILS NFR BLD AUTO: 80.8 % — HIGH (ref 43–77)
NRBC # BLD: 0 /100 WBCS — SIGNIFICANT CHANGE UP (ref 0–0)
PHOSPHATE SERPL-MCNC: 2.7 MG/DL — SIGNIFICANT CHANGE UP (ref 2.5–4.5)
PLATELET # BLD AUTO: 147 K/UL — LOW (ref 150–400)
POTASSIUM SERPL-MCNC: 3.8 MMOL/L — SIGNIFICANT CHANGE UP (ref 3.5–5.3)
POTASSIUM SERPL-SCNC: 3.8 MMOL/L — SIGNIFICANT CHANGE UP (ref 3.5–5.3)
PROT SERPL-MCNC: 5.9 G/DL — LOW (ref 6–8.3)
PROTHROM AB SERPL-ACNC: 14.9 SEC — HIGH (ref 10.5–13.4)
RBC # BLD: 3.43 M/UL — LOW (ref 3.8–5.2)
RBC # FLD: 13.6 % — SIGNIFICANT CHANGE UP (ref 10.3–14.5)
SODIUM SERPL-SCNC: 137 MMOL/L — SIGNIFICANT CHANGE UP (ref 135–145)
WBC # BLD: 12.35 K/UL — HIGH (ref 3.8–10.5)
WBC # FLD AUTO: 12.35 K/UL — HIGH (ref 3.8–10.5)

## 2023-05-17 PROCEDURE — 99233 SBSQ HOSP IP/OBS HIGH 50: CPT

## 2023-05-17 PROCEDURE — 99231 SBSQ HOSP IP/OBS SF/LOW 25: CPT

## 2023-05-17 PROCEDURE — 71045 X-RAY EXAM CHEST 1 VIEW: CPT | Mod: 26

## 2023-05-17 PROCEDURE — 99232 SBSQ HOSP IP/OBS MODERATE 35: CPT

## 2023-05-17 PROCEDURE — 99233 SBSQ HOSP IP/OBS HIGH 50: CPT | Mod: GC

## 2023-05-17 RX ORDER — HEPARIN SODIUM 5000 [USP'U]/ML
5000 INJECTION INTRAVENOUS; SUBCUTANEOUS EVERY 12 HOURS
Refills: 0 | Status: DISCONTINUED | OUTPATIENT
Start: 2023-05-17 | End: 2023-05-18

## 2023-05-17 RX ORDER — URSODIOL 250 MG/1
250 TABLET, FILM COATED ORAL EVERY 12 HOURS
Refills: 0 | Status: DISCONTINUED | OUTPATIENT
Start: 2023-05-17 | End: 2023-05-22

## 2023-05-17 RX ORDER — HEPARIN SODIUM 5000 [USP'U]/ML
5000 INJECTION INTRAVENOUS; SUBCUTANEOUS EVERY 8 HOURS
Refills: 0 | Status: DISCONTINUED | OUTPATIENT
Start: 2023-05-17 | End: 2023-05-17

## 2023-05-17 RX ORDER — METOPROLOL TARTRATE 50 MG
50 TABLET ORAL DAILY
Refills: 0 | Status: DISCONTINUED | OUTPATIENT
Start: 2023-05-17 | End: 2023-05-17

## 2023-05-17 RX ORDER — POTASSIUM CHLORIDE 20 MEQ
10 PACKET (EA) ORAL ONCE
Refills: 0 | Status: COMPLETED | OUTPATIENT
Start: 2023-05-17 | End: 2023-05-17

## 2023-05-17 RX ORDER — MAGNESIUM SULFATE 500 MG/ML
2 VIAL (ML) INJECTION ONCE
Refills: 0 | Status: COMPLETED | OUTPATIENT
Start: 2023-05-17 | End: 2023-05-17

## 2023-05-17 RX ORDER — METOPROLOL TARTRATE 50 MG
50 TABLET ORAL ONCE
Refills: 0 | Status: COMPLETED | OUTPATIENT
Start: 2023-05-17 | End: 2023-05-17

## 2023-05-17 RX ORDER — SERTRALINE 25 MG/1
50 TABLET, FILM COATED ORAL DAILY
Refills: 0 | Status: DISCONTINUED | OUTPATIENT
Start: 2023-05-17 | End: 2023-05-22

## 2023-05-17 RX ADMIN — Medication 100 MILLIEQUIVALENT(S): at 11:25

## 2023-05-17 RX ADMIN — HEPARIN SODIUM 5000 UNIT(S): 5000 INJECTION INTRAVENOUS; SUBCUTANEOUS at 17:57

## 2023-05-17 RX ADMIN — PIPERACILLIN AND TAZOBACTAM 25 GRAM(S): 4; .5 INJECTION, POWDER, LYOPHILIZED, FOR SOLUTION INTRAVENOUS at 14:36

## 2023-05-17 RX ADMIN — Medication 40 MICROGRAM(S): at 07:06

## 2023-05-17 RX ADMIN — URSODIOL 250 MILLIGRAM(S): 250 TABLET, FILM COATED ORAL at 17:57

## 2023-05-17 RX ADMIN — Medication 25 GRAM(S): at 08:15

## 2023-05-17 RX ADMIN — PIPERACILLIN AND TAZOBACTAM 25 GRAM(S): 4; .5 INJECTION, POWDER, LYOPHILIZED, FOR SOLUTION INTRAVENOUS at 02:08

## 2023-05-17 RX ADMIN — PIPERACILLIN AND TAZOBACTAM 25 GRAM(S): 4; .5 INJECTION, POWDER, LYOPHILIZED, FOR SOLUTION INTRAVENOUS at 21:09

## 2023-05-17 RX ADMIN — PIPERACILLIN AND TAZOBACTAM 25 GRAM(S): 4; .5 INJECTION, POWDER, LYOPHILIZED, FOR SOLUTION INTRAVENOUS at 07:07

## 2023-05-17 RX ADMIN — Medication 50 MILLIGRAM(S): at 07:06

## 2023-05-17 NOTE — PROGRESS NOTE ADULT - ASSESSMENT
89yo Female pt with PMHx HTN, HLD, Hypothyroidism; PSHx mitral valve replacement (on warfarin), PPM; presented to [facility] with c/o of sob and abdominal pain. In ED, pt febrile to 101.1 with 11.14 WBC. CXR 5/15 showing R sided pleural effusion, mild bi-basilar atelectasis. CT A+P on 5/15 showing small b/l pleural effusion, airspace disease, consolidation at both lung bases, and stomach wall thickening, & no evidence of biliary obstruction. At time of consult, pt afebrile and WBC 13.48. Pt originally given vanc + aztreonam but developed rash. Currently on cefepime/Flagyl. Pt likely with CA- aspiration pneumonia     Recommendations  - C/w Zosyn 3.375 IV q8h   - F/u blood cultures  - F/u GI/surgery reccs     Team 1 will follow.    89yo Female pt with PMHx HTN, HLD, Hypothyroidism; PSHx mitral valve replacement (on warfarin), PPM; presented to [facility] with c/o of sob and abdominal pain. In ED, pt febrile to 101.1 with 11.14 WBC. CXR 5/15 showing R sided pleural effusion, mild bi-basilar atelectasis. CT A+P on 5/15 showing small b/l pleural effusion, airspace disease, consolidation at both lung bases, and stomach wall thickening, & no evidence of biliary obstruction. At time of consult, pt afebrile and WBC 13.48. Pt originally given vanc + aztreonam but developed rash. Currently on cefepime/Flagyl. Pt likely with CA- aspiration pneumonia    Recommendations  - C/w Zosyn 3.375 IV q8h for a 5 day course (5/16-5/20)  - If patient discharged, may transition to Augmentin po 875 mg through 5/20.     Team 1 will sign off. Please re-consult with questions.    89yo Female pt with PMHx HTN, HLD, Hypothyroidism; PSHx mitral valve replacement (on warfarin), PPM; presented to [facility] with c/o of sob and abdominal pain. In ED, pt febrile to 101.1 with 11.14 WBC. CXR 5/15 showing R sided pleural effusion, mild bi-basilar atelectasis. CT A+P on 5/15 showing small b/l pleural effusion, airspace disease, consolidation at both lung bases, and stomach wall thickening, & no evidence of biliary obstruction. At time of consult, pt afebrile and WBC 13.48. Pt originally given vanc + aztreonam but developed rash. Currently on cefepime/Flagyl. Pt likely with CA- aspiration pneumonia    Recommendations  - C/w Zosyn 3.375 IV q8h for a 5 day course (5/16-5/20)  - If patient discharged, may transition to Augmentin po 875/125 mg q12h through 5/20.     Team 1 will sign off. Please re-consult with questions.    89yo Female pt with PMHx HTN, HLD, Hypothyroidism; PSHx mitral valve replacement (on warfarin), PPM; presented to [facility] with c/o of sob and abdominal pain. In ED, pt febrile to 101.1 with 11.14 WBC. CXR 5/15 showing R sided pleural effusion, mild bi-basilar atelectasis. CT A+P on 5/15 showing small b/l pleural effusion, airspace disease, consolidation at both lung bases, and stomach wall thickening, & no evidence of biliary obstruction. At time of consult, pt afebrile and WBC 13.48. Pt originally given vanc + aztreonam but developed rash. Pt likely with CA- aspiration pneumonia    Recommendations  - C/w Zosyn 3.375 IV q8h for a 5 day course (5/16-5/20)  - If patient discharged, may transition to Augmentin po 875/125 mg q12h through 5/20.     Team 1 will sign off. Please re-consult with questions.

## 2023-05-17 NOTE — PROGRESS NOTE ADULT - SUBJECTIVE AND OBJECTIVE BOX
STATUS POST:      POST OPERATIVE DAY #:     SUBJECTIVE: Pt seen and examined at bedside this am by surgery team. Tolerating diet, pain well controlled. Denies f/n/v/cp/sob.    MEDICATIONS  (STANDING):  chlorhexidine 2% Cloths 1 Application(s) Topical <User Schedule>  dextrose 5%. 1000 milliLiter(s) (50 mL/Hr) IV Continuous <Continuous>  dextrose 5%. 1000 milliLiter(s) (100 mL/Hr) IV Continuous <Continuous>  dextrose 50% Injectable 25 Gram(s) IV Push once  dextrose 50% Injectable 25 Gram(s) IV Push once  dextrose 50% Injectable 12.5 Gram(s) IV Push once  glucagon  Injectable 1 milliGRAM(s) IntraMuscular once  heparin   Injectable 5000 Unit(s) SubCutaneous every 12 hours  insulin lispro (ADMELOG) corrective regimen sliding scale   SubCutaneous every 6 hours  levothyroxine Injectable 40 MICROGram(s) IV Push <User Schedule>  piperacillin/tazobactam IVPB.. 3.375 Gram(s) IV Intermittent every 8 hours  ursodiol Tablet 250 milliGRAM(s) Oral every 12 hours    MEDICATIONS  (PRN):  dextrose Oral Gel 15 Gram(s) Oral once PRN Blood Glucose LESS THAN 70 milliGRAM(s)/deciliter  ondansetron Injectable 4 milliGRAM(s) IV Push every 6 hours PRN Nausea and/or Vomiting      Vital Signs Last 24 Hrs  T(C): 36.9 (17 May 2023 09:00), Max: 37 (17 May 2023 05:32)  T(F): 98.4 (17 May 2023 09:00), Max: 98.6 (17 May 2023 05:32)  HR: 61 (17 May 2023 15:00) (51 - 70)  BP: 138/61 (17 May 2023 15:00) (108/51 - 162/70)  BP(mean): 88 (17 May 2023 15:00) (72 - 107)  RR: 16 (17 May 2023 15:00) (16 - 20)  SpO2: 95% (17 May 2023 15:00) (92% - 99%)    Parameters below as of 17 May 2023 15:00  Patient On (Oxygen Delivery Method): nasal cannula  O2 Flow (L/min): 2      Physical Exam  General: NAD, resting comfortably in bed  Pulmonary: Nonlabored breathing, no respiratory distress  CV: NSR  Abd: soft, NT/ND, no guarding, incisions c/d/i  Extremities: (-) enema, warm, well-perfused      I&O's Detail    16 May 2023 07:01  -  17 May 2023 07:00  --------------------------------------------------------  IN:    IV PiggyBack: 100 mL    IV PiggyBack: 250 mL    Lactated Ringers: 1320 mL    Oral Fluid: 60 mL  Total IN: 1730 mL    OUT:    Voided (mL): 100 mL  Total OUT: 100 mL    Total NET: 1630 mL      17 May 2023 07:01  -  17 May 2023 15:13  --------------------------------------------------------  IN:    IV PiggyBack: 150 mL    IV PiggyBack: 75 mL    Lactated Ringers: 300 mL    Oral Fluid: 200 mL  Total IN: 725 mL    OUT:    Voided (mL): 150 mL  Total OUT: 150 mL    Total NET: 575 mL          LABS:                        10.9   12.35 )-----------( 147      ( 17 May 2023 05:30 )             32.9     05-17    137  |  104  |  12  ----------------------------<  100<H>  3.8   |  24  |  0.67    Ca    8.6      17 May 2023 05:30  Phos  2.7     05-17  Mg     1.7     05-17    TPro  5.9<L>  /  Alb  3.0<L>  /  TBili  1.3<H>  /  DBili  x   /  AST  160<H>  /  ALT  243<H>  /  AlkPhos  220<H>  05-17    PT/INR - ( 17 May 2023 05:30 )   PT: 14.9 sec;   INR: 1.25          PTT - ( 17 May 2023 05:30 )  PTT:32.4 sec  Urinalysis Basic - ( 15 May 2023 19:09 )    Color: Yellow / Appearance: Cloudy / SG: >=1.030 / pH: x  Gluc: x / Ketone: NEGATIVE  / Bili: Small / Urobili: 2.0 E.U./dL   Blood: x / Protein: 30 mg/dL / Nitrite: NEGATIVE   Leuk Esterase: Small / RBC: < 5 /HPF / WBC < 5 /HPF   Sq Epi: x / Non Sq Epi: x / Bacteria: Present /HPF        RADIOLOGY & ADDITIONAL STUDIES:   SUBJECTIVE: Pt seen and examined at bedside this am by surgery team. Denies pain. Denies f/n/v/cp/sob.    MEDICATIONS  (STANDING):  chlorhexidine 2% Cloths 1 Application(s) Topical <User Schedule>  dextrose 5%. 1000 milliLiter(s) (50 mL/Hr) IV Continuous <Continuous>  dextrose 5%. 1000 milliLiter(s) (100 mL/Hr) IV Continuous <Continuous>  dextrose 50% Injectable 25 Gram(s) IV Push once  dextrose 50% Injectable 25 Gram(s) IV Push once  dextrose 50% Injectable 12.5 Gram(s) IV Push once  glucagon  Injectable 1 milliGRAM(s) IntraMuscular once  heparin   Injectable 5000 Unit(s) SubCutaneous every 12 hours  insulin lispro (ADMELOG) corrective regimen sliding scale   SubCutaneous every 6 hours  levothyroxine Injectable 40 MICROGram(s) IV Push <User Schedule>  piperacillin/tazobactam IVPB.. 3.375 Gram(s) IV Intermittent every 8 hours  ursodiol Tablet 250 milliGRAM(s) Oral every 12 hours    MEDICATIONS  (PRN):  dextrose Oral Gel 15 Gram(s) Oral once PRN Blood Glucose LESS THAN 70 milliGRAM(s)/deciliter  ondansetron Injectable 4 milliGRAM(s) IV Push every 6 hours PRN Nausea and/or Vomiting      Vital Signs Last 24 Hrs  T(C): 36.9 (17 May 2023 09:00), Max: 37 (17 May 2023 05:32)  T(F): 98.4 (17 May 2023 09:00), Max: 98.6 (17 May 2023 05:32)  HR: 61 (17 May 2023 15:00) (51 - 70)  BP: 138/61 (17 May 2023 15:00) (108/51 - 162/70)  BP(mean): 88 (17 May 2023 15:00) (72 - 107)  RR: 16 (17 May 2023 15:00) (16 - 20)  SpO2: 95% (17 May 2023 15:00) (92% - 99%)    Parameters below as of 17 May 2023 15:00  Patient On (Oxygen Delivery Method): nasal cannula  O2 Flow (L/min): 2      Physical Exam  General: NAD, resting comfortably in bed  Pulmonary: Nonlabored breathing, no respiratory distress  CV: NSR  Abd: soft, NT/ND, no guarding, incisions c/d/i  Extremities: (-) edema, warm, well-perfused      I&O's Detail    16 May 2023 07:01  -  17 May 2023 07:00  --------------------------------------------------------  IN:    IV PiggyBack: 100 mL    IV PiggyBack: 250 mL    Lactated Ringers: 1320 mL    Oral Fluid: 60 mL  Total IN: 1730 mL    OUT:    Voided (mL): 100 mL  Total OUT: 100 mL    Total NET: 1630 mL      17 May 2023 07:01  -  17 May 2023 15:13  --------------------------------------------------------  IN:    IV PiggyBack: 150 mL    IV PiggyBack: 75 mL    Lactated Ringers: 300 mL    Oral Fluid: 200 mL  Total IN: 725 mL    OUT:    Voided (mL): 150 mL  Total OUT: 150 mL    Total NET: 575 mL          LABS:                        10.9   12.35 )-----------( 147      ( 17 May 2023 05:30 )             32.9     05-17    137  |  104  |  12  ----------------------------<  100<H>  3.8   |  24  |  0.67    Ca    8.6      17 May 2023 05:30  Phos  2.7     05-17  Mg     1.7     05-17    TPro  5.9<L>  /  Alb  3.0<L>  /  TBili  1.3<H>  /  DBili  x   /  AST  160<H>  /  ALT  243<H>  /  AlkPhos  220<H>  05-17    PT/INR - ( 17 May 2023 05:30 )   PT: 14.9 sec;   INR: 1.25          PTT - ( 17 May 2023 05:30 )  PTT:32.4 sec  Urinalysis Basic - ( 15 May 2023 19:09 )    Color: Yellow / Appearance: Cloudy / SG: >=1.030 / pH: x  Gluc: x / Ketone: NEGATIVE  / Bili: Small / Urobili: 2.0 E.U./dL   Blood: x / Protein: 30 mg/dL / Nitrite: NEGATIVE   Leuk Esterase: Small / RBC: < 5 /HPF / WBC < 5 /HPF   Sq Epi: x / Non Sq Epi: x / Bacteria: Present /HPF        RADIOLOGY & ADDITIONAL STUDIES:

## 2023-05-17 NOTE — PROGRESS NOTE ADULT - ASSESSMENT
89yo Female pt with PMHx HTN, HLD, Hypothyroidism; PSHx mitral valve replacement (on warfarin), PPM; presented to [facility] with c/o of difficulty breathing. Elevated LFTs, and questionable sludge ball in CBD, concerns for cholangitis. INR 2.05 - on coumadin for mitral valve repair. Will not pursue any operative or instrumental intervention at this time given the high risk of anesthesia    Plan:  - Ursodiol  - Rest of care per SICU

## 2023-05-17 NOTE — PROGRESS NOTE ADULT - ASSESSMENT
89yo Female pt with hx mitral valve replacement (on warfarin), PPM with acut hypoxic respiratory respiratory failure with elevated liver enzymes and sludge ball in CBD, concerning for acute cholangitis    Reassuring downtrend in liver enzymes. Pt otherwise comfortable, in NAD and afebrile.    May benefit from biliary decompression (ie. ERCP) however in light of multimorbid condition, cardiology risk assessment, acute hypox resp failure with elevated PASP on TTE, high concern for further decompensation following ERCP and necessary sedatio.  To this end, we favor medical management with Abx, and possible percutaneous decompression    Recommendations:  -Continue empiric abx  -Resusc per ICU  -Consier perc decompression  -No plan for ERCP at this time as above    D/w with Dr. Diana Dutta DO  Gastroenterology Fellow  Pager: [phone]  After 5PM or on weekends, please contact [place]  for fellow on call 89yo Female pt with hx mitral valve replacement (on warfarin), PPM with acut hypoxic respiratory respiratory failure with elevated liver enzymes and sludge ball in CBD, concerning for acute cholangitis    Reassuring downtrend in liver enzymes. Pt otherwise comfortable, in NAD and afebrile.    May benefit from biliary decompression (ie. ERCP) however in light of multimorbid condition, cardiology risk assessment, acute hypox resp failure with elevated PASP on TTE, high concern for further decompensation following ERCP and necessary sedatio.  To this end, we favor medical management with Abx, and possible percutaneous decompression    Recommendations:  -Continue empiric abx  -Resusc per ICU  -Consider perc decompression  -No plan for ERCP at this time as above    D/w with Dr. Diana Dutta DO  Gastroenterology Fellow  Pager: [phone]  After 5PM or on weekends, please contact [place]  for fellow on call

## 2023-05-17 NOTE — PROGRESS NOTE ADULT - ASSESSMENT
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 Elevated LFTs, and questionable sludge ball in CBD, concerns for cholangitis. CT findings c/w pneumonia likely 2/2 aspiration and possible cystitis as well. Admitted to SICU for HD monitoring.    Neuro: AxOx1 baseline. NTD  HEENT: NTD  CV: MAP > 65; TTE pending. Hx of pAF, PPM, and MVR--hold Warfarin. Cards c/s no need to bridge.   Pulm: Wean NC as tolerated, home NC PRN  GI/FEN: CLD, IVF 60, Ursodiol, GI consult  : Voids (primafit)  Endo: ISS  Heme: holding warfarin, vit k 5 given (05/16)  ID: ID recs - Zosyn (5/16 - )// d/c Cefepime (5/16-5/16), Flagyl (5/16-5/16); In ED Vancomycin, Aztreonam, Levofloxacin, Flagyl (5/16),   PPX: SQH, SCDs  L/D/T: PIVs  PT/OT: Not ordered  Dispo: SICU

## 2023-05-17 NOTE — PROGRESS NOTE ADULT - SUBJECTIVE AND OBJECTIVE BOX
****incomplete***** INFECTIOUS DISEASES CONSULT FOLLOW-UP NOTE    INTERVAL HPI/OVERNIGHT EVENTS: Pt afebrile overnight. Denies any sob or abdominal pain. Complaining of Right leg pain.      ROS:   Constitutional, eyes, ENT, cardiovascular, respiratory, gastrointestinal, genitourinary, integumentary, neurological, psychiatric and heme/lymph are otherwise negative other than noted above       ANTIBIOTICS/RELEVANT: Zosyn    MEDICATIONS  (STANDING):  chlorhexidine 2% Cloths 1 Application(s) Topical <User Schedule>  dextrose 5%. 1000 milliLiter(s) (50 mL/Hr) IV Continuous <Continuous>  dextrose 5%. 1000 milliLiter(s) (100 mL/Hr) IV Continuous <Continuous>  dextrose 50% Injectable 12.5 Gram(s) IV Push once  dextrose 50% Injectable 25 Gram(s) IV Push once  dextrose 50% Injectable 25 Gram(s) IV Push once  glucagon  Injectable 1 milliGRAM(s) IntraMuscular once  heparin   Injectable 5000 Unit(s) SubCutaneous every 12 hours  insulin lispro (ADMELOG) corrective regimen sliding scale   SubCutaneous every 6 hours  lactated ringers. 1000 milliLiter(s) (60 mL/Hr) IV Continuous <Continuous>  levothyroxine Injectable 40 MICROGram(s) IV Push <User Schedule>  piperacillin/tazobactam IVPB.. 3.375 Gram(s) IV Intermittent every 8 hours  ursodiol Tablet 250 milliGRAM(s) Oral every 12 hours    MEDICATIONS  (PRN):  dextrose Oral Gel 15 Gram(s) Oral once PRN Blood Glucose LESS THAN 70 milliGRAM(s)/deciliter  ondansetron Injectable 4 milliGRAM(s) IV Push every 6 hours PRN Nausea and/or Vomiting        Vital Signs Last 24 Hrs  T(C): 36.9 (17 May 2023 09:00), Max: 37 (17 May 2023 05:32)  T(F): 98.4 (17 May 2023 09:00), Max: 98.6 (17 May 2023 05:32)  HR: 63 (17 May 2023 11:00) (51 - 70)  BP: 149/65 (17 May 2023 11:00) (108/51 - 155/64)  BP(mean): 94 (17 May 2023 11:00) (72 - 107)  RR: 18 (17 May 2023 11:00) (16 - 20)  SpO2: 97% (17 May 2023 11:00) (92% - 99%)    Parameters below as of 17 May 2023 11:00  Patient On (Oxygen Delivery Method): nasal cannula  O2 Flow (L/min): 2      05-16-23 @ 07:01  -  05-17-23 @ 07:00  --------------------------------------------------------  IN: 1730 mL / OUT: 100 mL / NET: 1630 mL    05-17-23 @ 07:01  -  05-17-23 @ 12:28  --------------------------------------------------------  IN: 615 mL / OUT: 0 mL / NET: 615 mL      PHYSICAL EXAM:  Constitutional: alert, NAD  Eyes: the sclera and conjunctiva were normal.   ENT: the ears and nose were normal in appearance.   Neck: the appearance of the neck was normal and the neck was supple.   Pulmonary: no respiratory distress and lungs were clear to auscultation bilaterally.   Heart: heart rate was normal and rhythm regular, normal S1 and S2  Vascular:. there was no peripheral edema  Abdomen: normal bowel sounds, soft, non-tender  Neurological: no focal deficits.   Psychiatric: the affect was normal        LABS:                        10.9   12.35 )-----------( 147      ( 17 May 2023 05:30 )             32.9     05-17    137  |  104  |  12  ----------------------------<  100<H>  3.8   |  24  |  0.67    Ca    8.6      17 May 2023 05:30  Phos  2.7     05-17  Mg     1.7     05-17    TPro  5.9<L>  /  Alb  3.0<L>  /  TBili  1.3<H>  /  DBili  x   /  AST  160<H>  /  ALT  243<H>  /  AlkPhos  220<H>  05-17    PT/INR - ( 17 May 2023 05:30 )   PT: 14.9 sec;   INR: 1.25          PTT - ( 17 May 2023 05:30 )  PTT:32.4 sec  Urinalysis Basic - ( 15 May 2023 19:09 )    Color: Yellow / Appearance: Cloudy / SG: >=1.030 / pH: x  Gluc: x / Ketone: NEGATIVE  / Bili: Small / Urobili: 2.0 E.U./dL   Blood: x / Protein: 30 mg/dL / Nitrite: NEGATIVE   Leuk Esterase: Small / RBC: < 5 /HPF / WBC < 5 /HPF   Sq Epi: x / Non Sq Epi: x / Bacteria: Present /HPF        MICROBIOLOGY:  Legionella pneumophila Antigen, Urine (05.17.23 @ 03:08)    Legionella Antigen, Urine: Negative: Please submit a first morning sputum specimen for Legionella culture.  Positive Testing method: Immunochromatographic Assay.  Presumptive detection of L. pneumophila serogroup 1 antigen in urine,  suggesting recent or current infection. Order "Culture -Legionella" as  recommended to confirm infection.  Negative Testing method: Immunochromatographic Assay.  L. pneumophila serogroup 1 antigen in urine NOT detected, suggesting NO  recent or current infection. Infection due to Legionella cannot be ruled  out: other serogroups and species may cause disease, antigen may not be  present in urine in early infection, or the level of antigens in urine  may be below the detection limit of the test.  Order "Culture  -Legionella" is recommended for uncommon cases of suspected Legionella  pneumonia due to organisms other than L. pneumophila serogroup 1.    Culture - Sputum . (05.16.23 @ 07:51)    Gram Stain:   Rare epithelial cells  Rare to few White blood cells  Rare Gram Variable Rods  Rare Gram positive cocci in pairs   Specimen Source: .Sputum Sputum   Culture Results:   Normal Respiratory Cari present to date        RADIOLOGY & ADDITIONAL STUDIES:  Reviewed

## 2023-05-17 NOTE — DIETITIAN INITIAL EVALUATION ADULT - ADD RECOMMEND
1. Continue current diet order per team - Clear Liquid   >> Advance diet when medically feasible, PO consistency per SLP  2. Monitor PO intake  >> Consistently meet >75% of estimated needs during admission   >> Consider ONS if intake falls <50%   3. Monitor chemistry, wt trends, GI function, and skin integrity   4. Honor food preferences as able  5. RD to remain available for additional nutrition interventions and diet edu as needed   **High Nutrition Risk

## 2023-05-17 NOTE — PROGRESS NOTE ADULT - SUBJECTIVE AND OBJECTIVE BOX
24 hr events:  ON: Urine Legionella neg. FRANCISCO   5/16: GI consulted. Cards consulted. ID - Zosyn. TTE EF 65-70%, PASP 83.    SUBJECTIVE:  Doing well. Denies n/v. Denies cp/sob. Feeling improved today.    MEDICATIONS  (STANDING):  chlorhexidine 2% Cloths 1 Application(s) Topical <User Schedule>  dextrose 5%. 1000 milliLiter(s) (50 mL/Hr) IV Continuous <Continuous>  dextrose 5%. 1000 milliLiter(s) (100 mL/Hr) IV Continuous <Continuous>  dextrose 50% Injectable 12.5 Gram(s) IV Push once  dextrose 50% Injectable 25 Gram(s) IV Push once  dextrose 50% Injectable 25 Gram(s) IV Push once  glucagon  Injectable 1 milliGRAM(s) IntraMuscular once  heparin   Injectable 5000 Unit(s) SubCutaneous every 12 hours  insulin lispro (ADMELOG) corrective regimen sliding scale   SubCutaneous every 6 hours  lactated ringers. 1000 milliLiter(s) (60 mL/Hr) IV Continuous <Continuous>  levothyroxine Injectable 40 MICROGram(s) IV Push <User Schedule>  piperacillin/tazobactam IVPB.. 3.375 Gram(s) IV Intermittent every 8 hours  ursodiol Tablet 250 milliGRAM(s) Oral every 12 hours    MEDICATIONS  (PRN):  dextrose Oral Gel 15 Gram(s) Oral once PRN Blood Glucose LESS THAN 70 milliGRAM(s)/deciliter  ondansetron Injectable 4 milliGRAM(s) IV Push every 6 hours PRN Nausea and/or Vomiting      ICU Vital Signs Last 24 Hrs  T(C): 36.9 (17 May 2023 09:00), Max: 37 (17 May 2023 05:32)  T(F): 98.4 (17 May 2023 09:00), Max: 98.6 (17 May 2023 05:32)  HR: 63 (17 May 2023 11:00) (51 - 70)  BP: 149/65 (17 May 2023 11:00) (108/51 - 155/64)  BP(mean): 94 (17 May 2023 11:00) (72 - 107)  RR: 18 (17 May 2023 11:00) (16 - 20)  SpO2: 97% (17 May 2023 11:00) (92% - 99%)    O2 Parameters below as of 17 May 2023 11:00  Patient On (Oxygen Delivery Method): nasal cannula  O2 Flow (L/min): 2          Physical Exam:  General: NAD  HEENT: NC/AT, EOMI, normal hearing, no oral lesions, neck supple w/o LAD  Pulmonary: Nonlabored breathing, no respiratory distress, CTA-B  Cardiovascular: NSR, no murmurs  Abdominal: soft, mild RUQ TTP, ND  Extremities: WWP, 5/5 strength x 4, no clubbing/cyanosis/edema  Neuro: A/O x1, CNs II-XII grossly intact, no focal deficits  Pulses: palpable distal pulses      I&O's Summary    16 May 2023 07:01  -  17 May 2023 07:00  --------------------------------------------------------  IN: 1730 mL / OUT: 100 mL / NET: 1630 mL    17 May 2023 07:01  -  17 May 2023 11:41  --------------------------------------------------------  IN: 615 mL / OUT: 0 mL / NET: 615 mL        LABS:                        10.9   12.35 )-----------( 147      ( 17 May 2023 05:30 )             32.9     05-17    137  |  104  |  12  ----------------------------<  100<H>  3.8   |  24  |  0.67    Ca    8.6      17 May 2023 05:30  Phos  2.7     05-17  Mg     1.7     05-17    TPro  5.9<L>  /  Alb  3.0<L>  /  TBili  1.3<H>  /  DBili  x   /  AST  160<H>  /  ALT  243<H>  /  AlkPhos  220<H>  05-17    PT/INR - ( 17 May 2023 05:30 )   PT: 14.9 sec;   INR: 1.25          PTT - ( 17 May 2023 05:30 )  PTT:32.4 sec  Urinalysis Basic - ( 15 May 2023 19:09 )    Color: Yellow / Appearance: Cloudy / SG: >=1.030 / pH: x  Gluc: x / Ketone: NEGATIVE  / Bili: Small / Urobili: 2.0 E.U./dL   Blood: x / Protein: 30 mg/dL / Nitrite: NEGATIVE   Leuk Esterase: Small / RBC: < 5 /HPF / WBC < 5 /HPF   Sq Epi: x / Non Sq Epi: x / Bacteria: Present /HPF      CAPILLARY BLOOD GLUCOSE      POCT Blood Glucose.: 105 mg/dL (17 May 2023 11:27)  POCT Blood Glucose.: 100 mg/dL (16 May 2023 23:13)    LIVER FUNCTIONS - ( 17 May 2023 05:30 )  Alb: 3.0 g/dL / Pro: 5.9 g/dL / ALK PHOS: 220 U/L / ALT: 243 U/L / AST: 160 U/L / GGT: x             Cultures:Culture Results:   Normal Respiratory Cari present to date (05-16 @ 07:51)  Culture Results:   No growth at 1 day. (05-15 @ 19:17)  Culture Results:   No growth at 1 day. (05-15 @ 19:10)

## 2023-05-17 NOTE — DIETITIAN INITIAL EVALUATION ADULT - OTHER INFO
89yo Female pt with PMHx HTN, HLD, Hypothyroidism, diastolic CHF (EF 65-70% 2023), tachy-mamie syndrome, pAF (Warfarin); PSHx mitral valve replacement and MYKE closure, PPM; presented to [facility] with c/o of difficulty breathing. Elevated LFTs, and questionable sludge ball in CBD, concerns for cholangitis. CT findings c/w pneumonia likely 2/2 aspiration and possible cystitis as well. Admitted to SICU for HD monitoring.    Pt seen by RDN on 5 EA for nutrition assessment with caregiver, Lesvia, at bedside. Pt noted AAOx1 and confused during interview. History obtained from Lesvia and EMR. 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 No cultural, Cheondoism, or other food preferences reported. At time of assessment, pt was NPO, now placed on a Clear Liquid diet (passed bedside dysphagia). Lesvia reported pt has some difficulty chewing/swallowing and requires soft foods. PTA, Lesvia reported pt had a good appetite and was consuming 3 meals/day including cabbage rolls, meatloaf, chicken soup and vegetable soup. Lesvia reported the pt’s UBW 90-95lb x 1 year ago and endorses graduate wt loss since. Admission wt (5/16) 35.4kg/78lb reflective of -12lb/-13% wt loss x 1 year. +NFPE with significant muscle wasting/fat loss. Per ASPEN guidelines, pt meets criteria for moderate malnutrition. Education deferred at this time. Please see nutrition recommendations below. Pt made aware RDN remains available. Will continue to follow per RD protocol.  GI: No report of N/V/D/C. No abd distention noted.  Skin: No edema or pressure injuries at this time. Tomas 17.  Pain: No pain noted at time of RD interview

## 2023-05-17 NOTE — PROGRESS NOTE ADULT - TIME BILLING
Aspiration pneumonia; unlikely cholangitis and no plan for PTC noted. Advise complete antibiotic therapy as above.    Please reconsult with ?

## 2023-05-17 NOTE — DIETITIAN INITIAL EVALUATION ADULT - OTHER CALCULATIONS
Ht: 5'0", CBW: 35.4kg/78lb, IBW 45.5kg/100lb +/-10%, %IBW 78% . CBW used to calculate estimated needs based on Standards of Care given pt < 100% IBW. Adjusted for advanced age, malnutrition, CHF. **Fluids per team Ht: 5'0", CBW: 35.4kg/78lb, IBW 45.5kg/100lb +/-10%, %IBW 78% . ActualBW used to calculate estimated needs based on Standards of Care given pt < 100% IBW. Adjusted for advanced age, malnutrition, CHF. **Fluids per team

## 2023-05-17 NOTE — PROGRESS NOTE ADULT - SUBJECTIVE AND OBJECTIVE BOX
Pt seen and examined at bedside.  NAEO. Afebrile.   Comfortable per bedside RN. No abd pain/nausea/vomiting    Allergies    chlorhexidine topical (Rash)  fish (Other)  penicillin (Hives)    Intolerances        MEDICATIONS:  MEDICATIONS  (STANDING):  chlorhexidine 2% Cloths 1 Application(s) Topical <User Schedule>  dextrose 5%. 1000 milliLiter(s) (50 mL/Hr) IV Continuous <Continuous>  dextrose 5%. 1000 milliLiter(s) (100 mL/Hr) IV Continuous <Continuous>  dextrose 50% Injectable 12.5 Gram(s) IV Push once  dextrose 50% Injectable 25 Gram(s) IV Push once  dextrose 50% Injectable 25 Gram(s) IV Push once  glucagon  Injectable 1 milliGRAM(s) IntraMuscular once  insulin lispro (ADMELOG) corrective regimen sliding scale   SubCutaneous every 6 hours  lactated ringers. 1000 milliLiter(s) (60 mL/Hr) IV Continuous <Continuous>  levothyroxine Injectable 40 MICROGram(s) IV Push <User Schedule>  piperacillin/tazobactam IVPB.. 3.375 Gram(s) IV Intermittent every 8 hours  potassium chloride  10 mEq/100 mL IVPB 10 milliEquivalent(s) IV Intermittent once    MEDICATIONS  (PRN):  dextrose Oral Gel 15 Gram(s) Oral once PRN Blood Glucose LESS THAN 70 milliGRAM(s)/deciliter  ondansetron Injectable 4 milliGRAM(s) IV Push every 6 hours PRN Nausea and/or Vomiting      Vital Signs Last 24 Hrs  T(C): 37 (17 May 2023 05:32), Max: 37 (17 May 2023 05:32)  T(F): 98.6 (17 May 2023 05:32), Max: 98.6 (17 May 2023 05:32)  HR: 56 (17 May 2023 08:00) (51 - 69)  BP: 129/53 (17 May 2023 08:00) (108/51 - 155/64)  BP(mean): 77 (17 May 2023 08:00) (72 - 107)  RR: 18 (17 May 2023 07:00) (18 - 20)  SpO2: 96% (17 May 2023 08:00) (92% - 99%)    Parameters below as of 17 May 2023 08:00  Patient On (Oxygen Delivery Method): nasal cannula  O2 Flow (L/min): 2      05-16 @ 07:01  -  05-17 @ 07:00  --------------------------------------------------------  IN: 1670 mL / OUT: 100 mL / NET: 1570 mL        PHYSICAL EXAM:    General: No acute distress, frail, ill appearing  Cardiac: Regular rate on tele  Pulm: labored breathing  Gastrointestinal: Soft non-tender non-distended. No rebound or guarding  Skin: Warm and dry. No obvious rash    LABS:  CBC Full  -  ( 17 May 2023 05:30 )  WBC Count : 12.35 K/uL  RBC Count : 3.43 M/uL  Hemoglobin : 10.9 g/dL  Hematocrit : 32.9 %  Platelet Count - Automated : 147 K/uL  Mean Cell Volume : 95.9 fl  Mean Cell Hemoglobin : 31.8 pg  Mean Cell Hemoglobin Concentration : 33.1 gm/dL  Auto Neutrophil # : 9.98 K/uL  Auto Lymphocyte # : 1.15 K/uL  Auto Monocyte # : 1.00 K/uL  Auto Eosinophil # : 0.12 K/uL  Auto Basophil # : 0.04 K/uL  Auto Neutrophil % : 80.8 %  Auto Lymphocyte % : 9.3 %  Auto Monocyte % : 8.1 %  Auto Eosinophil % : 1.0 %  Auto Basophil % : 0.3 %    05-17    137  |  104  |  12  ----------------------------<  100<H>  3.8   |  24  |  0.67    Ca    8.6      17 May 2023 05:30  Phos  2.7     05-17  Mg     1.7     05-17    TPro  5.9<L>  /  Alb  3.0<L>  /  TBili  1.3<H>  /  DBili  x   /  AST  160<H>  /  ALT  243<H>  /  AlkPhos  220<H>  05-17    PT/INR - ( 17 May 2023 05:30 )   PT: 14.9 sec;   INR: 1.25          PTT - ( 17 May 2023 05:30 )  PTT:32.4 sec      Urinalysis Basic - ( 15 May 2023 19:09 )    Color: Yellow / Appearance: Cloudy / SG: >=1.030 / pH: x  Gluc: x / Ketone: NEGATIVE  / Bili: Small / Urobili: 2.0 E.U./dL   Blood: x / Protein: 30 mg/dL / Nitrite: NEGATIVE   Leuk Esterase: Small / RBC: < 5 /HPF / WBC < 5 /HPF   Sq Epi: x / Non Sq Epi: x / Bacteria: Present /HPF                RADIOLOGY & ADDITIONAL STUDIES:

## 2023-05-17 NOTE — DIETITIAN INITIAL EVALUATION ADULT - PERTINENT LABORATORY DATA
05-17    137  |  104  |  12  ----------------------------<  100<H>  3.8   |  24  |  0.67    Ca    8.6      17 May 2023 05:30  Phos  2.7     05-17  Mg     1.7     05-17    TPro  5.9<L>  /  Alb  3.0<L>  /  TBili  1.3<H>  /  DBili  x   /  AST  160<H>  /  ALT  243<H>  /  AlkPhos  220<H>  05-17  POCT Blood Glucose.: 100 mg/dL (05-16-23 @ 23:13)  A1C with Estimated Average Glucose Result: 5.4 % (05-16-23 @ 05:30)

## 2023-05-17 NOTE — DIETITIAN INITIAL EVALUATION ADULT - PERTINENT MEDS FT
MEDICATIONS  (STANDING):  chlorhexidine 2% Cloths 1 Application(s) Topical <User Schedule>  dextrose 5%. 1000 milliLiter(s) (50 mL/Hr) IV Continuous <Continuous>  dextrose 5%. 1000 milliLiter(s) (100 mL/Hr) IV Continuous <Continuous>  dextrose 50% Injectable 12.5 Gram(s) IV Push once  dextrose 50% Injectable 25 Gram(s) IV Push once  dextrose 50% Injectable 25 Gram(s) IV Push once  glucagon  Injectable 1 milliGRAM(s) IntraMuscular once  heparin   Injectable 5000 Unit(s) SubCutaneous every 12 hours  insulin lispro (ADMELOG) corrective regimen sliding scale   SubCutaneous every 6 hours  lactated ringers. 1000 milliLiter(s) (60 mL/Hr) IV Continuous <Continuous>  levothyroxine Injectable 40 MICROGram(s) IV Push <User Schedule>  piperacillin/tazobactam IVPB.. 3.375 Gram(s) IV Intermittent every 8 hours  potassium chloride  10 mEq/100 mL IVPB 10 milliEquivalent(s) IV Intermittent once  ursodiol Tablet 250 milliGRAM(s) Oral every 12 hours    MEDICATIONS  (PRN):  dextrose Oral Gel 15 Gram(s) Oral once PRN Blood Glucose LESS THAN 70 milliGRAM(s)/deciliter  ondansetron Injectable 4 milliGRAM(s) IV Push every 6 hours PRN Nausea and/or Vomiting

## 2023-05-18 DIAGNOSIS — E03.9 HYPOTHYROIDISM, UNSPECIFIED: ICD-10-CM

## 2023-05-18 DIAGNOSIS — I10 ESSENTIAL (PRIMARY) HYPERTENSION: ICD-10-CM

## 2023-05-18 DIAGNOSIS — Z29.9 ENCOUNTER FOR PROPHYLACTIC MEASURES, UNSPECIFIED: ICD-10-CM

## 2023-05-18 DIAGNOSIS — K83.09 OTHER CHOLANGITIS: ICD-10-CM

## 2023-05-18 DIAGNOSIS — I50.30 UNSPECIFIED DIASTOLIC (CONGESTIVE) HEART FAILURE: ICD-10-CM

## 2023-05-18 DIAGNOSIS — J69.0 PNEUMONITIS DUE TO INHALATION OF FOOD AND VOMIT: ICD-10-CM

## 2023-05-18 LAB
-  BACTEROIDES FRAGILIS: SIGNIFICANT CHANGE UP
-  CANDIDA ALBICANS: SIGNIFICANT CHANGE UP
-  CANDIDA GLABRATA: SIGNIFICANT CHANGE UP
-  CANDIDA KRUSEI: SIGNIFICANT CHANGE UP
-  CANDIDA PARAPSILOSIS: SIGNIFICANT CHANGE UP
-  CANDIDA TROPICALIS: SIGNIFICANT CHANGE UP
-  COAGULASE NEGATIVE STAPHYLOCOCCUS, METHICILLIN RESISTANT: SIGNIFICANT CHANGE UP
-  COAGULASE NEGATIVE STAPHYLOCOCCUS: SIGNIFICANT CHANGE UP
-  ENTEROBACTERALES: SIGNIFICANT CHANGE UP
-  K. PNEUMONIAE GROUP: SIGNIFICANT CHANGE UP
-  STAPHYLOCOCCUS EPIDERMIDIS, METHICILLIN RESISTANT: SIGNIFICANT CHANGE UP
-  STAPHYLOCOCCUS EPIDERMIDIS: SIGNIFICANT CHANGE UP
-  STAPHYLOCOCCUS LUGDUNENSIS, METHICILLIN RESISTANT: SIGNIFICANT CHANGE UP
-  STENOTROPHOMONAS MALTOPHILIA: SIGNIFICANT CHANGE UP
-  STREPTOCOCCUS SP. (NOT GRP A, B OR S PNEUMONIAE): SIGNIFICANT CHANGE UP
A BAUMANNII DNA SPEC QL NAA+PROBE: SIGNIFICANT CHANGE UP
ALBUMIN SERPL ELPH-MCNC: 2.9 G/DL — LOW (ref 3.3–5)
ALP SERPL-CCNC: 193 U/L — HIGH (ref 40–120)
ALT FLD-CCNC: 164 U/L — HIGH (ref 10–45)
ANION GAP SERPL CALC-SCNC: 9 MMOL/L — SIGNIFICANT CHANGE UP (ref 5–17)
AST SERPL-CCNC: 73 U/L — HIGH (ref 10–40)
BASOPHILS # BLD AUTO: 0.04 K/UL — SIGNIFICANT CHANGE UP (ref 0–0.2)
BASOPHILS NFR BLD AUTO: 0.3 % — SIGNIFICANT CHANGE UP (ref 0–2)
BILIRUB SERPL-MCNC: 1.4 MG/DL — HIGH (ref 0.2–1.2)
BUN SERPL-MCNC: 11 MG/DL — SIGNIFICANT CHANGE UP (ref 7–23)
C AURIS DNA BLD POS QL NAA+PROBE: SIGNIFICANT CHANGE UP
C NEOFORM DNA BLD POS QL NAA+PROBE: SIGNIFICANT CHANGE UP
CALCIUM SERPL-MCNC: 8.9 MG/DL — SIGNIFICANT CHANGE UP (ref 8.4–10.5)
CHLORIDE SERPL-SCNC: 101 MMOL/L — SIGNIFICANT CHANGE UP (ref 96–108)
CO2 SERPL-SCNC: 23 MMOL/L — SIGNIFICANT CHANGE UP (ref 22–31)
CREAT SERPL-MCNC: 0.61 MG/DL — SIGNIFICANT CHANGE UP (ref 0.5–1.3)
CULTURE RESULTS: SIGNIFICANT CHANGE UP
E CLOAC COMP DNA BLD POS QL NAA+PROBE: SIGNIFICANT CHANGE UP
E COLI DNA BLD POS QL NAA+NON-PROBE: SIGNIFICANT CHANGE UP
E FAECALIS DNA BLD POS QL NAA+NON-PROBE: SIGNIFICANT CHANGE UP
E FAECALIS DNA BLD POS QL NAA+NON-PROBE: SIGNIFICANT CHANGE UP
E FAECIUM DNA BLD POS QL NAA+NON-PROBE: SIGNIFICANT CHANGE UP
E FAECIUM DNA BLD POS QL NAA+NON-PROBE: SIGNIFICANT CHANGE UP
EGFR: 83 ML/MIN/1.73M2 — SIGNIFICANT CHANGE UP
EOSINOPHIL # BLD AUTO: 0.26 K/UL — SIGNIFICANT CHANGE UP (ref 0–0.5)
EOSINOPHIL NFR BLD AUTO: 2.2 % — SIGNIFICANT CHANGE UP (ref 0–6)
GLUCOSE BLDC GLUCOMTR-MCNC: 100 MG/DL — HIGH (ref 70–99)
GLUCOSE BLDC GLUCOMTR-MCNC: 112 MG/DL — HIGH (ref 70–99)
GLUCOSE BLDC GLUCOMTR-MCNC: 94 MG/DL — SIGNIFICANT CHANGE UP (ref 70–99)
GLUCOSE BLDC GLUCOMTR-MCNC: 96 MG/DL — SIGNIFICANT CHANGE UP (ref 70–99)
GLUCOSE SERPL-MCNC: 95 MG/DL — SIGNIFICANT CHANGE UP (ref 70–99)
GP B STREP DNA BLD POS QL NAA+NON-PROBE: SIGNIFICANT CHANGE UP
GRAM STN FLD: SIGNIFICANT CHANGE UP
HAEM INFLU DNA BLD POS QL NAA+NON-PROBE: SIGNIFICANT CHANGE UP
HCT VFR BLD CALC: 34.6 % — SIGNIFICANT CHANGE UP (ref 34.5–45)
HGB BLD-MCNC: 11.3 G/DL — LOW (ref 11.5–15.5)
IMM GRANULOCYTES NFR BLD AUTO: 0.3 % — SIGNIFICANT CHANGE UP (ref 0–0.9)
K OXYTOCA DNA BLD POS QL NAA+NON-PROBE: SIGNIFICANT CHANGE UP
KLEBSIELLA SP DNA BLD POS QL NAA+NON-PRB: SIGNIFICANT CHANGE UP
L MONOCYTOG DNA BLD POS QL NAA+NON-PROBE: SIGNIFICANT CHANGE UP
LYMPHOCYTES # BLD AUTO: 1.49 K/UL — SIGNIFICANT CHANGE UP (ref 1–3.3)
LYMPHOCYTES # BLD AUTO: 12.8 % — LOW (ref 13–44)
MAGNESIUM SERPL-MCNC: 2 MG/DL — SIGNIFICANT CHANGE UP (ref 1.6–2.6)
MCHC RBC-ENTMCNC: 31.7 PG — SIGNIFICANT CHANGE UP (ref 27–34)
MCHC RBC-ENTMCNC: 32.7 GM/DL — SIGNIFICANT CHANGE UP (ref 32–36)
MCV RBC AUTO: 97.2 FL — SIGNIFICANT CHANGE UP (ref 80–100)
METHOD TYPE: SIGNIFICANT CHANGE UP
MONOCYTES # BLD AUTO: 0.9 K/UL — SIGNIFICANT CHANGE UP (ref 0–0.9)
MONOCYTES NFR BLD AUTO: 7.7 % — SIGNIFICANT CHANGE UP (ref 2–14)
MRSA SPEC QL CULT: SIGNIFICANT CHANGE UP
MSSA DNA SPEC QL NAA+PROBE: SIGNIFICANT CHANGE UP
N MEN ISLT CULT: SIGNIFICANT CHANGE UP
NEUTROPHILS # BLD AUTO: 8.9 K/UL — HIGH (ref 1.8–7.4)
NEUTROPHILS NFR BLD AUTO: 76.7 % — SIGNIFICANT CHANGE UP (ref 43–77)
NRBC # BLD: 0 /100 WBCS — SIGNIFICANT CHANGE UP (ref 0–0)
P AERUGINOSA DNA BLD POS NAA+NON-PROBE: SIGNIFICANT CHANGE UP
PHOSPHATE SERPL-MCNC: 2.1 MG/DL — LOW (ref 2.5–4.5)
PLATELET # BLD AUTO: 165 K/UL — SIGNIFICANT CHANGE UP (ref 150–400)
POTASSIUM SERPL-MCNC: 3.8 MMOL/L — SIGNIFICANT CHANGE UP (ref 3.5–5.3)
POTASSIUM SERPL-SCNC: 3.8 MMOL/L — SIGNIFICANT CHANGE UP (ref 3.5–5.3)
PROT SERPL-MCNC: 6 G/DL — SIGNIFICANT CHANGE UP (ref 6–8.3)
PROTEUS SP DNA BLD POS QL NAA+NON-PROBE: SIGNIFICANT CHANGE UP
RBC # BLD: 3.56 M/UL — LOW (ref 3.8–5.2)
RBC # FLD: 13.6 % — SIGNIFICANT CHANGE UP (ref 10.3–14.5)
S LUGDUNENSIS DNA SNV QL NAA+NON-PROBE: SIGNIFICANT CHANGE UP
S MARCESCENS DNA BLD POS NAA+NON-PROBE: SIGNIFICANT CHANGE UP
S PNEUM DNA BLD POS QL NAA+NON-PROBE: SIGNIFICANT CHANGE UP
S PYO DNA BLD POS QL NAA+NON-PROBE: SIGNIFICANT CHANGE UP
SALMONELLA DNA BLD POS QL NAA+NON-PROBE: SIGNIFICANT CHANGE UP
SODIUM SERPL-SCNC: 133 MMOL/L — LOW (ref 135–145)
SPECIMEN SOURCE: SIGNIFICANT CHANGE UP
WBC # BLD: 11.63 K/UL — HIGH (ref 3.8–10.5)
WBC # FLD AUTO: 11.63 K/UL — HIGH (ref 3.8–10.5)

## 2023-05-18 PROCEDURE — 99231 SBSQ HOSP IP/OBS SF/LOW 25: CPT

## 2023-05-18 RX ORDER — FUROSEMIDE 40 MG
20 TABLET ORAL EVERY 12 HOURS
Refills: 0 | Status: DISCONTINUED | OUTPATIENT
Start: 2023-05-18 | End: 2023-05-22

## 2023-05-18 RX ORDER — WARFARIN SODIUM 2.5 MG/1
3 TABLET ORAL ONCE
Refills: 0 | Status: DISCONTINUED | OUTPATIENT
Start: 2023-05-18 | End: 2023-05-19

## 2023-05-18 RX ORDER — SPIRONOLACTONE 25 MG/1
25 TABLET, FILM COATED ORAL DAILY
Refills: 0 | Status: DISCONTINUED | OUTPATIENT
Start: 2023-05-19 | End: 2023-05-22

## 2023-05-18 RX ORDER — METOPROLOL TARTRATE 50 MG
50 TABLET ORAL DAILY
Refills: 0 | Status: DISCONTINUED | OUTPATIENT
Start: 2023-05-19 | End: 2023-05-22

## 2023-05-18 RX ORDER — METOPROLOL TARTRATE 50 MG
50 TABLET ORAL DAILY
Refills: 0 | Status: DISCONTINUED | OUTPATIENT
Start: 2023-05-18 | End: 2023-05-18

## 2023-05-18 RX ORDER — PIPERACILLIN AND TAZOBACTAM 4; .5 G/20ML; G/20ML
3.38 INJECTION, POWDER, LYOPHILIZED, FOR SOLUTION INTRAVENOUS EVERY 8 HOURS
Refills: 0 | Status: DISCONTINUED | OUTPATIENT
Start: 2023-05-18 | End: 2023-05-22

## 2023-05-18 RX ORDER — SODIUM,POTASSIUM PHOSPHATES 278-250MG
2 POWDER IN PACKET (EA) ORAL ONCE
Refills: 0 | Status: COMPLETED | OUTPATIENT
Start: 2023-05-18 | End: 2023-05-18

## 2023-05-18 RX ORDER — OFLOXACIN 0.3 %
1 DROPS OPHTHALMIC (EYE)
Refills: 0 | Status: DISCONTINUED | OUTPATIENT
Start: 2023-05-18 | End: 2023-05-22

## 2023-05-18 RX ADMIN — WARFARIN SODIUM 3 MILLIGRAM(S): 2.5 TABLET ORAL at 22:41

## 2023-05-18 RX ADMIN — Medication 2 PACKET(S): at 06:36

## 2023-05-18 RX ADMIN — Medication 1 DROP(S): at 18:03

## 2023-05-18 RX ADMIN — Medication 20 MILLIGRAM(S): at 18:56

## 2023-05-18 RX ADMIN — URSODIOL 250 MILLIGRAM(S): 250 TABLET, FILM COATED ORAL at 06:36

## 2023-05-18 RX ADMIN — SERTRALINE 50 MILLIGRAM(S): 25 TABLET, FILM COATED ORAL at 12:15

## 2023-05-18 RX ADMIN — URSODIOL 250 MILLIGRAM(S): 250 TABLET, FILM COATED ORAL at 18:56

## 2023-05-18 RX ADMIN — Medication 50 MILLIGRAM(S): at 10:31

## 2023-05-18 RX ADMIN — PIPERACILLIN AND TAZOBACTAM 25 GRAM(S): 4; .5 INJECTION, POWDER, LYOPHILIZED, FOR SOLUTION INTRAVENOUS at 22:41

## 2023-05-18 RX ADMIN — HEPARIN SODIUM 5000 UNIT(S): 5000 INJECTION INTRAVENOUS; SUBCUTANEOUS at 05:50

## 2023-05-18 RX ADMIN — PIPERACILLIN AND TAZOBACTAM 25 GRAM(S): 4; .5 INJECTION, POWDER, LYOPHILIZED, FOR SOLUTION INTRAVENOUS at 13:59

## 2023-05-18 RX ADMIN — PIPERACILLIN AND TAZOBACTAM 25 GRAM(S): 4; .5 INJECTION, POWDER, LYOPHILIZED, FOR SOLUTION INTRAVENOUS at 05:50

## 2023-05-18 RX ADMIN — Medication 40 MICROGRAM(S): at 06:25

## 2023-05-18 NOTE — PROGRESS NOTE ADULT - SUBJECTIVE AND OBJECTIVE BOX
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 Elevated LFTs, and questionable sludge ball in CBD, concerns for acute cholangitis but surgery deemed not needed. CT findings c/w pneumonia likely 2/2 aspiration and possible cystitis as well, on Zosyn 3.375g q8 for 5d.  GI and ID consuled, signed off.  Had been admitted to SICU for HD monitoring now stable for stepdown to [facility].  Per GI: no ERCP; Per Cards: int-hi risk for int procedure; Per ID:  Zosyn; Per PCP: minimize interventions.    SUBJECTIVE / INTERVAL HPI: Patient seen and examined at bedside.     VITAL SIGNS:  Vital Signs Last 24 Hrs  T(C): 36.3 (18 May 2023 13:02), Max: 37.4 (18 May 2023 02:05)  T(F): 97.4 (18 May 2023 13:02), Max: 99.3 (18 May 2023 02:05)  HR: 58 (18 May 2023 13:02) (50 - 68)  BP: 144/80 (18 May 2023 13:02) (122/58 - 161/68)  BP(mean): 90 (18 May 2023 12:00) (81 - 101)  RR: 17 (18 May 2023 13:02) (16 - 22)  SpO2: 95% (18 May 2023 13:02) (93% - 98%)    Parameters below as of 18 May 2023 13:02  Patient On (Oxygen Delivery Method): nasal cannula  O2 Flow (L/min): 2    I&O's Summary    17 May 2023 07:01  -  18 May 2023 07:00  --------------------------------------------------------  IN: 925 mL / OUT: 450 mL / NET: 475 mL    18 May 2023 07:01  -  18 May 2023 13:18  --------------------------------------------------------  IN: 300 mL / OUT: 400 mL / NET: -100 mL        PHYSICAL EXAM:  General: NAD  HEENT: NC/AT, EOMI, no oral lesions, neck supple w/o LAD  Pulmonary: Nonlabored breathing, no respiratory distress, CTA-B/L  Cardiovascular: NSR, no murmurs  Abdominal: soft, mild RUQ TTP, ND  Extremities: WWP, 5/5 strength x 4, no clubbing/cyanosis/edema  Neuro: A/O x1, CNs II-XII grossly intact, no focal deficits  Pulses: palpable distal pulses    MEDICATIONS:  MEDICATIONS  (STANDING):  artificial  tears Solution 1 Drop(s) Both EYES daily  chlorhexidine 2% Cloths 1 Application(s) Topical <User Schedule>  dextrose 5%. 1000 milliLiter(s) (100 mL/Hr) IV Continuous <Continuous>  dextrose 5%. 1000 milliLiter(s) (50 mL/Hr) IV Continuous <Continuous>  dextrose 50% Injectable 12.5 Gram(s) IV Push once  dextrose 50% Injectable 25 Gram(s) IV Push once  dextrose 50% Injectable 25 Gram(s) IV Push once  glucagon  Injectable 1 milliGRAM(s) IntraMuscular once  heparin   Injectable 5000 Unit(s) SubCutaneous every 12 hours  insulin lispro (ADMELOG) corrective regimen sliding scale   SubCutaneous every 6 hours  levothyroxine Injectable 40 MICROGram(s) IV Push <User Schedule>  metoprolol succinate ER 50 milliGRAM(s) Oral daily  piperacillin/tazobactam IVPB.. 3.375 Gram(s) IV Intermittent every 8 hours  sertraline 50 milliGRAM(s) Oral daily  ursodiol Tablet 250 milliGRAM(s) Oral every 12 hours    MEDICATIONS  (PRN):  dextrose Oral Gel 15 Gram(s) Oral once PRN Blood Glucose LESS THAN 70 milliGRAM(s)/deciliter  ondansetron Injectable 4 milliGRAM(s) IV Push every 6 hours PRN Nausea and/or Vomiting      ALLERGIES:  Allergies    chlorhexidine topical (Rash)  fish (Other)  penicillin (Hives)    Intolerances        LABS:                        11.3   11.63 )-----------( 165      ( 18 May 2023 05:30 )             34.6     05-18    133<L>  |  101  |  11  ----------------------------<  95  3.8   |  23  |  0.61    Ca    8.9      18 May 2023 05:30  Phos  2.1     05-18  Mg     2.0     05-18    TPro  6.0  /  Alb  2.9<L>  /  TBili  1.4<H>  /  DBili  x   /  AST  73<H>  /  ALT  164<H>  /  AlkPhos  193<H>  05-18    PT/INR - ( 17 May 2023 05:30 )   PT: 14.9 sec;   INR: 1.25          PTT - ( 17 May 2023 05:30 )  PTT:32.4 sec    CAPILLARY BLOOD GLUCOSE      POCT Blood Glucose.: 100 mg/dL (18 May 2023 11:23)      RADIOLOGY & ADDITIONAL TESTS: Reviewed.

## 2023-05-18 NOTE — PROGRESS NOTE ADULT - PROBLEM SELECTOR PLAN 3
Pt on 40mcg IV synthroid. elevated liver enzymes and sludge ball in CBD, concerning for acute cholangitis. Reassuring downtrend in liver enzymes and patient afebrile.  Per GI: "may benefit from biliary decompression (ie. ERCP) however in light of multimorbid condition, cardiology risk assessment, acute hypox resp failure with elevated PASP on TTE, high concern for further decompensation following ERCP and necessary sedation."  Pt improving on abx.    -c/w empiric abx (see above)  -can consider percutaneous decompression if worsens  - started on Ursodiol 5/18

## 2023-05-18 NOTE — PROGRESS NOTE ADULT - ASSESSMENT
89yo Female pt with PMHx HTN, HLD, Hypothyroidism, diastolic CHF (EF 65-70% 2023), tachy-mamie syndrome, pAF (Warfarin); PSHx mitral valve replacement and MYKE closure, PPM; presented to [facility] with c/o of difficulty breathing. Elevated LFTs, and questionable sludge ball in CBD, concerns for cholangitis. CT findings c/w pneumonia likely 2/2 aspiration and possible cystitis as well. Admitted to SICU for HD monitoring - patient is not a surgical candidate.    Plan:  - Transfer to medicine   - Continue ursodiol  - Care per SICU

## 2023-05-18 NOTE — PROGRESS NOTE ADULT - ASSESSMENT
89yo Female pt with PMHx HTN, HLD, Hypothyroidism, diastolic CHF (EF 65-70% 2023), tachy-mamie syndrome, pAF (Warfarin); PSHx mitral valve replacement and MYKE closure, PPM; presented to [facility] with c/o of difficulty breathing. Elevated LFTs, and questionable sludge ball in CBD, concerns for cholangitis. CT findings c/w pneumonia likely 2/2 aspiration and possible cystitis as well. Admitted to SICU for HD monitoring.    Neuro: AxOx1 baseline. NTD  HEENT: NTD  CV: MAP > 65; Toprol 50. Hx of pAF, PPM, and MVR--hold Warfarin. Cards c/s no need to bridge.   Pulm: Wean NC as tolerated, home NC PRN  GI/FEN: CLD, IVF 60, Ursodiol, GI consult  : Voids (primafit)  Endo: ISS  Heme: holding warfarin, vit k 5 given (05/16)  ID: ID recs - Zosyn (5/16 - )// d/c Cefepime (5/16-5/16), Flagyl (5/16-5/16); In ED Vancomycin, Aztreonam, Levofloxacin, Flagyl (5/16),   PPX: SQH, SCDs  L/D/T: PIVs  PT/OT: Not ordered  Dispo: SICU

## 2023-05-18 NOTE — PROGRESS NOTE ADULT - SUBJECTIVE AND OBJECTIVE BOX
SUBJECTIVE: Pt seen and examined at bedside this am by surgery team. Tolerating diet, pain well controlled. Denies f/n/v/cp/sob.    MEDICATIONS  (STANDING):  artificial  tears Solution 1 Drop(s) Both EYES daily  chlorhexidine 2% Cloths 1 Application(s) Topical <User Schedule>  dextrose 5%. 1000 milliLiter(s) (100 mL/Hr) IV Continuous <Continuous>  dextrose 5%. 1000 milliLiter(s) (50 mL/Hr) IV Continuous <Continuous>  dextrose 50% Injectable 25 Gram(s) IV Push once  dextrose 50% Injectable 25 Gram(s) IV Push once  dextrose 50% Injectable 12.5 Gram(s) IV Push once  furosemide    Tablet 20 milliGRAM(s) Oral every 12 hours  glucagon  Injectable 1 milliGRAM(s) IntraMuscular once  insulin lispro (ADMELOG) corrective regimen sliding scale   SubCutaneous every 6 hours  levothyroxine Injectable 40 MICROGram(s) IV Push <User Schedule>  metoprolol succinate ER 50 milliGRAM(s) Oral daily  piperacillin/tazobactam IVPB.. 3.375 Gram(s) IV Intermittent every 8 hours  sertraline 50 milliGRAM(s) Oral daily  ursodiol Tablet 250 milliGRAM(s) Oral every 12 hours  warfarin 3 milliGRAM(s) Oral once    MEDICATIONS  (PRN):  dextrose Oral Gel 15 Gram(s) Oral once PRN Blood Glucose LESS THAN 70 milliGRAM(s)/deciliter  ondansetron Injectable 4 milliGRAM(s) IV Push every 6 hours PRN Nausea and/or Vomiting      Vital Signs Last 24 Hrs  T(C): 37.1 (18 May 2023 06:05), Max: 37.4 (18 May 2023 02:05)  T(F): 98.8 (18 May 2023 06:05), Max: 99.3 (18 May 2023 02:05)  HR: 60 (18 May 2023 07:00) (50 - 70)  BP: 158/70 (18 May 2023 07:00) (124/56 - 162/70)  BP(mean): 100 (18 May 2023 07:00) (77 - 101)  RR: 18 (18 May 2023 07:00) (16 - 22)  SpO2: 94% (18 May 2023 07:00) (93% - 98%)    O2 Parameters below as of 18 May 2023 07:00  Patient On (Oxygen Delivery Method): nasal cannula  O2 Flow (L/min): 2      Physical Exam  General: NAD, resting comfortably in bed  Pulmonary: Nonlabored breathing, no respiratory distress  CV: NSR  Abd: soft, NT/ND, no guarding, incisions c/d/i  Extremities: (-) enema, warm, well-perfused      I&O's Detail    17 May 2023 07:01  -  18 May 2023 07:00  --------------------------------------------------------  IN:    IV PiggyBack: 150 mL    IV PiggyBack: 275 mL    Lactated Ringers: 300 mL    Oral Fluid: 200 mL  Total IN: 925 mL    OUT:    Voided (mL): 450 mL  Total OUT: 450 mL    Total NET: 475 mL      18 May 2023 07:01  -  18 May 2023 18:22  --------------------------------------------------------  IN:    Oral Fluid: 300 mL  Total IN: 300 mL    OUT:    Voided (mL): 400 mL  Total OUT: 400 mL    Total NET: -100 mL          LABS:                        11.3   11.63 )-----------( 165      ( 18 May 2023 05:30 )             34.6     05-18    133<L>  |  101  |  11  ----------------------------<  95  3.8   |  23  |  0.61    Ca    8.9      18 May 2023 05:30  Phos  2.1     05-18  Mg     2.0     05-18    TPro  6.0  /  Alb  2.9<L>  /  TBili  1.4<H>  /  DBili  x   /  AST  73<H>  /  ALT  164<H>  /  AlkPhos  193<H>  05-18    PT/INR - ( 17 May 2023 05:30 )   PT: 14.9 sec;   INR: 1.25          PTT - ( 17 May 2023 05:30 )  PTT:32.4 sec      RADIOLOGY & ADDITIONAL STUDIES:   SUBJECTIVE: Pt seen and examined at bedside this am by surgery team. Tolerating diet, pain well controlled. Denies f/n/v/cp/sob.    MEDICATIONS  (STANDING):  artificial  tears Solution 1 Drop(s) Both EYES daily  chlorhexidine 2% Cloths 1 Application(s) Topical <User Schedule>  dextrose 5%. 1000 milliLiter(s) (100 mL/Hr) IV Continuous <Continuous>  dextrose 5%. 1000 milliLiter(s) (50 mL/Hr) IV Continuous <Continuous>  dextrose 50% Injectable 25 Gram(s) IV Push once  dextrose 50% Injectable 25 Gram(s) IV Push once  dextrose 50% Injectable 12.5 Gram(s) IV Push once  furosemide    Tablet 20 milliGRAM(s) Oral every 12 hours  glucagon  Injectable 1 milliGRAM(s) IntraMuscular once  insulin lispro (ADMELOG) corrective regimen sliding scale   SubCutaneous every 6 hours  levothyroxine Injectable 40 MICROGram(s) IV Push <User Schedule>  metoprolol succinate ER 50 milliGRAM(s) Oral daily  piperacillin/tazobactam IVPB.. 3.375 Gram(s) IV Intermittent every 8 hours  sertraline 50 milliGRAM(s) Oral daily  ursodiol Tablet 250 milliGRAM(s) Oral every 12 hours  warfarin 3 milliGRAM(s) Oral once    MEDICATIONS  (PRN):  dextrose Oral Gel 15 Gram(s) Oral once PRN Blood Glucose LESS THAN 70 milliGRAM(s)/deciliter  ondansetron Injectable 4 milliGRAM(s) IV Push every 6 hours PRN Nausea and/or Vomiting      Vital Signs Last 24 Hrs  T(C): 37.1 (18 May 2023 06:05), Max: 37.4 (18 May 2023 02:05)  T(F): 98.8 (18 May 2023 06:05), Max: 99.3 (18 May 2023 02:05)  HR: 60 (18 May 2023 07:00) (50 - 70)  BP: 158/70 (18 May 2023 07:00) (124/56 - 162/70)  BP(mean): 100 (18 May 2023 07:00) (77 - 101)  RR: 18 (18 May 2023 07:00) (16 - 22)  SpO2: 94% (18 May 2023 07:00) (93% - 98%)    O2 Parameters below as of 18 May 2023 07:00  Patient On (Oxygen Delivery Method): nasal cannula  O2 Flow (L/min): 2      Physical Exam  General: NAD, resting comfortably in bed  Pulmonary: Nonlabored breathing, no respiratory distress  CV: NSR  Abd: soft, NT/ND, no guarding  Extremities: (-) edema, warm, well-perfused      I&O's Detail    17 May 2023 07:01  -  18 May 2023 07:00  --------------------------------------------------------  IN:    IV PiggyBack: 150 mL    IV PiggyBack: 275 mL    Lactated Ringers: 300 mL    Oral Fluid: 200 mL  Total IN: 925 mL    OUT:    Voided (mL): 450 mL  Total OUT: 450 mL    Total NET: 475 mL      18 May 2023 07:01  -  18 May 2023 18:22  --------------------------------------------------------  IN:    Oral Fluid: 300 mL  Total IN: 300 mL    OUT:    Voided (mL): 400 mL  Total OUT: 400 mL    Total NET: -100 mL          LABS:                        11.3   11.63 )-----------( 165      ( 18 May 2023 05:30 )             34.6     05-18    133<L>  |  101  |  11  ----------------------------<  95  3.8   |  23  |  0.61    Ca    8.9      18 May 2023 05:30  Phos  2.1     05-18  Mg     2.0     05-18    TPro  6.0  /  Alb  2.9<L>  /  TBili  1.4<H>  /  DBili  x   /  AST  73<H>  /  ALT  164<H>  /  AlkPhos  193<H>  05-18    PT/INR - ( 17 May 2023 05:30 )   PT: 14.9 sec;   INR: 1.25          PTT - ( 17 May 2023 05:30 )  PTT:32.4 sec      RADIOLOGY & ADDITIONAL STUDIES:

## 2023-05-18 NOTE — PROGRESS NOTE ADULT - PROBLEM SELECTOR PLAN 2
elevated liver enzymes and sludge ball in CBD, concerning for acute cholangitis. Reassuring downtrend in liver enzymes and patient afebrile.  Per GI: "may benefit from biliary decompression (ie. ERCP) however in light of multimorbid condition, cardiology risk assessment, acute hypox resp failure with elevated PASP on TTE, high concern for further decompensation following ERCP and necessary sedation."  Pt improving on abx.    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    - restarted home Coumadin 5/18  - c/w home metoprolol succinate ER 50mg q24  - c/w spironolactone 25mg q24  - c/w furosemide 20mg q12

## 2023-05-18 NOTE — PROGRESS NOTE ADULT - SUBJECTIVE AND OBJECTIVE BOX
24 hr events:  ON: FRANCISCO  5/17: GI declining ERCP. Cards int-hi risk. PCP recs continue medical treatment, no intervention. Added ursodiol. SQH added. Passed bedside dysphagia, adv to CLD. D/c IVF      MEDICATIONS  (STANDING):  chlorhexidine 2% Cloths 1 Application(s) Topical <User Schedule>  dextrose 5%. 1000 milliLiter(s) (100 mL/Hr) IV Continuous <Continuous>  dextrose 5%. 1000 milliLiter(s) (50 mL/Hr) IV Continuous <Continuous>  dextrose 50% Injectable 12.5 Gram(s) IV Push once  dextrose 50% Injectable 25 Gram(s) IV Push once  dextrose 50% Injectable 25 Gram(s) IV Push once  glucagon  Injectable 1 milliGRAM(s) IntraMuscular once  heparin   Injectable 5000 Unit(s) SubCutaneous every 12 hours  insulin lispro (ADMELOG) corrective regimen sliding scale   SubCutaneous every 6 hours  levothyroxine Injectable 40 MICROGram(s) IV Push <User Schedule>  metoprolol succinate ER 50 milliGRAM(s) Oral daily  piperacillin/tazobactam IVPB.. 3.375 Gram(s) IV Intermittent every 8 hours  sertraline 50 milliGRAM(s) Oral daily  ursodiol Tablet 250 milliGRAM(s) Oral every 12 hours    MEDICATIONS  (PRN):  dextrose Oral Gel 15 Gram(s) Oral once PRN Blood Glucose LESS THAN 70 milliGRAM(s)/deciliter  ondansetron Injectable 4 milliGRAM(s) IV Push every 6 hours PRN Nausea and/or Vomiting      ICU Vital Signs Last 24 Hrs  T(C): 37.1 (18 May 2023 06:05), Max: 37.4 (18 May 2023 02:05)  T(F): 98.8 (18 May 2023 06:05), Max: 99.3 (18 May 2023 02:05)  HR: 60 (18 May 2023 07:00) (50 - 70)  BP: 158/70 (18 May 2023 07:00) (124/56 - 162/70)  BP(mean): 100 (18 May 2023 07:00) (77 - 101)  RR: 18 (18 May 2023 07:00) (16 - 22)  SpO2: 94% (18 May 2023 07:00) (93% - 98%)    O2 Parameters below as of 18 May 2023 07:00  Patient On (Oxygen Delivery Method): nasal cannula  O2 Flow (L/min): 2          Physical Exam:  General: NAD  HEENT: NC/AT, EOMI, no oral lesions, neck supple w/o LAD  Pulmonary: Nonlabored breathing, no respiratory distress, CTA-B  Cardiovascular: NSR, no murmurs  Abdominal: soft, mild RUQ TTP, ND  Extremities: WWP, 5/5 strength x 4, no clubbing/cyanosis/edema  Neuro: A/O x1, CNs II-XII grossly intact, no focal deficits  Pulses: palpable distal pulses      I&O's Summary    17 May 2023 07:01  -  18 May 2023 07:00  --------------------------------------------------------  IN: 925 mL / OUT: 450 mL / NET: 475 mL        LABS:                        11.3   11.63 )-----------( 165      ( 18 May 2023 05:30 )             34.6     05-18    133<L>  |  101  |  11  ----------------------------<  95  3.8   |  23  |  0.61    Ca    8.9      18 May 2023 05:30  Phos  2.1     05-18  Mg     2.0     05-18    TPro  6.0  /  Alb  2.9<L>  /  TBili  1.4<H>  /  DBili  x   /  AST  73<H>  /  ALT  164<H>  /  AlkPhos  193<H>  05-18    PT/INR - ( 17 May 2023 05:30 )   PT: 14.9 sec;   INR: 1.25          PTT - ( 17 May 2023 05:30 )  PTT:32.4 sec    CAPILLARY BLOOD GLUCOSE      POCT Blood Glucose.: 96 mg/dL (18 May 2023 06:14)  POCT Blood Glucose.: 94 mg/dL (18 May 2023 00:27)  POCT Blood Glucose.: 96 mg/dL (17 May 2023 16:13)  POCT Blood Glucose.: 105 mg/dL (17 May 2023 11:27)    LIVER FUNCTIONS - ( 18 May 2023 05:30 )  Alb: 2.9 g/dL / Pro: 6.0 g/dL / ALK PHOS: 193 U/L / ALT: 164 U/L / AST: 73 U/L / GGT: x             Cultures:Culture Results:   Normal Respiratory Cari present to date (05-16 @ 07:51)

## 2023-05-19 LAB
ALBUMIN SERPL ELPH-MCNC: 2.6 G/DL — LOW (ref 3.3–5)
ALP SERPL-CCNC: 253 U/L — HIGH (ref 40–120)
ALT FLD-CCNC: 104 U/L — HIGH (ref 10–45)
ANION GAP SERPL CALC-SCNC: 10 MMOL/L — SIGNIFICANT CHANGE UP (ref 5–17)
APTT BLD: 29.5 SEC — SIGNIFICANT CHANGE UP (ref 27.5–35.5)
AST SERPL-CCNC: 38 U/L — SIGNIFICANT CHANGE UP (ref 10–40)
BASOPHILS # BLD AUTO: 0.04 K/UL — SIGNIFICANT CHANGE UP (ref 0–0.2)
BASOPHILS NFR BLD AUTO: 0.4 % — SIGNIFICANT CHANGE UP (ref 0–2)
BILIRUB SERPL-MCNC: 1.1 MG/DL — SIGNIFICANT CHANGE UP (ref 0.2–1.2)
BUN SERPL-MCNC: 11 MG/DL — SIGNIFICANT CHANGE UP (ref 7–23)
CALCIUM SERPL-MCNC: 8.9 MG/DL — SIGNIFICANT CHANGE UP (ref 8.4–10.5)
CHLORIDE SERPL-SCNC: 100 MMOL/L — SIGNIFICANT CHANGE UP (ref 96–108)
CO2 SERPL-SCNC: 25 MMOL/L — SIGNIFICANT CHANGE UP (ref 22–31)
CREAT SERPL-MCNC: 0.61 MG/DL — SIGNIFICANT CHANGE UP (ref 0.5–1.3)
CULTURE RESULTS: SIGNIFICANT CHANGE UP
EGFR: 83 ML/MIN/1.73M2 — SIGNIFICANT CHANGE UP
EOSINOPHIL # BLD AUTO: 0.47 K/UL — SIGNIFICANT CHANGE UP (ref 0–0.5)
EOSINOPHIL NFR BLD AUTO: 4.8 % — SIGNIFICANT CHANGE UP (ref 0–6)
GLUCOSE BLDC GLUCOMTR-MCNC: 109 MG/DL — HIGH (ref 70–99)
GLUCOSE BLDC GLUCOMTR-MCNC: 110 MG/DL — HIGH (ref 70–99)
GLUCOSE BLDC GLUCOMTR-MCNC: 111 MG/DL — HIGH (ref 70–99)
GLUCOSE BLDC GLUCOMTR-MCNC: 114 MG/DL — HIGH (ref 70–99)
GLUCOSE BLDC GLUCOMTR-MCNC: 93 MG/DL — SIGNIFICANT CHANGE UP (ref 70–99)
GLUCOSE SERPL-MCNC: 119 MG/DL — HIGH (ref 70–99)
HCT VFR BLD CALC: 34.2 % — LOW (ref 34.5–45)
HGB BLD-MCNC: 11.1 G/DL — LOW (ref 11.5–15.5)
IMM GRANULOCYTES NFR BLD AUTO: 0.5 % — SIGNIFICANT CHANGE UP (ref 0–0.9)
INR BLD: 1.16 — SIGNIFICANT CHANGE UP (ref 0.88–1.16)
LYMPHOCYTES # BLD AUTO: 1.61 K/UL — SIGNIFICANT CHANGE UP (ref 1–3.3)
LYMPHOCYTES # BLD AUTO: 16.4 % — SIGNIFICANT CHANGE UP (ref 13–44)
MAGNESIUM SERPL-MCNC: 1.8 MG/DL — SIGNIFICANT CHANGE UP (ref 1.6–2.6)
MCHC RBC-ENTMCNC: 31.8 PG — SIGNIFICANT CHANGE UP (ref 27–34)
MCHC RBC-ENTMCNC: 32.5 GM/DL — SIGNIFICANT CHANGE UP (ref 32–36)
MCV RBC AUTO: 98 FL — SIGNIFICANT CHANGE UP (ref 80–100)
MONOCYTES # BLD AUTO: 0.88 K/UL — SIGNIFICANT CHANGE UP (ref 0–0.9)
MONOCYTES NFR BLD AUTO: 9 % — SIGNIFICANT CHANGE UP (ref 2–14)
NEUTROPHILS # BLD AUTO: 6.77 K/UL — SIGNIFICANT CHANGE UP (ref 1.8–7.4)
NEUTROPHILS NFR BLD AUTO: 68.9 % — SIGNIFICANT CHANGE UP (ref 43–77)
NRBC # BLD: 0 /100 WBCS — SIGNIFICANT CHANGE UP (ref 0–0)
PHOSPHATE SERPL-MCNC: 2.6 MG/DL — SIGNIFICANT CHANGE UP (ref 2.5–4.5)
PLATELET # BLD AUTO: 175 K/UL — SIGNIFICANT CHANGE UP (ref 150–400)
POTASSIUM SERPL-MCNC: 3.5 MMOL/L — SIGNIFICANT CHANGE UP (ref 3.5–5.3)
POTASSIUM SERPL-SCNC: 3.5 MMOL/L — SIGNIFICANT CHANGE UP (ref 3.5–5.3)
PROT SERPL-MCNC: 6.5 G/DL — SIGNIFICANT CHANGE UP (ref 6–8.3)
PROTHROM AB SERPL-ACNC: 13.8 SEC — HIGH (ref 10.5–13.4)
RBC # BLD: 3.49 M/UL — LOW (ref 3.8–5.2)
RBC # FLD: 13.7 % — SIGNIFICANT CHANGE UP (ref 10.3–14.5)
SODIUM SERPL-SCNC: 135 MMOL/L — SIGNIFICANT CHANGE UP (ref 135–145)
SPECIMEN SOURCE: SIGNIFICANT CHANGE UP
WBC # BLD: 9.82 K/UL — SIGNIFICANT CHANGE UP (ref 3.8–10.5)
WBC # FLD AUTO: 9.82 K/UL — SIGNIFICANT CHANGE UP (ref 3.8–10.5)

## 2023-05-19 PROCEDURE — 99231 SBSQ HOSP IP/OBS SF/LOW 25: CPT

## 2023-05-19 RX ORDER — WARFARIN SODIUM 2.5 MG/1
3 TABLET ORAL ONCE
Refills: 0 | Status: COMPLETED | OUTPATIENT
Start: 2023-05-19 | End: 2023-05-19

## 2023-05-19 RX ORDER — MAGNESIUM SULFATE 500 MG/ML
2 VIAL (ML) INJECTION ONCE
Refills: 0 | Status: COMPLETED | OUTPATIENT
Start: 2023-05-19 | End: 2023-05-19

## 2023-05-19 RX ORDER — POTASSIUM CHLORIDE 20 MEQ
40 PACKET (EA) ORAL ONCE
Refills: 0 | Status: COMPLETED | OUTPATIENT
Start: 2023-05-19 | End: 2023-05-19

## 2023-05-19 RX ORDER — URSODIOL 250 MG/1
1 TABLET, FILM COATED ORAL
Qty: 0 | Refills: 0 | DISCHARGE
Start: 2023-05-19

## 2023-05-19 RX ORDER — WARFARIN SODIUM 2.5 MG/1
3 TABLET ORAL AT BEDTIME
Refills: 0 | Status: DISCONTINUED | OUTPATIENT
Start: 2023-05-19 | End: 2023-05-19

## 2023-05-19 RX ADMIN — Medication 40 MILLIEQUIVALENT(S): at 12:12

## 2023-05-19 RX ADMIN — Medication 20 MILLIGRAM(S): at 06:51

## 2023-05-19 RX ADMIN — Medication 20 MILLIGRAM(S): at 17:13

## 2023-05-19 RX ADMIN — URSODIOL 250 MILLIGRAM(S): 250 TABLET, FILM COATED ORAL at 17:12

## 2023-05-19 RX ADMIN — Medication 1 DROP(S): at 17:13

## 2023-05-19 RX ADMIN — CHLORHEXIDINE GLUCONATE 1 APPLICATION(S): 213 SOLUTION TOPICAL at 06:44

## 2023-05-19 RX ADMIN — Medication 50 MILLIGRAM(S): at 06:51

## 2023-05-19 RX ADMIN — SERTRALINE 50 MILLIGRAM(S): 25 TABLET, FILM COATED ORAL at 12:12

## 2023-05-19 RX ADMIN — PIPERACILLIN AND TAZOBACTAM 25 GRAM(S): 4; .5 INJECTION, POWDER, LYOPHILIZED, FOR SOLUTION INTRAVENOUS at 13:57

## 2023-05-19 RX ADMIN — URSODIOL 250 MILLIGRAM(S): 250 TABLET, FILM COATED ORAL at 06:45

## 2023-05-19 RX ADMIN — Medication 1 DROP(S): at 12:12

## 2023-05-19 RX ADMIN — PIPERACILLIN AND TAZOBACTAM 25 GRAM(S): 4; .5 INJECTION, POWDER, LYOPHILIZED, FOR SOLUTION INTRAVENOUS at 06:44

## 2023-05-19 RX ADMIN — Medication 1 DROP(S): at 06:45

## 2023-05-19 RX ADMIN — Medication 40 MICROGRAM(S): at 09:30

## 2023-05-19 RX ADMIN — WARFARIN SODIUM 3 MILLIGRAM(S): 2.5 TABLET ORAL at 22:16

## 2023-05-19 RX ADMIN — Medication 25 GRAM(S): at 12:12

## 2023-05-19 RX ADMIN — PIPERACILLIN AND TAZOBACTAM 25 GRAM(S): 4; .5 INJECTION, POWDER, LYOPHILIZED, FOR SOLUTION INTRAVENOUS at 22:16

## 2023-05-19 RX ADMIN — SPIRONOLACTONE 25 MILLIGRAM(S): 25 TABLET, FILM COATED ORAL at 06:51

## 2023-05-19 NOTE — SWALLOW BEDSIDE ASSESSMENT ADULT - PHARYNGEAL PHASE
Immediate cough with thin liquids x3/4, suggestive of aspiration. No overt signs of aspiration observed with mildly thick liquids or solids.

## 2023-05-19 NOTE — SWALLOW BEDSIDE ASSESSMENT ADULT - SWALLOW EVAL: RECOMMENDED FEEDING/EATING TECHNIQUES
allow for swallow between intakes/alternate food with liquid/maintain upright posture during/after eating for 30 mins/no straws/position upright (90 degrees)/small sips/bites

## 2023-05-19 NOTE — SWALLOW BEDSIDE ASSESSMENT ADULT - SLP PERTINENT HISTORY OF CURRENT PROBLEM
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 Elevated LFTs, and questionable sludge ball in CBD, concerns for acute cholangitis but surgery deemed not needed. CT findings c/w pneumonia likely 2/2 aspiration and possible cystitis as well, on Zosyn 3.375g q8 for 5d.

## 2023-05-19 NOTE — SWALLOW BEDSIDE ASSESSMENT ADULT - SWALLOW EVAL: DIAGNOSIS
Pt presented with suspected pharyngeal dysphagia, likely acute-on-chronic or on-going, characterized by overt signs of aspiration with thin liquids, in setting of current PNA likely 2/2 aspiration per CT findings per MD. Per MD, pt's GOC are on-going. If within pt's GOC for further intervention, recs for MBSS for instrumental swallow assessment. If GOC are for comfort, recs for soft & bite-sized with mildly thick liquids as tolerated.

## 2023-05-19 NOTE — PROGRESS NOTE ADULT - PROBLEM SELECTOR PLAN 3
elevated liver enzymes and sludge ball in CBD, concerning for acute cholangitis. Reassuring downtrend in liver enzymes and patient afebrile.  Per GI: "may benefit from biliary decompression (ie. ERCP) however in light of multimorbid condition, cardiology risk assessment, acute hypox resp failure with elevated PASP on TTE, high concern for further decompensation following ERCP and necessary sedation."  Pt improving on abx.    -c/w empiric abx (see above)  -can consider percutaneous decompression if worsens  - started on Ursodiol 5/18 to continue indefinitely

## 2023-05-19 NOTE — SWALLOW BEDSIDE ASSESSMENT ADULT - SLP GENERAL OBSERVATIONS
Pt received awake, oriented to self and place, to year given 2 choices. Pt was confused, with intermittent confabulatory and tangential speech.

## 2023-05-19 NOTE — PROGRESS NOTE ADULT - ASSESSMENT
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 with c/o of difficulty breathing. Elevated LFTs, and questionable sludge ball in CBD, concerns for cholangitis. CT findings c/w pneumonia likely 2/2 aspiration and possible cystitis as well. Admitted to SICU for HD monitoring - patient is not a surgical candidate.    Plan:  - Continue ursodiol indefinitely  - Team 4c to sign off

## 2023-05-19 NOTE — PROGRESS NOTE ADULT - PROBLEM SELECTOR PLAN 2
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    - restarted home Coumadin 5/18  - c/w home metoprolol succinate ER 50mg q24  - c/w spironolactone 25mg q24  - c/w furosemide 20mg q12

## 2023-05-19 NOTE — SWALLOW BEDSIDE ASSESSMENT ADULT - COMMENTS
Per home health aide at bedside, pt was on a regular diet/thin liquids prior to admission. She reported that pt has been coughing intermittently with thin liquids for a while (could not say how long).

## 2023-05-19 NOTE — PROGRESS NOTE ADULT - SUBJECTIVE AND OBJECTIVE BOX
SUBJECTIVE: Pt seen and examined at bedside this am by surgery team. Tolerating diet, denies any pain. Slightly confused when being cleaned this morning.    MEDICATIONS  (STANDING):  artificial  tears Solution 1 Drop(s) Both EYES daily  chlorhexidine 2% Cloths 1 Application(s) Topical <User Schedule>  dextrose 5%. 1000 milliLiter(s) (50 mL/Hr) IV Continuous <Continuous>  dextrose 5%. 1000 milliLiter(s) (100 mL/Hr) IV Continuous <Continuous>  dextrose 50% Injectable 12.5 Gram(s) IV Push once  dextrose 50% Injectable 25 Gram(s) IV Push once  dextrose 50% Injectable 25 Gram(s) IV Push once  furosemide    Tablet 20 milliGRAM(s) Oral every 12 hours  glucagon  Injectable 1 milliGRAM(s) IntraMuscular once  insulin lispro (ADMELOG) corrective regimen sliding scale   SubCutaneous every 6 hours  levothyroxine Injectable 40 MICROGram(s) IV Push <User Schedule>  metoprolol succinate ER 50 milliGRAM(s) Oral daily  ofloxacin 0.3% Solution 1 Drop(s) Both EYES two times a day  piperacillin/tazobactam IVPB.. 3.375 Gram(s) IV Intermittent every 8 hours  sertraline 50 milliGRAM(s) Oral daily  spironolactone 25 milliGRAM(s) Oral daily  ursodiol Tablet 250 milliGRAM(s) Oral every 12 hours  warfarin 3 milliGRAM(s) Oral once    MEDICATIONS  (PRN):  dextrose Oral Gel 15 Gram(s) Oral once PRN Blood Glucose LESS THAN 70 milliGRAM(s)/deciliter  ondansetron Injectable 4 milliGRAM(s) IV Push every 6 hours PRN Nausea and/or Vomiting      Vital Signs Last 24 Hrs  T(C): 36.8 (19 May 2023 06:54), Max: 36.8 (19 May 2023 06:54)  T(F): 98.2 (19 May 2023 06:54), Max: 98.2 (19 May 2023 06:54)  HR: 65 (19 May 2023 06:54) (50 - 70)  BP: 171/72 (19 May 2023 06:54) (122/58 - 171/72)  BP(mean): 90 (18 May 2023 12:00) (83 - 101)  RR: 18 (19 May 2023 06:54) (16 - 19)  SpO2: 92% (19 May 2023 06:54) (91% - 96%)    Parameters below as of 19 May 2023 06:54  Patient On (Oxygen Delivery Method): nasal cannula        Physical Exam  General: NAD, resting comfortably in bed  Pulmonary: Nonlabored breathing, no respiratory distress  CV: NSR  Abd: soft, NT/ND, no guarding  Extremities: (-) edema, warm, well-perfused      I&O's Detail    18 May 2023 07:01  -  19 May 2023 07:00  --------------------------------------------------------  IN:    Oral Fluid: 300 mL  Total IN: 300 mL    OUT:    Voided (mL): 400 mL  Total OUT: 400 mL    Total NET: -100 mL          LABS:                        11.3   11.63 )-----------( 165      ( 18 May 2023 05:30 )             34.6     05-18    133<L>  |  101  |  11  ----------------------------<  95  3.8   |  23  |  0.61    Ca    8.9      18 May 2023 05:30  Phos  2.1     05-18  Mg     2.0     05-18    TPro  6.0  /  Alb  2.9<L>  /  TBili  1.4<H>  /  DBili  x   /  AST  73<H>  /  ALT  164<H>  /  AlkPhos  193<H>  05-18          RADIOLOGY & ADDITIONAL STUDIES:

## 2023-05-19 NOTE — PROGRESS NOTE ADULT - SUBJECTIVE AND OBJECTIVE BOX
OVERNIGHT EVENTS:  sonia    SUBJECTIVE / INTERVAL HPI: Patient seen and examined at bedside.  Baseline confused as usual but pleasant, in no distress.    VITAL SIGNS:  Vital Signs Last 24 Hrs  T(C): 36.8 (19 May 2023 06:54), Max: 36.8 (19 May 2023 06:54)  T(F): 98.2 (19 May 2023 06:54), Max: 98.2 (19 May 2023 06:54)  HR: 65 (19 May 2023 06:54) (57 - 70)  BP: 171/72 (19 May 2023 06:54) (137/63 - 171/72)  BP(mean): 90 (18 May 2023 12:00) (90 - 101)  RR: 18 (19 May 2023 06:54) (16 - 18)  SpO2: 92% (19 May 2023 06:54) (91% - 96%)    Parameters below as of 19 May 2023 06:54  Patient On (Oxygen Delivery Method): nasal cannula      I&O's Summary    18 May 2023 07:01  -  19 May 2023 07:00  --------------------------------------------------------  IN: 300 mL / OUT: 900 mL / NET: -600 mL        PHYSICAL EXAM:  General: NAD  HEENT: NC/AT, EOMI, no oral lesions, neck supple w/o LAD  Pulmonary: Nonlabored breathing, no respiratory distress, CTA-B/L  Cardiovascular: NSR, no murmurs  Abdominal: soft, mild RUQ TTP, ND  Extremities: WWP, 5/5 strength x 4, no clubbing/cyanosis/edema  Neuro: A/O x1, CNs II-XII grossly intact, no focal deficits  Pulses: palpable distal pulses    MEDICATIONS:  MEDICATIONS  (STANDING):  artificial  tears Solution 1 Drop(s) Both EYES daily  chlorhexidine 2% Cloths 1 Application(s) Topical <User Schedule>  dextrose 5%. 1000 milliLiter(s) (100 mL/Hr) IV Continuous <Continuous>  dextrose 5%. 1000 milliLiter(s) (50 mL/Hr) IV Continuous <Continuous>  dextrose 50% Injectable 12.5 Gram(s) IV Push once  dextrose 50% Injectable 25 Gram(s) IV Push once  dextrose 50% Injectable 25 Gram(s) IV Push once  furosemide    Tablet 20 milliGRAM(s) Oral every 12 hours  glucagon  Injectable 1 milliGRAM(s) IntraMuscular once  insulin lispro (ADMELOG) corrective regimen sliding scale   SubCutaneous every 6 hours  levothyroxine Injectable 40 MICROGram(s) IV Push <User Schedule>  metoprolol succinate ER 50 milliGRAM(s) Oral daily  ofloxacin 0.3% Solution 1 Drop(s) Both EYES two times a day  piperacillin/tazobactam IVPB.. 3.375 Gram(s) IV Intermittent every 8 hours  sertraline 50 milliGRAM(s) Oral daily  spironolactone 25 milliGRAM(s) Oral daily  ursodiol Tablet 250 milliGRAM(s) Oral every 12 hours  warfarin 3 milliGRAM(s) Oral once    MEDICATIONS  (PRN):  dextrose Oral Gel 15 Gram(s) Oral once PRN Blood Glucose LESS THAN 70 milliGRAM(s)/deciliter  ondansetron Injectable 4 milliGRAM(s) IV Push every 6 hours PRN Nausea and/or Vomiting      ALLERGIES:  Allergies    chlorhexidine topical (Rash)  fish (Other)  penicillin (Hives)    Intolerances        LABS:                        11.3   11.63 )-----------( 165      ( 18 May 2023 05:30 )             34.6     05-18    133<L>  |  101  |  11  ----------------------------<  95  3.8   |  23  |  0.61    Ca    8.9      18 May 2023 05:30  Phos  2.1     05-18  Mg     2.0     05-18    TPro  6.0  /  Alb  2.9<L>  /  TBili  1.4<H>  /  DBili  x   /  AST  73<H>  /  ALT  164<H>  /  AlkPhos  193<H>  05-18        CAPILLARY BLOOD GLUCOSE      POCT Blood Glucose.: 93 mg/dL (19 May 2023 06:32)      RADIOLOGY & ADDITIONAL TESTS: Reviewed.

## 2023-05-20 LAB
ALBUMIN SERPL ELPH-MCNC: 3.1 G/DL — LOW (ref 3.3–5)
ALP SERPL-CCNC: 222 U/L — HIGH (ref 40–120)
ALT FLD-CCNC: 75 U/L — HIGH (ref 10–45)
ANION GAP SERPL CALC-SCNC: 10 MMOL/L — SIGNIFICANT CHANGE UP (ref 5–17)
APTT BLD: 29.2 SEC — SIGNIFICANT CHANGE UP (ref 27.5–35.5)
AST SERPL-CCNC: 29 U/L — SIGNIFICANT CHANGE UP (ref 10–40)
BASOPHILS # BLD AUTO: 0.04 K/UL — SIGNIFICANT CHANGE UP (ref 0–0.2)
BASOPHILS NFR BLD AUTO: 0.4 % — SIGNIFICANT CHANGE UP (ref 0–2)
BILIRUB SERPL-MCNC: 0.8 MG/DL — SIGNIFICANT CHANGE UP (ref 0.2–1.2)
BUN SERPL-MCNC: 11 MG/DL — SIGNIFICANT CHANGE UP (ref 7–23)
CALCIUM SERPL-MCNC: 8.6 MG/DL — SIGNIFICANT CHANGE UP (ref 8.4–10.5)
CHLORIDE SERPL-SCNC: 99 MMOL/L — SIGNIFICANT CHANGE UP (ref 96–108)
CO2 SERPL-SCNC: 27 MMOL/L — SIGNIFICANT CHANGE UP (ref 22–31)
CREAT SERPL-MCNC: 0.63 MG/DL — SIGNIFICANT CHANGE UP (ref 0.5–1.3)
CULTURE RESULTS: SIGNIFICANT CHANGE UP
EGFR: 82 ML/MIN/1.73M2 — SIGNIFICANT CHANGE UP
EOSINOPHIL # BLD AUTO: 0.75 K/UL — HIGH (ref 0–0.5)
EOSINOPHIL NFR BLD AUTO: 7.2 % — HIGH (ref 0–6)
GLUCOSE BLDC GLUCOMTR-MCNC: 107 MG/DL — HIGH (ref 70–99)
GLUCOSE BLDC GLUCOMTR-MCNC: 112 MG/DL — HIGH (ref 70–99)
GLUCOSE BLDC GLUCOMTR-MCNC: 147 MG/DL — HIGH (ref 70–99)
GLUCOSE BLDC GLUCOMTR-MCNC: 92 MG/DL — SIGNIFICANT CHANGE UP (ref 70–99)
GLUCOSE SERPL-MCNC: 98 MG/DL — SIGNIFICANT CHANGE UP (ref 70–99)
HCT VFR BLD CALC: 33.6 % — LOW (ref 34.5–45)
HGB BLD-MCNC: 11 G/DL — LOW (ref 11.5–15.5)
IMM GRANULOCYTES NFR BLD AUTO: 0.3 % — SIGNIFICANT CHANGE UP (ref 0–0.9)
INR BLD: 1.14 — SIGNIFICANT CHANGE UP (ref 0.88–1.16)
LYMPHOCYTES # BLD AUTO: 1.68 K/UL — SIGNIFICANT CHANGE UP (ref 1–3.3)
LYMPHOCYTES # BLD AUTO: 16.2 % — SIGNIFICANT CHANGE UP (ref 13–44)
MAGNESIUM SERPL-MCNC: 2 MG/DL — SIGNIFICANT CHANGE UP (ref 1.6–2.6)
MCHC RBC-ENTMCNC: 31.7 PG — SIGNIFICANT CHANGE UP (ref 27–34)
MCHC RBC-ENTMCNC: 32.7 GM/DL — SIGNIFICANT CHANGE UP (ref 32–36)
MCV RBC AUTO: 96.8 FL — SIGNIFICANT CHANGE UP (ref 80–100)
MONOCYTES # BLD AUTO: 0.92 K/UL — HIGH (ref 0–0.9)
MONOCYTES NFR BLD AUTO: 8.9 % — SIGNIFICANT CHANGE UP (ref 2–14)
NEUTROPHILS # BLD AUTO: 6.97 K/UL — SIGNIFICANT CHANGE UP (ref 1.8–7.4)
NEUTROPHILS NFR BLD AUTO: 67 % — SIGNIFICANT CHANGE UP (ref 43–77)
NRBC # BLD: 0 /100 WBCS — SIGNIFICANT CHANGE UP (ref 0–0)
PHOSPHATE SERPL-MCNC: 2.8 MG/DL — SIGNIFICANT CHANGE UP (ref 2.5–4.5)
PLATELET # BLD AUTO: 186 K/UL — SIGNIFICANT CHANGE UP (ref 150–400)
POTASSIUM SERPL-MCNC: 3.6 MMOL/L — SIGNIFICANT CHANGE UP (ref 3.5–5.3)
POTASSIUM SERPL-SCNC: 3.6 MMOL/L — SIGNIFICANT CHANGE UP (ref 3.5–5.3)
PROT SERPL-MCNC: 6.3 G/DL — SIGNIFICANT CHANGE UP (ref 6–8.3)
PROTHROM AB SERPL-ACNC: 13.6 SEC — HIGH (ref 10.5–13.4)
RBC # BLD: 3.47 M/UL — LOW (ref 3.8–5.2)
RBC # FLD: 13.6 % — SIGNIFICANT CHANGE UP (ref 10.3–14.5)
SODIUM SERPL-SCNC: 136 MMOL/L — SIGNIFICANT CHANGE UP (ref 135–145)
SPECIMEN SOURCE: SIGNIFICANT CHANGE UP
WBC # BLD: 10.39 K/UL — SIGNIFICANT CHANGE UP (ref 3.8–10.5)
WBC # FLD AUTO: 10.39 K/UL — SIGNIFICANT CHANGE UP (ref 3.8–10.5)

## 2023-05-20 RX ORDER — POTASSIUM CHLORIDE 20 MEQ
40 PACKET (EA) ORAL ONCE
Refills: 0 | Status: COMPLETED | OUTPATIENT
Start: 2023-05-20 | End: 2023-05-20

## 2023-05-20 RX ADMIN — SPIRONOLACTONE 25 MILLIGRAM(S): 25 TABLET, FILM COATED ORAL at 06:54

## 2023-05-20 RX ADMIN — Medication 1 DROP(S): at 12:19

## 2023-05-20 RX ADMIN — PIPERACILLIN AND TAZOBACTAM 25 GRAM(S): 4; .5 INJECTION, POWDER, LYOPHILIZED, FOR SOLUTION INTRAVENOUS at 14:20

## 2023-05-20 RX ADMIN — Medication 20 MILLIGRAM(S): at 06:54

## 2023-05-20 RX ADMIN — Medication 1 DROP(S): at 07:11

## 2023-05-20 RX ADMIN — Medication 40 MICROGRAM(S): at 09:07

## 2023-05-20 RX ADMIN — PIPERACILLIN AND TAZOBACTAM 25 GRAM(S): 4; .5 INJECTION, POWDER, LYOPHILIZED, FOR SOLUTION INTRAVENOUS at 06:57

## 2023-05-20 RX ADMIN — PIPERACILLIN AND TAZOBACTAM 25 GRAM(S): 4; .5 INJECTION, POWDER, LYOPHILIZED, FOR SOLUTION INTRAVENOUS at 22:39

## 2023-05-20 RX ADMIN — SERTRALINE 50 MILLIGRAM(S): 25 TABLET, FILM COATED ORAL at 12:20

## 2023-05-20 RX ADMIN — Medication 1 DROP(S): at 18:42

## 2023-05-20 RX ADMIN — Medication 50 MILLIGRAM(S): at 06:54

## 2023-05-20 RX ADMIN — URSODIOL 250 MILLIGRAM(S): 250 TABLET, FILM COATED ORAL at 18:38

## 2023-05-20 RX ADMIN — URSODIOL 250 MILLIGRAM(S): 250 TABLET, FILM COATED ORAL at 08:33

## 2023-05-20 RX ADMIN — Medication 40 MILLIEQUIVALENT(S): at 12:19

## 2023-05-20 NOTE — PROGRESS NOTE ADULT - SUBJECTIVE AND OBJECTIVE BOX
PINEDA MAY  89y, Female    Patient is a 89y old  Female who presents with a chief complaint of Acute cholangitis (19 May 2023 07:36)      INTERVAL HPI/OVERNIGHT EVENTS:    ROS: otherwise negative      T(C): 36.5 (05-20-23 @ 06:00), Max: 37.1 (05-19-23 @ 22:49)  HR: 59 (05-20-23 @ 06:00) (59 - 67)  BP: 154/78 (05-20-23 @ 06:00) (150/60 - 163/86)  RR: 18 (05-20-23 @ 06:00) (17 - 18)  SpO2: 93% (05-20-23 @ 06:00) (92% - 93%)  Wt(kg): --Vital Signs Last 24 Hrs  T(C): 36.5 (20 May 2023 06:00), Max: 37.1 (19 May 2023 22:49)  T(F): 97.7 (20 May 2023 06:00), Max: 98.7 (19 May 2023 22:49)  HR: 59 (20 May 2023 06:00) (59 - 67)  BP: 154/78 (20 May 2023 06:00) (150/60 - 163/86)  BP(mean): --  RR: 18 (20 May 2023 06:00) (17 - 18)  SpO2: 93% (20 May 2023 06:00) (92% - 93%)    Parameters below as of 19 May 2023 22:49  Patient On (Oxygen Delivery Method): nasal cannula  O2 Flow (L/min): 2      PHYSICAL EXAM:  General: NAD  HEENT: NC/AT, EOMI, no oral lesions, neck supple w/o LAD  Pulmonary: Nonlabored breathing, no respiratory distress, CTA-B/L  Cardiovascular: NSR, no murmurs  Abdominal: soft, mild RUQ TTP, ND  Extremities: WWP, 5/5 strength x 4, no clubbing/cyanosis/edema  Neuro: A/O x1, CNs II-XII grossly intact, no focal deficits  Pulses: palpable distal pulses      LABS:                        11.0   10.39 )-----------( 186      ( 20 May 2023 05:30 )             33.6     05-19    135  |  100  |  11  ----------------------------<  119<H>  3.5   |  25  |  0.61    Ca    8.9      19 May 2023 10:31  Phos  2.6     05-19  Mg     1.8     05-19    TPro  6.5  /  Alb  2.6<L>  /  TBili  1.1  /  DBili  x   /  AST  38  /  ALT  104<H>  /  AlkPhos  253<H>  05-19      PT/INR - ( 20 May 2023 05:30 )   PT: 13.6 sec;   INR: 1.14          PTT - ( 20 May 2023 05:30 )  PTT:29.2 sec    CAPILLARY BLOOD GLUCOSE      POCT Blood Glucose.: 109 mg/dL (19 May 2023 23:55)  POCT Blood Glucose.: 111 mg/dL (19 May 2023 18:21)  POCT Blood Glucose.: 114 mg/dL (19 May 2023 13:04)            MEDICATIONS  (STANDING):  artificial  tears Solution 1 Drop(s) Both EYES daily  chlorhexidine 2% Cloths 1 Application(s) Topical <User Schedule>  dextrose 5%. 1000 milliLiter(s) (50 mL/Hr) IV Continuous <Continuous>  dextrose 5%. 1000 milliLiter(s) (100 mL/Hr) IV Continuous <Continuous>  dextrose 50% Injectable 12.5 Gram(s) IV Push once  dextrose 50% Injectable 25 Gram(s) IV Push once  dextrose 50% Injectable 25 Gram(s) IV Push once  furosemide    Tablet 20 milliGRAM(s) Oral every 12 hours  glucagon  Injectable 1 milliGRAM(s) IntraMuscular once  insulin lispro (ADMELOG) corrective regimen sliding scale   SubCutaneous every 6 hours  levothyroxine Injectable 40 MICROGram(s) IV Push <User Schedule>  metoprolol succinate ER 50 milliGRAM(s) Oral daily  ofloxacin 0.3% Solution 1 Drop(s) Both EYES two times a day  piperacillin/tazobactam IVPB.. 3.375 Gram(s) IV Intermittent every 8 hours  sertraline 50 milliGRAM(s) Oral daily  spironolactone 25 milliGRAM(s) Oral daily  ursodiol Tablet 250 milliGRAM(s) Oral every 12 hours    MEDICATIONS  (PRN):  dextrose Oral Gel 15 Gram(s) Oral once PRN Blood Glucose LESS THAN 70 milliGRAM(s)/deciliter  ondansetron Injectable 4 milliGRAM(s) IV Push every 6 hours PRN Nausea and/or Vomiting      RADIOLOGY & ADDITIONAL TESTS: Reviewed   PINEDA MAY  89y, Female    Patient is a 89y old  Female who presents with a chief complaint of Acute cholangitis (19 May 2023 07:36)      INTERVAL HPI/OVERNIGHT EVENTS: FRANCISCO    ROS: otherwise negative      T(C): 36.5 (05-20-23 @ 06:00), Max: 37.1 (05-19-23 @ 22:49)  HR: 59 (05-20-23 @ 06:00) (59 - 67)  BP: 154/78 (05-20-23 @ 06:00) (150/60 - 163/86)  RR: 18 (05-20-23 @ 06:00) (17 - 18)  SpO2: 93% (05-20-23 @ 06:00) (92% - 93%)  Wt(kg): --Vital Signs Last 24 Hrs  T(C): 36.5 (20 May 2023 06:00), Max: 37.1 (19 May 2023 22:49)  T(F): 97.7 (20 May 2023 06:00), Max: 98.7 (19 May 2023 22:49)  HR: 59 (20 May 2023 06:00) (59 - 67)  BP: 154/78 (20 May 2023 06:00) (150/60 - 163/86)  BP(mean): --  RR: 18 (20 May 2023 06:00) (17 - 18)  SpO2: 93% (20 May 2023 06:00) (92% - 93%)    Parameters below as of 19 May 2023 22:49  Patient On (Oxygen Delivery Method): nasal cannula  O2 Flow (L/min): 2      PHYSICAL EXAM:  General: NAD  HEENT: NC/AT, EOMI, no oral lesions, neck supple w/o LAD  Pulmonary: Nonlabored breathing, no respiratory distress, CTA-B/L  Cardiovascular: NSR, no murmurs  Abdominal: soft, mild RUQ TTP, ND  Extremities: WWP, 5/5 strength x 4, no clubbing/cyanosis/edema  Neuro: A/O x1, CNs II-XII grossly intact, no focal deficits  Pulses: palpable distal pulses      LABS:                        11.0   10.39 )-----------( 186      ( 20 May 2023 05:30 )             33.6     05-19    135  |  100  |  11  ----------------------------<  119<H>  3.5   |  25  |  0.61    Ca    8.9      19 May 2023 10:31  Phos  2.6     05-19  Mg     1.8     05-19    TPro  6.5  /  Alb  2.6<L>  /  TBili  1.1  /  DBili  x   /  AST  38  /  ALT  104<H>  /  AlkPhos  253<H>  05-19      PT/INR - ( 20 May 2023 05:30 )   PT: 13.6 sec;   INR: 1.14          PTT - ( 20 May 2023 05:30 )  PTT:29.2 sec    CAPILLARY BLOOD GLUCOSE      POCT Blood Glucose.: 109 mg/dL (19 May 2023 23:55)  POCT Blood Glucose.: 111 mg/dL (19 May 2023 18:21)  POCT Blood Glucose.: 114 mg/dL (19 May 2023 13:04)            MEDICATIONS  (STANDING):  artificial  tears Solution 1 Drop(s) Both EYES daily  chlorhexidine 2% Cloths 1 Application(s) Topical <User Schedule>  dextrose 5%. 1000 milliLiter(s) (50 mL/Hr) IV Continuous <Continuous>  dextrose 5%. 1000 milliLiter(s) (100 mL/Hr) IV Continuous <Continuous>  dextrose 50% Injectable 12.5 Gram(s) IV Push once  dextrose 50% Injectable 25 Gram(s) IV Push once  dextrose 50% Injectable 25 Gram(s) IV Push once  furosemide    Tablet 20 milliGRAM(s) Oral every 12 hours  glucagon  Injectable 1 milliGRAM(s) IntraMuscular once  insulin lispro (ADMELOG) corrective regimen sliding scale   SubCutaneous every 6 hours  levothyroxine Injectable 40 MICROGram(s) IV Push <User Schedule>  metoprolol succinate ER 50 milliGRAM(s) Oral daily  ofloxacin 0.3% Solution 1 Drop(s) Both EYES two times a day  piperacillin/tazobactam IVPB.. 3.375 Gram(s) IV Intermittent every 8 hours  sertraline 50 milliGRAM(s) Oral daily  spironolactone 25 milliGRAM(s) Oral daily  ursodiol Tablet 250 milliGRAM(s) Oral every 12 hours    MEDICATIONS  (PRN):  dextrose Oral Gel 15 Gram(s) Oral once PRN Blood Glucose LESS THAN 70 milliGRAM(s)/deciliter  ondansetron Injectable 4 milliGRAM(s) IV Push every 6 hours PRN Nausea and/or Vomiting      RADIOLOGY & ADDITIONAL TESTS: Reviewed

## 2023-05-20 NOTE — PROGRESS NOTE ADULT - PROBLEM SELECTOR PLAN 5
Lytes: replete as needed  Diet: Pureed Diet  PPX: SQH, SCDs  Dispo: [facility]F

## 2023-05-20 NOTE — PROGRESS NOTE ADULT - PROBLEM SELECTOR PLAN 1
In ED, pt febrile to 101.1 with 11.14 WBC. CXR 5/15 showing R sided pleural effusion, mild bi-basilar atelectasis. CT A+P on 5/15 showing small b/l pleural effusion, airspace disease, consolidation at both lung bases, and stomach wall thickening, & no evidence of biliary obstruction. At time of consult, pt afebrile and WBC 13.48. Pt originally given vanc + aztreonam but developed rash. Pt likely with community acquired aspiration pneumonia.    - C/w Zosyn 3.375 IV q8h for a 5 day course (5/16-5/20)  - If patient discharged, may transition to Augmentin po 875/125 mg q12h through 5/20.

## 2023-05-21 LAB
GLUCOSE BLDC GLUCOMTR-MCNC: 100 MG/DL — HIGH (ref 70–99)
GLUCOSE BLDC GLUCOMTR-MCNC: 114 MG/DL — HIGH (ref 70–99)
GLUCOSE BLDC GLUCOMTR-MCNC: 90 MG/DL — SIGNIFICANT CHANGE UP (ref 70–99)
GLUCOSE BLDC GLUCOMTR-MCNC: 93 MG/DL — SIGNIFICANT CHANGE UP (ref 70–99)

## 2023-05-21 RX ORDER — DIGOXIN 250 MCG
125 TABLET ORAL DAILY
Refills: 0 | Status: DISCONTINUED | OUTPATIENT
Start: 2023-05-21 | End: 2023-05-22

## 2023-05-21 RX ORDER — WARFARIN SODIUM 2.5 MG/1
3 TABLET ORAL ONCE
Refills: 0 | Status: COMPLETED | OUTPATIENT
Start: 2023-05-21 | End: 2023-05-21

## 2023-05-21 RX ORDER — ENOXAPARIN SODIUM 100 MG/ML
40 INJECTION SUBCUTANEOUS EVERY 12 HOURS
Refills: 0 | Status: DISCONTINUED | OUTPATIENT
Start: 2023-05-21 | End: 2023-05-21

## 2023-05-21 RX ORDER — ENOXAPARIN SODIUM 100 MG/ML
30 INJECTION SUBCUTANEOUS EVERY 12 HOURS
Refills: 0 | Status: DISCONTINUED | OUTPATIENT
Start: 2023-05-21 | End: 2023-05-22

## 2023-05-21 RX ORDER — WARFARIN SODIUM 2.5 MG/1
3 TABLET ORAL AT BEDTIME
Refills: 0 | Status: DISCONTINUED | OUTPATIENT
Start: 2023-05-21 | End: 2023-05-21

## 2023-05-21 RX ADMIN — URSODIOL 250 MILLIGRAM(S): 250 TABLET, FILM COATED ORAL at 07:08

## 2023-05-21 RX ADMIN — Medication 20 MILLIGRAM(S): at 19:48

## 2023-05-21 RX ADMIN — PIPERACILLIN AND TAZOBACTAM 25 GRAM(S): 4; .5 INJECTION, POWDER, LYOPHILIZED, FOR SOLUTION INTRAVENOUS at 07:07

## 2023-05-21 RX ADMIN — Medication 20 MILLIGRAM(S): at 07:07

## 2023-05-21 RX ADMIN — Medication 1 DROP(S): at 07:08

## 2023-05-21 RX ADMIN — CHLORHEXIDINE GLUCONATE 1 APPLICATION(S): 213 SOLUTION TOPICAL at 07:07

## 2023-05-21 RX ADMIN — Medication 50 MILLIGRAM(S): at 07:08

## 2023-05-21 RX ADMIN — Medication 1 DROP(S): at 13:31

## 2023-05-21 RX ADMIN — PIPERACILLIN AND TAZOBACTAM 25 GRAM(S): 4; .5 INJECTION, POWDER, LYOPHILIZED, FOR SOLUTION INTRAVENOUS at 22:58

## 2023-05-21 RX ADMIN — Medication 40 MICROGRAM(S): at 07:08

## 2023-05-21 RX ADMIN — WARFARIN SODIUM 3 MILLIGRAM(S): 2.5 TABLET ORAL at 22:10

## 2023-05-21 RX ADMIN — SERTRALINE 50 MILLIGRAM(S): 25 TABLET, FILM COATED ORAL at 13:30

## 2023-05-21 RX ADMIN — PIPERACILLIN AND TAZOBACTAM 25 GRAM(S): 4; .5 INJECTION, POWDER, LYOPHILIZED, FOR SOLUTION INTRAVENOUS at 13:30

## 2023-05-21 RX ADMIN — Medication 1 DROP(S): at 19:49

## 2023-05-21 RX ADMIN — SPIRONOLACTONE 25 MILLIGRAM(S): 25 TABLET, FILM COATED ORAL at 07:08

## 2023-05-21 RX ADMIN — URSODIOL 250 MILLIGRAM(S): 250 TABLET, FILM COATED ORAL at 19:48

## 2023-05-21 NOTE — PHYSICAL THERAPY INITIAL EVALUATION ADULT - PERTINENT HX OF CURRENT PROBLEM, REHAB EVAL
89yo Female pt with PMHx HTN, HLD, Hypothyroidism; PSHx mitral valve replacement (on warfarin), PPM; presented to [facility] with c/o of difficulty breathing. Elevated LFTs, and questionable sludge ball in CBD, concerns for cholangitis

## 2023-05-21 NOTE — PROGRESS NOTE ADULT - ASSESSMENT
Stable  Completer antibiotics and plan discharge  Discussed with the patients son that the bile duct sludge may result in recurrent infection

## 2023-05-22 ENCOUNTER — TRANSCRIPTION ENCOUNTER (OUTPATIENT)
Age: 88
End: 2023-05-22

## 2023-05-22 VITALS
DIASTOLIC BLOOD PRESSURE: 67 MMHG | RESPIRATION RATE: 17 BRPM | TEMPERATURE: 98 F | SYSTOLIC BLOOD PRESSURE: 153 MMHG | OXYGEN SATURATION: 93 % | HEART RATE: 67 BPM

## 2023-05-22 LAB
ALBUMIN SERPL ELPH-MCNC: 3 G/DL — LOW (ref 3.3–5)
ALP SERPL-CCNC: 224 U/L — HIGH (ref 40–120)
ALT FLD-CCNC: 42 U/L — SIGNIFICANT CHANGE UP (ref 10–45)
ANION GAP SERPL CALC-SCNC: 12 MMOL/L — SIGNIFICANT CHANGE UP (ref 5–17)
AST SERPL-CCNC: 24 U/L — SIGNIFICANT CHANGE UP (ref 10–40)
BASOPHILS # BLD AUTO: 0.05 K/UL — SIGNIFICANT CHANGE UP (ref 0–0.2)
BASOPHILS NFR BLD AUTO: 0.4 % — SIGNIFICANT CHANGE UP (ref 0–2)
BILIRUB SERPL-MCNC: 0.5 MG/DL — SIGNIFICANT CHANGE UP (ref 0.2–1.2)
BUN SERPL-MCNC: 11 MG/DL — SIGNIFICANT CHANGE UP (ref 7–23)
CALCIUM SERPL-MCNC: 8.9 MG/DL — SIGNIFICANT CHANGE UP (ref 8.4–10.5)
CHLORIDE SERPL-SCNC: 99 MMOL/L — SIGNIFICANT CHANGE UP (ref 96–108)
CO2 SERPL-SCNC: 26 MMOL/L — SIGNIFICANT CHANGE UP (ref 22–31)
CREAT SERPL-MCNC: 0.66 MG/DL — SIGNIFICANT CHANGE UP (ref 0.5–1.3)
EGFR: 81 ML/MIN/1.73M2 — SIGNIFICANT CHANGE UP
EOSINOPHIL # BLD AUTO: 0.76 K/UL — HIGH (ref 0–0.5)
EOSINOPHIL NFR BLD AUTO: 6.6 % — HIGH (ref 0–6)
GLUCOSE BLDC GLUCOMTR-MCNC: 103 MG/DL — HIGH (ref 70–99)
GLUCOSE BLDC GLUCOMTR-MCNC: 93 MG/DL — SIGNIFICANT CHANGE UP (ref 70–99)
GLUCOSE SERPL-MCNC: 86 MG/DL — SIGNIFICANT CHANGE UP (ref 70–99)
HCT VFR BLD CALC: 35.3 % — SIGNIFICANT CHANGE UP (ref 34.5–45)
HGB BLD-MCNC: 11.5 G/DL — SIGNIFICANT CHANGE UP (ref 11.5–15.5)
IMM GRANULOCYTES NFR BLD AUTO: 0.5 % — SIGNIFICANT CHANGE UP (ref 0–0.9)
INR BLD: 1.14 — SIGNIFICANT CHANGE UP (ref 0.88–1.16)
LYMPHOCYTES # BLD AUTO: 2.59 K/UL — SIGNIFICANT CHANGE UP (ref 1–3.3)
LYMPHOCYTES # BLD AUTO: 22.6 % — SIGNIFICANT CHANGE UP (ref 13–44)
MAGNESIUM SERPL-MCNC: 1.9 MG/DL — SIGNIFICANT CHANGE UP (ref 1.6–2.6)
MCHC RBC-ENTMCNC: 31.5 PG — SIGNIFICANT CHANGE UP (ref 27–34)
MCHC RBC-ENTMCNC: 32.6 GM/DL — SIGNIFICANT CHANGE UP (ref 32–36)
MCV RBC AUTO: 96.7 FL — SIGNIFICANT CHANGE UP (ref 80–100)
MONOCYTES # BLD AUTO: 1.21 K/UL — HIGH (ref 0–0.9)
MONOCYTES NFR BLD AUTO: 10.6 % — SIGNIFICANT CHANGE UP (ref 2–14)
NEUTROPHILS # BLD AUTO: 6.79 K/UL — SIGNIFICANT CHANGE UP (ref 1.8–7.4)
NEUTROPHILS NFR BLD AUTO: 59.3 % — SIGNIFICANT CHANGE UP (ref 43–77)
NRBC # BLD: 0 /100 WBCS — SIGNIFICANT CHANGE UP (ref 0–0)
PHOSPHATE SERPL-MCNC: 3.6 MG/DL — SIGNIFICANT CHANGE UP (ref 2.5–4.5)
PLATELET # BLD AUTO: 228 K/UL — SIGNIFICANT CHANGE UP (ref 150–400)
POTASSIUM SERPL-MCNC: 3.8 MMOL/L — SIGNIFICANT CHANGE UP (ref 3.5–5.3)
POTASSIUM SERPL-SCNC: 3.8 MMOL/L — SIGNIFICANT CHANGE UP (ref 3.5–5.3)
PROT SERPL-MCNC: 6.3 G/DL — SIGNIFICANT CHANGE UP (ref 6–8.3)
PROTHROM AB SERPL-ACNC: 13.6 SEC — HIGH (ref 10.5–13.4)
RBC # BLD: 3.65 M/UL — LOW (ref 3.8–5.2)
RBC # FLD: 13.6 % — SIGNIFICANT CHANGE UP (ref 10.3–14.5)
SODIUM SERPL-SCNC: 137 MMOL/L — SIGNIFICANT CHANGE UP (ref 135–145)
WBC # BLD: 11.46 K/UL — HIGH (ref 3.8–10.5)
WBC # FLD AUTO: 11.46 K/UL — HIGH (ref 3.8–10.5)

## 2023-05-22 PROCEDURE — 85610 PROTHROMBIN TIME: CPT

## 2023-05-22 PROCEDURE — 81001 URINALYSIS AUTO W/SCOPE: CPT

## 2023-05-22 PROCEDURE — 96365 THER/PROPH/DIAG IV INF INIT: CPT

## 2023-05-22 PROCEDURE — C8929: CPT

## 2023-05-22 PROCEDURE — 80053 COMPREHEN METABOLIC PANEL: CPT

## 2023-05-22 PROCEDURE — 87070 CULTURE OTHR SPECIMN AEROBIC: CPT

## 2023-05-22 PROCEDURE — 84295 ASSAY OF SERUM SODIUM: CPT

## 2023-05-22 PROCEDURE — 92526 ORAL FUNCTION THERAPY: CPT

## 2023-05-22 PROCEDURE — 96366 THER/PROPH/DIAG IV INF ADDON: CPT

## 2023-05-22 PROCEDURE — 97161 PT EVAL LOW COMPLEX 20 MIN: CPT

## 2023-05-22 PROCEDURE — 84484 ASSAY OF TROPONIN QUANT: CPT

## 2023-05-22 PROCEDURE — 83036 HEMOGLOBIN GLYCOSYLATED A1C: CPT

## 2023-05-22 PROCEDURE — 96367 TX/PROPH/DG ADDL SEQ IV INF: CPT

## 2023-05-22 PROCEDURE — 80076 HEPATIC FUNCTION PANEL: CPT

## 2023-05-22 PROCEDURE — 86901 BLOOD TYPING SEROLOGIC RH(D): CPT

## 2023-05-22 PROCEDURE — 36415 COLL VENOUS BLD VENIPUNCTURE: CPT

## 2023-05-22 PROCEDURE — 80307 DRUG TEST PRSMV CHEM ANLYZR: CPT

## 2023-05-22 PROCEDURE — 92610 EVALUATE SWALLOWING FUNCTION: CPT

## 2023-05-22 PROCEDURE — 82803 BLOOD GASES ANY COMBINATION: CPT

## 2023-05-22 PROCEDURE — 80048 BASIC METABOLIC PNL TOTAL CA: CPT

## 2023-05-22 PROCEDURE — 86850 RBC ANTIBODY SCREEN: CPT

## 2023-05-22 PROCEDURE — 85027 COMPLETE CBC AUTOMATED: CPT

## 2023-05-22 PROCEDURE — 82330 ASSAY OF CALCIUM: CPT

## 2023-05-22 PROCEDURE — 82962 GLUCOSE BLOOD TEST: CPT

## 2023-05-22 PROCEDURE — 76705 ECHO EXAM OF ABDOMEN: CPT

## 2023-05-22 PROCEDURE — 84132 ASSAY OF SERUM POTASSIUM: CPT

## 2023-05-22 PROCEDURE — 74177 CT ABD & PELVIS W/CONTRAST: CPT | Mod: MA

## 2023-05-22 PROCEDURE — 87040 BLOOD CULTURE FOR BACTERIA: CPT

## 2023-05-22 PROCEDURE — 83605 ASSAY OF LACTIC ACID: CPT

## 2023-05-22 PROCEDURE — 85025 COMPLETE CBC W/AUTO DIFF WBC: CPT

## 2023-05-22 PROCEDURE — 0225U NFCT DS DNA&RNA 21 SARSCOV2: CPT

## 2023-05-22 PROCEDURE — 96375 TX/PRO/DX INJ NEW DRUG ADDON: CPT

## 2023-05-22 PROCEDURE — 71045 X-RAY EXAM CHEST 1 VIEW: CPT

## 2023-05-22 PROCEDURE — 83735 ASSAY OF MAGNESIUM: CPT

## 2023-05-22 PROCEDURE — 85730 THROMBOPLASTIN TIME PARTIAL: CPT

## 2023-05-22 PROCEDURE — 87449 NOS EACH ORGANISM AG IA: CPT

## 2023-05-22 PROCEDURE — 83880 ASSAY OF NATRIURETIC PEPTIDE: CPT

## 2023-05-22 PROCEDURE — 87150 DNA/RNA AMPLIFIED PROBE: CPT

## 2023-05-22 PROCEDURE — 86900 BLOOD TYPING SEROLOGIC ABO: CPT

## 2023-05-22 PROCEDURE — 99285 EMERGENCY DEPT VISIT HI MDM: CPT

## 2023-05-22 PROCEDURE — 93005 ELECTROCARDIOGRAM TRACING: CPT

## 2023-05-22 PROCEDURE — 84100 ASSAY OF PHOSPHORUS: CPT

## 2023-05-22 PROCEDURE — 87086 URINE CULTURE/COLONY COUNT: CPT

## 2023-05-22 RX ORDER — FUROSEMIDE 40 MG
2 TABLET ORAL
Refills: 0 | DISCHARGE
Start: 2023-05-22

## 2023-05-22 RX ORDER — SPIRONOLACTONE 25 MG/1
1 TABLET, FILM COATED ORAL
Refills: 0 | DISCHARGE
Start: 2023-05-22

## 2023-05-22 RX ORDER — METOPROLOL TARTRATE 50 MG
1 TABLET ORAL
Refills: 0 | DISCHARGE

## 2023-05-22 RX ORDER — FUROSEMIDE 40 MG
1 TABLET ORAL
Refills: 0 | DISCHARGE

## 2023-05-22 RX ORDER — URSODIOL 250 MG/1
1 TABLET, FILM COATED ORAL
Qty: 60 | Refills: 0
Start: 2023-05-22 | End: 2023-06-20

## 2023-05-22 RX ORDER — CIPROFLOXACIN LACTATE 400MG/40ML
1 VIAL (ML) INTRAVENOUS
Qty: 10 | Refills: 0
Start: 2023-05-22 | End: 2023-05-31

## 2023-05-22 RX ORDER — WARFARIN SODIUM 2.5 MG/1
1 TABLET ORAL
Qty: 0 | Refills: 0 | DISCHARGE
Start: 2023-05-22

## 2023-05-22 RX ORDER — DIGOXIN 250 MCG
1 TABLET ORAL
Qty: 0 | Refills: 0 | DISCHARGE
Start: 2023-05-22

## 2023-05-22 RX ORDER — METOPROLOL TARTRATE 50 MG
1 TABLET ORAL
Refills: 0 | DISCHARGE
Start: 2023-05-22

## 2023-05-22 RX ORDER — WARFARIN SODIUM 2.5 MG/1
1 TABLET ORAL
Refills: 0 | DISCHARGE

## 2023-05-22 RX ORDER — ENOXAPARIN SODIUM 100 MG/ML
30 INJECTION SUBCUTANEOUS EVERY 12 HOURS
Refills: 0 | Status: DISCONTINUED | OUTPATIENT
Start: 2023-05-22 | End: 2023-05-22

## 2023-05-22 RX ORDER — ENOXAPARIN SODIUM 100 MG/ML
30 INJECTION SUBCUTANEOUS
Qty: 60 | Refills: 0
Start: 2023-05-22 | End: 2023-06-20

## 2023-05-22 RX ADMIN — Medication 1 DROP(S): at 12:42

## 2023-05-22 RX ADMIN — Medication 125 MICROGRAM(S): at 07:22

## 2023-05-22 RX ADMIN — SERTRALINE 50 MILLIGRAM(S): 25 TABLET, FILM COATED ORAL at 12:40

## 2023-05-22 RX ADMIN — Medication 1 DROP(S): at 07:37

## 2023-05-22 RX ADMIN — SPIRONOLACTONE 25 MILLIGRAM(S): 25 TABLET, FILM COATED ORAL at 07:22

## 2023-05-22 RX ADMIN — PIPERACILLIN AND TAZOBACTAM 25 GRAM(S): 4; .5 INJECTION, POWDER, LYOPHILIZED, FOR SOLUTION INTRAVENOUS at 14:02

## 2023-05-22 RX ADMIN — URSODIOL 250 MILLIGRAM(S): 250 TABLET, FILM COATED ORAL at 07:21

## 2023-05-22 RX ADMIN — Medication 20 MILLIGRAM(S): at 07:22

## 2023-05-22 RX ADMIN — URSODIOL 250 MILLIGRAM(S): 250 TABLET, FILM COATED ORAL at 18:09

## 2023-05-22 RX ADMIN — Medication 1 DROP(S): at 18:14

## 2023-05-22 RX ADMIN — ENOXAPARIN SODIUM 30 MILLIGRAM(S): 100 INJECTION SUBCUTANEOUS at 04:31

## 2023-05-22 RX ADMIN — PIPERACILLIN AND TAZOBACTAM 25 GRAM(S): 4; .5 INJECTION, POWDER, LYOPHILIZED, FOR SOLUTION INTRAVENOUS at 07:22

## 2023-05-22 RX ADMIN — Medication 40 MICROGRAM(S): at 07:21

## 2023-05-22 RX ADMIN — ENOXAPARIN SODIUM 30 MILLIGRAM(S): 100 INJECTION SUBCUTANEOUS at 18:01

## 2023-05-22 RX ADMIN — CHLORHEXIDINE GLUCONATE 1 APPLICATION(S): 213 SOLUTION TOPICAL at 07:21

## 2023-05-22 RX ADMIN — Medication 20 MILLIGRAM(S): at 18:08

## 2023-05-22 RX ADMIN — Medication 50 MILLIGRAM(S): at 07:22

## 2023-05-22 NOTE — DISCHARGE NOTE PROVIDER - NSDCMRMEDTOKEN_GEN_ALL_CORE_FT
acyclovir 400 mg oral tablet: 1 tab(s) orally once a day  Aldactone 25 mg oral tablet: 1 tab(s) orally once a day  Colace 100 mg oral capsule: 1 cap(s) orally 2 times a day  Coumadin 3 mg oral tablet: 1 tab(s) orally once a day  digoxin 125 mcg (0.125 mg) oral tablet: 1 tab(s) orally every 24 hours  docusate sodium 100 mg oral capsule: 1 cap(s) orally 2 times a day  Home oxygen 2 liters via nasal canulla as needed for dyspnea. : 2 liter(s) nasal prn, As Needed -for shortness of breath and/or wheezing   Lasix 20 mg oral tablet: 1 tab(s) orally 2 times a day  Lipitor 20 mg oral tablet: 1 tab(s) orally once a day (at bedtime)  Synthroid 50 mcg (0.05 mg) oral tablet: 1 tab(s) orally once a day  Toprol-XL 50 mg oral tablet, extended release: 1 tab(s) orally 2 times a day  ursodiol 250 mg oral tablet: 1 tab(s) orally every 12 hours  Zoloft 50 mg oral tablet: 1 tab(s) orally once a day   acyclovir 400 mg oral tablet: 1 tab(s) orally once a day  Colace 100 mg oral capsule: 1 cap(s) orally 2 times a day  Digox 125 mcg (0.125 mg) oral tablet: 1 tab(s) orally once a day  Lasix 20 mg oral tablet: 1 tab(s) orally every 12 hours  Lipitor 20 mg oral tablet: 1 tab(s) orally once a day (at bedtime)  Lovenox 30 mg/0.3 mL injectable solution: 30 milligram(s) subcutaneously 2 times a day  metoprolol succinate 50 mg oral tablet, extended release: 1 tab(s) orally once a day  spironolactone 25 mg oral tablet: 1 tab(s) orally once a day  Synthroid 50 mcg (0.05 mg) oral tablet: 1 tab(s) orally once a day  ursodiol 250 mg oral tablet: 1 tab(s) orally 2 times a day  Zoloft 50 mg oral tablet: 1 tab(s) orally once a day   Colace 100 mg oral capsule: 1 cap(s) orally 2 times a day  Digox 125 mcg (0.125 mg) oral tablet: 1 tab(s) orally once a day  Jantoven 3 mg oral tablet: 1 tab(s) orally once a day  Lasix 20 mg oral tablet: 1 tab(s) orally every 12 hours  Lipitor 20 mg oral tablet: 1 tab(s) orally once a day (at bedtime)  Lovenox 30 mg/0.3 mL injectable solution: 30 milligram(s) subcutaneously 2 times a day  metoprolol succinate 50 mg oral tablet, extended release: 1 tab(s) orally once a day  spironolactone 25 mg oral tablet: 1 tab(s) orally once a day  Synthroid 50 mcg (0.05 mg) oral tablet: 1 tab(s) orally once a day  ursodiol 250 mg oral tablet: 1 tab(s) orally 2 times a day  Zoloft 50 mg oral tablet: 1 tab(s) orally once a day   ciprofloxacin 500 mg oral tablet: 1 tab(s) orally once a day  Colace 100 mg oral capsule: 1 cap(s) orally 2 times a day  Digox 125 mcg (0.125 mg) oral tablet: 1 tab(s) orally once a day  Jantoven 3 mg oral tablet: 1 tab(s) orally once a day  Lasix 20 mg oral tablet: 1 tab(s) orally every 12 hours  Lipitor 20 mg oral tablet: 1 tab(s) orally once a day (at bedtime)  Lovenox 30 mg/0.3 mL injectable solution: 30 milligram(s) subcutaneously 2 times a day  metoprolol succinate 50 mg oral tablet, extended release: 1 tab(s) orally once a day  spironolactone 25 mg oral tablet: 1 tab(s) orally once a day  Synthroid 50 mcg (0.05 mg) oral tablet: 1 tab(s) orally once a day  ursodiol 250 mg oral tablet: 1 tab(s) orally 2 times a day  Zoloft 50 mg oral tablet: 1 tab(s) orally once a day

## 2023-05-22 NOTE — DISCHARGE NOTE PROVIDER - NSDCCPCAREPLAN_GEN_ALL_CORE_FT
PRINCIPAL DISCHARGE DIAGNOSIS  Diagnosis: Pneumonia, aspiration  Assessment and Plan of Treatment: Pneumonia is an infection that inflames air sacs in one or both lungs, which may fill with fluid. With pneumonia, the air sacs may fill with fluid or pus. The infection can be life-threatening to infants, children, and people over 65. Symptoms include cough with phlegm or pus, fever, chills, and difficulty breathing. Antibiotics can treat many forms of pneumonia. Some forms of pneumonia can be prevented by vaccines.  Your aspiration was likely from aspiraiton due to difficulty swallowing. You completed a course of IV antibiotics and your symptoms improved. Our S+S team evaluated you and recommended a pureed diet with mildly thick liquids.        SECONDARY DISCHARGE DIAGNOSES  Diagnosis: Acute cholangitis  Assessment and Plan of Treatment: You were found to have cholangitis, which is inflammation in your biliary ducts. You were treated with 6 days of IV antibiotics and started on a medication called ursodiol.  - Please take ursodiol twice daily, as prescribed    Diagnosis: Atrial fibrillation  Assessment and Plan of Treatment: You have a history of AFib and were taking warfarin. During this hospitalization, you were transitioned to taking lovenox twice a day.  - Please take lovenox 30 twice daily subcutaneously, as prescribed     PRINCIPAL DISCHARGE DIAGNOSIS  Diagnosis: Pneumonia, aspiration  Assessment and Plan of Treatment: Pneumonia is an infection that inflames air sacs in one or both lungs, which may fill with fluid. With pneumonia, the air sacs may fill with fluid or pus. The infection can be life-threatening to infants, children, and people over 65. Symptoms include cough with phlegm or pus, fever, chills, and difficulty breathing. Antibiotics can treat many forms of pneumonia. Some forms of pneumonia can be prevented by vaccines.  Your aspiration was likely from aspiraiton due to difficulty swallowing. You completed a course of IV antibiotics and your symptoms improved. Our S+S team evaluated you and recommended a pureed diet with mildly thick liquids.        SECONDARY DISCHARGE DIAGNOSES  Diagnosis: Acute cholangitis  Assessment and Plan of Treatment: You were found to have cholangitis, which is inflammation in your biliary ducts. You were treated with 6 days of IV antibiotics and started on a medication called ursodiol.  - Please take ursodiol twice daily, as prescribed    Diagnosis: Atrial fibrillation  Assessment and Plan of Treatment: You have a history of AFib and were taking warfarin. During this hospitalization, you were transitioned to taking lovenox twice a day.  - Please take lovenox 30 twice daily subcutaneously, as prescribed    Diagnosis: Bacteremia  Assessment and Plan of Treatment: You were found to have an organism called Cornyebacterium in your blood. It is possibly this was a contaminant, however, given your illness, will treat with a full course of antibiotics. You were treated with IV antibiotics inpatient.  - Please take ciprofloxacin 500mg daily by mouth, as prescribed for 10 more days. It is important that you take the full course of your antibiotics.

## 2023-05-22 NOTE — CHART NOTE - NSCHARTNOTEFT_GEN_A_CORE
Spoke to Dr. Johnson this morning. Per discussions with himself, patient, and patients HCP- Patient and son wish to take the patient home without therapeutic Lovenox and further speech and swallow evaluations. Patients warfarin has been subtherapeutic throughout this stay due to it being held iso of potential procedure. She also has missed several doses in setting of non compliance with blood draws for INR and has not been compliant with the Lovenox injections. I confirmed this with patient and AID at bedside this morning. Patient wishes to leave prior to being properly bridged to therapeutic INR with Lovenox as well as future evaluations of esophageal. I explained that leaving without proper bridging can increase her chance of stroke. Patient verbalizes understanding of the risks. Patient currently approved for diet with speech and swallow, however was recommended for further. Patient and son acknowledge risks for future aspiration per Dr. Johnson. Patient has plan to follow with Dr. Jhonson for repeat INRs, will continue on her home coumadin on discharge.

## 2023-05-22 NOTE — DISCHARGE NOTE PROVIDER - CARE PROVIDER_API CALL
Ag Johnson  INTERNAL MEDICINE  [address] [place] [id number]  Phone: [phone]  Fax: [phone]  Follow Up Time:    Abdomen soft, nontender, nondistended, bowel sounds present in all 4 quadrants. gA Johnson  Internal Medicine  [address] [place] [address]  Phone: [phone]  Fax: [phone]  Scheduled Appointment: 06/08/2023 01:30 PM

## 2023-05-22 NOTE — PROGRESS NOTE ADULT - PROVIDER SPECIALTY LIST ADULT
Infectious Disease
Internal Medicine
Surgery
Internal Medicine
SICU
Surgery
Internal Medicine
Internal Medicine
Surgery
Gastroenterology
Internal Medicine
SICU
SICU
Internal Medicine
Internal Medicine
Surgery
Internal Medicine

## 2023-05-22 NOTE — DISCHARGE NOTE NURSING/CASE MANAGEMENT/SOCIAL WORK - PATIENT PORTAL LINK FT
You can access the FollowMyHealth Patient Portal offered by [facility] by registering at the following website: [email or web address]. By joining [employer]’s FollowMyHealth portal, you will also be able to view your health information using other applications (apps) compatible with our system.

## 2023-05-22 NOTE — PROGRESS NOTE ADULT - REASON FOR ADMISSION
Acute cholangitis

## 2023-05-22 NOTE — DISCHARGE NOTE PROVIDER - DETAILS OF MALNUTRITION DIAGNOSIS/DIAGNOSES
This patient has been assessed with a concern for Malnutrition and was treated during this hospitalization for the following Nutrition diagnosis/diagnoses:     -  05/17/2023: Moderate protein-calorie malnutrition   -  05/17/2023: Underweight (BMI < 19)

## 2023-05-22 NOTE — PROGRESS NOTE ADULT - NUTRITIONAL ASSESSMENT
This patient has been assessed with a concern for Malnutrition and has been determined to have a diagnosis/diagnoses of Moderate protein-calorie malnutrition and Underweight (BMI < 19).    This patient is being managed with:   Diet Clear Liquid-  Entered: May 17 2023 11:29AM  
This patient has been assessed with a concern for Malnutrition and has been determined to have a diagnosis/diagnoses of Moderate protein-calorie malnutrition and Underweight (BMI < 19).    This patient is being managed with:   Diet Clear Liquid-  Entered: May 17 2023 11:29AM  
This patient has been assessed with a concern for Malnutrition and has been determined to have a diagnosis/diagnoses of Moderate protein-calorie malnutrition and Underweight (BMI < 19).    This patient is being managed with:   Diet Pureed-  Supplement Feeding Modality:  Oral  Ensure Enlive Cans or Servings Per Day:  2       Frequency:  Two Times a day  Entered: May 18 2023  3:10PM  
This patient has been assessed with a concern for Malnutrition and has been determined to have a diagnosis/diagnoses of Moderate protein-calorie malnutrition and Underweight (BMI < 19).    This patient is being managed with:   Diet Pureed-  Mildly Thick Liquids (MILDTHICKLIQS)  Supplement Feeding Modality:  Oral  Ensure Enlive Cans or Servings Per Day:  2       Frequency:  Two Times a day  Entered: May 19 2023 10:34AM  
This patient has been assessed with a concern for Malnutrition and has been determined to have a diagnosis/diagnoses of Moderate protein-calorie malnutrition and Underweight (BMI < 19).    This patient is being managed with:   Diet Pureed-  Supplement Feeding Modality:  Oral  Ensure Enlive Cans or Servings Per Day:  2       Frequency:  Two Times a day  Entered: May 18 2023  3:10PM  
This patient has been assessed with a concern for Malnutrition and has been determined to have a diagnosis/diagnoses of Moderate protein-calorie malnutrition and Underweight (BMI < 19).    This patient is being managed with:   Diet Pureed-  Supplement Feeding Modality:  Oral  Ensure Enlive Cans or Servings Per Day:  2       Frequency:  Two Times a day  Entered: May 18 2023  3:10PM  
This patient has been assessed with a concern for Malnutrition and has been determined to have a diagnosis/diagnoses of Moderate protein-calorie malnutrition and Underweight (BMI < 19).    This patient is being managed with:   Diet Pureed-  Mildly Thick Liquids (MILDTHICKLIQS)  Supplement Feeding Modality:  Oral  Ensure Enlive Cans or Servings Per Day:  2       Frequency:  Two Times a day  Entered: May 19 2023 10:34AM  
This patient has been assessed with a concern for Malnutrition and has been determined to have a diagnosis/diagnoses of Moderate protein-calorie malnutrition and Underweight (BMI < 19).    This patient is being managed with:   Diet Pureed-  Supplement Feeding Modality:  Oral  Ensure Enlive Cans or Servings Per Day:  2       Frequency:  Two Times a day  Entered: May 18 2023  3:10PM  
This patient has been assessed with a concern for Malnutrition and has been determined to have a diagnosis/diagnoses of Moderate protein-calorie malnutrition and Underweight (BMI < 19).    This patient is being managed with:   Diet Pureed-  Mildly Thick Liquids (MILDTHICKLIQS)  Supplement Feeding Modality:  Oral  Ensure Enlive Cans or Servings Per Day:  2       Frequency:  Two Times a day  Entered: May 19 2023 10:34AM  
This patient has been assessed with a concern for Malnutrition and has been determined to have a diagnosis/diagnoses of Moderate protein-calorie malnutrition and Underweight (BMI < 19).    This patient is being managed with:   Diet Pureed-  Mildly Thick Liquids (MILDTHICKLIQS)  Supplement Feeding Modality:  Oral  Ensure Enlive Cans or Servings Per Day:  2       Frequency:  Two Times a day  Entered: May 19 2023 10:34AM

## 2023-05-22 NOTE — DISCHARGE NOTE PROVIDER - HOSPITAL COURSE
Pneumonia, aspiration.   ·  Plan: In ED, pt febrile to 101.1 with 11.14 WBC. CXR 5/15 showing R sided pleural effusion, mild bi-basilar atelectasis. CT A+P on 5/15 showing small b/l pleural effusion, airspace disease, consolidation at both lung bases, and stomach wall thickening, & no evidence of biliary obstruction. At time of consult, pt afebrile and WBC 13.48. Pt originally given vanc + aztreonam but developed rash. Pt likely with community acquired aspiration pneumonia.    - C/w Zosyn 3.375 IV q8h for a 5 day course (5/16-5/20)  - If patient discharged, may transition to Augmentin po 875/125 mg q12h through 5/20.     Problem/Plan - 2:  ·  Problem: (HFpEF) heart failure with preserved ejection fraction.   ·  Plan: Pt with mitral valve replacement in 2016 (bioMVR) with appendage closure, Afib on coumadin (last INR was 2), chronic dHF (EF 50-55%).    - restarted home Coumadin 5/18  - c/w home metoprolol succinate ER 50mg q24  - c/w spironolactone 25mg q24  - c/w furosemide 20mg q12.     Problem/Plan - 3:  ·  Problem: Acute cholangitis.   ·  Plan: elevated liver enzymes and sludge ball in CBD, concerning for acute cholangitis. Reassuring downtrend in liver enzymes and patient afebrile.  Per GI: "may benefit from biliary decompression (ie. ERCP) however in light of multimorbid condition, cardiology risk assessment, acute hypox resp failure with elevated PASP on TTE, high concern for further decompensation following ERCP and necessary sedation."  Pt improving on abx.    -c/w empiric abx (see above)  -can consider percutaneous decompression if worsens  - started on Ursodiol 5/18 to continue indefinitely.     Problem/Plan - 4:  ·  Problem: Hypothyroid.   ·  Plan: Pt on 40mcg IV synthroid.   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 Elevated LFTs, and questionable sludge ball in CBD, concerns for acute cholangitis but surgery deemed not needed. CT findings c/w pneumonia likely 2/2 aspiration and possible cystitis as well, on Zosyn 3.375g q8 for 5d.  GI and ID consuled, signed off.  Had been admitted to SICU for HD monitoring now stable for stepdown to RMF.  Per GI: no ERCP; Per Cards: int-hi risk for int procedure; Per ID:  Zosyn; Per PCP: minimize interventions. On RMF continued IV zosyn for cholangitis. Restarted home coumadin and bridged to lovenox, howee patient continually refusing medication. INR subtherapeutic on discharge, to be followed up outpatient. BCx also growing gram positive rods in 1/4 bottles, continued on antibiotics (treating cholangitis). Surveillance cultures NGTD. Pt was evaluated by S+S for aspiration PNA, started on a pureed diet w/ mildly thick liquids, tolerating well. Patient was discharged to home in stable medical condition with plans to follow up outpatient with Dr. Johnson.    #Acute cholangitis  Elevated liver enzymes and sludge ball in CBD, concerning for acute cholangitis. Reassuring downtrend in liver enzymes and patient afebrile.  Per GI: "may benefit from biliary decompression (ie. ERCP) however in light of multimorbid condition, cardiology risk assessment, acute hypox resp failure with elevated PASP on TTE, high concern for further decompensation following ERCP and necessary sedation."  Pt improving on abx.  - s/p zosyn 3.375 x6d (5/16-5/22)  - can consider percutaneous decompression if worsens  Plan:  - started on Ursodiol 5/18 to continue indefinitely.  - f/u outpt    #Pneumonia, aspiration - RESOLVED  In ED, pt febrile to 101.1 with 11.14 WBC. CXR 5/15 showing R sided pleural effusion, mild bi-basilar atelectasis. CT A+P on 5/15 showing small b/l pleural effusion, airspace disease, consolidation at both lung bases, and stomach wall thickening, & no evidence of biliary obstruction. At time of consult, pt afebrile and WBC 13.48. Pt originally given vanc + aztreonam but developed rash. Pt likely with community acquired aspiration pneumonia.  - S+S evaluated, recommending pureed diet w/ mildly thick liquid  - urine Legionella negative  - s/p Zosyn 3.375 IV q8h for a 5 day course (5/16-5/20)  Plan:  - f/u outpt  - aspiration precautions    #(HFpEF) heart failure with preserved ejection fraction.   #AFib on AC  Pt with mitral valve replacement in 2016 (bioMVR) with appendage closure, Afib on coumadin (last INR was 2), chronic dHF (EF 50-55%).  - restarted home Coumadin 5/18, bridge to lovenox (pt w/ multiple refusals)  Plan:  - c/w lovenox 30 subQ BID  - c/w home metoprolol succinate ER 50mg q24  - c/w spironolactone 25mg q24  - c/w furosemide 20mg q12.  - c/w home digoxin 125mcg daily    #Hypothyroid.   Home medications: synthroid 50mcg daily. Patient given IV for aspiration while inpatient. Can resume PO in d/c.  - c/w home synthroid    Patient was discharged to: home    New medications: lovenox 30mg subcutaneous q12hr  Changes to old medications: none  Medications that were stopped: warfarin  Items to follow up as outpatient: f/u outpt w/ PCP (Dr. Johnson)    Physical exam at the time of discharge:  Constitutional: Resting comfortably in bed; NAD  Head: NC/AT  Eyes: EOMI, anicteric sclera  ENT: no nasal discharge; MMM  Neck: supple  Respiratory: clear to auscultation bilaterally anteriorly; no wheezes, ronchi, increased work of breathing, retractions  Cardiac: RRR; normal S1/S2, no MRG, no LE edema  Gastrointestinal: +BSx4, abdomen soft, NT/ND; no rebound or guarding  Extremities: WWPx4  Musculoskeletal: no joint swelling, tenderness or erythema  Vascular: 2+ radial pulses bilaterally  Dermatologic: skin warm, dry and intact; no rashes, open wounds  Neurologic: CNII-XII grossly intact; no focal deficits HOSPITAL COURSE:  89yo Female pt with PMHx HTN, HLD, Hypothyroidism, diastolic CHF (EF 65-70% 2023), tachy-mamie syndrome, pAF (Warfarin); 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 Elevated LFTs, and questionable sludge ball in CBD, concerns for acute cholangitis but surgery deemed not needed. CT findings c/w pneumonia likely 2/2 aspiration and possible cystitis as well, on Zosyn 3.375g q8 for 5d.  GI and ID consuled, signed off.  Had been admitted to SICU for HD monitoring now stable for stepdown to RMF.  Per GI: no ERCP; Per Cards: int-hi risk for int procedure; Per ID:  Zosyn; Per PCP: minimize interventions. On RMF continued IV zosyn for cholangitis. Restarted home coumadin and bridged to lovenox, howee patient continually refusing medication. INR subtherapeutic on discharge, to be followed up outpatient. BCx also growing gram positive rods in 1/4 bottles, continued on antibiotics (treating cholangitis). Surveillance cultures NGTD. Pt was evaluated by S+S for aspiration PNA, started on a pureed diet w/ mildly thick liquids, tolerating well. Patient was discharged to home in stable medical condition with plans to follow up outpatient with Dr. Johnson.    #Acute cholangitis  Elevated liver enzymes and sludge ball in CBD, concerning for acute cholangitis. Reassuring downtrend in liver enzymes and patient afebrile.  Per GI: "may benefit from biliary decompression (ie. ERCP) however in light of multimorbid condition, cardiology risk assessment, acute hypox resp failure with elevated PASP on TTE, high concern for further decompensation following ERCP and necessary sedation."  Pt improving on abx.  - s/p zosyn 3.375 x6d (5/16-5/22)  - can consider percutaneous decompression if worsens  Plan:  - started on Ursodiol 5/18 to continue indefinitely.  - f/u outpt    #Pneumonia, aspiration - RESOLVED  In ED, pt febrile to 101.1 with 11.14 WBC. CXR 5/15 showing R sided pleural effusion, mild bi-basilar atelectasis. CT A+P on 5/15 showing small b/l pleural effusion, airspace disease, consolidation at both lung bases, and stomach wall thickening, & no evidence of biliary obstruction. At time of consult, pt afebrile and WBC 13.48. Pt originally given vanc + aztreonam but developed rash. Pt likely with community acquired aspiration pneumonia.  - S+S evaluated, recommending pureed diet w/ mildly thick liquid  - urine Legionella negative  - s/p Zosyn 3.375 IV q8h for a 5 day course (5/16-5/20)  Plan:  - f/u outpt  - aspiration precautions    #(HFpEF) heart failure with preserved ejection fraction.   #AFib on AC  Pt with mitral valve replacement in 2016 (bioMVR) with appendage closure, Afib on coumadin (last INR was 2), chronic dHF (EF 50-55%).  - restarted home Coumadin 5/18, bridge to lovenox (pt w/ multiple refusals); per discussions with family, they do not want to start lovenox (see chart note)  Plan:  - c/w home coumadin; recommend lovenox 30 BID (sent to pharmacy)  - c/w home metoprolol succinate ER 50mg q24  - c/w spironolactone 25mg q24  - c/w furosemide 20mg q12.  - c/w home digoxin 125mcg daily    #Hypothyroid.   Home medications: synthroid 50mcg daily. Patient given IV for aspiration while inpatient. Can resume PO in d/c.  - c/w home synthroid    Patient was discharged to: home    New medications: lovenox 30mg subcutaneous q12hr  Changes to old medications: none  Medications that were stopped: warfarin  Items to follow up as outpatient: f/u outpt w/ PCP (Dr. Johnson)    Physical exam at the time of discharge:  Constitutional: Resting comfortably in bed; NAD  Head: NC/AT  Eyes: EOMI, anicteric sclera  ENT: no nasal discharge; MMM  Neck: supple  Respiratory: clear to auscultation bilaterally anteriorly; no wheezes, ronchi, increased work of breathing, retractions  Cardiac: RRR; normal S1/S2, no MRG, no LE edema  Gastrointestinal: +BSx4, abdomen soft, NT/ND; no rebound or guarding  Extremities: WWPx4  Musculoskeletal: no joint swelling, tenderness or erythema  Vascular: 2+ radial pulses bilaterally  Dermatologic: skin warm, dry and intact; no rashes, open wounds  Neurologic: CNII-XII grossly intact; no focal deficits HOSPITAL COURSE:  89yo Female pt with PMHx HTN, HLD, Hypothyroidism, diastolic CHF (EF 65-70% 2023), tachy-mamie syndrome, pAF (Warfarin); 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 Elevated LFTs, and questionable sludge ball in CBD, concerns for acute cholangitis but surgery deemed not needed. CT findings c/w pneumonia likely 2/2 aspiration and possible cystitis as well, on Zosyn 3.375g q8 for 5d.  GI and ID consuled, signed off.  Had been admitted to SICU for HD monitoring now stable for stepdown to RMF.  Per GI: no ERCP; Per Cards: int-hi risk for int procedure; Per ID:  Zosyn; Per PCP: minimize interventions. On RMF continued IV zosyn for cholangitis. Restarted home coumadin and bridged to lovenox, howee patient continually refusing medication. INR subtherapeutic on discharge, to be followed up outpatient. BCx also growing gram positive rods in 1/4 bottles, continued on antibiotics (treating cholangitis). Surveillance cultures NGTD. Pt was evaluated by S+S for aspiration PNA, started on a pureed diet w/ mildly thick liquids, tolerating well. Patient was discharged to home in stable medical condition with plans to follow up outpatient with Dr. Johnson.    #Acute cholangitis  Elevated liver enzymes and sludge ball in CBD, concerning for acute cholangitis. Reassuring downtrend in liver enzymes and patient afebrile.  Per GI: "may benefit from biliary decompression (ie. ERCP) however in light of multimorbid condition, cardiology risk assessment, acute hypox resp failure with elevated PASP on TTE, high concern for further decompensation following ERCP and necessary sedation."  Pt improving on abx.  - s/p zosyn 3.375 x6d (5/16-5/22)  - can consider percutaneous decompression if worsens  Plan:  - started on Ursodiol 5/18 to continue indefinitely.  - f/u outpt    #Pneumonia, aspiration - RESOLVED  In ED, pt febrile to 101.1 with 11.14 WBC. CXR 5/15 showing R sided pleural effusion, mild bi-basilar atelectasis. CT A+P on 5/15 showing small b/l pleural effusion, airspace disease, consolidation at both lung bases, and stomach wall thickening, & no evidence of biliary obstruction. At time of consult, pt afebrile and WBC 13.48. Pt originally given vanc + aztreonam but developed rash. Pt likely with community acquired aspiration pneumonia.  - S+S evaluated, recommending pureed diet w/ mildly thick liquid  - urine Legionella negative  - s/p Zosyn 3.375 IV q8h for a 5 day course (5/16-5/20)  Plan:  - f/u outpt  - aspiration precautions    #(HFpEF) heart failure with preserved ejection fraction.   #AFib on AC  Pt with mitral valve replacement in 2016 (bioMVR) with appendage closure, Afib on coumadin (last INR was 2), chronic dHF (EF 50-55%).  - restarted home Coumadin 5/18, bridge to lovenox (pt w/ multiple refusals); per discussions with family, they do not want to start lovenox (see chart note)  Plan:  - c/w home coumadin; recommend lovenox 30 BID (sent to pharmacy)  - c/w home metoprolol succinate ER 50mg q24  - c/w spironolactone 25mg q24  - c/w furosemide 20mg q12.  - c/w home digoxin 125mcg daily    #Bacteremia  Patient growing Cornyebacterium in 1/4 bottles. Possibly contaminant, however pt with cholangitis and previously acutely ill so will treat. S/p zosyn IV inpatient. Will do 14d antibiotics since last cleared blood cultures.  - ciprofloxacin 500mg q24hr for 10 additional days (14d course total, renal dosing)    #Hypothyroid.   Home medications: synthroid 50mcg daily. Patient given IV for aspiration while inpatient. Can resume PO in d/c.  - c/w home synthroid    Patient was discharged to: home    New medications: lovenox 30mg subcutaneous q12hr, ciprofloxacin 500 q24hr  Changes to old medications: none  Medications that were stopped: warfarin  Items to follow up as outpatient: f/u outpt w/ PCP (Dr. Johnson)    Physical exam at the time of discharge:  Constitutional: Resting comfortably in bed; NAD  Head: NC/AT  Eyes: EOMI, anicteric sclera  ENT: no nasal discharge; MMM  Neck: supple  Respiratory: clear to auscultation bilaterally anteriorly; no wheezes, ronchi, increased work of breathing, retractions  Cardiac: RRR; normal S1/S2, no MRG, no LE edema  Gastrointestinal: +BSx4, abdomen soft, NT/ND; no rebound or guarding  Extremities: WWPx4  Musculoskeletal: no joint swelling, tenderness or erythema  Vascular: 2+ radial pulses bilaterally  Dermatologic: skin warm, dry and intact; no rashes, open wounds  Neurologic: CNII-XII grossly intact; no focal deficits

## 2023-05-22 NOTE — DISCHARGE NOTE PROVIDER - NSDCFUADDAPPT_GEN_ALL_CORE_FT
(1) Follow up outpatient with your primary care doctor, Dr. Johnson. Call [phone] to contact his office.

## 2023-05-22 NOTE — DISCHARGE NOTE NURSING/CASE MANAGEMENT/SOCIAL WORK - NSDCPEFALRISK_GEN_ALL_CORE
For information on Fall & Injury Prevention, visit: [email or web address] OR  [email or web address] OR  https://www.cdc.gov/steadi/patient.html

## 2023-05-22 NOTE — DISCHARGE NOTE PROVIDER - NSDCFUSCHEDAPPT_GEN_ALL_CORE_FT
[facility] Physician [facility]  [address] [address]  Scheduled Appointment: 06/28/2023

## 2023-05-22 NOTE — DISCHARGE NOTE PROVIDER - PROVIDER TOKENS
PROVIDER:[TOKEN:[[id number]]] PROVIDER:[TOKEN:[[id number]],SCHEDULEDAPPT:[06/08/2023],SCHEDULEDAPPTTIME:[01:30 PM]]

## 2023-05-23 ENCOUNTER — TRANSCRIPTION ENCOUNTER (OUTPATIENT)
Age: 88
End: 2023-05-23

## 2023-05-24 LAB
CULTURE RESULTS: SIGNIFICANT CHANGE UP
SPECIMEN SOURCE: SIGNIFICANT CHANGE UP

## 2023-05-25 LAB
CULTURE RESULTS: SIGNIFICANT CHANGE UP
ORGANISM # SPEC MICROSCOPIC CNT: SIGNIFICANT CHANGE UP
ORGANISM # SPEC MICROSCOPIC CNT: SIGNIFICANT CHANGE UP
SPECIMEN SOURCE: SIGNIFICANT CHANGE UP

## 2023-06-28 ENCOUNTER — NON-APPOINTMENT (OUTPATIENT)
Age: 88
End: 2023-06-28

## 2023-06-28 ENCOUNTER — APPOINTMENT (OUTPATIENT)
Dept: HEART AND VASCULAR | Facility: CLINIC | Age: 88
End: 2023-06-28
Payer: MEDICARE

## 2023-06-28 PROCEDURE — 93296 REM INTERROG EVL PM/IDS: CPT

## 2023-06-28 PROCEDURE — 93294 REM INTERROG EVL PM/LDLS PM: CPT

## 2023-08-30 ENCOUNTER — INPATIENT (INPATIENT)
Facility: HOSPITAL | Age: 88
LOS: 6 days | Discharge: HOME HEALTH | DRG: 177 | End: 2023-09-06
Attending: INTERNAL MEDICINE | Admitting: INTERNAL MEDICINE
Payer: MEDICARE

## 2023-08-30 VITALS
HEIGHT: 62 IN | OXYGEN SATURATION: 95 % | TEMPERATURE: 98 F | RESPIRATION RATE: 20 BRPM | SYSTOLIC BLOOD PRESSURE: 156 MMHG | WEIGHT: 89.95 LBS | HEART RATE: 51 BPM | DIASTOLIC BLOOD PRESSURE: 73 MMHG

## 2023-08-30 DIAGNOSIS — Z86.79 PERSONAL HISTORY OF OTHER DISEASES OF THE CIRCULATORY SYSTEM: ICD-10-CM

## 2023-08-30 DIAGNOSIS — I10 ESSENTIAL (PRIMARY) HYPERTENSION: ICD-10-CM

## 2023-08-30 DIAGNOSIS — F32.9 MAJOR DEPRESSIVE DISORDER, SINGLE EPISODE, UNSPECIFIED: ICD-10-CM

## 2023-08-30 DIAGNOSIS — I50.30 UNSPECIFIED DIASTOLIC (CONGESTIVE) HEART FAILURE: ICD-10-CM

## 2023-08-30 DIAGNOSIS — Z84.89 FAMILY HISTORY OF OTHER SPECIFIED CONDITIONS: Chronic | ICD-10-CM

## 2023-08-30 DIAGNOSIS — Z29.9 ENCOUNTER FOR PROPHYLACTIC MEASURES, UNSPECIFIED: ICD-10-CM

## 2023-08-30 DIAGNOSIS — R74.01 ELEVATION OF LEVELS OF LIVER TRANSAMINASE LEVELS: ICD-10-CM

## 2023-08-30 DIAGNOSIS — E87.20 ACIDOSIS, UNSPECIFIED: ICD-10-CM

## 2023-08-30 DIAGNOSIS — I48.91 UNSPECIFIED ATRIAL FIBRILLATION: ICD-10-CM

## 2023-08-30 DIAGNOSIS — B34.8 OTHER VIRAL INFECTIONS OF UNSPECIFIED SITE: ICD-10-CM

## 2023-08-30 DIAGNOSIS — E78.5 HYPERLIPIDEMIA, UNSPECIFIED: ICD-10-CM

## 2023-08-30 DIAGNOSIS — E03.9 HYPOTHYROIDISM, UNSPECIFIED: ICD-10-CM

## 2023-08-30 DIAGNOSIS — U07.1 COVID-19: ICD-10-CM

## 2023-08-30 LAB
ALBUMIN SERPL ELPH-MCNC: 3.4 G/DL — SIGNIFICANT CHANGE UP (ref 3.3–5)
ALP SERPL-CCNC: 468 U/L — HIGH (ref 40–120)
ALT FLD-CCNC: 58 U/L — HIGH (ref 10–45)
ANION GAP SERPL CALC-SCNC: 10 MMOL/L — SIGNIFICANT CHANGE UP (ref 5–17)
APPEARANCE UR: ABNORMAL
APTT BLD: 38.4 SEC — HIGH (ref 24.5–35.6)
AST SERPL-CCNC: 63 U/L — HIGH (ref 10–40)
BACTERIA # UR AUTO: PRESENT /HPF
BASE EXCESS BLDV CALC-SCNC: 2.6 MMOL/L — SIGNIFICANT CHANGE UP (ref -2–3)
BASOPHILS # BLD AUTO: 0.04 K/UL — SIGNIFICANT CHANGE UP (ref 0–0.2)
BASOPHILS NFR BLD AUTO: 0.5 % — SIGNIFICANT CHANGE UP (ref 0–2)
BILIRUB SERPL-MCNC: 0.5 MG/DL — SIGNIFICANT CHANGE UP (ref 0.2–1.2)
BILIRUB UR-MCNC: NEGATIVE — SIGNIFICANT CHANGE UP
BUN SERPL-MCNC: 15 MG/DL — SIGNIFICANT CHANGE UP (ref 7–23)
CA-I SERPL-SCNC: 1.26 MMOL/L — SIGNIFICANT CHANGE UP (ref 1.15–1.33)
CALCIUM SERPL-MCNC: 9.6 MG/DL — SIGNIFICANT CHANGE UP (ref 8.4–10.5)
CHLORIDE SERPL-SCNC: 98 MMOL/L — SIGNIFICANT CHANGE UP (ref 96–108)
CO2 BLDV-SCNC: 31.4 MMOL/L — HIGH (ref 22–26)
CO2 SERPL-SCNC: 25 MMOL/L — SIGNIFICANT CHANGE UP (ref 22–31)
COLOR SPEC: YELLOW — SIGNIFICANT CHANGE UP
COMMENT - URINE: SIGNIFICANT CHANGE UP
CREAT SERPL-MCNC: 0.71 MG/DL — SIGNIFICANT CHANGE UP (ref 0.5–1.3)
DIFF PNL FLD: ABNORMAL
EGFR: 78 ML/MIN/1.73M2 — SIGNIFICANT CHANGE UP
EOSINOPHIL # BLD AUTO: 0.05 K/UL — SIGNIFICANT CHANGE UP (ref 0–0.5)
EOSINOPHIL NFR BLD AUTO: 0.6 % — SIGNIFICANT CHANGE UP (ref 0–6)
EPI CELLS # UR: ABNORMAL /HPF (ref 0–5)
GAS PNL BLDV: 131 MMOL/L — LOW (ref 136–145)
GAS PNL BLDV: SIGNIFICANT CHANGE UP
GLUCOSE SERPL-MCNC: 96 MG/DL — SIGNIFICANT CHANGE UP (ref 70–99)
GLUCOSE UR QL: NEGATIVE — SIGNIFICANT CHANGE UP
HCO3 BLDV-SCNC: 30 MMOL/L — HIGH (ref 22–29)
HCT VFR BLD CALC: 40.9 % — SIGNIFICANT CHANGE UP (ref 34.5–45)
HGB BLD-MCNC: 13.5 G/DL — SIGNIFICANT CHANGE UP (ref 11.5–15.5)
IMM GRANULOCYTES NFR BLD AUTO: 0.6 % — SIGNIFICANT CHANGE UP (ref 0–0.9)
INR BLD: 1.39 — HIGH (ref 0.85–1.18)
KETONES UR-MCNC: NEGATIVE — SIGNIFICANT CHANGE UP
LACTATE SERPL-SCNC: 2.3 MMOL/L — HIGH (ref 0.5–2)
LEUKOCYTE ESTERASE UR-ACNC: ABNORMAL
LIDOCAIN IGE QN: 89 U/L — HIGH (ref 7–60)
LYMPHOCYTES # BLD AUTO: 1.48 K/UL — SIGNIFICANT CHANGE UP (ref 1–3.3)
LYMPHOCYTES # BLD AUTO: 17.7 % — SIGNIFICANT CHANGE UP (ref 13–44)
MCHC RBC-ENTMCNC: 32.1 PG — SIGNIFICANT CHANGE UP (ref 27–34)
MCHC RBC-ENTMCNC: 33 GM/DL — SIGNIFICANT CHANGE UP (ref 32–36)
MCV RBC AUTO: 97.4 FL — SIGNIFICANT CHANGE UP (ref 80–100)
MONOCYTES # BLD AUTO: 1.05 K/UL — HIGH (ref 0–0.9)
MONOCYTES NFR BLD AUTO: 12.5 % — SIGNIFICANT CHANGE UP (ref 2–14)
NEUTROPHILS # BLD AUTO: 5.7 K/UL — SIGNIFICANT CHANGE UP (ref 1.8–7.4)
NEUTROPHILS NFR BLD AUTO: 68.1 % — SIGNIFICANT CHANGE UP (ref 43–77)
NITRITE UR-MCNC: NEGATIVE — SIGNIFICANT CHANGE UP
NRBC # BLD: 0 /100 WBCS — SIGNIFICANT CHANGE UP (ref 0–0)
PCO2 BLDV: 55 MMHG — HIGH (ref 39–42)
PH BLDV: 7.34 — SIGNIFICANT CHANGE UP (ref 7.32–7.43)
PH UR: 6 — SIGNIFICANT CHANGE UP (ref 5–8)
PLATELET # BLD AUTO: 159 K/UL — SIGNIFICANT CHANGE UP (ref 150–400)
PO2 BLDV: <33 MMHG — SIGNIFICANT CHANGE UP (ref 25–45)
POTASSIUM BLDV-SCNC: 5.1 MMOL/L — SIGNIFICANT CHANGE UP (ref 3.5–5.1)
POTASSIUM SERPL-MCNC: 4.6 MMOL/L — SIGNIFICANT CHANGE UP (ref 3.5–5.3)
POTASSIUM SERPL-SCNC: 4.6 MMOL/L — SIGNIFICANT CHANGE UP (ref 3.5–5.3)
PROT SERPL-MCNC: 7.1 G/DL — SIGNIFICANT CHANGE UP (ref 6–8.3)
PROT UR-MCNC: 30 MG/DL
PROTHROM AB SERPL-ACNC: 15.7 SEC — HIGH (ref 9.5–13)
RAPID RVP RESULT: DETECTED
RBC # BLD: 4.2 M/UL — SIGNIFICANT CHANGE UP (ref 3.8–5.2)
RBC # FLD: 13.6 % — SIGNIFICANT CHANGE UP (ref 10.3–14.5)
RBC CASTS # UR COMP ASSIST: < 5 /HPF — SIGNIFICANT CHANGE UP
RV+EV RNA SPEC QL NAA+PROBE: DETECTED
SAO2 % BLDV: 48.4 % — LOW (ref 67–88)
SARS-COV-2 RNA SPEC QL NAA+PROBE: DETECTED
SODIUM SERPL-SCNC: 133 MMOL/L — LOW (ref 135–145)
SP GR SPEC: >=1.03 — SIGNIFICANT CHANGE UP (ref 1–1.03)
UROBILINOGEN FLD QL: 0.2 E.U./DL — SIGNIFICANT CHANGE UP
WBC # BLD: 8.37 K/UL — SIGNIFICANT CHANGE UP (ref 3.8–10.5)
WBC # FLD AUTO: 8.37 K/UL — SIGNIFICANT CHANGE UP (ref 3.8–10.5)
WBC UR QL: ABNORMAL /HPF

## 2023-08-30 PROCEDURE — 71045 X-RAY EXAM CHEST 1 VIEW: CPT | Mod: 26

## 2023-08-30 PROCEDURE — 99285 EMERGENCY DEPT VISIT HI MDM: CPT | Mod: FS

## 2023-08-30 RX ORDER — ACETAMINOPHEN 500 MG
650 TABLET ORAL EVERY 6 HOURS
Refills: 0 | Status: DISCONTINUED | OUTPATIENT
Start: 2023-08-30 | End: 2023-09-06

## 2023-08-30 RX ORDER — PIPERACILLIN AND TAZOBACTAM 4; .5 G/20ML; G/20ML
3.38 INJECTION, POWDER, LYOPHILIZED, FOR SOLUTION INTRAVENOUS ONCE
Refills: 0 | Status: COMPLETED | OUTPATIENT
Start: 2023-08-30 | End: 2023-08-30

## 2023-08-30 RX ORDER — ATORVASTATIN CALCIUM 80 MG/1
20 TABLET, FILM COATED ORAL AT BEDTIME
Refills: 0 | Status: DISCONTINUED | OUTPATIENT
Start: 2023-08-30 | End: 2023-09-06

## 2023-08-30 RX ORDER — WARFARIN SODIUM 2.5 MG/1
3 TABLET ORAL AT BEDTIME
Refills: 0 | Status: COMPLETED | OUTPATIENT
Start: 2023-08-30 | End: 2023-08-30

## 2023-08-30 RX ORDER — SPIRONOLACTONE 25 MG/1
25 TABLET, FILM COATED ORAL DAILY
Refills: 0 | Status: DISCONTINUED | OUTPATIENT
Start: 2023-08-31 | End: 2023-09-06

## 2023-08-30 RX ORDER — URSODIOL 250 MG/1
250 TABLET, FILM COATED ORAL
Refills: 0 | Status: DISCONTINUED | OUTPATIENT
Start: 2023-08-30 | End: 2023-09-06

## 2023-08-30 RX ORDER — SODIUM CHLORIDE 9 MG/ML
500 INJECTION INTRAMUSCULAR; INTRAVENOUS; SUBCUTANEOUS ONCE
Refills: 0 | Status: COMPLETED | OUTPATIENT
Start: 2023-08-30 | End: 2023-08-30

## 2023-08-30 RX ORDER — ACETAMINOPHEN 500 MG
1000 TABLET ORAL ONCE
Refills: 0 | Status: COMPLETED | OUTPATIENT
Start: 2023-08-30 | End: 2023-08-30

## 2023-08-30 RX ORDER — SENNA PLUS 8.6 MG/1
2 TABLET ORAL AT BEDTIME
Refills: 0 | Status: DISCONTINUED | OUTPATIENT
Start: 2023-08-30 | End: 2023-09-06

## 2023-08-30 RX ADMIN — SODIUM CHLORIDE 500 MILLILITER(S): 9 INJECTION INTRAMUSCULAR; INTRAVENOUS; SUBCUTANEOUS at 16:58

## 2023-08-30 RX ADMIN — SENNA PLUS 2 TABLET(S): 8.6 TABLET ORAL at 22:37

## 2023-08-30 RX ADMIN — Medication 1000 MILLIGRAM(S): at 13:19

## 2023-08-30 RX ADMIN — URSODIOL 250 MILLIGRAM(S): 250 TABLET, FILM COATED ORAL at 22:37

## 2023-08-30 RX ADMIN — ATORVASTATIN CALCIUM 20 MILLIGRAM(S): 80 TABLET, FILM COATED ORAL at 22:37

## 2023-08-30 RX ADMIN — WARFARIN SODIUM 3 MILLIGRAM(S): 2.5 TABLET ORAL at 22:37

## 2023-08-30 RX ADMIN — SODIUM CHLORIDE 500 MILLILITER(S): 9 INJECTION INTRAMUSCULAR; INTRAVENOUS; SUBCUTANEOUS at 13:51

## 2023-08-30 RX ADMIN — PIPERACILLIN AND TAZOBACTAM 200 GRAM(S): 4; .5 INJECTION, POWDER, LYOPHILIZED, FOR SOLUTION INTRAVENOUS at 13:49

## 2023-08-30 NOTE — ED PROVIDER NOTE - CARE PLAN
1 Principal Discharge DX:	Acute UTI  Secondary Diagnosis:	Pneumonia   Principal Discharge DX:	Acute UTI  Secondary Diagnosis:	Pneumonia  Secondary Diagnosis:	COVID-19  Secondary Diagnosis:	Rhinovirus

## 2023-08-30 NOTE — ED PROVIDER NOTE - PHYSICAL EXAMINATION
Vitals reviewed  Gen: cachectic/chronically ill but nontoxic appearing elderly F, occasional one work responses, sating 94% on RA, mild increase WOB  Skin: warm, poor turgor, no rash/lesions  HEENT: ncat, eomi, dry oral mucosa   CV: mamie, regular rhythm  Resp: symmetrical expansion, b/l breath sounds but limited eval 2/2 poor effort, no wheezing or rales noted  Abd: nondistended, soft, mild suprapubic ttp, no r/g  Ext: FROM throughout, no peripheral edema, no clubbing or cyanosis  Neuro: alert/oriented to self, no focal deficits

## 2023-08-30 NOTE — ED PROVIDER NOTE - ATTENDING APP SHARED VISIT CONTRIBUTION OF CARE
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  HHA reports pt felt warm last night, took temp and noted fever 100.9.  pt contacted Dr. Johnson instructed to give tylenol and dose ciprofloxacin.  no fever this morning but HHA noted pt chest sounded congested and her work of breathing was increased upon waking up.  checked her O2 and it was 94%. reports pt more tired than normal this morning but did eat small breakfast.  HHA reports giving pt all her home meds.  states pt has not been c/o pain anywhere, pt reports she is feeling well when asked.  denies chills, chest pain, cough, sore throat, abdpain vd, increased urination, hematuria, rash, fall/trauma    on exam pt rectal temp 100.3, appreas dehydrated and mild dypsnea but no hypoxia, b/l breath sounds, abd soft, mild suprapubic ttp, no r/g, moving all ext.  suspect viral uri vs pneumonia vs uti.  will obtain labs, cultures, urine, cxr, ekg and give tylenol    labs w/ mild transaminitis in setting viral infection/uti, no upper abd ttp do not feel need further imaging.  lact 2.3, plan to rpt s/p ivf (500cc infusing, hold further as h/o chf).  cxr w/ b/l opacities, ekg mamie paced.  covid/entero/rhinovirus +.  given tyelnol and covered for abx d/t c/f uti.  d/w Dr. Johnson who will admit    Agree with above note as documented by PA.  I was available as the supervising attending during patient's ER evaluation.

## 2023-08-30 NOTE — ED PROVIDER NOTE - CLINICAL SUMMARY MEDICAL DECISION MAKING FREE TEXT BOX
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  on exam pt rectal temp 100.3, appreas dehydrated and mild dypsnea but no hypoxia, b/l breath sounds, abd soft, mild suprapubic ttp, no r/g, moving all ext.  suspect viral uri vs pneumonia vs uti.  will obtain labs, cultures, urine, cxr, ekg and give tylenol

## 2023-08-30 NOTE — ED PROVIDER NOTE - PROGRESS NOTE DETAILS
labs w/ mild transaminitis in setting viral infection/uti, no upper abd ttp do not feel need further imaging.  lact 2.3, plan to rpt s/p ivf (500cc infusing, hold further as h/o chf).  cxr w/ b/l opacities, ekg mamie paced.  covid/entero/rhinovirus +.  given tyelnol and covered for abx d/t c/f uti.  d/w Dr. Johnson who will admit

## 2023-08-30 NOTE — H&P ADULT - PROBLEM SELECTOR PLAN 1
Colace 100 mg oral capsule: 1 cap(s) orally 2 times a day lactate 2.3, receiving 500cc IV fluids. caution in CHF  - repeat lactate  HD stable lactate 2.3, receiving 500cc IV fluids. caution in CHF. HD stable  - repeat lactate Lactate 2.3, s/p 500cc NS in ED. caution in CHF. HD stable  - monitor off of abx  - give additional 500cc NS bolus  - repeat lactate Presenting with fever and SOB at home, defervesced with Tylenol. Testing positive for COVID and rhino/enterovirus.  Elevated lactate, likely 2/2 concurrent infection. Hypovolemic on exam, possibly due to insensible losses from fever and decreased PO intake due to COVID and/or entero/rhinovirus.  Lactate 2.3, s/p 500cc NS in ED. caution in CHF. HD stable. At baseline mentation (alert, interactive, AOx1).  - monitor off of abx  - give additional 500cc NS bolus  - repeat lactate Presenting with fever and SOB at home, defervesced with Tylenol. Testing positive for COVID and rhino/enterovirus.  Elevated lactate, likely 2/2 concurrent infection. Hypovolemic on exam, possibly due to insensible losses from fever and decreased PO intake due to COVID and/or entero/rhinovirus.  UA with LE, however, contaminated with epithelial cells. Denying dysuria or urinary frequency.  Lactate 2.3, s/p 500cc NS in ED. caution in CHF. HD stable. At baseline mentation (alert, interactive, AOx1).  - monitor off of abx  - give additional 500cc NS bolus  - f/u bcx  - repeat UA  - repeat lactate

## 2023-08-30 NOTE — H&P ADULT - PROBLEM SELECTOR PLAN 7
- continue home synthroid 50mcg daily Lipitor 20 mg oral tablet: 1 tab(s) orally once a day (at bedtime) Admission EKG showing atrial-paced bradycardia at 50bpm, prolonged OK (226).  - holding metoprolol and digoxin ISO bradycardia

## 2023-08-30 NOTE — ED ADULT NURSE NOTE - NSFALLHARMRISKINTERV_ED_ALL_ED
Assistance OOB with selected safe patient handling equipment if applicable/Assistance with ambulation/Communicate risk of Fall with Harm to all staff, patient, and family/Monitor gait and stability/Monitor for mental status changes and reorient to person, place, and time, as needed/Provide visual cue: red socks, yellow wristband, yellow gown, etc/Reinforce activity limits and safety measures with patient and family/Toileting schedule using arm’s reach rule for commode and bathroom/Use of alarms - bed, stretcher, chair and/or video monitoring/Bed in lowest position, wheels locked, appropriate side rails in place/Call bell, personal items and telephone in reach/Instruct patient to call for assistance before getting out of bed/chair/stretcher/Non-slip footwear applied when patient is off stretcher/[place] to call system/Physically safe environment - no spills, clutter or unnecessary equipment/Purposeful Proactive Rounding/Room/bathroom lighting operational, light cord in reach

## 2023-08-30 NOTE — H&P ADULT - PROBLEM SELECTOR PLAN 5
Warfarin (Jantoven) 3 mg oral tablet: 1 tab(s) orally once a day  metoprolol succinate 50 mg oral tablet, extended release: 1 tab(s) orally once a day  Digox 125 mcg (0.125 mg) oral tablet: 1 tab(s) orally once a day On home Toprol XL 50mg daily, spironolactone 25mg daily, lasix 20mg PO BID, digoxin 125mcg daily  - holding metoprolol and digoxin ISO bradycardia  - holding Lasix, as pt appears hypovolemic on exam  - continue spironolactone 25mg daily (with hold parameters)    #HTN  - plan, as above TTE 5/2023 with EF 65-70%. Hx mitral valve repair. On home Toprol XL 50mg daily, spironolactone 25mg daily, lasix 20mg PO BID, digoxin 125mcg daily  - holding metoprolol and digoxin ISO bradycardia  - holding Lasix, as pt appears hypovolemic on exam  - continue spironolactone 25mg daily (with hold parameters)    #HTN  - plan, as above

## 2023-08-30 NOTE — ED ADULT NURSE NOTE - ISOLATION TYPE:
Patient more visible on the unit today, social with staff and peers on the milieu  Patient brightens when approached, cooperative with assessment questions  Patient reports poor sleep the night before, reports increased anxiety related to this  Patient reports 4/10 depression and 3/4 anxiety, denies SI/HI AH/VH  Patient medication and meal compliant, appetite good  Patient given PRN tylenol 650 at 1807 for 5/10 headache, patient reports this was effective  VSS  Will continue to monitor frequently and provide support as needed  Airborne+Contact precautions

## 2023-08-30 NOTE — ED ADULT TRIAGE NOTE - CHIEF COMPLAINT QUOTE
Pt presents to ED c/o shortness of breath, fever since yesterday. home health aide gave tylenol this morning at 2am. hx of dementia, a&ox1. mental status at baseline per home health aide.

## 2023-08-30 NOTE — H&P ADULT - NSHPSOCIALHISTORY_GEN_ALL_CORE
Denies tobacco, alcohol, or drug use.    Lives with 24hr HHA. Bedbound. Denies tobacco, alcohol, or drug use. Lives with 24hr HHA. Bedbound.

## 2023-08-30 NOTE — H&P ADULT - CONVERSATION DETAILS
Discussed advanced directives with pt's son and alleged HCP (Harvey Oconnor) over phone. Pt's son states that his mother did not have any advanced directives in place, and that they never discussed this.    For now, pt will be FULL CODE. Pt's son will be in contact, should this change. He will be unavailable on 8/31 AM (will be on flight). If he cannot be reached, he requests that his wife be contacted.    Harvey Oconnor (son) [phone]  Alison Oconnor (son's wife) [phone]

## 2023-08-30 NOTE — H&P ADULT - NSHPLABSRESULTS_GEN_ALL_CORE
LABS:                         13.5   8.37  >-----< 159           ( 08-30-23 @ 13:04 )             40.9       133  |  98  |   15  ----------------------< 96    (08-30-23 @ 14:19)     4.6  |  25  |  0.71    Anion Gap: 10    Ca   9.6   (08-30-23 @ 14:19)  Mg   x    (08-30-23 @ 14:19)  Phos x    (08-30-23 @ 14:19)       TP 9.6     |  AST 3.4    -------------------------     Alb x     |  ALT x               (08-30-23 @ 14:19)  -------------------------     T-bili 3.4  |  AlkPh 468    D-bili x   COAGULATION STUDIES:     aPTT  38.4 sec    (08-30-23 @ 13:04)     PT     15.7 sec    (08-30-23 @ 13:04)     INR    1.39          (08-30-23 @ 13:04)       Urinalysis Basic - ( 30 Aug 2023 14:19 )    Color: x / Appearance: x / SG: x / pH: x  Gluc: 96 mg/dL / Ketone: x  / Bili: x / Urobili: x   Blood: x / Protein: x / Nitrite: x   Leuk Esterase: x / RBC: x / WBC x   Sq Epi: x / Non Sq Epi: x / Bacteria: x      I/O SUMMARY:

## 2023-08-30 NOTE — H&P ADULT - PROBLEM SELECTOR PLAN 2
no hypoxia  supportive care Presenting with 1 day of fever and mild SOB. Found to be COVID PCR+ on admission.  Stable on room air.  - monitor O2 requirements  - supportive care, chest PT

## 2023-08-30 NOTE — H&P ADULT - NSHPPHYSICALEXAM_GEN_ALL_CORE
General: in no acute distress  Eyes: EOMI intact bilaterally. Anicteric sclerae, moist conjunctivae  HENT: Moist mucous membranes  Neck: Trachea midline, supple  Lungs: CTA B/L. No wheezes, rales, or rhonchi  Cardiovascular: RRR. No murmurs, rubs, or gallops  Abdomen: Soft, non-tender non-distended; No rebound or guarding  Extremities: WWP, No clubbing, cyanosis or edema  MSK: No midline bony tenderness. No CVA tenderness bilaterally  Neurological: Alert and oriented x3  Skin: Warm and dry. No obvious rash General: in no acute distress, comfortable appearing on room air  Eyes: EOMI intact bilaterally. Anicteric sclerae, moist conjunctivae  HENT: Moist mucous membranes  Neck: Trachea midline, supple  Lungs: CTA B/L. No wheezes, rales, or rhonchi  Cardiovascular: RRR. No murmurs, rubs, or gallops  Abdomen: Soft, refusing further exam  Extremities: No clubbing, cyanosis or edema. 1+ pedal pulses  Neurological: Alert and oriented x3  Skin: Warm and dry. No obvious rash General: in no acute distress, comfortable appearing on room air  Eyes: EOMI intact bilaterally. Anicteric sclerae, moist conjunctivae  HENT: Dry mucous membranes  Neck: Trachea midline, supple  Lungs: CTA B/L, limited exam due to patient refusal  Cardiovascular: bradycardic, regular rhythm. limited exam due to patient refusal  Abdomen: Soft, refusing further exam  Extremities: No clubbing, cyanosis or edema. 1+ pedal pulses. Moves all extremities spontaneously.  Neurological: Alert and interactive. Oriented x1  Skin: Warm and dry. No obvious rash

## 2023-08-30 NOTE — ED ADULT NURSE NOTE - OBJECTIVE STATEMENT
Pt. from home and brought in with home health aide  Aide sts. that last night the pt. felt warm and today checked her temp and it was elevated  Pt. has a hx of HTN, HLD and Dementia  Pt. a&ox2, but at her baseline  Pt. placed on the continuous cardiac monitor and pulse ox

## 2023-08-30 NOTE — H&P ADULT - ASSESSMENT
labs w/ mild transaminitis in setting viral infection/uti, no upper abd ttp do not feel need further imaging.  lact 2.3, plan to rpt s/p ivf (500cc infusing, hold further as h/o chf).  cxr w/ b/l opacities, ekg mamie paced.  covid/entero/rhinovirus +.  given tyelnol and covered for abx d/t c/f uti.  d/w Dr. Johnson who will admit. 89F PMH dementia (AOx1 baseline), HFpEF (EF 65-70% in 5/2023), AF (warfarin), hx mitral valve replacement and MYKE closure, HTN, HLD, hypothyroidism, tachy-mamie syndrome, who presents from home with HHA for fever and mild SOB x 1 day, found to be COVID and entero/rhinovirus positive, admitted for further management.

## 2023-08-30 NOTE — H&P ADULT - PROBLEM SELECTOR PLAN 6
Lipitor 20 mg oral tablet: 1 tab(s) orally once a day (at bedtime) Admission EKG showing atrial-paced bradycardia at 50bpm, prolonged NV (226).  On home warfarin 3mg, Toprol XL 50mg, digoxin 125mcg daily. Has taken digoxin on AM of admission.  - continue home warfarin        - daily INR, re-dose warfarin daily  - hold Toprol XL 50mg ISO bradycardia  - obtain AM digoxin level (holding home digoxin for now)

## 2023-08-30 NOTE — H&P ADULT - PROBLEM SELECTOR PLAN 9
Zoloft 50 mg oral tablet: 1 tab(s) orally once a day metoprolol succinate 50 mg oral tablet, extended release: 1 tab(s) orally once a day  spironolactone 25 mg oral tablet: 1 tab(s) orally once a day  Lasix 20 mg oral tablet: 1 tab(s) orally every 12 hours  Digox 125 mcg (0.125 mg) oral tablet: 1 tab(s) orally once a day - continue home Zoloft 50mg daily

## 2023-08-30 NOTE — H&P ADULT - PROBLEM SELECTOR PLAN 3
At last hospitalization in 5/2023, elevated LFTs, and questionable sludge ball in CBD, concerns for acute cholangitis.  - received zosyn on last admission  - continue home ursodiol 250 mg BID  - ? aspiration PNA supportive care Presenting with 1 day of fever and mild SOB. Found to be entero/rhinovirus positive on admission. No diarrhea.  Stable on room air.  - monitor O2 requirements  - supportive care, chest PT

## 2023-08-30 NOTE — ED PROVIDER NOTE - OBJECTIVE STATEMENT
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  HHA reports pt felt warm last night, took temp and noted fever 100.9.  pt contacted Dr. Johnson instructed to give tylenol and dose ciprofloxacin.  no fever this morning but HHA noted pt chest sounded congested and her work of breathing was increased upon waking up.  checked her O2 and it was 94%. reports pt more tired than normal this morning but did eat small breakfast.  HHA reports giving pt all her home meds.  states pt has not been c/o pain anywhere, pt reports she is feeling well when asked.  denies chills, chest pain, cough, sore throat, abdpain vd, increased urination, hematuria, rash, fall/trauma

## 2023-08-30 NOTE — H&P ADULT - HISTORY OF PRESENT ILLNESS
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  HHA reports pt felt warm last night, took temp and noted fever 100.9.  pt contacted Dr. Johnson instructed to give tylenol and dose ciprofloxacin.  no fever this morning but HHA noted pt chest sounded congested and her work of breathing was increased upon waking up.  checked her O2 and it was 94%. reports pt more tired than normal this morning but did eat small breakfast.  HHA reports giving pt all her home meds.  states pt has not been c/o pain anywhere, pt reports she is feeling well when asked.  denies chills, chest pain, cough, sore throat, abdpain vd, increased urination, hematuria, rash, fall/trauma    In the ED:    - VS: Tmax: , HR:  , BP:  , RR:  , O2:    %     - Pertinent Labs:     - Imaging: CXR: CT: US: Cath: EKG:     - Treatment/interventions:  89 F a/ox1 baseline here with 24 hr caregiver (HHA), pmh HTN, HLD, Hypothyroidism, diastolic CHF (EF 65-70% 2023), tachy-mamie syndrome, pAF (Warfarin); PSHx mitral valve replacement and MYKE closure, PPM bibems for fever and difficulty breathing since last night.  HHA reports pt felt warm last night, took temp and noted fever 100.9.  pt contacted Dr. Johnson instructed to give tylenol and dose ciprofloxacin.  no fever this morning but HHA noted pt chest sounded congested and her work of breathing was increased upon waking up.  checked her O2 and it was 94%. reports pt more tired than normal this morning but did eat small breakfast.  HHA reports giving pt all her home meds.  states pt has not been c/o pain anywhere, pt reports she is feeling well when asked.  denies chills, chest pain, cough, sore throat, abd pain vd, increased urination, hematuria, rash, fall/trauma    In the ED:    - VS: Tmax: 100.3 rectal, HR: 51, BP: 151/73, RR: 20, O2: 95% room air    - Pertinent Labs:     - Imaging: CXR: CT: US: Cath: EKG:     - Treatment/interventions:  89 F a/ox1 baseline here with 24 hr caregiver (HHA), pmh HTN, HLD, Hypothyroidism, diastolic CHF (EF 65-70% 2023), tachy-mamie syndrome, pAF (Warfarin); PSHx mitral valve replacement and MYKE closure, PPM bibems for fever and difficulty breathing since last night.  HHA reports pt felt warm last night, took temp and noted fever 100.9.  pt contacted Dr. Johnson instructed to give tylenol and dose ciprofloxacin.  no fever this morning but HHA noted pt chest sounded congested and her work of breathing was increased upon waking up.  checked her O2 and it was 94%. reports pt more tired than normal this morning but did eat small breakfast.  HHA reports giving pt all her home meds.  states pt has not been c/o pain anywhere, pt reports she is feeling well when asked.  denies chills, chest pain, cough, sore throat, abd pain vd, increased urination, hematuria, rash, fall/trauma    In the ED:    - VS: Tmax: 100.3 rectal, HR: 51, BP: 151/73, RR: 20, O2: 95% room air    - Pertinent Labs: wbc 8.3, Na 133, BUN 15, Cr 0.71, , AST 63, ALT 58, lipase 89, trop T wnl, lactate 2.3, INR 1.39, UA = moderate epithelial cells, SG >1.03, many WBC, large LE, neg NIT, entero/rhinovirus positive, COVID positive    - Imaging: CXR: Stable position left ICD. Bilateral opacities.. Cardiomegaly, thoracic aortic calcification, status post median sternotomy, mitral valve replacement, left atrial appendage clip. Stable bony structures, scoliosis. EKG: atrial-paced bradycardia @50bpm, , QTc 346    - Treatment/interventions: 500cc NS, zosyn 3.375g, tylenol 1g 89F PMH dementia (AOx1 baseline), HFpEF (EF 65-70% in 5/2023), AF (warfarin), hx mitral valve replacement and MYKE closure, HTN, HLD, hypothyroidism, tachy-mamie syndrome, who presents from home with HHA for fever and mild SOB x 1 day. Per HHA, pt felt warm on night before admission, and measured T100.9. HHA contacted pt's PCP (Dr. Johnson) and due to hx recurrent UTI, was instructed to give 1 dose of ciprofloxacin and Tylenol. Pt defervesced, however, sounded congested and with increased work of breathing this morning. SpO2 on pulse oximeter was 94%. Pt was more tired appearing, however, was able to eat small breakfast and take AM meds. Pt is denying pain anywhere, reports feeling well when asked. Denies chills, chest pain, cough, sore throat, abdominal pain, nausea, vomiting, diarrhea, dysuria.    In the ED:    - VS: Tmax: 100.3 rectal, HR: 51, BP: 151/73, RR: 20, O2: 95% room air    - Pertinent Labs: wbc 8.3, Na 133, BUN 15, Cr 0.71, , AST 63, ALT 58, lipase 89, trop T wnl, lactate 2.3, INR 1.39, UA = moderate epithelial cells, SG >1.03, many WBC, large LE, neg NIT, entero/rhinovirus positive, COVID positive    - Imaging: CXR: Stable position left ICD. Bilateral opacities. Cardiomegaly, thoracic aortic calcification, status post median sternotomy, mitral valve replacement, left atrial appendage clip. Stable bony structures, scoliosis. EKG: atrial-paced bradycardia @50bpm, , QTc 346    - Treatment/interventions: 500cc NS, zosyn 3.375g, Tylenol 1g

## 2023-08-30 NOTE — H&P ADULT - PROBLEM SELECTOR PLAN 12
F: Tolerating PO, no IVF  E: Replete K<4, Mg<2  N: (DIET)  VTE Ppx: Lovenox 40mg SQ q24h  GI: not needed  C: Full Code  D: RMF    Problem: Nutrition, metabolism, and development symptoms

## 2023-08-30 NOTE — H&P ADULT - PROBLEM SELECTOR PLAN 4
Warfarin (Jantoven) 3 mg oral tablet: 1 tab(s) orally once a day  metoprolol succinate 50 mg oral tablet, extended release: 1 tab(s) orally once a day  Digox 125 mcg (0.125 mg) oral tablet: 1 tab(s) orally once a day At last hospitalization in 5/2023, elevated LFTs, and questionable sludge ball in CBD, concerns for acute cholangitis.  - received zosyn on last admission  - continue home ursodiol 250 mg BID  - ? aspiration PNA At last hospitalization in 5/2023, elevated LFTs, and questionable sludge ball in CBD, concerns for acute cholangitis.  - received zosyn on last admission  - monitor off of abx  - continue home ursodiol 250 mg BID  - ? aspiration PNA At last hospitalization in 5/2023, elevated LFTs, and questionable sludge ball in CBD, concerns for acute cholangitis.  s/p zosyn x1 in ED. Received course of zosyn at last admission.  Alkaline phosphatase 468, similar to prior admission. AST 63, ALT 58, bilirubin 0.5.  Denying abdominal pain at home. Refusing physical exam.  - monitor off of abx  - continue home ursodiol 250 mg BID  - obtain RUQ US to evaluate biliary tree At last hospitalization in 5/2023, elevated LFTs, and questionable sludge ball in CBD, concerns for acute cholangitis.  s/p Zosyn x1 in ED. Received course of Zosyn at last admission. Penicillin allergy, but has tolerated Zosyn.  Alkaline phosphatase 468, similar to prior admission. AST 63, ALT 58, bilirubin 0.5.  Denying abdominal pain at home. Refusing physical exam.  - monitor off of abx  - continue home ursodiol 250 mg BID  - obtain RUQ US to evaluate biliary tree

## 2023-08-30 NOTE — H&P ADULT - PROBLEM SELECTOR PLAN 11
F: Tolerating PO, no IVF  E: Replete K<4, Mg<2  N: (DIET)  VTE Ppx: Lovenox 40mg SQ q24h  GI: not needed  C: Full Code  D: RMF    Problem: Nutrition, metabolism, and development symptoms F: Tolerating PO, no IVF  E: Replete K<4, Mg<2  N: Pureed with ensures  VTE Ppx: warfarin, dosed daily    GI: not needed  C: Full Code  D: RMF    Problem: Nutrition, metabolism, and development symptoms

## 2023-08-31 LAB
ALBUMIN SERPL ELPH-MCNC: 3.4 G/DL — SIGNIFICANT CHANGE UP (ref 3.3–5)
ALP SERPL-CCNC: 507 U/L — HIGH (ref 40–120)
ALT FLD-CCNC: 80 U/L — HIGH (ref 10–45)
ANION GAP SERPL CALC-SCNC: 12 MMOL/L — SIGNIFICANT CHANGE UP (ref 5–17)
AST SERPL-CCNC: 86 U/L — HIGH (ref 10–40)
BILIRUB SERPL-MCNC: 0.5 MG/DL — SIGNIFICANT CHANGE UP (ref 0.2–1.2)
BUN SERPL-MCNC: 12 MG/DL — SIGNIFICANT CHANGE UP (ref 7–23)
CALCIUM SERPL-MCNC: 9.6 MG/DL — SIGNIFICANT CHANGE UP (ref 8.4–10.5)
CHLORIDE SERPL-SCNC: 96 MMOL/L — SIGNIFICANT CHANGE UP (ref 96–108)
CO2 SERPL-SCNC: 23 MMOL/L — SIGNIFICANT CHANGE UP (ref 22–31)
CREAT SERPL-MCNC: 0.64 MG/DL — SIGNIFICANT CHANGE UP (ref 0.5–1.3)
CULTURE RESULTS: NO GROWTH — SIGNIFICANT CHANGE UP
DIGOXIN SERPL-MCNC: 1.1 NG/ML — SIGNIFICANT CHANGE UP (ref 0.8–2)
EGFR: 81 ML/MIN/1.73M2 — SIGNIFICANT CHANGE UP
GLUCOSE SERPL-MCNC: 85 MG/DL — SIGNIFICANT CHANGE UP (ref 70–99)
MAGNESIUM SERPL-MCNC: 2 MG/DL — SIGNIFICANT CHANGE UP (ref 1.6–2.6)
PHOSPHATE SERPL-MCNC: 3.5 MG/DL — SIGNIFICANT CHANGE UP (ref 2.5–4.5)
POTASSIUM SERPL-MCNC: 4.3 MMOL/L — SIGNIFICANT CHANGE UP (ref 3.5–5.3)
POTASSIUM SERPL-SCNC: 4.3 MMOL/L — SIGNIFICANT CHANGE UP (ref 3.5–5.3)
PROT SERPL-MCNC: 7.7 G/DL — SIGNIFICANT CHANGE UP (ref 6–8.3)
SODIUM SERPL-SCNC: 131 MMOL/L — LOW (ref 135–145)
SPECIMEN SOURCE: SIGNIFICANT CHANGE UP

## 2023-08-31 RX ADMIN — URSODIOL 250 MILLIGRAM(S): 250 TABLET, FILM COATED ORAL at 06:24

## 2023-08-31 RX ADMIN — SPIRONOLACTONE 25 MILLIGRAM(S): 25 TABLET, FILM COATED ORAL at 06:24

## 2023-08-31 RX ADMIN — URSODIOL 250 MILLIGRAM(S): 250 TABLET, FILM COATED ORAL at 22:35

## 2023-08-31 RX ADMIN — ATORVASTATIN CALCIUM 20 MILLIGRAM(S): 80 TABLET, FILM COATED ORAL at 22:35

## 2023-08-31 NOTE — PROGRESS NOTE ADULT - ASSESSMENT
89F PMH dementia (AOx1 baseline), HFpEF (EF 65-70% in 5/2023), AF (warfarin), hx mitral valve replacement and MYKE closure, HTN, HLD, hypothyroidism, tachy-mamie syndrome, who presents from home with HHA for fever and mild SOB x 1 day, found to be COVID and entero/rhinovirus positive, admitted for further management.

## 2023-09-01 ENCOUNTER — TRANSCRIPTION ENCOUNTER (OUTPATIENT)
Age: 88
End: 2023-09-01

## 2023-09-01 LAB
ALBUMIN SERPL ELPH-MCNC: 3.2 G/DL — LOW (ref 3.3–5)
ALP SERPL-CCNC: 668 U/L — HIGH (ref 40–120)
ALT FLD-CCNC: 149 U/L — HIGH (ref 10–45)
ANION GAP SERPL CALC-SCNC: 10 MMOL/L — SIGNIFICANT CHANGE UP (ref 5–17)
AST SERPL-CCNC: 202 U/L — HIGH (ref 10–40)
BASOPHILS # BLD AUTO: 0.02 K/UL — SIGNIFICANT CHANGE UP (ref 0–0.2)
BASOPHILS NFR BLD AUTO: 0.3 % — SIGNIFICANT CHANGE UP (ref 0–2)
BILIRUB SERPL-MCNC: 0.5 MG/DL — SIGNIFICANT CHANGE UP (ref 0.2–1.2)
BUN SERPL-MCNC: 17 MG/DL — SIGNIFICANT CHANGE UP (ref 7–23)
CALCIUM SERPL-MCNC: 9.6 MG/DL — SIGNIFICANT CHANGE UP (ref 8.4–10.5)
CHLORIDE SERPL-SCNC: 101 MMOL/L — SIGNIFICANT CHANGE UP (ref 96–108)
CO2 SERPL-SCNC: 24 MMOL/L — SIGNIFICANT CHANGE UP (ref 22–31)
CREAT SERPL-MCNC: 0.59 MG/DL — SIGNIFICANT CHANGE UP (ref 0.5–1.3)
EGFR: 83 ML/MIN/1.73M2 — SIGNIFICANT CHANGE UP
EOSINOPHIL # BLD AUTO: 0.12 K/UL — SIGNIFICANT CHANGE UP (ref 0–0.5)
EOSINOPHIL NFR BLD AUTO: 1.7 % — SIGNIFICANT CHANGE UP (ref 0–6)
GLUCOSE SERPL-MCNC: 100 MG/DL — HIGH (ref 70–99)
HCT VFR BLD CALC: 41.5 % — SIGNIFICANT CHANGE UP (ref 34.5–45)
HGB BLD-MCNC: 13.7 G/DL — SIGNIFICANT CHANGE UP (ref 11.5–15.5)
IMM GRANULOCYTES NFR BLD AUTO: 0.3 % — SIGNIFICANT CHANGE UP (ref 0–0.9)
LACTATE SERPL-SCNC: 1.6 MMOL/L — SIGNIFICANT CHANGE UP (ref 0.5–2)
LYMPHOCYTES # BLD AUTO: 2.21 K/UL — SIGNIFICANT CHANGE UP (ref 1–3.3)
LYMPHOCYTES # BLD AUTO: 31.3 % — SIGNIFICANT CHANGE UP (ref 13–44)
MAGNESIUM SERPL-MCNC: 1.8 MG/DL — SIGNIFICANT CHANGE UP (ref 1.6–2.6)
MCHC RBC-ENTMCNC: 32.2 PG — SIGNIFICANT CHANGE UP (ref 27–34)
MCHC RBC-ENTMCNC: 33 GM/DL — SIGNIFICANT CHANGE UP (ref 32–36)
MCV RBC AUTO: 97.6 FL — SIGNIFICANT CHANGE UP (ref 80–100)
MONOCYTES # BLD AUTO: 0.87 K/UL — SIGNIFICANT CHANGE UP (ref 0–0.9)
MONOCYTES NFR BLD AUTO: 12.3 % — SIGNIFICANT CHANGE UP (ref 2–14)
NEUTROPHILS # BLD AUTO: 3.81 K/UL — SIGNIFICANT CHANGE UP (ref 1.8–7.4)
NEUTROPHILS NFR BLD AUTO: 54.1 % — SIGNIFICANT CHANGE UP (ref 43–77)
NRBC # BLD: 0 /100 WBCS — SIGNIFICANT CHANGE UP (ref 0–0)
PHOSPHATE SERPL-MCNC: 2.8 MG/DL — SIGNIFICANT CHANGE UP (ref 2.5–4.5)
PLATELET # BLD AUTO: 175 K/UL — SIGNIFICANT CHANGE UP (ref 150–400)
POTASSIUM SERPL-MCNC: 4.4 MMOL/L — SIGNIFICANT CHANGE UP (ref 3.5–5.3)
POTASSIUM SERPL-SCNC: 4.4 MMOL/L — SIGNIFICANT CHANGE UP (ref 3.5–5.3)
PROT SERPL-MCNC: 7.4 G/DL — SIGNIFICANT CHANGE UP (ref 6–8.3)
RBC # BLD: 4.25 M/UL — SIGNIFICANT CHANGE UP (ref 3.8–5.2)
RBC # FLD: 13.9 % — SIGNIFICANT CHANGE UP (ref 10.3–14.5)
SODIUM SERPL-SCNC: 135 MMOL/L — SIGNIFICANT CHANGE UP (ref 135–145)
WBC # BLD: 7.05 K/UL — SIGNIFICANT CHANGE UP (ref 3.8–10.5)
WBC # FLD AUTO: 7.05 K/UL — SIGNIFICANT CHANGE UP (ref 3.8–10.5)

## 2023-09-01 RX ADMIN — ATORVASTATIN CALCIUM 20 MILLIGRAM(S): 80 TABLET, FILM COATED ORAL at 21:53

## 2023-09-01 RX ADMIN — SENNA PLUS 2 TABLET(S): 8.6 TABLET ORAL at 21:53

## 2023-09-01 RX ADMIN — URSODIOL 250 MILLIGRAM(S): 250 TABLET, FILM COATED ORAL at 09:50

## 2023-09-01 RX ADMIN — URSODIOL 250 MILLIGRAM(S): 250 TABLET, FILM COATED ORAL at 21:52

## 2023-09-01 RX ADMIN — SPIRONOLACTONE 25 MILLIGRAM(S): 25 TABLET, FILM COATED ORAL at 06:24

## 2023-09-01 NOTE — DISCHARGE NOTE PROVIDER - CARE PROVIDER_API CALL
Ag Johnson  Internal Medicine  [address] [place] [id number]  Phone: [phone]  Fax: [phone]  Follow Up Time: 1 week

## 2023-09-01 NOTE — PATIENT PROFILE ADULT - MST SCORE
[facility] Medicine  Progress Note    Patient Name: Chanel Cervantes  MRN: [id number]  Patient Class: IP- Inpatient   Admission Date: 7/18/2023  Length of Stay: 9 days  Attending Physician: Kartik Farris MD  Primary Care Provider: ARIC Almaguer        Subjective:     Principal Problem:Septic shock        HPI:  Chanel Cervantes is a 67 y.o. White female   With PMH of DM2, HTN, bariatric surgery,  Remote cholecystectomy,  Frequent UTIs with kidney stones,  who presents with back pain.  Admitted for pancreatitis with elevated LFTs     Pt is currently confused after receiving ativan  (ER gave it to calm her for CT scan)  History taken from family at bedside     Onset of back pain overnight  Located b/l lumbar region  Radiating to b/l upper quadrants of abdomen  Occurring intermittently  Progressively worsening  Severe, causes SOB when occurring     Initially pt thought pain was due to a fall yesterday  (Mechanical, tripped over a rug, no head trauma)  +nausea, no vomiting  +intermittent chills  They are not sure about objective fever     Has had similar abdominal pain previously  Per family, this has been attributed to her ongoing ventral wall hernia  They don't bring her to ER for this usually  However pain today was much more severe than usual      Overview/Hospital Course:  Chanel Cervantes is a 67 year old female with a past medical history of obesity, ventral hernia, DM, HTN, anemia, CAD, NIKOLAS, and hypothyroidism who presented with septic shock secondary to a possible cholangitis with E coli bacteremia. Vancomycin was discontinued and she remained on meropenem. BP was maintained on a Levophed infusion. GI was consulted and performed ERCP which showed biliary sludge with a sludge ball dilating the main duct which was removed; a biliary sphincterotomy was also performed. Repeat blood cultures were negative. She was on IV fluids with LR and was NPO as she was encephalopathic. She also had  an ANTHONY as well. She also had acute hypoxic respiratory failure for which she was put on mask O2. She also had demand ischemia; TTE was done and troponin was trended. There was shock liver superimposed on the hepatocellular injury brought on by the acute cholangitis. Pulmonary/Critical Care was consulted given the patient's medical acuity.  Pulmonologist made decision to intubate patient based on patient's deep encephalopathy and inability to protect her airway.  Patient underwent transesophageal echocardiography which did not report any intracardiac thrombus or evidence of endocarditis.  Patient was successfully extubated on July 24, 2023. PT and OT is working with patient. Patient is transferred to medicine floor for further management and rehab.  working on LTAC placement.      Interval History:  No acute events overnight.  The patient says she has not been able to sleep very well.  She is also not been having any bowel movements.  Will work on getting her Waller catheter out today.  Continuing to plan for rehab.  She continues to have difficulty breathing though her lungs are much more clear and less wheezing today.  Will discuss with Pulmonary if any further optimization as possible.    Review of Systems   All other systems reviewed and are negative.  Objective:     Vital Signs (Most Recent):  Temp: 98.8 °F (37.1 °C) (07/28/23 0715)  Pulse: 89 (07/28/23 0715)  Resp: 18 (07/28/23 0715)  BP: (!) 174/76 (notified nurse Martine) (07/28/23 0715)  SpO2: 95 % (07/28/23 0715) Vital Signs (24h Range):  Temp:  [97.5 °F (36.4 °C)-98.8 °F (37.1 °C)] 98.8 °F (37.1 °C)  Pulse:  [79-96] 89  Resp:  [18-20] 18  SpO2:  [94 %-97 %] 95 %  BP: (151-188)/(65-79) 174/76     Weight: 108.9 kg (239 lb 15.9 oz)  Body mass index is 37.6 kg/m².    Intake/Output Summary (Last 24 hours) at 7/28/2023 1111  Last data filed at 7/28/2023 1041  Gross per 24 hour   Intake 290 ml   Output 1500 ml   Net -1210 ml           Physical  Exam  Constitutional:       Appearance: Normal appearance. She is normal weight.      Comments: Very weak   HENT:      Head: Normocephalic and atraumatic.      Nose: Nose normal.      Mouth/Throat:      Mouth: Mucous membranes are moist.   Eyes:      Conjunctiva/sclera: Conjunctivae normal.      Pupils: Pupils are equal, round, and reactive to light.   Cardiovascular:      Rate and Rhythm: Normal rate and regular rhythm.      Pulses: Normal pulses.      Heart sounds: Normal heart sounds. No murmur heard.    No friction rub. No gallop.   Pulmonary:      Effort: Pulmonary effort is normal.      Breath sounds: No wheezing, rhonchi or rales.      Comments: Clear breath sounds today, no stridor. Few crackles. 3L Nc  Abdominal:      General: Abdomen is flat. Bowel sounds are normal. There is no distension.      Palpations: Abdomen is soft.      Tenderness: There is no abdominal tenderness. There is no guarding.   Musculoskeletal:         General: No swelling. Normal range of motion.      Cervical back: Normal range of motion and neck supple.      Right lower leg: No edema.      Left lower leg: No edema.   Skin:     General: Skin is warm and dry.   Neurological:      General: No focal deficit present.      Mental Status: She is alert.   Psychiatric:         Mood and Affect: Mood normal.         Thought Content: Thought content normal.         Judgment: Judgment normal.           Significant Labs: All pertinent labs within the past 24 hours have been reviewed.    Significant Imaging: I have reviewed all pertinent imaging results/findings within the past 24 hours.      Assessment/Plan:      * Septic shock due to Entercoccus faecium and E.coli  Secondary to acute cholangitis  -continue IVAB, stop date is 08/01  - will complete antibiotics rehab  -Enterococcus bacteremia now cleared  -S/p ERCP  -Continue LR IV fluids and Levophed to keep MAP > 65    Antibiotics (From admission, onward)    Start     Stop Route Frequency Ordered  "   07/21/23 1400  DAPTOmycin (CUBICIN) 805 mg in sodium chloride 0.9% SolP 50 mL IVPB         -- IV Every 48 hours (non-standard times) 07/20/23 0749    07/19/23 1300  meropenem 1 g in sodium chloride 0.9 % 100 mL IVPB (ready to mix system)        Note to Pharmacy: Ht: 5' 7" (1.702 m)  Wt: 108.9 kg (240 lb)  Estimated Creatinine Clearance: 63.1 mL/min (based on SCr of 1.1 mg/dL).  Body mass index is 37.59 kg/m².    -- IV Every 12 hours (non-standard times) 07/19/23 0740              Constipation  Patient has had bowel movements since her admission  Will address constipation today      Gallstone pancreatitis  Present on hospital day 1.  Continue vasopressor support and crystalloid.      Encephalopathy, metabolic  Metabolic in setting of septic shock.  -assess mental status when off sedation  -Treat septic shock      Shock liver  - LFTs improved    Lab Results   Component Value Date    ALT 51 (H) 07/27/2023    AST 21 07/27/2023    GGT 24 05/17/2018    ALKPHOS 171 (H) 07/27/2023    BILITOT 0.8 07/27/2023         ANTHONY (acute kidney injury)  In setting of septic shock.  -appreciate nephrology recommendations  -Renally dose medications  -Avoid nephrotoxic agents  -Monitor UOP and electrolytes  -Trend creatinine  -nephrologist consulting appreciate recommendations    Cr has trended upward:  2.4 to 2.6 to 2.9 to 2.8.  Now down to 2.3  BUN is going up as well:  BUN is stabilized      Demand ischemia  History of CAD s/p CABG. No acute ST changes on EKG.  -Trend troponin  -Treat septic shock  -Telemetry  -cardiology consulting -- their opinion is that the troponin is high b/c of myocardial demand ischemia    Troponin I High Sensitivity   Date Value Ref Range Status   07/20/2023 2384.3 (HH) 0.0 - 14.9 pg/mL Final     Comment:     Troponin results differ between methods. Do not use   results between Troponin methods interchangeably.    Alkaline Phospatase levels above 400 U/L may   cause false positive results.    Access hsTnI " should not be used for patients taking   Asfotase anya (Strensiq).  Troponin critical result(s) called and verbal readback obtained   from Argentina Avila RN M3 by MS1 07/20/2023 04:54     07/19/2023 1148.1 (HH) 0.0 - 14.9 pg/mL Final     Comment:     Troponin results differ between methods. Do not use   results between Troponin methods interchangeably.    Alkaline Phospatase levels above 400 U/L may   cause false positive results.    Access hsTnI should not be used for patients taking   Asfotase anya (Strensiq).  Troponin critical result(s) called and verbal readback obtained from   Karan Maldonado RN M3  by MS1 07/19/2023 22:10     07/19/2023 648.1 (HH) 0.0 - 14.9 pg/mL Final     Comment:     Troponin results differ between methods. Do not use   results between Troponin methods interchangeably.    Alkaline Phospatase levels above 400 U/L may   cause false positive results.    Access hsTnI should not be used for patients taking   Asfotase anya (Strensiq).  Results confirmed, test repeated  Troponin critical result(s) repeated. Called and verbal readback   obtained from Carmela Mcintosh,ICU, RN.  by SLT1 07/19/2023 17:37         Acute hypoxemic respiratory failure  Required intubation on hospital day 3.  Patient was successfully extubated on July 24, 2023.  Follow Pulmonary Medicine recommendations.  Wheezing has improved but she still feels extremely short of breath  Will discuss with pulmonology  Adjust DuoNebs  Added mucus clearance therapy yesterday        Anemia  Stable.  -Trend Hgb with CBC    Hemoglobin   Date Value Ref Range Status   07/25/2023 11.5 (L) 12.0 - 16.0 g/dL Final   07/24/2023 10.6 (L) 12.0 - 16.0 g/dL Final           Obesity (BMI 30-39.9)  Body mass index is 37.6 kg/m². Morbid obesity complicates all aspects of disease management from diagnostic modalities to treatment.       Hypothyroidism  TSH unremarkable.  It's 0.470.  -Continue Synthroid      Acute obstructive cholangitis  Sludge ball  removed via ERCP. Likely source of bacteremia. LFTs much improved.  -appreciate ID recommendations, treatment of E coli and Enterococcus as noted above  -GI following  -IV fluids  - blood cultures:  Entercoccus faecium and E.coli  -Trend LFTs      CAD (coronary artery disease)  -Hold home medications  -Telemetry      Hypokalemia  -Trend K.  Has improved.  -Replete K PRN  -Telemetry    Potassium   Date Value Ref Range Status   07/23/2023 3.8 3.5 - 5.1 mmol/L Final   07/22/2023 4.0 3.5 - 5.1 mmol/L Final         S/P bariatric surgery  Noted.      NIKOLAS on CPAP  -chronic condition.  - needs nightly CPAP    Type 2 diabetes mellitus treated with insulin  Patient's FSGs are controlled on current medication regimen.  Last A1c reviewed-   Lab Results   Component Value Date    HGBA1C 6.9 (H) 07/18/2023     'Current correctional scale  Medium  Maintain anti-hyperglycemic dose as follows-   Antihyperglycemics (From admission, onward)    Start     Stop Route Frequency Ordered    07/19/23 1018  insulin aspart U-100 pen 1-10 Units         -- SubQ Every 6 hours PRN 07/19/23 0920        Hold Oral hypoglycemics while patient is in the hospital.    Benign essential hypertension  - resumed home medications  - blood pressure improving      VTE Risk Mitigation (From admission, onward)         Ordered     enoxaparin injection 30 mg  Every 24 hours (non-standard times)         07/22/23 0940     IP VTE HIGH RISK PATIENT  Once         07/18/23 1642     Place sequential compression device  Until discontinued         07/18/23 1642                Discharge Planning   LUIS ALFREDO: 7/29/2023     Code Status: Full Code   Is the patient medically ready for discharge?:     Reason for patient still in hospital (select all that apply): Treatment  Discharge Plan A: Rehab   Discharge Delays: (!) Post-Acute Set-up              Kartik Farris MD  Department of Hospital Medicine   [facility]   2

## 2023-09-01 NOTE — DIETITIAN INITIAL EVALUATION ADULT - PROBLEM SELECTOR PLAN 4
At last hospitalization in 5/2023, elevated LFTs, and questionable sludge ball in CBD, concerns for acute cholangitis.  s/p Zosyn x1 in ED. Received course of Zosyn at last admission. Penicillin allergy, but has tolerated Zosyn.  Alkaline phosphatase 468, similar to prior admission. AST 63, ALT 58, bilirubin 0.5.  Denying abdominal pain at home. Refusing physical exam.  - monitor off of abx  - continue home ursodiol 250 mg BID  - obtain RUQ US to evaluate biliary tree
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
## 2023-09-01 NOTE — DISCHARGE NOTE PROVIDER - NSDCFUSCHEDAPPT_GEN_ALL_CORE_FT
[facility]  [facility] [address]  Scheduled Appointment: 09/27/2023

## 2023-09-01 NOTE — DIETITIAN INITIAL EVALUATION ADULT - PROBLEM SELECTOR PLAN 5
TTE 5/2023 with EF 65-70%. Hx mitral valve repair. On home Toprol XL 50mg daily, spironolactone 25mg daily, lasix 20mg PO BID, digoxin 125mcg daily  - holding metoprolol and digoxin ISO bradycardia  - holding Lasix, as pt appears hypovolemic on exam  - continue spironolactone 25mg daily (with hold parameters)    #HTN  - plan, as above

## 2023-09-01 NOTE — DIETITIAN NUTRITION RISK NOTIFICATION - ADDITIONAL COMMENTS/DIETITIAN RECOMMENDATIONS
Advance diet per team. When medically cleared for PO, recommend diet textures/consistencies per SLP (on puree/mildly thick liquids at home) with Ensure Enlive 2x/day (350kcal, 20g protein per serving).

## 2023-09-01 NOTE — DIETITIAN INITIAL EVALUATION ADULT - PERTINENT MEDS FT
MEDICATIONS  (STANDING):  atorvastatin 20 milliGRAM(s) Oral at bedtime  senna 2 Tablet(s) Oral at bedtime  spironolactone 25 milliGRAM(s) Oral daily  ursodiol Tablet 250 milliGRAM(s) Oral two times a day    MEDICATIONS  (PRN):  acetaminophen     Tablet .. 650 milliGRAM(s) Oral every 6 hours PRN Temp greater or equal to 38C (100.4F), Mild Pain (1 - 3)  aluminum hydroxide/magnesium hydroxide/simethicone Suspension 30 milliLiter(s) Oral every 4 hours PRN Dyspepsia

## 2023-09-01 NOTE — DIETITIAN INITIAL EVALUATION ADULT - PROBLEM SELECTOR PLAN 1
Presenting with fever and SOB at home, defervesced with Tylenol. Testing positive for COVID and rhino/enterovirus.  Elevated lactate, likely 2/2 concurrent infection. Hypovolemic on exam, possibly due to insensible losses from fever and decreased PO intake due to COVID and/or entero/rhinovirus.  UA with LE, however, contaminated with epithelial cells. Denying dysuria or urinary frequency.  Lactate 2.3, s/p 500cc NS in ED. caution in CHF. HD stable. At baseline mentation (alert, interactive, AOx1).  - monitor off of abx  - give additional 500cc NS bolus  - f/u bcx  - repeat UA  - repeat lactate

## 2023-09-01 NOTE — DIETITIAN INITIAL EVALUATION ADULT - OTHER CALCULATIONS
Based on Standards of Care pt within % IBW (82%) thus actual body weight used for all calculations (90#). Needs adjusted for advanced age, CHF and malnutrition. Fluid recs per team.

## 2023-09-01 NOTE — DIETITIAN INITIAL EVALUATION ADULT - PROBLEM SELECTOR PLAN 7
Admission EKG showing atrial-paced bradycardia at 50bpm, prolonged VT (226).  - holding metoprolol and digoxin ISO bradycardia

## 2023-09-01 NOTE — PROGRESS NOTE ADULT - ASSESSMENT
Await sono 89F PMH dementia (AOx1 baseline), HFpEF (EF 65-70% in 5/2023), AF (warfarin), hx mitral valve replacement and MYKE closure, HTN, HLD, hypothyroidism, tachy-mamie syndrome, who presents from home with HHA for fever and mild SOB x 1 day, found to be COVID and entero/rhinovirus positive, admitted for further management.

## 2023-09-01 NOTE — DISCHARGE NOTE PROVIDER - DETAILS OF MALNUTRITION DIAGNOSIS/DIAGNOSES
This patient has been assessed with a concern for Malnutrition and was treated during this hospitalization for the following Nutrition diagnosis/diagnoses:     -  09/01/2023: Severe protein-calorie malnutrition   -  09/01/2023: Underweight (BMI < 19)

## 2023-09-01 NOTE — DIETITIAN INITIAL EVALUATION ADULT - ADD RECOMMEND
1. Advance diet per team. When medically cleared for PO, recommend diet textures/consistencies per SLP (on puree/mildly thick liquids at home) with Ensure Enlive 2x/day (350kcal, 20g protein per serving).  2. Encourage pt to meet nutritional needs as able   3. Monitor labs: electrolytes, cmp  4. Monitor weights   5. Pain and bowel regimen per team   6. Will continue to assess/honor food preferences as able

## 2023-09-01 NOTE — DIETITIAN INITIAL EVALUATION ADULT - PROBLEM SELECTOR PLAN 3
Presenting with 1 day of fever and mild SOB. Found to be entero/rhinovirus positive on admission. No diarrhea.  Stable on room air.  - monitor O2 requirements  - supportive care, chest PT

## 2023-09-01 NOTE — DIETITIAN INITIAL EVALUATION ADULT - PROBLEM SELECTOR PLAN 10
Topical Anesthesia?: 30% lidocaine
Treatment Level (Optional): 8
Detail Level: Zone
Post-Care Instructions: I reviewed with the patient in detail post-care instructions.
Pain Level (Optional): 2 (moderate)
Price (Use Numbers Only, No Special Characters Or $): 202
- continue home atorvastatin 20mg daily
Treatment Number (Optional): 2
Actual Kj Used (Optional): 3.96
Eye Shielding Text (Leave Blank If Unwanted- Will Be Inserted If Selecting Eye Shields): The intraocular eye shields were placed. 2 drops of intraocular tetracaine HCL ophthalmic 0.5% solution was administered. The eye shields were coated with ophthalmic bacitracin prior to insertion. After the shields were removed the eyes were flushed with normal saline.
Energy In Mj (Optional): 70
Consent: Written consent obtained, risks reviewed including but not limited to crusting, scabbing, blistering, scarring, darker or lighter pigmentary change, and/or incomplete removal.
Length Topical Anesthesia Applied (Optional): 60 minutes

## 2023-09-01 NOTE — PROGRESS NOTE ADULT - SUBJECTIVE AND OBJECTIVE BOX
**INCOMPLETE NOTE    OVERNIGHT EVENTS: None    SUBJECTIVE:  Patient seen and examined at bedside, comfortable, NAD. Denied fever, chest pain, dyspnea, abdominal pain.     Vital Signs Last 12 Hrs  T(F): 98 (09-01-23 @ 06:03), Max: 98 (09-01-23 @ 06:03)  HR: 60 (09-01-23 @ 06:03) (60 - 60)  BP: 163/71 (09-01-23 @ 06:03) (151/71 - 163/71)  BP(mean): --  RR: 18 (09-01-23 @ 06:03) (18 - 18)  SpO2: 95% (09-01-23 @ 06:03) (95% - 95%)  I&O's Summary    31 Aug 2023 07:01  -  01 Sep 2023 07:00  --------------------------------------------------------  IN: 0 mL / OUT: 600 mL / NET: -600 mL        PHYSICAL EXAM:  Constitutional: NAD, comfortable in bed.  HEENT: NC/AT, PERRLA, EOMI, no conjunctival pallor or scleral icterus, MMM  Neck: Supple, no JVD  Respiratory: CTA B/L. No w/r/r.   Cardiovascular: RRR, normal S1 and S2, no m/r/g.   Gastrointestinal: +BS, soft NTND, no guarding or rebound tenderness, no palpable masses   Extremities: wwp; no cyanosis, clubbing or edema.   Vascular: Pulses equal and strong throughout.   Neurological: AAOx3, no CN deficits, strength and sensation intact throughout.   Skin: No gross skin abnormalities or rashes        LABS:                        13.7   7.05  )-----------( 175      ( 01 Sep 2023 05:30 )             41.5     09-01    135  |  101  |  x   ----------------------------<  x   4.4   |  x   |  x     Ca    9.6      31 Aug 2023 05:30  Phos  2.8     09-01  Mg     1.8     09-01    TPro  7.7  /  Alb  3.4  /  TBili  0.5  /  DBili  x   /  AST  86<H>  /  ALT  80<H>  /  AlkPhos  507<H>  08-31    PT/INR - ( 30 Aug 2023 13:04 )   PT: 15.7 sec;   INR: 1.39          PTT - ( 30 Aug 2023 13:04 )  PTT:38.4 sec  Urinalysis Basic - ( 31 Aug 2023 05:30 )    Color: x / Appearance: x / SG: x / pH: x  Gluc: 85 mg/dL / Ketone: x  / Bili: x / Urobili: x   Blood: x / Protein: x / Nitrite: x   Leuk Esterase: x / RBC: x / WBC x   Sq Epi: x / Non Sq Epi: x / Bacteria: x          RADIOLOGY & ADDITIONAL TESTS:    MEDICATIONS  (STANDING):  atorvastatin 20 milliGRAM(s) Oral at bedtime  senna 2 Tablet(s) Oral at bedtime  spironolactone 25 milliGRAM(s) Oral daily  ursodiol Tablet 250 milliGRAM(s) Oral two times a day    MEDICATIONS  (PRN):  acetaminophen     Tablet .. 650 milliGRAM(s) Oral every 6 hours PRN Temp greater or equal to 38C (100.4F), Mild Pain (1 - 3)  aluminum hydroxide/magnesium hydroxide/simethicone Suspension 30 milliLiter(s) Oral every 4 hours PRN Dyspepsia   OVERNIGHT EVENTS: None    SUBJECTIVE:  Patient seen and examined at bedside, comfortable, NAD. Denied fever, chest pain, dyspnea, abdominal pain.     Vital Signs Last 12 Hrs  T(F): 98 (09-01-23 @ 06:03), Max: 98 (09-01-23 @ 06:03)  HR: 60 (09-01-23 @ 06:03) (60 - 60)  BP: 163/71 (09-01-23 @ 06:03) (151/71 - 163/71)  BP(mean): --  RR: 18 (09-01-23 @ 06:03) (18 - 18)  SpO2: 95% (09-01-23 @ 06:03) (95% - 95%)  I&O's Summary    31 Aug 2023 07:01  -  01 Sep 2023 07:00  --------------------------------------------------------  IN: 0 mL / OUT: 600 mL / NET: -600 mL        PHYSICAL EXAM:  Constitutional: NAD, comfortable in bed.  HEENT: NC/AT, PERRLA, EOMI, no conjunctival pallor or scleral icterus, MMM, poor dentition  Neck: Supple, no JVD  Respiratory: CTA B/L. No w/r/r.   Cardiovascular: RRR, no m/r/g.   Gastrointestinal: +BS, soft NTND, no guarding or rebound tenderness, no palpable masses   Extremities: wwp; no cyanosis, clubbing or edema.   Vascular: Pulses equal throughout.   Neurological: AAOx1   Skin: No gross skin abnormalities or rashes        LABS:                        13.7   7.05  )-----------( 175      ( 01 Sep 2023 05:30 )             41.5     09-01    135  |  101  |  x   ----------------------------<  x   4.4   |  x   |  x     Ca    9.6      31 Aug 2023 05:30  Phos  2.8     09-01  Mg     1.8     09-01    TPro  7.7  /  Alb  3.4  /  TBili  0.5  /  DBili  x   /  AST  86<H>  /  ALT  80<H>  /  AlkPhos  507<H>  08-31    PT/INR - ( 30 Aug 2023 13:04 )   PT: 15.7 sec;   INR: 1.39          PTT - ( 30 Aug 2023 13:04 )  PTT:38.4 sec  Urinalysis Basic - ( 31 Aug 2023 05:30 )    Color: x / Appearance: x / SG: x / pH: x  Gluc: 85 mg/dL / Ketone: x  / Bili: x / Urobili: x   Blood: x / Protein: x / Nitrite: x   Leuk Esterase: x / RBC: x / WBC x   Sq Epi: x / Non Sq Epi: x / Bacteria: x          RADIOLOGY & ADDITIONAL TESTS:    MEDICATIONS  (STANDING):  atorvastatin 20 milliGRAM(s) Oral at bedtime  senna 2 Tablet(s) Oral at bedtime  spironolactone 25 milliGRAM(s) Oral daily  ursodiol Tablet 250 milliGRAM(s) Oral two times a day    MEDICATIONS  (PRN):  acetaminophen     Tablet .. 650 milliGRAM(s) Oral every 6 hours PRN Temp greater or equal to 38C (100.4F), Mild Pain (1 - 3)  aluminum hydroxide/magnesium hydroxide/simethicone Suspension 30 milliLiter(s) Oral every 4 hours PRN Dyspepsia

## 2023-09-01 NOTE — DIETITIAN INITIAL EVALUATION ADULT - PERTINENT LABORATORY DATA
09-01    135  |  101  |  17  ----------------------------<  100<H>  4.4   |  24  |  0.59    Ca    9.6      01 Sep 2023 05:30  Phos  2.8     09-01  Mg     1.8     09-01    TPro  7.4  /  Alb  3.2<L>  /  TBili  0.5  /  DBili  x   /  AST  202<H>  /  ALT  149<H>  /  AlkPhos  668<H>  09-01  A1C with Estimated Average Glucose Result: 5.4 % (05-16-23 @ 05:30)

## 2023-09-01 NOTE — DISCHARGE NOTE PROVIDER - NSDCCPCAREPLAN_GEN_ALL_CORE_FT
PRINCIPAL DISCHARGE DIAGNOSIS  Diagnosis: Upper respiratory infection  Assessment and Plan of Treatment: You were found to be positive for infection with COVID and Rhino/Enterovirus. When mild, these infections can cause respiratory symptoms like cough, shortness of breath, runny nose, and fever. You had a fever at home that improved with tylenol, and you were found to have low oxygenation of your blood (94%), as measured by pulse oximeter when you came to the hospital. You were treated with supportive measures (antipyretics, hydration, chest physical therapy, etc) and are encouraged to continue these when you leave the hospital. Please monitor for the development or worsening of any of these symptoms, including low oxygen levels, and be sure to reach out to your PCP or return to the hospital should you develop a new fever or difficulty breathing.      SECONDARY DISCHARGE DIAGNOSES  Diagnosis: Pneumonia  Assessment and Plan of Treatment:     Diagnosis: COVID-19  Assessment and Plan of Treatment:     Diagnosis: Rhinovirus  Assessment and Plan of Treatment:     Diagnosis: Transaminitis  Assessment and Plan of Treatment: The liver releases higher levels of certain proteins when it is unwell, some of which were elevated when you came into the emergency room. ******** Please follow up with Dr. Johnson regarding the results of an ultrasound performed in the hospital     PRINCIPAL DISCHARGE DIAGNOSIS  Diagnosis: Upper respiratory infection  Assessment and Plan of Treatment: You were found to be positive for infection with COVID and Rhino/Enterovirus. When mild, these infections can cause respiratory symptoms like cough, shortness of breath, runny nose, and fever. You had a fever at home that improved with tylenol, and you were found to have low oxygenation of your blood (94%), as measured by pulse oximeter when you came to the hospital. You were treated with supportive measures (antipyretics, hydration, chest physical therapy, etc) and are encouraged to continue these when you leave the hospital. Please monitor for the development or worsening of any of these symptoms, including low oxygen levels, and be sure to reach out to your PCP or return to the hospital should you develop a new fever or difficulty breathing.      SECONDARY DISCHARGE DIAGNOSES  Diagnosis: Transaminitis  Assessment and Plan of Treatment: The liver releases higher levels of certain proteins when it is unwell, some of which were elevated when you came into the emergency room. In the RUQ, there was some biliary sludge found which is a mixture of particulate solids that have precipitated from bile. Such sediment consists of cholesterol crystals, calcium bilirubinate pigment, and other calcium salts.  RUQ showed some  Please follow up with Dr. Johnson outpatient to discuss percutaneous cholecystectomy which is a minimally invasive procedure that will drain blocked and infected gallbladder fluid.

## 2023-09-01 NOTE — DIETITIAN INITIAL EVALUATION ADULT - PROBLEM SELECTOR PLAN 6
Admission EKG showing atrial-paced bradycardia at 50bpm, prolonged MO (226).  On home warfarin 3mg, Toprol XL 50mg, digoxin 125mcg daily. Has taken digoxin on AM of admission.  - continue home warfarin        - daily INR, re-dose warfarin daily  - hold Toprol XL 50mg ISO bradycardia  - obtain AM digoxin level (holding home digoxin for now)

## 2023-09-01 NOTE — PATIENT PROFILE ADULT - FALL HARM RISK - HARM RISK INTERVENTIONS

## 2023-09-01 NOTE — DIETITIAN INITIAL EVALUATION ADULT - OTHER INFO
89F PMH dementia (AOx1 baseline), HFpEF (EF 65-70% in 5/2023), AF (warfarin), hx mitral valve replacement and MYKE closure, HTN, HLD, hypothyroidism, tachy-mamie syndrome, who presents from home with HHA for fever and mild SOB x 1 day. Per HHA, pt felt warm on night before admission, and measured T100.9. HHA contacted pt's PCP (Dr. Johnson) and due to hx recurrent UTI, was instructed to give 1 dose of ciprofloxacin and Tylenol. Pt defervesced, however, sounded congested and with increased work of breathing this morning. SpO2 on pulse oximeter was 94%. Pt was more tired appearing, however, was able to eat small breakfast and take AM meds. Pt is denying pain anywhere, reports feeling well when asked.     Patient seen at bedside for MST assessment. Current diet order: NPO. RD interview conducted with family member due to patient's mental status. Reports allergy to shellfish. Reports patient requires feeding assistance and typically consumes puree solids with mildly thick liquids and Ensure at home. Reports appetite/PO  intake has been fair. Patient had consumed 50-75% of breakfast tray this AM. Dosing weight: 90 pounds, BMI 16.5, reports UBW of 100 pounds x1 year ago. Family member endorses gradual weight loss over the past few years. No pressure injuries documented. No edema documented. Denies N/V/D/C, fecal incontinence, last BM 8/31 per EMR. Elevated LFT's. Meds: spironolactone, bowel regimen. Observed with severe muscle wasting to clavicles, shoulders, and temples and severe fat wasting to buccals. Based on ASPEN guidelines, pt meets criteria for severe malnutrition. No cultural, ethnic, [place] food preferences reported. See nutrition recommendations below.

## 2023-09-01 NOTE — DIETITIAN INITIAL EVALUATION ADULT - PROBLEM SELECTOR PLAN 8
- continue home synthroid 50mcg daily    #constipation  Takes home colace 100mg BID. Not on formulary here.  - senna 2tabs qHS while inpatient  - resume home regimen on discharge

## 2023-09-01 NOTE — DISCHARGE NOTE PROVIDER - NSDCDCMDCOMP_GEN_ALL_CORE
PRINCIPAL DISCHARGE DIAGNOSIS  Diagnosis: Renal failure, unspecified chronicity  Assessment and Plan of Treatment: Continue with your HD schedule as an outpatient.   Avoid taking (NSAIDs) - (ex: Ibuprofen, Advil, Celebrex, Naprosyn)  Avoid taking any nephrotoxic agents (can harm kidneys) - Intravenous contrast for diagnostic testing, combination cold medications.  Have all medications adjusted for your renal function by your Health Care Provider.  Blood pressure control is important.  Take all medication as prescribed.        SECONDARY DISCHARGE DIAGNOSES  Diagnosis: CAD (Coronary Artery Disease)  Assessment and Plan of Treatment: s/p cardiac catheterization. Follow up with Cardiologist as an outpatient.   Coronary artery disease is a condition where the arteries the supply the heart muscle get clogges with fatty deposits & puts you at risk for a heart attack  Call your doctor if you have any new pain, pressure, or discomfort in the center of your chest, pain, tingling or discomfort in arms, back, neck, jaw, or stomach, shortness of breath, nausea, vomiting, burping or heartburn, sweating, cold and clammy skin, racing or abnormal heartbeat for more than 10 minutes or if they keep coming & going.  Call 911 and do not tr to get to hospital by care  You can help yourself with lefestyle changes (quitting smoking if you smoke), eat lots of fruits & vegetables & low fat dairy products, not a lot of meat & fatty foods, walk or some form of physical activity most days of the week, lose weight if you are overweight  Take your cardiac medication as prescribed to lower cholesterol, to lower blood pressure, aspirin to prevent blood clots, and diabetes control  Make sure to keep appointments with doctor for cardiac follow up care      Diagnosis: Hypothyroidism  Assessment and Plan of Treatment: you do not make enough thyroid hormone  signs & symptoms of low levels of thyroid hormone - tired, getting cold easily, coarse or thin hair, constipation, shortness of breath, swelling, irregular periods  your doctor will do thyroid hormone blood tests at least once a year to monitor if medication dose is adequate  take your thyroid medicine as directed by your doctor & on empty stomach      Diagnosis: Dyslipidemia  Assessment and Plan of Treatment: Please take your medications as ordered.    Diagnosis: Hypertensive urgency  Assessment and Plan of Treatment: Low salt diet  Activity as tolerated.  Take all medication as prescribed.  Follow up with your medical doctor for routine blood pressure monitoring at your next visit.  Notify your doctor if you have any of the following symptoms:   Dizziness, Lightheadedness, Blurry vision, Headache, Chest pain, Shortness of breath
This document is complete and the patient is ready for discharge.

## 2023-09-01 NOTE — PATIENT PROFILE ADULT - FUNCTIONAL ASSESSMENT - BASIC MOBILITY 6.
2-calculated by average/Not able to assess (calculate score using [facility] averaging method)

## 2023-09-01 NOTE — DISCHARGE NOTE PROVIDER - HOSPITAL COURSE
Ms. Oconnor is a 89-year-old female with past medical history of dementia (AOx1 baseline), HFpEF (EF 65-70% in 5/2023), AF (warfarin), hx mitral valve replacement and MYKE closure, HTN, HLD, hypothyroidism, tachy-mamie syndrome, who presents from home with HHA for fever and mild SOB x 1 day, found to be COVID and entero/rhinovirus positive, admitted for further management.    Hospital Course (by problem):    1. Lactic acidosis.   Presented with fever and SOB at home, defervesced with Tylenol. Tested positive for COVID and rhino/enterovirus.  Elevated lactate likely 2/2 concurrent infection. Repeat lactate ***    2. COVID-19  COVID PCR+ on admission, stable on room air.  - continue to monitor oxygen requirements  - continue supportive care, chest PT    3. Rhinovirus  Found to be entero/rhinovirus positive on admission, but no diarrhea, fever resolved, stable on room air.  - continue to monitor oxygen requirements  - continue supportive care, chest PT    4. Transaminitis  Last hospitalized 5/2023, then found to have elevated LFTs and questionable sludge ball in CBD, received course of Zosyn said last admission.   Current admission - ED alkaline phosphatase 468 (similar to prior admission), AST 63, ALT 58, bilirubin 0.5.  - continue ursodiol 250 mg BID  - follow up RUQ US to evaluate biliary tree    5. Diastolic Heart Failure  TTE 5/2023 with EF 65-70%, Hx mitral valve repair, on home Toprol XL 50mg QD, spironolactone 25mg QD, lasix 20mg PO BID, digoxin 125mcg QD  - holding metoprolol and digoxin ISO bradycardia******  - holding Lasix, as pt appears hypovolemic on exam******  - continue spironolactone 25mg daily (with hold parameters)******    6. HTN  - plan as #5 above    7. Atrial fibrillation  Admission EKG showing atrial-paced bradycardia at 50bpm, prolonged OH (226); on home warfarin 3mg, Toprol XL 50mg, digoxin 125mcg daily. Has taken digoxin on AM of admission.  - continue warfarin & continue to monitor INR  - hold Toprol XL 50mg ISO bradycardia******  - digoxin ******    8. Hypothyroidism  - continue home synthroid 50mcg daily    9. Constipation  - contnue colace 100mg BID    10. Major Depression  - continue Zoloft 50mg daily    11. Hyperlipidemia  - continue home atorvastatin 20mg daily    New medications: n/a  Changes to old medications: *******  Labs to be followed outpatient: n/a  Exam to be followed outpatient: RUQ, follow up results with primary care provider, Dr. Johnson Ms. Oconnor is a 89-year-old female with past medical history of dementia (AOx1 baseline), HFpEF (EF 65-70% in 5/2023), AF (warfarin), hx mitral valve replacement and MYKE closure, HTN, HLD, hypothyroidism, tachy-mamie syndrome, who presents from home with HHA for fever and mild SOB x 1 day, found to be COVID and entero/rhinovirus positive, admitted for further management. Hospital course c/b RUQ with RUQ US showing biliary sludge and extending to CBD, CBD not dilated.     Hospital Course (by problem):    1. RUQ pain with transaminitis.  Last hospitalized 5/2023, then found to have elevated LFTs and questionable sludge ball in CBD, received course of Zosyn said last admission.   Current admission - ED alkaline phosphatase 468 (similar to prior admission), AST 63, ALT 58, bilirubin 0.5. Not a surgical candidate per Surgery.   RUQ pain with tenderness to palpation. RUQ US showing biliary sludge and extending to CBD, CBD not dilated.  -outpatient follow up for perc silvana  - continue ursodiol 250 mg BID    1. Lactic acidosis.   Presented with fever and SOB at home, defervesced with Tylenol. Tested positive for COVID and rhino/enterovirus.  Elevated lactate likely 2/2 concurrent infection.  - resolved    2. COVID-19  COVID PCR+ on admission, stable on room air. Pt now on room air and oxygenating well.  -resolved    3. Rhinovirus  Found to be entero/rhinovirus positive on admission, but no diarrhea, fever resolved, stable on room air. Pt now asymptomatic w/o cough or SOB  - resolved    4. Diastolic Heart Failure  TTE 5/2023 with EF 65-70%, Hx mitral valve repair, on home Toprol XL 50mg QD, spironolactone 25mg QD, lasix 20mg PO BID, digoxin 125mcg QD  - holding metoprolol and digoxin ISO bradycardia  - holding Lasix, as pt appears hypovolemic on exam  - continue spironolactone 25mg daily    5. HTN  - plan as above    6. Atrial fibrillation  Admission EKG showing atrial-paced bradycardia at 50bpm, prolonged MS (226); on home warfarin 3mg, Toprol XL 50mg, digoxin 125mcg daily. Has taken digoxin on AM of admission.  - continue warfarin & continue to monitor INR  - held Toprol XL 50mg ISO bradycardia, resume home meds at discharge  - resume digoxin at discharge    7. Hypothyroidism  - continue home synthroid 50mcg daily    8. Constipation  - contnue colace 100mg BID    9. Major Depression  - continue Zoloft 50mg daily    10. Hyperlipidemia  - continue home atorvastatin 20mg daily    New medications: ursodiol 250 mg BID  Changes to old medications: none, can resume all home meds at d/c  Labs to be followed outpatient:LFTs  Exam to be followed outpatient: RUJAKOB, follow up results with primary care provider, Dr. Johnson

## 2023-09-01 NOTE — DISCHARGE NOTE PROVIDER - NSDCMRMEDTOKEN_GEN_ALL_CORE_FT
Colace 100 mg oral capsule: 1 cap(s) orally 2 times a day  Digox 125 mcg (0.125 mg) oral tablet: 1 tab(s) orally once a day  Jantoven 3 mg oral tablet: 1 tab(s) orally once a day  Lasix 20 mg oral tablet: 1 tab(s) orally every 12 hours  Lipitor 20 mg oral tablet: 1 tab(s) orally once a day (at bedtime)  metoprolol succinate 50 mg oral tablet, extended release: 1 tab(s) orally once a day  spironolactone 25 mg oral tablet: 1 tab(s) orally once a day  Synthroid 50 mcg (0.05 mg) oral tablet: 1 tab(s) orally once a day  ursodiol 250 mg oral tablet: 1 tab(s) orally 2 times a day  Zoloft 50 mg oral tablet: 1 tab(s) orally once a day   Digox 125 mcg (0.125 mg) oral tablet: 1 tab(s) orally once a day  Jantoven 3 mg oral tablet: 1 tab(s) orally once a day  Lasix 20 mg oral tablet: 1 tab(s) orally every 12 hours  Lipitor 20 mg oral tablet: 1 tab(s) orally once a day (at bedtime)  metoprolol succinate 50 mg oral tablet, extended release: 1 tab(s) orally once a day  spironolactone 25 mg oral tablet: 1 tab(s) orally once a day  Synthroid 50 mcg (0.05 mg) oral tablet: 1 tab(s) orally once a day  ursodiol 250 mg oral tablet: 1 tab(s) orally 2 times a day  Zoloft 50 mg oral tablet: 1 tab(s) orally once a day

## 2023-09-01 NOTE — DIETITIAN INITIAL EVALUATION ADULT - PROBLEM SELECTOR PLAN 2
Presenting with 1 day of fever and mild SOB. Found to be COVID PCR+ on admission.  Stable on room air.  - monitor O2 requirements  - supportive care, chest PT

## 2023-09-02 PROCEDURE — 76705 ECHO EXAM OF ABDOMEN: CPT | Mod: 26

## 2023-09-02 RX ORDER — METRONIDAZOLE 500 MG
500 TABLET ORAL EVERY 8 HOURS
Refills: 0 | Status: DISCONTINUED | OUTPATIENT
Start: 2023-09-02 | End: 2023-09-06

## 2023-09-02 RX ORDER — CEFTRIAXONE 500 MG/1
1000 INJECTION, POWDER, FOR SOLUTION INTRAMUSCULAR; INTRAVENOUS EVERY 24 HOURS
Refills: 0 | Status: DISCONTINUED | OUTPATIENT
Start: 2023-09-02 | End: 2023-09-06

## 2023-09-02 RX ADMIN — URSODIOL 250 MILLIGRAM(S): 250 TABLET, FILM COATED ORAL at 09:24

## 2023-09-02 RX ADMIN — URSODIOL 250 MILLIGRAM(S): 250 TABLET, FILM COATED ORAL at 22:55

## 2023-09-02 RX ADMIN — SPIRONOLACTONE 25 MILLIGRAM(S): 25 TABLET, FILM COATED ORAL at 06:06

## 2023-09-02 RX ADMIN — ATORVASTATIN CALCIUM 20 MILLIGRAM(S): 80 TABLET, FILM COATED ORAL at 22:54

## 2023-09-02 RX ADMIN — SENNA PLUS 2 TABLET(S): 8.6 TABLET ORAL at 22:55

## 2023-09-03 LAB
ALBUMIN SERPL ELPH-MCNC: 3.4 G/DL — SIGNIFICANT CHANGE UP (ref 3.3–5)
ALP SERPL-CCNC: 547 U/L — HIGH (ref 40–120)
ALT FLD-CCNC: 75 U/L — HIGH (ref 10–45)
ANION GAP SERPL CALC-SCNC: 14 MMOL/L — SIGNIFICANT CHANGE UP (ref 5–17)
AST SERPL-CCNC: 71 U/L — HIGH (ref 10–40)
BASOPHILS # BLD AUTO: 0.03 K/UL — SIGNIFICANT CHANGE UP (ref 0–0.2)
BASOPHILS NFR BLD AUTO: 0.4 % — SIGNIFICANT CHANGE UP (ref 0–2)
BILIRUB SERPL-MCNC: 0.5 MG/DL — SIGNIFICANT CHANGE UP (ref 0.2–1.2)
BUN SERPL-MCNC: 14 MG/DL — SIGNIFICANT CHANGE UP (ref 7–23)
CALCIUM SERPL-MCNC: 10.1 MG/DL — SIGNIFICANT CHANGE UP (ref 8.4–10.5)
CHLORIDE SERPL-SCNC: 103 MMOL/L — SIGNIFICANT CHANGE UP (ref 96–108)
CO2 SERPL-SCNC: 21 MMOL/L — LOW (ref 22–31)
CREAT SERPL-MCNC: 0.59 MG/DL — SIGNIFICANT CHANGE UP (ref 0.5–1.3)
EGFR: 83 ML/MIN/1.73M2 — SIGNIFICANT CHANGE UP
EOSINOPHIL # BLD AUTO: 0.36 K/UL — SIGNIFICANT CHANGE UP (ref 0–0.5)
EOSINOPHIL NFR BLD AUTO: 4.4 % — SIGNIFICANT CHANGE UP (ref 0–6)
GLUCOSE SERPL-MCNC: 90 MG/DL — SIGNIFICANT CHANGE UP (ref 70–99)
HCT VFR BLD CALC: 44.9 % — SIGNIFICANT CHANGE UP (ref 34.5–45)
HGB BLD-MCNC: 14 G/DL — SIGNIFICANT CHANGE UP (ref 11.5–15.5)
IMM GRANULOCYTES NFR BLD AUTO: 0.1 % — SIGNIFICANT CHANGE UP (ref 0–0.9)
LYMPHOCYTES # BLD AUTO: 2.47 K/UL — SIGNIFICANT CHANGE UP (ref 1–3.3)
LYMPHOCYTES # BLD AUTO: 30.5 % — SIGNIFICANT CHANGE UP (ref 13–44)
MAGNESIUM SERPL-MCNC: 2 MG/DL — SIGNIFICANT CHANGE UP (ref 1.6–2.6)
MCHC RBC-ENTMCNC: 31 PG — SIGNIFICANT CHANGE UP (ref 27–34)
MCHC RBC-ENTMCNC: 31.2 GM/DL — LOW (ref 32–36)
MCV RBC AUTO: 99.3 FL — SIGNIFICANT CHANGE UP (ref 80–100)
MONOCYTES # BLD AUTO: 0.84 K/UL — SIGNIFICANT CHANGE UP (ref 0–0.9)
MONOCYTES NFR BLD AUTO: 10.4 % — SIGNIFICANT CHANGE UP (ref 2–14)
NEUTROPHILS # BLD AUTO: 4.39 K/UL — SIGNIFICANT CHANGE UP (ref 1.8–7.4)
NEUTROPHILS NFR BLD AUTO: 54.2 % — SIGNIFICANT CHANGE UP (ref 43–77)
NRBC # BLD: 0 /100 WBCS — SIGNIFICANT CHANGE UP (ref 0–0)
PHOSPHATE SERPL-MCNC: 3.1 MG/DL — SIGNIFICANT CHANGE UP (ref 2.5–4.5)
PLATELET # BLD AUTO: 165 K/UL — SIGNIFICANT CHANGE UP (ref 150–400)
POTASSIUM SERPL-MCNC: 4.6 MMOL/L — SIGNIFICANT CHANGE UP (ref 3.5–5.3)
POTASSIUM SERPL-SCNC: 4.6 MMOL/L — SIGNIFICANT CHANGE UP (ref 3.5–5.3)
PROT SERPL-MCNC: 7.5 G/DL — SIGNIFICANT CHANGE UP (ref 6–8.3)
RBC # BLD: 4.52 M/UL — SIGNIFICANT CHANGE UP (ref 3.8–5.2)
RBC # FLD: 13.9 % — SIGNIFICANT CHANGE UP (ref 10.3–14.5)
SODIUM SERPL-SCNC: 138 MMOL/L — SIGNIFICANT CHANGE UP (ref 135–145)
WBC # BLD: 8.1 K/UL — SIGNIFICANT CHANGE UP (ref 3.8–10.5)
WBC # FLD AUTO: 8.1 K/UL — SIGNIFICANT CHANGE UP (ref 3.8–10.5)

## 2023-09-03 RX ADMIN — URSODIOL 250 MILLIGRAM(S): 250 TABLET, FILM COATED ORAL at 11:10

## 2023-09-03 RX ADMIN — URSODIOL 250 MILLIGRAM(S): 250 TABLET, FILM COATED ORAL at 22:57

## 2023-09-03 RX ADMIN — CEFTRIAXONE 100 MILLIGRAM(S): 500 INJECTION, POWDER, FOR SOLUTION INTRAMUSCULAR; INTRAVENOUS at 11:10

## 2023-09-03 RX ADMIN — ATORVASTATIN CALCIUM 20 MILLIGRAM(S): 80 TABLET, FILM COATED ORAL at 22:57

## 2023-09-03 RX ADMIN — SENNA PLUS 2 TABLET(S): 8.6 TABLET ORAL at 22:57

## 2023-09-03 RX ADMIN — Medication 100 MILLIGRAM(S): at 11:10

## 2023-09-03 NOTE — PROGRESS NOTE ADULT - ASSESSMENT
Jagjito reviewed On IV antibiotics  Await repeat LFTs  If decreasing will discharge tomorrow on po Rx

## 2023-09-04 LAB
ALBUMIN SERPL ELPH-MCNC: 3.3 G/DL — SIGNIFICANT CHANGE UP (ref 3.3–5)
ALP SERPL-CCNC: 456 U/L — HIGH (ref 40–120)
ALT FLD-CCNC: SIGNIFICANT CHANGE UP U/L (ref 10–45)
ANION GAP SERPL CALC-SCNC: 12 MMOL/L — SIGNIFICANT CHANGE UP (ref 5–17)
AST SERPL-CCNC: SIGNIFICANT CHANGE UP U/L (ref 10–40)
BASOPHILS # BLD AUTO: 0.04 K/UL — SIGNIFICANT CHANGE UP (ref 0–0.2)
BASOPHILS NFR BLD AUTO: 0.4 % — SIGNIFICANT CHANGE UP (ref 0–2)
BILIRUB SERPL-MCNC: 0.5 MG/DL — SIGNIFICANT CHANGE UP (ref 0.2–1.2)
BUN SERPL-MCNC: 16 MG/DL — SIGNIFICANT CHANGE UP (ref 7–23)
CALCIUM SERPL-MCNC: 9.7 MG/DL — SIGNIFICANT CHANGE UP (ref 8.4–10.5)
CHLORIDE SERPL-SCNC: 102 MMOL/L — SIGNIFICANT CHANGE UP (ref 96–108)
CO2 SERPL-SCNC: 20 MMOL/L — LOW (ref 22–31)
CREAT SERPL-MCNC: 0.59 MG/DL — SIGNIFICANT CHANGE UP (ref 0.5–1.3)
CULTURE RESULTS: SIGNIFICANT CHANGE UP
CULTURE RESULTS: SIGNIFICANT CHANGE UP
EGFR: 83 ML/MIN/1.73M2 — SIGNIFICANT CHANGE UP
EOSINOPHIL # BLD AUTO: 0.47 K/UL — SIGNIFICANT CHANGE UP (ref 0–0.5)
EOSINOPHIL NFR BLD AUTO: 4.8 % — SIGNIFICANT CHANGE UP (ref 0–6)
GLUCOSE SERPL-MCNC: 97 MG/DL — SIGNIFICANT CHANGE UP (ref 70–99)
HCT VFR BLD CALC: 42.7 % — SIGNIFICANT CHANGE UP (ref 34.5–45)
HGB BLD-MCNC: 13.5 G/DL — SIGNIFICANT CHANGE UP (ref 11.5–15.5)
IMM GRANULOCYTES NFR BLD AUTO: 0.4 % — SIGNIFICANT CHANGE UP (ref 0–0.9)
LYMPHOCYTES # BLD AUTO: 3.36 K/UL — HIGH (ref 1–3.3)
LYMPHOCYTES # BLD AUTO: 34.6 % — SIGNIFICANT CHANGE UP (ref 13–44)
MAGNESIUM SERPL-MCNC: 1.9 MG/DL — SIGNIFICANT CHANGE UP (ref 1.6–2.6)
MCHC RBC-ENTMCNC: 31.6 GM/DL — LOW (ref 32–36)
MCHC RBC-ENTMCNC: 31.6 PG — SIGNIFICANT CHANGE UP (ref 27–34)
MCV RBC AUTO: 100 FL — SIGNIFICANT CHANGE UP (ref 80–100)
MONOCYTES # BLD AUTO: 1.13 K/UL — HIGH (ref 0–0.9)
MONOCYTES NFR BLD AUTO: 11.6 % — SIGNIFICANT CHANGE UP (ref 2–14)
NEUTROPHILS # BLD AUTO: 4.66 K/UL — SIGNIFICANT CHANGE UP (ref 1.8–7.4)
NEUTROPHILS NFR BLD AUTO: 48.2 % — SIGNIFICANT CHANGE UP (ref 43–77)
NRBC # BLD: 0 /100 WBCS — SIGNIFICANT CHANGE UP (ref 0–0)
PHOSPHATE SERPL-MCNC: 3 MG/DL — SIGNIFICANT CHANGE UP (ref 2.5–4.5)
PLATELET # BLD AUTO: 170 K/UL — SIGNIFICANT CHANGE UP (ref 150–400)
POTASSIUM SERPL-MCNC: SIGNIFICANT CHANGE UP MMOL/L (ref 3.5–5.3)
POTASSIUM SERPL-SCNC: SIGNIFICANT CHANGE UP MMOL/L (ref 3.5–5.3)
PROT SERPL-MCNC: 7.2 G/DL — SIGNIFICANT CHANGE UP (ref 6–8.3)
RBC # BLD: 4.27 M/UL — SIGNIFICANT CHANGE UP (ref 3.8–5.2)
RBC # FLD: 13.5 % — SIGNIFICANT CHANGE UP (ref 10.3–14.5)
SODIUM SERPL-SCNC: 134 MMOL/L — LOW (ref 135–145)
SPECIMEN SOURCE: SIGNIFICANT CHANGE UP
SPECIMEN SOURCE: SIGNIFICANT CHANGE UP
WBC # BLD: 9.7 K/UL — SIGNIFICANT CHANGE UP (ref 3.8–10.5)
WBC # FLD AUTO: 9.7 K/UL — SIGNIFICANT CHANGE UP (ref 3.8–10.5)

## 2023-09-04 RX ADMIN — ATORVASTATIN CALCIUM 20 MILLIGRAM(S): 80 TABLET, FILM COATED ORAL at 23:02

## 2023-09-04 RX ADMIN — CEFTRIAXONE 100 MILLIGRAM(S): 500 INJECTION, POWDER, FOR SOLUTION INTRAMUSCULAR; INTRAVENOUS at 15:11

## 2023-09-04 RX ADMIN — Medication 100 MILLIGRAM(S): at 17:47

## 2023-09-04 RX ADMIN — URSODIOL 250 MILLIGRAM(S): 250 TABLET, FILM COATED ORAL at 23:01

## 2023-09-04 RX ADMIN — URSODIOL 250 MILLIGRAM(S): 250 TABLET, FILM COATED ORAL at 15:12

## 2023-09-04 RX ADMIN — SPIRONOLACTONE 25 MILLIGRAM(S): 25 TABLET, FILM COATED ORAL at 07:59

## 2023-09-04 RX ADMIN — SENNA PLUS 2 TABLET(S): 8.6 TABLET ORAL at 23:02

## 2023-09-04 NOTE — PROGRESS NOTE ADULT - PROBLEM SELECTOR PROBLEM 5
Diastolic HF (heart failure)

## 2023-09-04 NOTE — PROGRESS NOTE ADULT - PROBLEM SELECTOR PLAN 1
Presenting with fever and SOB at home, defervesced with Tylenol. Testing positive for COVID and rhino/enterovirus.  Elevated lactate, likely 2/2 concurrent infection. Hypovolemic on exam, possibly due to insensible losses from fever and decreased PO intake due to COVID and/or entero/rhinovirus.  UA with LE, however, contaminated with epithelial cells. Denying dysuria or urinary frequency.  Lactate 2.3, s/p 500cc NS in ED. caution in CHF. HD stable. At baseline mentation (alert, interactive, AOx1).  - monitor off of abx  - give additional 500cc NS bolus  - f/u bcx  - repeat UA  - repeat lactate
Presenting with fever and SOB at home, defervesced with Tylenol. Testing positive for COVID and rhino/enterovirus.  Elevated lactate, likely 2/2 concurrent infection. Hypovolemic on exam, possibly due to insensible losses from fever and decreased PO intake due to COVID and/or entero/rhinovirus.  UA with LE, however, contaminated with epithelial cells. Denying dysuria or urinary frequency.  Lactate 2.3, s/p 500cc NS in ED. caution in CHF. HD stable. At baseline mentation (alert, interactive, AOx1).  - monitor off of abx  - f/u bcx  - follow up UA  - follow up lactate

## 2023-09-04 NOTE — PROGRESS NOTE ADULT - PROBLEM SELECTOR PLAN 10
- continue home atorvastatin 20mg daily

## 2023-09-04 NOTE — PROGRESS NOTE ADULT - PROBLEM SELECTOR PLAN 4
At last hospitalization in 5/2023, elevated LFTs, and questionable sludge ball in CBD, concerns for acute cholangitis.  s/p Zosyn x1 in ED. Received course of Zosyn at last admission. Penicillin allergy, but has tolerated Zosyn.  Alkaline phosphatase 468, similar to prior admission. AST 63, ALT 58, bilirubin 0.5.  Denying abdominal pain at home. Refusing physical exam.  - monitor off of abx  - continue home ursodiol 250 mg BID  - follow up RUQ US to evaluate biliary tree
At last hospitalization in 5/2023, elevated LFTs, and questionable sludge ball in CBD, concerns for acute cholangitis.  s/p Zosyn x1 in ED. Received course of Zosyn at last admission. Penicillin allergy, but has tolerated Zosyn.  Alkaline phosphatase 468, similar to prior admission. AST 63, ALT 58, bilirubin 0.5.  Denying abdominal pain at home. Refusing physical exam.  - monitor off of abx  - continue home ursodiol 250 mg BID  - obtain RUQ US to evaluate biliary tree

## 2023-09-04 NOTE — PROGRESS NOTE ADULT - SUBJECTIVE AND OBJECTIVE BOX
**INCOMPLETE NOTE    OVERNIGHT EVENTS: None    SUBJECTIVE:  Patient seen and examined at bedside, comfortable, NAD. Denied fever, chest pain, dyspnea, abdominal pain.     Vital Signs Last 12 Hrs  T(F): 97.4 (09-04-23 @ 15:06), Max: 97.4 (09-04-23 @ 07:56)  HR: 72 (09-04-23 @ 15:06) (63 - 72)  BP: 145/64 (09-04-23 @ 15:06) (145/64 - 176/78)  BP(mean): --  RR: 18 (09-04-23 @ 15:06) (18 - 18)  SpO2: 98% (09-04-23 @ 15:06) (98% - 98%)  I&O's Summary    03 Sep 2023 07:01  -  04 Sep 2023 07:00  --------------------------------------------------------  IN: 0 mL / OUT: 700 mL / NET: -700 mL        PHYSICAL EXAM:  Constitutional: NAD, comfortable in bed.  HEENT: NC/AT, PERRLA, EOMI, no conjunctival pallor or scleral icterus, MMM  Neck: Supple, no JVD  Respiratory: CTA B/L. No w/r/r.   Cardiovascular: RRR, normal S1 and S2, no m/r/g.   Gastrointestinal: +BS, soft NTND, no guarding or rebound tenderness, no palpable masses   Extremities: wwp; no cyanosis, clubbing or edema.   Vascular: Pulses equal and strong throughout.   Neurological: AAOx3, no CN deficits, strength and sensation intact throughout.   Skin: No gross skin abnormalities or rashes        LABS:                        13.5   9.70  )-----------( 170      ( 04 Sep 2023 06:43 )             42.7     09-04    134<L>  |  102  |  16  ----------------------------<  97  see note   |  20<L>  |  0.59    Ca    9.7      04 Sep 2023 06:43  Phos  3.0     09-04  Mg     1.9     09-04    TPro  7.2  /  Alb  3.3  /  TBili  0.5  /  DBili  x   /  AST  see note  /  ALT  see note  /  AlkPhos  456<H>  09-04      Urinalysis Basic - ( 04 Sep 2023 06:43 )    Color: x / Appearance: x / SG: x / pH: x  Gluc: 97 mg/dL / Ketone: x  / Bili: x / Urobili: x   Blood: x / Protein: x / Nitrite: x   Leuk Esterase: x / RBC: x / WBC x   Sq Epi: x / Non Sq Epi: x / Bacteria: x          RADIOLOGY & ADDITIONAL TESTS:    MEDICATIONS  (STANDING):  atorvastatin 20 milliGRAM(s) Oral at bedtime  cefTRIAXone   IVPB 1000 milliGRAM(s) IV Intermittent every 24 hours  metroNIDAZOLE  IVPB 500 milliGRAM(s) IV Intermittent every 8 hours  senna 2 Tablet(s) Oral at bedtime  spironolactone 25 milliGRAM(s) Oral daily  ursodiol Tablet 250 milliGRAM(s) Oral two times a day    MEDICATIONS  (PRN):  acetaminophen     Tablet .. 650 milliGRAM(s) Oral every 6 hours PRN Temp greater or equal to 38C (100.4F), Mild Pain (1 - 3)  aluminum hydroxide/magnesium hydroxide/simethicone Suspension 30 milliLiter(s) Oral every 4 hours PRN Dyspepsia   OVERNIGHT EVENTS: None    SUBJECTIVE:  Patient seen and examined at bedside, comfortable, NAD.     Vital Signs Last 12 Hrs  T(F): 97.4 (09-04-23 @ 15:06), Max: 97.4 (09-04-23 @ 07:56)  HR: 72 (09-04-23 @ 15:06) (63 - 72)  BP: 145/64 (09-04-23 @ 15:06) (145/64 - 176/78)  BP(mean): --  RR: 18 (09-04-23 @ 15:06) (18 - 18)  SpO2: 98% (09-04-23 @ 15:06) (98% - 98%)  I&O's Summary    03 Sep 2023 07:01  -  04 Sep 2023 07:00  --------------------------------------------------------  IN: 0 mL / OUT: 700 mL / NET: -700 mL      PHYSICAL EXAM:  Constitutional: NAD, comfortable in bed.  HEENT: NC/AT, PERRLA, EOMI, no conjunctival pallor or scleral icterus, MMM, poor dentition  Neck: Supple, no JVD  Respiratory: CTA B/L. No w/r/r.   Cardiovascular: RRR, no m/r/g.   Gastrointestinal: +BS, soft NTND, no guarding or rebound tenderness, no palpable masses   Extremities: wwp; no cyanosis, clubbing or edema.   Vascular: Pulses equal throughout.   Neurological: AAOx1   Skin: No gross skin abnormalities or rashes      LABS:                        13.5   9.70  )-----------( 170      ( 04 Sep 2023 06:43 )             42.7     09-04    134<L>  |  102  |  16  ----------------------------<  97  see note   |  20<L>  |  0.59    Ca    9.7      04 Sep 2023 06:43  Phos  3.0     09-04  Mg     1.9     09-04    TPro  7.2  /  Alb  3.3  /  TBili  0.5  /  DBili  x   /  AST  see note  /  ALT  see note  /  AlkPhos  456<H>  09-04      Urinalysis Basic - ( 04 Sep 2023 06:43 )    Color: x / Appearance: x / SG: x / pH: x  Gluc: 97 mg/dL / Ketone: x  / Bili: x / Urobili: x   Blood: x / Protein: x / Nitrite: x   Leuk Esterase: x / RBC: x / WBC x   Sq Epi: x / Non Sq Epi: x / Bacteria: x          RADIOLOGY & ADDITIONAL TESTS:    MEDICATIONS  (STANDING):  atorvastatin 20 milliGRAM(s) Oral at bedtime  cefTRIAXone   IVPB 1000 milliGRAM(s) IV Intermittent every 24 hours  metroNIDAZOLE  IVPB 500 milliGRAM(s) IV Intermittent every 8 hours  senna 2 Tablet(s) Oral at bedtime  spironolactone 25 milliGRAM(s) Oral daily  ursodiol Tablet 250 milliGRAM(s) Oral two times a day    MEDICATIONS  (PRN):  acetaminophen     Tablet .. 650 milliGRAM(s) Oral every 6 hours PRN Temp greater or equal to 38C (100.4F), Mild Pain (1 - 3)  aluminum hydroxide/magnesium hydroxide/simethicone Suspension 30 milliLiter(s) Oral every 4 hours PRN Dyspepsia

## 2023-09-04 NOTE — PROGRESS NOTE ADULT - PROBLEM SELECTOR PLAN 9
- continue home Zoloft 50mg daily

## 2023-09-04 NOTE — PROGRESS NOTE ADULT - PROBLEM SELECTOR PLAN 11
F: Tolerating PO, no IVF  E: Replete K<4, Mg<2  N: Pureed with ensures  VTE Ppx: warfarin, dosed daily    GI: not needed  C: Full Code  D: RMF    Problem: Nutrition, metabolism, and development symptoms

## 2023-09-04 NOTE — PROGRESS NOTE ADULT - PROBLEM SELECTOR PROBLEM 7
History of tachycardia-bradycardia syndrome

## 2023-09-04 NOTE — PROGRESS NOTE ADULT - PROBLEM SELECTOR PLAN 2
Presenting with 1 day of fever and mild SOB. Found to be COVID PCR+ on admission.  Stable on room air.  - monitor O2 requirements  - supportive care, chest PT
normal...
Presenting with 1 day of fever and mild SOB. Found to be COVID PCR+ on admission.  Stable on room air.  - monitor O2 requirements  - supportive care, chest PT

## 2023-09-04 NOTE — PROGRESS NOTE ADULT - SUBJECTIVE AND OBJECTIVE BOX
OVERNIGHT EVENTS: NAEO     SUBJECTIVE: Patient seen and examined at bedside. Generalized pain. Denies CP, SOB, abdominal pain.     Vital Signs Last 12 Hrs  T(F): 98.7 (09-03-23 @ 21:03), Max: 98.7 (09-03-23 @ 21:03)  HR: 93 (09-03-23 @ 21:03) (84 - 93)  BP: 122/69 (09-03-23 @ 21:03) (122/69 - 157/78)  BP(mean): --  RR: 18 (09-03-23 @ 21:03) (18 - 18)  SpO2: 94% (09-03-23 @ 21:03) (94% - 95%)  I&O's Summary    02 Sep 2023 07:01  -  03 Sep 2023 07:00  --------------------------------------------------------  IN: 0 mL / OUT: 500 mL / NET: -500 mL    03 Sep 2023 07:01  -  04 Sep 2023 00:55  --------------------------------------------------------  IN: 0 mL / OUT: 700 mL / NET: -700 mL        PHYSICAL EXAM:  Constitutional: NAD, comfortable in bed.  HEENT: NC/AT, PERRLA, EOMI, MMM  Neck: Supple, no JVD  Respiratory: CTA B/L. No w/r/r.   Cardiovascular: RRR, normal S1 and S2, no m/r/g.   Gastrointestinal: +BS, soft NTND, no guarding or rebound tenderness  Extremities: wwp; no cyanosis, clubbing or edema.   Vascular: Pulses equal and strong throughout.   Neurological: AAOx3, no CN deficits, strength and sensation intact throughout.   Skin: No gross skin abnormalities or rashes        LABS:                        14.0   8.10  )-----------( 165      ( 03 Sep 2023 05:30 )             44.9     09-03    138  |  103  |  14  ----------------------------<  90  4.6   |  21<L>  |  0.59    Ca    10.1      03 Sep 2023 05:30  Phos  3.1     09-03  Mg     2.0     09-03    TPro  7.5  /  Alb  3.4  /  TBili  0.5  /  DBili  x   /  AST  71<H>  /  ALT  75<H>  /  AlkPhos  547<H>  09-03      Urinalysis Basic - ( 03 Sep 2023 05:30 )    Color: x / Appearance: x / SG: x / pH: x  Gluc: 90 mg/dL / Ketone: x  / Bili: x / Urobili: x   Blood: x / Protein: x / Nitrite: x   Leuk Esterase: x / RBC: x / WBC x   Sq Epi: x / Non Sq Epi: x / Bacteria: x          RADIOLOGY & ADDITIONAL TESTS:    MEDICATIONS  (STANDING):  atorvastatin 20 milliGRAM(s) Oral at bedtime  cefTRIAXone   IVPB 1000 milliGRAM(s) IV Intermittent every 24 hours  metroNIDAZOLE  IVPB 500 milliGRAM(s) IV Intermittent every 8 hours  senna 2 Tablet(s) Oral at bedtime  spironolactone 25 milliGRAM(s) Oral daily  ursodiol Tablet 250 milliGRAM(s) Oral two times a day    MEDICATIONS  (PRN):  acetaminophen     Tablet .. 650 milliGRAM(s) Oral every 6 hours PRN Temp greater or equal to 38C (100.4F), Mild Pain (1 - 3)  aluminum hydroxide/magnesium hydroxide/simethicone Suspension 30 milliLiter(s) Oral every 4 hours PRN Dyspepsia

## 2023-09-04 NOTE — PROGRESS NOTE ADULT - PROBLEM SELECTOR PLAN 6
Admission EKG showing atrial-paced bradycardia at 50bpm, prolonged ND (226).  On home warfarin 3mg, Toprol XL 50mg, digoxin 125mcg daily. Has taken digoxin on AM of admission.  - continue home warfarin        - daily INR, re-dose warfarin daily  - hold Toprol XL 50mg ISO bradycardia  - obtain AM digoxin level (holding home digoxin for now)
Admission EKG showing atrial-paced bradycardia at 50bpm, prolonged ME (226).  On home warfarin 3mg, Toprol XL 50mg, digoxin 125mcg daily. Has taken digoxin on AM of admission.  - continue home warfarin        - daily INR, re-dose warfarin daily  - hold Toprol XL 50mg ISO bradycardia  - obtain AM digoxin level (holding home digoxin for now)
Admission EKG showing atrial-paced bradycardia at 50bpm, prolonged AL (226).  On home warfarin 3mg, Toprol XL 50mg, digoxin 125mcg daily. Has taken digoxin on AM of admission.  - continue home warfarin        - daily INR, re-dose warfarin daily  - hold Toprol XL 50mg ISO bradycardia  - obtain AM digoxin level (holding home digoxin for now)
Admission EKG showing atrial-paced bradycardia at 50bpm, prolonged MS (226).  On home warfarin 3mg, Toprol XL 50mg, digoxin 125mcg daily. Has taken digoxin on AM of admission.  - continue home warfarin        - daily INR, re-dose warfarin daily  - hold Toprol XL 50mg ISO bradycardia  - obtain AM digoxin level (holding home digoxin for now)

## 2023-09-04 NOTE — PROGRESS NOTE ADULT - PROBLEM SELECTOR PLAN 7
Admission EKG showing atrial-paced bradycardia at 50bpm, prolonged HI (226).  - holding metoprolol and digoxin ISO bradycardia
Admission EKG showing atrial-paced bradycardia at 50bpm, prolonged MO (226).  - holding metoprolol and digoxin ISO bradycardia
Admission EKG showing atrial-paced bradycardia at 50bpm, prolonged MN (226).  - holding metoprolol and digoxin ISO bradycardia
Admission EKG showing atrial-paced bradycardia at 50bpm, prolonged TX (226).  - holding metoprolol and digoxin ISO bradycardia

## 2023-09-05 LAB
ALBUMIN SERPL ELPH-MCNC: 3.3 G/DL — SIGNIFICANT CHANGE UP (ref 3.3–5)
ALP SERPL-CCNC: 438 U/L — HIGH (ref 40–120)
ALT FLD-CCNC: 47 U/L — HIGH (ref 10–45)
ANION GAP SERPL CALC-SCNC: 9 MMOL/L — SIGNIFICANT CHANGE UP (ref 5–17)
AST SERPL-CCNC: 48 U/L — HIGH (ref 10–40)
BASOPHILS # BLD AUTO: 0.03 K/UL — SIGNIFICANT CHANGE UP (ref 0–0.2)
BASOPHILS NFR BLD AUTO: 0.3 % — SIGNIFICANT CHANGE UP (ref 0–2)
BILIRUB SERPL-MCNC: 0.5 MG/DL — SIGNIFICANT CHANGE UP (ref 0.2–1.2)
BUN SERPL-MCNC: 14 MG/DL — SIGNIFICANT CHANGE UP (ref 7–23)
CALCIUM SERPL-MCNC: 9.8 MG/DL — SIGNIFICANT CHANGE UP (ref 8.4–10.5)
CHLORIDE SERPL-SCNC: 103 MMOL/L — SIGNIFICANT CHANGE UP (ref 96–108)
CO2 SERPL-SCNC: 25 MMOL/L — SIGNIFICANT CHANGE UP (ref 22–31)
CREAT SERPL-MCNC: 0.64 MG/DL — SIGNIFICANT CHANGE UP (ref 0.5–1.3)
EGFR: 81 ML/MIN/1.73M2 — SIGNIFICANT CHANGE UP
EOSINOPHIL # BLD AUTO: 0.51 K/UL — HIGH (ref 0–0.5)
EOSINOPHIL NFR BLD AUTO: 4.7 % — SIGNIFICANT CHANGE UP (ref 0–6)
GLUCOSE SERPL-MCNC: 123 MG/DL — HIGH (ref 70–99)
HCT VFR BLD CALC: 40 % — SIGNIFICANT CHANGE UP (ref 34.5–45)
HGB BLD-MCNC: 12.6 G/DL — SIGNIFICANT CHANGE UP (ref 11.5–15.5)
IMM GRANULOCYTES NFR BLD AUTO: 0.3 % — SIGNIFICANT CHANGE UP (ref 0–0.9)
LYMPHOCYTES # BLD AUTO: 19.2 % — SIGNIFICANT CHANGE UP (ref 13–44)
LYMPHOCYTES # BLD AUTO: 2.08 K/UL — SIGNIFICANT CHANGE UP (ref 1–3.3)
MAGNESIUM SERPL-MCNC: 1.7 MG/DL — SIGNIFICANT CHANGE UP (ref 1.6–2.6)
MCHC RBC-ENTMCNC: 31.5 GM/DL — LOW (ref 32–36)
MCHC RBC-ENTMCNC: 31.7 PG — SIGNIFICANT CHANGE UP (ref 27–34)
MCV RBC AUTO: 100.5 FL — HIGH (ref 80–100)
MONOCYTES # BLD AUTO: 0.83 K/UL — SIGNIFICANT CHANGE UP (ref 0–0.9)
MONOCYTES NFR BLD AUTO: 7.6 % — SIGNIFICANT CHANGE UP (ref 2–14)
NEUTROPHILS # BLD AUTO: 7.38 K/UL — SIGNIFICANT CHANGE UP (ref 1.8–7.4)
NEUTROPHILS NFR BLD AUTO: 67.9 % — SIGNIFICANT CHANGE UP (ref 43–77)
NRBC # BLD: 0 /100 WBCS — SIGNIFICANT CHANGE UP (ref 0–0)
PHOSPHATE SERPL-MCNC: 3 MG/DL — SIGNIFICANT CHANGE UP (ref 2.5–4.5)
PLATELET # BLD AUTO: 168 K/UL — SIGNIFICANT CHANGE UP (ref 150–400)
POTASSIUM SERPL-MCNC: 4.4 MMOL/L — SIGNIFICANT CHANGE UP (ref 3.5–5.3)
POTASSIUM SERPL-SCNC: 4.4 MMOL/L — SIGNIFICANT CHANGE UP (ref 3.5–5.3)
PROT SERPL-MCNC: 7.1 G/DL — SIGNIFICANT CHANGE UP (ref 6–8.3)
RBC # BLD: 3.98 M/UL — SIGNIFICANT CHANGE UP (ref 3.8–5.2)
RBC # FLD: 13.5 % — SIGNIFICANT CHANGE UP (ref 10.3–14.5)
SODIUM SERPL-SCNC: 137 MMOL/L — SIGNIFICANT CHANGE UP (ref 135–145)
WBC # BLD: 10.86 K/UL — HIGH (ref 3.8–10.5)
WBC # FLD AUTO: 10.86 K/UL — HIGH (ref 3.8–10.5)

## 2023-09-05 RX ADMIN — Medication 100 MILLIGRAM(S): at 00:32

## 2023-09-05 RX ADMIN — ATORVASTATIN CALCIUM 20 MILLIGRAM(S): 80 TABLET, FILM COATED ORAL at 22:42

## 2023-09-05 RX ADMIN — Medication 100 MILLIGRAM(S): at 14:15

## 2023-09-05 RX ADMIN — Medication 100 MILLIGRAM(S): at 22:41

## 2023-09-05 RX ADMIN — URSODIOL 250 MILLIGRAM(S): 250 TABLET, FILM COATED ORAL at 10:41

## 2023-09-05 RX ADMIN — Medication 100 MILLIGRAM(S): at 06:53

## 2023-09-05 RX ADMIN — CEFTRIAXONE 100 MILLIGRAM(S): 500 INJECTION, POWDER, FOR SOLUTION INTRAMUSCULAR; INTRAVENOUS at 10:41

## 2023-09-05 RX ADMIN — SENNA PLUS 2 TABLET(S): 8.6 TABLET ORAL at 22:42

## 2023-09-05 RX ADMIN — SPIRONOLACTONE 25 MILLIGRAM(S): 25 TABLET, FILM COATED ORAL at 06:54

## 2023-09-05 NOTE — CONSULT NOTE ADULT - SUBJECTIVE AND OBJECTIVE BOX
Surgery Consult Note    HPI:  89F PMH dementia (AOx1 baseline), HFpEF (EF 65-70% in 5/2023), AF (warfarin), hx mitral valve replacement and MYKE closure, HTN, HLD, hypothyroidism, tachy-mamie syndrome, who presents from home with HHA for fever and mild SOB x 1 day. Per HHA, pt felt warm on night before admission, and measured T100.9. HHA contacted pt's PCP (Dr. Johnson) and due to hx recurrent UTI, was instructed to give 1 dose of ciprofloxacin and Tylenol. Pt defervesced, however, sounded congested and with increased work of breathing this morning. SpO2 on pulse oximeter was 94%. Pt was more tired appearing, however, was able to eat small breakfast and take AM meds. Pt is denying pain anywhere, reports feeling well when asked. Denies chills, chest pain, cough, sore throat, abdominal pain, nausea, vomiting, diarrhea, dysuria.    In the ED:    - VS: Tmax: 100.3 rectal, HR: 51, BP: 151/73, RR: 20, O2: 95% room air    - Pertinent Labs: wbc 8.3, Na 133, BUN 15, Cr 0.71, , AST 63, ALT 58, lipase 89, trop T wnl, lactate 2.3, INR 1.39, UA = moderate epithelial cells, SG >1.03, many WBC, large LE, neg NIT, entero/rhinovirus positive, COVID positive    - Imaging: CXR: Stable position left ICD. Bilateral opacities. Cardiomegaly, thoracic aortic calcification, status post median sternotomy, mitral valve replacement, left atrial appendage clip. Stable bony structures, scoliosis. EKG: atrial-paced bradycardia @50bpm, , QTc 346    - Treatment/interventions: 500cc NS, zosyn 3.375g, Tylenol 1g (30 Aug 2023 15:19)      Attending:      Surgery Addendum: 89F PMH dementia (AOx1 baseline), HFpEF (EF 65-70% in 5/2023), AF (warfarin stopped since admission on 8/30), hx mitral valve replacement and MYKE closure, HTN, HLD, hypothyroidism, tachy-mamie syndrome, who presents from home with HHA for fever and mild SOB x 1 day. General surgery consulted for RUQ pain, transaminitis and biliary sludge. Patient has recent admission in 5/2023 with cholangitis managed with medical management. Patient seen at bed side reports RUQ since this morning, denies nausea, vomiting, nurse at bed side also denied nausea, vomiting.         PAST MEDICAL & SURGICAL HISTORY:  HTN (hypertension)      High cholesterol      Hypothyroid      FH: mitral valve repair          MEDICATIONS  (STANDING):  atorvastatin 20 milliGRAM(s) Oral at bedtime  cefTRIAXone   IVPB 1000 milliGRAM(s) IV Intermittent every 24 hours  metroNIDAZOLE  IVPB 500 milliGRAM(s) IV Intermittent every 8 hours  senna 2 Tablet(s) Oral at bedtime  spironolactone 25 milliGRAM(s) Oral daily  ursodiol Tablet 250 milliGRAM(s) Oral two times a day    MEDICATIONS  (PRN):  acetaminophen     Tablet .. 650 milliGRAM(s) Oral every 6 hours PRN Temp greater or equal to 38C (100.4F), Mild Pain (1 - 3)  aluminum hydroxide/magnesium hydroxide/simethicone Suspension 30 milliLiter(s) Oral every 4 hours PRN Dyspepsia      Allergies    fish (Other)  chlorhexidine topical (Rash)  penicillin (Hives)    Intolerances        SOCIAL HISTORY:    FAMILY HISTORY:      Vital Signs Last 24 Hrs  T(C): 36.7 (05 Sep 2023 15:21), Max: 36.7 (05 Sep 2023 06:42)  T(F): 98.1 (05 Sep 2023 15:21), Max: 98.1 (05 Sep 2023 15:21)  HR: 67 (05 Sep 2023 15:21) (65 - 72)  BP: 132/84 (05 Sep 2023 15:21) (132/84 - 171/65)  BP(mean): --  RR: 18 (05 Sep 2023 15:21) (16 - 18)  SpO2: 95% (05 Sep 2023 15:21) (94% - 97%)    Parameters below as of 05 Sep 2023 15:21  Patient On (Oxygen Delivery Method): room air        I&O's Summary    04 Sep 2023 07:01  -  05 Sep 2023 07:00  --------------------------------------------------------  IN: 0 mL / OUT: 400 mL / NET: -400 mL        Physical Exam:  General: NAD, resting comfortably  HEENT: NC/AT, EOMI, normal hearing, no oral lesions, no LAD, neck supple  Pulmonary: normal resp effort, CTA-B  Cardiovascular: NSR, no murmurs  Abdominal: soft, ND/NT, no organomegaly  Extremities: WWP, normal strength, no clubbing/cyanosis/edema  Neuro: A/O x 3, CNs II-XII grossly intact, normal sensation, no focal deficits  Pulses: palpable distal pulses    Lines/drains/tubes:    LABS:                        12.6   10.86 )-----------( 168      ( 05 Sep 2023 10:48 )             40.0     09-05    137  |  103  |  14  ----------------------------<  123<H>  4.4   |  25  |  0.64    Ca    9.8      05 Sep 2023 10:48  Phos  3.0     09-05  Mg     1.7     09-05    TPro  7.1  /  Alb  3.3  /  TBili  0.5  /  DBili  x   /  AST  48<H>  /  ALT  47<H>  /  AlkPhos  438<H>  09-05      Urinalysis Basic - ( 05 Sep 2023 10:48 )    Color: x / Appearance: x / SG: x / pH: x  Gluc: 123 mg/dL / Ketone: x  / Bili: x / Urobili: x   Blood: x / Protein: x / Nitrite: x   Leuk Esterase: x / RBC: x / WBC x   Sq Epi: x / Non Sq Epi: x / Bacteria: x      CAPILLARY BLOOD GLUCOSE        LIVER FUNCTIONS - ( 05 Sep 2023 10:48 )  Alb: 3.3 g/dL / Pro: 7.1 g/dL / ALK PHOS: 438 U/L / ALT: 47 U/L / AST: 48 U/L / GGT: x             Cultures:      RADIOLOGY & ADDITIONAL STUDIES:  Abdomen US 9/2/23: 1. Mildly enlarged liver measuring up to 18 cm in cephalocaudal dimensions. Biliary sludge which is mobile again noted within the gallbladder lumen and to a lesser extent within the common bile duct. The common bile duct is nondilated. There may be some prominence of the intrahepatic bile ducts. Further imaging may be of value to include an MRCP if there is clinical concern for obstruction.   Echogenic right kidney.

## 2023-09-05 NOTE — CONSULT NOTE ADULT - ATTENDING COMMENTS
Multiple medical problems and advance age, not a surgical candidate, percutaneous drainage of GB by IR, continue IV ABX.

## 2023-09-05 NOTE — CONSULT NOTE ADULT - ASSESSMENT
89 years old female with multiple comorbidities, high risk surgical candidate, history of  recent admission in 5/2023 with cholangitis managed with medical management. Presents with one day of RUQ pain, abdomen is soft but tender in RUQ, negative Reagan sign, not rigid, no rebound. Abdomen US showing biliary sludge and extending to CBD, CBD not dilated. WBC 10.86 Bili 0.5 AST 48 ALT 47 (Downtrending) Alkaline phosphatase 438 downtrending from 668 although persistently elevated. Patient with prior episode of cholangitis, high risk candidate for surgical intervention.     Recommendations  - Patient poor surgical candidate, will recommend IR consultation for percutaneous cholecystostomy for biliary drainage.   - Continue antibiotics   - Plan discussed with chief resident and attending Dr Cadet

## 2023-09-06 ENCOUNTER — TRANSCRIPTION ENCOUNTER (OUTPATIENT)
Age: 88
End: 2023-09-06

## 2023-09-06 VITALS
TEMPERATURE: 98 F | RESPIRATION RATE: 18 BRPM | DIASTOLIC BLOOD PRESSURE: 71 MMHG | SYSTOLIC BLOOD PRESSURE: 153 MMHG | HEART RATE: 67 BPM | OXYGEN SATURATION: 94 %

## 2023-09-06 PROCEDURE — 84295 ASSAY OF SERUM SODIUM: CPT

## 2023-09-06 PROCEDURE — 83735 ASSAY OF MAGNESIUM: CPT

## 2023-09-06 PROCEDURE — 80162 ASSAY OF DIGOXIN TOTAL: CPT

## 2023-09-06 PROCEDURE — 84132 ASSAY OF SERUM POTASSIUM: CPT

## 2023-09-06 PROCEDURE — 85730 THROMBOPLASTIN TIME PARTIAL: CPT

## 2023-09-06 PROCEDURE — 71045 X-RAY EXAM CHEST 1 VIEW: CPT

## 2023-09-06 PROCEDURE — 96374 THER/PROPH/DIAG INJ IV PUSH: CPT

## 2023-09-06 PROCEDURE — 81001 URINALYSIS AUTO W/SCOPE: CPT

## 2023-09-06 PROCEDURE — 84484 ASSAY OF TROPONIN QUANT: CPT

## 2023-09-06 PROCEDURE — 84100 ASSAY OF PHOSPHORUS: CPT

## 2023-09-06 PROCEDURE — 82803 BLOOD GASES ANY COMBINATION: CPT

## 2023-09-06 PROCEDURE — 87086 URINE CULTURE/COLONY COUNT: CPT

## 2023-09-06 PROCEDURE — 0225U NFCT DS DNA&RNA 21 SARSCOV2: CPT

## 2023-09-06 PROCEDURE — 99285 EMERGENCY DEPT VISIT HI MDM: CPT

## 2023-09-06 PROCEDURE — 87040 BLOOD CULTURE FOR BACTERIA: CPT

## 2023-09-06 PROCEDURE — 85610 PROTHROMBIN TIME: CPT

## 2023-09-06 PROCEDURE — 83690 ASSAY OF LIPASE: CPT

## 2023-09-06 PROCEDURE — 85025 COMPLETE CBC W/AUTO DIFF WBC: CPT

## 2023-09-06 PROCEDURE — 36415 COLL VENOUS BLD VENIPUNCTURE: CPT

## 2023-09-06 PROCEDURE — 76705 ECHO EXAM OF ABDOMEN: CPT

## 2023-09-06 PROCEDURE — 82330 ASSAY OF CALCIUM: CPT

## 2023-09-06 PROCEDURE — 80053 COMPREHEN METABOLIC PANEL: CPT

## 2023-09-06 PROCEDURE — 83605 ASSAY OF LACTIC ACID: CPT

## 2023-09-06 RX ORDER — URSODIOL 250 MG/1
1 TABLET, FILM COATED ORAL
Qty: 60 | Refills: 0
Start: 2023-09-06 | End: 2023-10-05

## 2023-09-06 RX ORDER — DOCUSATE SODIUM 100 MG
1 CAPSULE ORAL
Refills: 0 | DISCHARGE

## 2023-09-06 RX ADMIN — Medication 100 MILLIGRAM(S): at 15:00

## 2023-09-06 RX ADMIN — Medication 100 MILLIGRAM(S): at 06:31

## 2023-09-06 RX ADMIN — CEFTRIAXONE 100 MILLIGRAM(S): 500 INJECTION, POWDER, FOR SOLUTION INTRAMUSCULAR; INTRAVENOUS at 10:48

## 2023-09-06 RX ADMIN — SPIRONOLACTONE 25 MILLIGRAM(S): 25 TABLET, FILM COATED ORAL at 06:30

## 2023-09-06 NOTE — PROGRESS NOTE ADULT - ASSESSMENT
LFTs decreasing  Appreciate surgery note  Safest approach to the problem is unclear given her significant frailty and high risk status for any intervention

## 2023-09-06 NOTE — DISCHARGE NOTE NURSING/CASE MANAGEMENT/SOCIAL WORK - NSDCPEFALRISK_GEN_ALL_CORE
For information on Fall & Injury Prevention, visit: [email or web address] OR  [email or web address] OR  https://www.cdc.gov/steadi/patient.html

## 2023-09-06 NOTE — DISCHARGE NOTE NURSING/CASE MANAGEMENT/SOCIAL WORK - PATIENT PORTAL LINK FT
You can access the FollowMyHealth Patient Portal offered by [facility] by registering at the following website: [email or web address]. By joining [employer]’s FollowMyHealth portal, you will also be able to view your health information using other applications (apps) compatible with our system.
no

## 2023-09-06 NOTE — PROGRESS NOTE ADULT - PROVIDER SPECIALTY LIST ADULT
Internal Medicine

## 2023-09-06 NOTE — PROGRESS NOTE ADULT - REASON FOR ADMISSION
fever, SOB

## 2023-09-06 NOTE — PROGRESS NOTE ADULT - NUTRITIONAL ASSESSMENT
This patient has been assessed with a concern for Malnutrition and has been determined to have a diagnosis/diagnoses of Severe protein-calorie malnutrition and Underweight (BMI < 19).    This patient is being managed with:   Diet NPO after Midnight-     NPO Start Date: 05-Sep-2023   NPO Start Time: 23:59  Entered: Sep  5 2023  9:08PM    Diet Pureed-  Entered: Sep  2 2023  1:09PM  
This patient has been assessed with a concern for Malnutrition and has been determined to have a diagnosis/diagnoses of Severe protein-calorie malnutrition and Underweight (BMI < 19).    This patient is being managed with:   Diet NPO after Midnight-     NPO Start Date: 05-Sep-2023   NPO Start Time: 23:59  Entered: Sep  5 2023  9:08PM    Diet Pureed-  Entered: Sep  2 2023  1:09PM  
This patient has been assessed with a concern for Malnutrition and has been determined to have a diagnosis/diagnoses of Severe protein-calorie malnutrition and Underweight (BMI < 19).    This patient is being managed with:   Diet Pureed-  Entered: Sep  2 2023  1:09PM  
This patient has been assessed with a concern for Malnutrition and has been determined to have a diagnosis/diagnoses of Severe protein-calorie malnutrition and Underweight (BMI < 19).    This patient is being managed with:   Diet Pureed-  Entered: Sep  2 2023  1:09PM  
This patient has been assessed with a concern for Malnutrition and has been determined to have a diagnosis/diagnoses of Severe protein-calorie malnutrition and Underweight (BMI < 19).    This patient is being managed with:   Diet NPO after Midnight-     NPO Start Date: 01-Sep-2023   NPO Start Time: 23:59  Except Medications  Entered: Sep  1 2023  7:17PM    Diet NPO-  Entered: Sep  1 2023 12:25PM  
This patient has been assessed with a concern for Malnutrition and has been determined to have a diagnosis/diagnoses of Severe protein-calorie malnutrition and Underweight (BMI < 19).    This patient is being managed with:   Diet Pureed-  Entered: Sep  2 2023  1:09PM  

## 2023-09-13 DIAGNOSIS — I49.5 SICK SINUS SYNDROME: ICD-10-CM

## 2023-09-13 DIAGNOSIS — R64 CACHEXIA: ICD-10-CM

## 2023-09-13 DIAGNOSIS — J06.9 ACUTE UPPER RESPIRATORY INFECTION, UNSPECIFIED: ICD-10-CM

## 2023-09-13 DIAGNOSIS — E87.21 ACUTE METABOLIC ACIDOSIS: ICD-10-CM

## 2023-09-13 DIAGNOSIS — Z88.0 ALLERGY STATUS TO PENICILLIN: ICD-10-CM

## 2023-09-13 DIAGNOSIS — U07.1 COVID-19: ICD-10-CM

## 2023-09-13 DIAGNOSIS — K59.00 CONSTIPATION, UNSPECIFIED: ICD-10-CM

## 2023-09-13 DIAGNOSIS — E78.5 HYPERLIPIDEMIA, UNSPECIFIED: ICD-10-CM

## 2023-09-13 DIAGNOSIS — Z79.01 LONG TERM (CURRENT) USE OF ANTICOAGULANTS: ICD-10-CM

## 2023-09-13 DIAGNOSIS — I50.32 CHRONIC DIASTOLIC (CONGESTIVE) HEART FAILURE: ICD-10-CM

## 2023-09-13 DIAGNOSIS — F32.9 MAJOR DEPRESSIVE DISORDER, SINGLE EPISODE, UNSPECIFIED: ICD-10-CM

## 2023-09-13 DIAGNOSIS — B97.89 OTHER VIRAL AGENTS AS THE CAUSE OF DISEASES CLASSIFIED ELSEWHERE: ICD-10-CM

## 2023-09-13 DIAGNOSIS — Z91.013 ALLERGY TO SEAFOOD: ICD-10-CM

## 2023-09-13 DIAGNOSIS — I11.0 HYPERTENSIVE HEART DISEASE WITH HEART FAILURE: ICD-10-CM

## 2023-09-13 DIAGNOSIS — E43 UNSPECIFIED SEVERE PROTEIN-CALORIE MALNUTRITION: ICD-10-CM

## 2023-09-13 DIAGNOSIS — R74.02 ELEVATION OF LEVELS OF LACTIC ACID DEHYDROGENASE [LDH]: ICD-10-CM

## 2023-09-13 DIAGNOSIS — B97.10 UNSPECIFIED ENTEROVIRUS AS THE CAUSE OF DISEASES CLASSIFIED ELSEWHERE: ICD-10-CM

## 2023-09-13 DIAGNOSIS — I48.91 UNSPECIFIED ATRIAL FIBRILLATION: ICD-10-CM

## 2023-09-13 DIAGNOSIS — E03.9 HYPOTHYROIDISM, UNSPECIFIED: ICD-10-CM

## 2023-09-13 DIAGNOSIS — R74.01 ELEVATION OF LEVELS OF LIVER TRANSAMINASE LEVELS: ICD-10-CM

## 2023-09-27 ENCOUNTER — NON-APPOINTMENT (OUTPATIENT)
Age: 88
End: 2023-09-27

## 2023-09-27 ENCOUNTER — APPOINTMENT (OUTPATIENT)
Dept: HEART AND VASCULAR | Facility: CLINIC | Age: 88
End: 2023-09-27
Payer: MEDICARE

## 2023-09-27 PROCEDURE — 93294 REM INTERROG EVL PM/LDLS PM: CPT

## 2023-09-27 PROCEDURE — 93296 REM INTERROG EVL PM/IDS: CPT

## 2023-10-01 ENCOUNTER — INPATIENT (INPATIENT)
Facility: HOSPITAL | Age: 88
LOS: 4 days | Discharge: HOME HEALTH | DRG: 871 | End: 2023-10-06
Attending: STUDENT IN AN ORGANIZED HEALTH CARE EDUCATION/TRAINING PROGRAM | Admitting: STUDENT IN AN ORGANIZED HEALTH CARE EDUCATION/TRAINING PROGRAM
Payer: MEDICARE

## 2023-10-01 VITALS
RESPIRATION RATE: 18 BRPM | SYSTOLIC BLOOD PRESSURE: 114 MMHG | HEIGHT: 62 IN | WEIGHT: 130.07 LBS | HEART RATE: 94 BPM | TEMPERATURE: 99 F | DIASTOLIC BLOOD PRESSURE: 65 MMHG

## 2023-10-01 DIAGNOSIS — Z84.89 FAMILY HISTORY OF OTHER SPECIFIED CONDITIONS: Chronic | ICD-10-CM

## 2023-10-01 DIAGNOSIS — Z95.0 PRESENCE OF CARDIAC PACEMAKER: ICD-10-CM

## 2023-10-01 DIAGNOSIS — I11.0 HYPERTENSIVE HEART DISEASE WITH HEART FAILURE: ICD-10-CM

## 2023-10-01 DIAGNOSIS — Z86.16 PERSONAL HISTORY OF COVID-19: ICD-10-CM

## 2023-10-01 DIAGNOSIS — Z91.013 ALLERGY TO SEAFOOD: ICD-10-CM

## 2023-10-01 DIAGNOSIS — Z41.8 ENCOUNTER FOR OTHER PROCEDURES FOR PURPOSES OTHER THAN REMEDYING HEALTH STATE: ICD-10-CM

## 2023-10-01 DIAGNOSIS — Z79.01 LONG TERM (CURRENT) USE OF ANTICOAGULANTS: ICD-10-CM

## 2023-10-01 DIAGNOSIS — Z88.0 ALLERGY STATUS TO PENICILLIN: ICD-10-CM

## 2023-10-01 DIAGNOSIS — A41.9 SEPSIS, UNSPECIFIED ORGANISM: ICD-10-CM

## 2023-10-01 DIAGNOSIS — I27.20 PULMONARY HYPERTENSION, UNSPECIFIED: ICD-10-CM

## 2023-10-01 DIAGNOSIS — E83.42 HYPOMAGNESEMIA: ICD-10-CM

## 2023-10-01 DIAGNOSIS — I49.5 SICK SINUS SYNDROME: ICD-10-CM

## 2023-10-01 DIAGNOSIS — E03.9 HYPOTHYROIDISM, UNSPECIFIED: ICD-10-CM

## 2023-10-01 DIAGNOSIS — K85.10 BILIARY ACUTE PANCREATITIS WITHOUT NECROSIS OR INFECTION: ICD-10-CM

## 2023-10-01 DIAGNOSIS — Z95.3 PRESENCE OF XENOGENIC HEART VALVE: ICD-10-CM

## 2023-10-01 DIAGNOSIS — K80.63 CALCULUS OF GALLBLADDER AND BILE DUCT WITH ACUTE CHOLECYSTITIS WITH OBSTRUCTION: ICD-10-CM

## 2023-10-01 DIAGNOSIS — E80.6 OTHER DISORDERS OF BILIRUBIN METABOLISM: ICD-10-CM

## 2023-10-01 DIAGNOSIS — E87.20 ACIDOSIS, UNSPECIFIED: ICD-10-CM

## 2023-10-01 DIAGNOSIS — E78.5 HYPERLIPIDEMIA, UNSPECIFIED: ICD-10-CM

## 2023-10-01 DIAGNOSIS — Z88.3 ALLERGY STATUS TO OTHER ANTI-INFECTIVE AGENTS: ICD-10-CM

## 2023-10-01 DIAGNOSIS — D68.9 COAGULATION DEFECT, UNSPECIFIED: ICD-10-CM

## 2023-10-01 DIAGNOSIS — I50.32 CHRONIC DIASTOLIC (CONGESTIVE) HEART FAILURE: ICD-10-CM

## 2023-10-01 DIAGNOSIS — N17.0 ACUTE KIDNEY FAILURE WITH TUBULAR NECROSIS: ICD-10-CM

## 2023-10-01 DIAGNOSIS — I35.0 NONRHEUMATIC AORTIC (VALVE) STENOSIS: ICD-10-CM

## 2023-10-01 DIAGNOSIS — Z79.890 HORMONE REPLACEMENT THERAPY: ICD-10-CM

## 2023-10-01 LAB
ALBUMIN SERPL ELPH-MCNC: 3.6 G/DL — SIGNIFICANT CHANGE UP (ref 3.3–5)
ALP SERPL-CCNC: 760 U/L — HIGH (ref 40–120)
ALT FLD-CCNC: 316 U/L — HIGH (ref 10–45)
AMYLASE P1 CFR SERPL: 2724 U/L — HIGH (ref 25–125)
ANION GAP SERPL CALC-SCNC: 15 MMOL/L — SIGNIFICANT CHANGE UP (ref 5–17)
ANISOCYTOSIS BLD QL: SLIGHT — SIGNIFICANT CHANGE UP
APTT BLD: 48.7 SEC — HIGH (ref 24.5–35.6)
AST SERPL-CCNC: 433 U/L — HIGH (ref 10–40)
BASOPHILS # BLD AUTO: 0 K/UL — SIGNIFICANT CHANGE UP (ref 0–0.2)
BASOPHILS NFR BLD AUTO: 0 % — SIGNIFICANT CHANGE UP (ref 0–2)
BILIRUB SERPL-MCNC: 3 MG/DL — HIGH (ref 0.2–1.2)
BLD GP AB SCN SERPL QL: NEGATIVE — SIGNIFICANT CHANGE UP
BLD GP AB SCN SERPL QL: NEGATIVE — SIGNIFICANT CHANGE UP
BUN SERPL-MCNC: 21 MG/DL — SIGNIFICANT CHANGE UP (ref 7–23)
CALCIUM SERPL-MCNC: 10.1 MG/DL — SIGNIFICANT CHANGE UP (ref 8.4–10.5)
CHLORIDE SERPL-SCNC: 99 MMOL/L — SIGNIFICANT CHANGE UP (ref 96–108)
CO2 SERPL-SCNC: 24 MMOL/L — SIGNIFICANT CHANGE UP (ref 22–31)
CREAT SERPL-MCNC: 0.84 MG/DL — SIGNIFICANT CHANGE UP (ref 0.5–1.3)
EGFR: 64 ML/MIN/1.73M2 — SIGNIFICANT CHANGE UP
EOSINOPHIL # BLD AUTO: 0 K/UL — SIGNIFICANT CHANGE UP (ref 0–0.5)
EOSINOPHIL NFR BLD AUTO: 0 % — SIGNIFICANT CHANGE UP (ref 0–6)
GIANT PLATELETS BLD QL SMEAR: PRESENT — SIGNIFICANT CHANGE UP
GLUCOSE BLDC GLUCOMTR-MCNC: 150 MG/DL — HIGH (ref 70–99)
GLUCOSE BLDC GLUCOMTR-MCNC: 79 MG/DL — SIGNIFICANT CHANGE UP (ref 70–99)
GLUCOSE BLDC GLUCOMTR-MCNC: 94 MG/DL — SIGNIFICANT CHANGE UP (ref 70–99)
GLUCOSE SERPL-MCNC: 92 MG/DL — SIGNIFICANT CHANGE UP (ref 70–99)
HCT VFR BLD CALC: 43.9 % — SIGNIFICANT CHANGE UP (ref 34.5–45)
HGB BLD-MCNC: 14.5 G/DL — SIGNIFICANT CHANGE UP (ref 11.5–15.5)
HYPOCHROMIA BLD QL: SLIGHT — SIGNIFICANT CHANGE UP
INR BLD: 3.22 — HIGH (ref 0.85–1.18)
LACTATE SERPL-SCNC: 3.5 MMOL/L — HIGH (ref 0.5–2)
LACTATE SERPL-SCNC: 4.1 MMOL/L — CRITICAL HIGH (ref 0.5–2)
LACTATE SERPL-SCNC: 5 MMOL/L — CRITICAL HIGH (ref 0.5–2)
LIDOCAIN IGE QN: >3000 U/L — HIGH (ref 7–60)
LYMPHOCYTES # BLD AUTO: 1.23 K/UL — SIGNIFICANT CHANGE UP (ref 1–3.3)
LYMPHOCYTES # BLD AUTO: 4.4 % — LOW (ref 13–44)
MANUAL SMEAR VERIFICATION: SIGNIFICANT CHANGE UP
MCHC RBC-ENTMCNC: 32 PG — SIGNIFICANT CHANGE UP (ref 27–34)
MCHC RBC-ENTMCNC: 33 GM/DL — SIGNIFICANT CHANGE UP (ref 32–36)
MCV RBC AUTO: 96.9 FL — SIGNIFICANT CHANGE UP (ref 80–100)
MONOCYTES # BLD AUTO: 2.46 K/UL — HIGH (ref 0–0.9)
MONOCYTES NFR BLD AUTO: 8.8 % — SIGNIFICANT CHANGE UP (ref 2–14)
NEUTROPHILS # BLD AUTO: 24.26 K/UL — HIGH (ref 1.8–7.4)
NEUTROPHILS NFR BLD AUTO: 85.1 % — HIGH (ref 43–77)
NEUTS BAND # BLD: 1.7 % — SIGNIFICANT CHANGE UP (ref 0–8)
OVALOCYTES BLD QL SMEAR: SLIGHT — SIGNIFICANT CHANGE UP
PLAT MORPH BLD: ABNORMAL
PLATELET # BLD AUTO: 223 K/UL — SIGNIFICANT CHANGE UP (ref 150–400)
POIKILOCYTOSIS BLD QL AUTO: SLIGHT — SIGNIFICANT CHANGE UP
POLYCHROMASIA BLD QL SMEAR: SLIGHT — SIGNIFICANT CHANGE UP
POTASSIUM SERPL-MCNC: 4.7 MMOL/L — SIGNIFICANT CHANGE UP (ref 3.5–5.3)
POTASSIUM SERPL-SCNC: 4.7 MMOL/L — SIGNIFICANT CHANGE UP (ref 3.5–5.3)
PROT SERPL-MCNC: 7.6 G/DL — SIGNIFICANT CHANGE UP (ref 6–8.3)
PROTHROM AB SERPL-ACNC: 35.5 SEC — HIGH (ref 9.5–13)
RBC # BLD: 4.53 M/UL — SIGNIFICANT CHANGE UP (ref 3.8–5.2)
RBC # FLD: 14.6 % — HIGH (ref 10.3–14.5)
RBC BLD AUTO: ABNORMAL
RH IG SCN BLD-IMP: POSITIVE — SIGNIFICANT CHANGE UP
RH IG SCN BLD-IMP: POSITIVE — SIGNIFICANT CHANGE UP
SODIUM SERPL-SCNC: 138 MMOL/L — SIGNIFICANT CHANGE UP (ref 135–145)
WBC # BLD: 27.95 K/UL — HIGH (ref 3.8–10.5)
WBC # FLD AUTO: 27.95 K/UL — HIGH (ref 3.8–10.5)

## 2023-10-01 PROCEDURE — 74177 CT ABD & PELVIS W/CONTRAST: CPT | Mod: 26,MG

## 2023-10-01 PROCEDURE — 99285 EMERGENCY DEPT VISIT HI MDM: CPT | Mod: FS

## 2023-10-01 PROCEDURE — 99291 CRITICAL CARE FIRST HOUR: CPT

## 2023-10-01 PROCEDURE — G1004: CPT

## 2023-10-01 PROCEDURE — 99221 1ST HOSP IP/OBS SF/LOW 40: CPT

## 2023-10-01 PROCEDURE — 76705 ECHO EXAM OF ABDOMEN: CPT | Mod: 26

## 2023-10-01 RX ORDER — PHYTONADIONE (VIT K1) 5 MG
10 TABLET ORAL ONCE
Refills: 0 | Status: COMPLETED | OUTPATIENT
Start: 2023-10-01 | End: 2023-10-01

## 2023-10-01 RX ORDER — ONDANSETRON 8 MG/1
4 TABLET, FILM COATED ORAL ONCE
Refills: 0 | Status: COMPLETED | OUTPATIENT
Start: 2023-10-01 | End: 2023-10-01

## 2023-10-01 RX ORDER — PIPERACILLIN AND TAZOBACTAM 4; .5 G/20ML; G/20ML
3.38 INJECTION, POWDER, LYOPHILIZED, FOR SOLUTION INTRAVENOUS EVERY 8 HOURS
Refills: 0 | Status: DISCONTINUED | OUTPATIENT
Start: 2023-10-01 | End: 2023-10-05

## 2023-10-01 RX ORDER — ACYCLOVIR SODIUM 500 MG
1 VIAL (EA) INTRAVENOUS
Refills: 0 | DISCHARGE

## 2023-10-01 RX ORDER — SPIRONOLACTONE 25 MG/1
1 TABLET, FILM COATED ORAL
Refills: 0 | DISCHARGE

## 2023-10-01 RX ORDER — PIPERACILLIN AND TAZOBACTAM 4; .5 G/20ML; G/20ML
3.38 INJECTION, POWDER, LYOPHILIZED, FOR SOLUTION INTRAVENOUS ONCE
Refills: 0 | Status: COMPLETED | OUTPATIENT
Start: 2023-10-01 | End: 2023-10-01

## 2023-10-01 RX ORDER — GLUCAGON INJECTION, SOLUTION 0.5 MG/.1ML
1 INJECTION, SOLUTION SUBCUTANEOUS ONCE
Refills: 0 | Status: DISCONTINUED | OUTPATIENT
Start: 2023-10-01 | End: 2023-10-06

## 2023-10-01 RX ORDER — ONDANSETRON 8 MG/1
4 TABLET, FILM COATED ORAL EVERY 6 HOURS
Refills: 0 | Status: DISCONTINUED | OUTPATIENT
Start: 2023-10-01 | End: 2023-10-06

## 2023-10-01 RX ORDER — INSULIN LISPRO 100/ML
VIAL (ML) SUBCUTANEOUS EVERY 6 HOURS
Refills: 0 | Status: DISCONTINUED | OUTPATIENT
Start: 2023-10-01 | End: 2023-10-06

## 2023-10-01 RX ORDER — LEVOTHYROXINE SODIUM 125 MCG
40 TABLET ORAL AT BEDTIME
Refills: 0 | Status: DISCONTINUED | OUTPATIENT
Start: 2023-10-01 | End: 2023-10-03

## 2023-10-01 RX ORDER — DEXTROSE 50 % IN WATER 50 %
25 SYRINGE (ML) INTRAVENOUS ONCE
Refills: 0 | Status: COMPLETED | OUTPATIENT
Start: 2023-10-01 | End: 2023-10-01

## 2023-10-01 RX ORDER — SODIUM CHLORIDE 9 MG/ML
1000 INJECTION INTRAMUSCULAR; INTRAVENOUS; SUBCUTANEOUS ONCE
Refills: 0 | Status: COMPLETED | OUTPATIENT
Start: 2023-10-01 | End: 2023-10-01

## 2023-10-01 RX ORDER — PANTOPRAZOLE SODIUM 20 MG/1
40 TABLET, DELAYED RELEASE ORAL DAILY
Refills: 0 | Status: DISCONTINUED | OUTPATIENT
Start: 2023-10-01 | End: 2023-10-03

## 2023-10-01 RX ORDER — METOPROLOL TARTRATE 50 MG
5 TABLET ORAL EVERY 6 HOURS
Refills: 0 | Status: DISCONTINUED | OUTPATIENT
Start: 2023-10-01 | End: 2023-10-02

## 2023-10-01 RX ORDER — IOHEXOL 300 MG/ML
30 INJECTION, SOLUTION INTRAVENOUS ONCE
Refills: 0 | Status: COMPLETED | OUTPATIENT
Start: 2023-10-01 | End: 2023-10-01

## 2023-10-01 RX ORDER — SODIUM CHLORIDE 9 MG/ML
1000 INJECTION, SOLUTION INTRAVENOUS
Refills: 0 | Status: DISCONTINUED | OUTPATIENT
Start: 2023-10-01 | End: 2023-10-02

## 2023-10-01 RX ORDER — DEXTROSE 50 % IN WATER 50 %
15 SYRINGE (ML) INTRAVENOUS ONCE
Refills: 0 | Status: DISCONTINUED | OUTPATIENT
Start: 2023-10-01 | End: 2023-10-06

## 2023-10-01 RX ORDER — SODIUM CHLORIDE 9 MG/ML
1000 INJECTION, SOLUTION INTRAVENOUS
Refills: 0 | Status: DISCONTINUED | OUTPATIENT
Start: 2023-10-01 | End: 2023-10-06

## 2023-10-01 RX ORDER — DEXTROSE 50 % IN WATER 50 %
12.5 SYRINGE (ML) INTRAVENOUS ONCE
Refills: 0 | Status: COMPLETED | OUTPATIENT
Start: 2023-10-01 | End: 2023-10-01

## 2023-10-01 RX ORDER — DEXTROSE 50 % IN WATER 50 %
25 SYRINGE (ML) INTRAVENOUS ONCE
Refills: 0 | Status: DISCONTINUED | OUTPATIENT
Start: 2023-10-01 | End: 2023-10-06

## 2023-10-01 RX ADMIN — Medication 12.5 MILLILITER(S): at 23:35

## 2023-10-01 RX ADMIN — Medication 102 MILLIGRAM(S): at 21:18

## 2023-10-01 RX ADMIN — SODIUM CHLORIDE 1000 MILLILITER(S): 9 INJECTION INTRAMUSCULAR; INTRAVENOUS; SUBCUTANEOUS at 15:41

## 2023-10-01 RX ADMIN — PIPERACILLIN AND TAZOBACTAM 200 GRAM(S): 4; .5 INJECTION, POWDER, LYOPHILIZED, FOR SOLUTION INTRAVENOUS at 14:16

## 2023-10-01 RX ADMIN — Medication 5 MILLIGRAM(S): at 23:13

## 2023-10-01 RX ADMIN — Medication 40 MICROGRAM(S): at 23:08

## 2023-10-01 RX ADMIN — ONDANSETRON 4 MILLIGRAM(S): 8 TABLET, FILM COATED ORAL at 13:17

## 2023-10-01 RX ADMIN — IOHEXOL 30 MILLILITER(S): 300 INJECTION, SOLUTION INTRAVENOUS at 13:17

## 2023-10-01 RX ADMIN — SODIUM CHLORIDE 1000 MILLILITER(S): 9 INJECTION INTRAMUSCULAR; INTRAVENOUS; SUBCUTANEOUS at 13:18

## 2023-10-01 NOTE — CONSULT NOTE ADULT - ASSESSMENT
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 Now presenting with acute cholangitis and pancreatitis. Discussed GOC - would like to proceed with intervention. Admitted to SICU for hemodynamic monitoring and further intervention.     Neuro: tylenol for pain PRN, zofran prn for nausea, depression: hold zoloft  CV: MAP goal > 65, Hx HLD: hold lipitor, Hx of afib: cont metop, hold warfarin; CHF: hold lasix, spironolactone, digoxin; echo (05/16) EF 65-70%, moderate AS, pulmonary HTN.  Pulm: saturating well on RA, IS  GI: NPO, PPI, f/u GI consult  : nestor for strict Is/Os  ID: Zosyn (10/1-)  Endo: ISS, Hx of hypothyroid: cont synthroid  PPx: SCD,   Lines: PIV   Wounds: none  Heme: INR 3.22 - given 2 FFP and vitamin K 10.   PT/OT:  not ordered  Dispo: SICU

## 2023-10-01 NOTE — ED ADULT TRIAGE NOTE - CHIEF COMPLAINT QUOTE
pt bibems c/o sudden onset lower right sided abd pain. Pt had normal BM this morning. Pt is confused at baseline. Home health aid at bedside. Denies any fever, chills, N/V, CP, SOB.

## 2023-10-01 NOTE — ED PROVIDER NOTE - OBJECTIVE STATEMENT
89-year-old female with past medical history of dementia (AOx1 baseline), HFpEF (EF 65-70% in 5/2023), AF (warfarin), hx mitral valve replacement and MYKE closure, HTN, HLD, hypothyroidism, tachy-mamie syndrome, who presents from home with HHA for fever and mild SOB x 1 day, found to be COVID and entero/rhinovirus positive, admitted for further management. Hospital course c/b RUQ with RUQ US showing biliary sludge and extending to CBD, CBD not dilated.     Hospital Course (by problem):    1. RUQ pain with transaminitis.  Last hospitalized 5/2023, then found to have elevated LFTs and questionable sludge ball in CBD, received course of Zosyn said last admission.   Current admission - ED alkaline phosphatase 468 (similar to prior admission), AST 63, ALT 58, bilirubin 0.5. Not a surgical candidate per Surgery.   RUQ pain with tenderness to palpation. RUQ US showing biliary sludge and extending to CBD, CBD not dilated.  -outpatient follow up for perc silvana  - continue ursodiol 250 mg BID 89yo female with pmhx of dementia (AOx1 baseline), HFpEF (EF 65-70% in 5/2023), AF (warfarin), hx mitral valve replacement and MYKE closure, HTN, HLD, hypothyroidism, tachy-mamie syndrome, presents with abdominal pain this morning per HHA. Home aid states she has been with pt since 9/28 and pt was in normal state of health until this morning when she refused breakfast and seemed to be in pain when aid touched lower abdomen. Aid denies fever, cough, vomiting, diarrhea, constipation, urinary frequency, malodorous urine. Of note, pt had admission to Surgery in May 2023 due to concern for cholangitis. She had elevated LFTs and questionable sludge ball in CBD. She was deemed high risk for an interventional procedure and managed with IV zosyn. Pt was again admitted from 8/30-9/6 for fever and mild shortness of breath (found to be COVID and entero/rhinovirus positive). Repeat RUQ US showed biliary sludge and extending to CBD, CBD not dilated. Plan was for outpatient follow up for perc silvana.

## 2023-10-01 NOTE — ED PROVIDER NOTE - PHYSICAL EXAMINATION
VITAL SIGNS: I have reviewed nursing notes and confirm.  CONSTITUTIONAL: Well-developed; in no acute distress.   SKIN:  warm and dry, no acute rash.   HEAD:  normocephalic, atraumatic.  EYES: PERRL, EOM intact; conjunctiva and sclera clear.  ENT: No nasal discharge; airway clear.   NECK: Supple; non tender.  CARD: S1, S2 normal; no murmurs, gallops, or rubs. Regular rate and rhythm.   RESP:  Clear to auscultation b/l, no wheezes, rales or rhonchi.  ABD: Normal bowel sounds; soft; non-distended; generalized abdominal tenderness; no guarding/ rebound.  EXT: Normal ROM. No clubbing, cyanosis or edema. 2+ pulses to b/l ue/le.  NEURO: Alert, oriented, grossly unremarkable  PSYCH: Cooperative, mood and affect appropriate.

## 2023-10-01 NOTE — ED PROVIDER NOTE - CARE PLAN
Principal Discharge DX:	Abdominal pain   1 Principal Discharge DX:	Cholelithiasis with cholangitis

## 2023-10-01 NOTE — H&P ADULT - NSHPLABSRESULTS_GEN_ALL_CORE
.  LABS:                         14.5   27.95 )-----------( 223      ( 01 Oct 2023 12:48 )             43.9     10-01    138  |  99  |  21  ----------------------------<  92  4.7   |  24  |  0.84    Ca    10.1      01 Oct 2023 12:48    TPro  7.6  /  Alb  3.6  /  TBili  3.0<H>  /  DBili  2.2<H>  /  AST  433<H>  /  ALT  316<H>  /  AlkPhos  760<H>  10-01    PT/INR - ( 01 Oct 2023 18:31 )   PT: 35.5 sec;   INR: 3.22          PTT - ( 01 Oct 2023 18:31 )  PTT:48.7 sec  Urinalysis Basic - ( 01 Oct 2023 12:48 )    Color: x / Appearance: x / SG: x / pH: x  Gluc: 92 mg/dL / Ketone: x  / Bili: x / Urobili: x   Blood: x / Protein: x / Nitrite: x   Leuk Esterase: x / RBC: x / WBC x   Sq Epi: x / Non Sq Epi: x / Bacteria: x        Lactate, Blood: 4.1 mmol/L (10-01 @ 14:17)  Lactate, Blood: 3.5 mmol/L (10-01 @ 12:48)      RADIOLOGY, EKG & ADDITIONAL TESTS: Reviewed.

## 2023-10-01 NOTE — ED PROVIDER NOTE - ATTENDING APP SHARED VISIT CONTRIBUTION OF CARE
Pt is a 89yo f, h/o chf w/ pEF, afib on warfarin, mvr, htn, ht, dementia a&o x 1 at baseline, who p/w abd pain since this am. No vomiting, fever, bowel changes, urinary sx's. Admitted to May '23 for cholangitis, deemed high risk for intervention and managed conservatively w/ iv abx. RUQ us last month showed biliary sludge with normal cbd, recommended to f/u for perc silvana. Afebrile. HDS. PE as above. + gen abd tenderness. + leukocytosis to 27, lactate 3.5 -> 4.1 after 1L ns. Tb 3, alk phos 760, ast 433, alt 316, lipase > 3000. Concern for cholangitis/ pancreatitis. Pt ordered for zosyn, ct a/p and ruq sono. Surgery consult requested for evaluation. Will continue to monitor.

## 2023-10-01 NOTE — ED ADULT NURSE NOTE - NSFALLFACTORS_ED_ALL_ED
----- Message from Radha Clayton anila titus!         Confusion/AMS

## 2023-10-01 NOTE — CHART NOTE - NSCHARTNOTEFT_GEN_A_CORE
89F h/o dementia (AOx1 baseline), HFpEF (EF 65-70% in 5/2023), pulm HTN, afib (on warfarin), s/p bioMVR (2016) and MYKE closure, HTN, HLD, tachy-mamie syndrome p/w gallstone pancreatitis w/ cholangitis.    Vitals stable, WBC 27, and lactate 3.5 -> 4.1. Received first dose of abx at 2 PM. Will need continued IVF resuscitation and urgent ERCP tomorrow.    Recommendations:  - Keep NPO  - Check INR now, give IV vitamin K if INR > 1.5, and recheck tomorrow AM  - Agree with Zosyn  - Continue IV fluids though monitor closely for signs of volume overload given hx of volume overload    GI will formally see tomorrow.    Bob ([facility]) MD Adi  Gastroenterology Fellow  Pager (M-F 7a-5x): [phone]  Pager (after hours): Please call  for on-call fellow 89F h/o dementia (AOx1 baseline), HFpEF (EF 65-70% in 5/2023), pulm HTN, afib (on warfarin), s/p bioMVR (2016) and MYKE closure, tachy-mamie syndrome (s/p PPM), HTN, HLD p/w gallstone pancreatitis w/ cholangitis.    Vitals stable, WBC 27, and lactate 3.5 -> 4.1. Received first dose of abx at 2 PM. Will need continued IVF resuscitation and urgent ERCP tomorrow.    Recommendations:  - Keep NPO  - Check INR now, give IV vitamin K if INR > 1.5, and recheck tomorrow AM  - Agree with Zosyn  - Continue IV fluids though monitor closely for signs of volume overload given hx of volume overload    GI will formally see tomorrow.    Bob ([facility]) MD Adi  Gastroenterology Fellow  Pager (M-F 7a-5p): [phone]  Pager (after hours): Please call  for on-call fellow

## 2023-10-01 NOTE — CONSULT NOTE ADULT - SUBJECTIVE AND OBJECTIVE BOX
SICU CONSULT NOTE    HPI:    SICU ADDENDUM:      MEDICATIONS  (STANDING):  dextrose 5%. 1000 milliLiter(s) (50 mL/Hr) IV Continuous <Continuous>  dextrose 50% Injectable 25 Gram(s) IV Push once  glucagon  Injectable 1 milliGRAM(s) IntraMuscular once  insulin lispro (ADMELOG) corrective regimen sliding scale   SubCutaneous every 6 hours  lactated ringers. 1000 milliLiter(s) (100 mL/Hr) IV Continuous <Continuous>  levothyroxine Injectable 40 MICROGram(s) IV Push at bedtime  metoprolol tartrate Injectable 5 milliGRAM(s) IV Push every 6 hours  pantoprazole  Injectable 40 milliGRAM(s) IV Push daily  phytonadione  IVPB 10 milliGRAM(s) IV Intermittent once  piperacillin/tazobactam IVPB.. 3.375 Gram(s) IV Intermittent every 8 hours    MEDICATIONS  (PRN):  dextrose Oral Gel 15 Gram(s) Oral once PRN Blood Glucose LESS THAN 70 milliGRAM(s)/deciliter  ondansetron Injectable 4 milliGRAM(s) IV Push every 6 hours PRN Nausea      Drips:     ICU Vital Signs Last 24 Hrs  T(C): 36.4 (01 Oct 2023 19:24), Max: 37.1 (01 Oct 2023 11:03)  T(F): 97.5 (01 Oct 2023 19:24), Max: 98.7 (01 Oct 2023 11:03)  HR: 74 (01 Oct 2023 20:00) (74 - 94)  BP: 157/83 (01 Oct 2023 20:00) (114/65 - 157/83)  BP(mean): 119 (01 Oct 2023 20:00) (119 - 119)  ABP: --  ABP(mean): --  RR: 18 (01 Oct 2023 20:00) (16 - 20)  SpO2: 97% (01 Oct 2023 20:00) (91% - 97%)    O2 Parameters below as of 01 Oct 2023 20:00  Patient On (Oxygen Delivery Method): room air            Physical Exam:  General: NAD  HEENT: NC/AT, EOMI, PERRLA, normal hearing, no oral lesions, neck supple w/o LAD  Pulmonary: Nonlabored breathing, no respiratory distress, CTA-B  Cardiovascular: NSR, no murmurs  Abdominal: soft, NT/ND, +BS, no organomegaly  Extremities: WWP, 5/5 strength x 4, no clubbing/cyanosis/edema  Neuro: A/O x3, CNs II-XII grossly intact, normal motor/sensation, no focal deficits  Pulses: palpable distal pulses      Larkin: placed        I&O's Summary      LABS:                        14.5   27.95 )-----------( 223      ( 01 Oct 2023 12:48 )             43.9     10-01    138  |  99  |  21  ----------------------------<  92  4.7   |  24  |  0.84    Ca    10.1      01 Oct 2023 12:48    TPro  7.6  /  Alb  3.6  /  TBili  3.0<H>  /  DBili  2.2<H>  /  AST  433<H>  /  ALT  316<H>  /  AlkPhos  760<H>  10-01    PT/INR - ( 01 Oct 2023 18:31 )   PT: 35.5 sec;   INR: 3.22          PTT - ( 01 Oct 2023 18:31 )  PTT:48.7 sec  Urinalysis Basic - ( 01 Oct 2023 12:48 )    Color: x / Appearance: x / SG: x / pH: x  Gluc: 92 mg/dL / Ketone: x  / Bili: x / Urobili: x   Blood: x / Protein: x / Nitrite: x   Leuk Esterase: x / RBC: x / WBC x   Sq Epi: x / Non Sq Epi: x / Bacteria: x      CAPILLARY BLOOD GLUCOSE        LIVER FUNCTIONS - ( 01 Oct 2023 12:48 )  Alb: 3.6 g/dL / Pro: 7.6 g/dL / ALK PHOS: 760 U/L / ALT: 316 U/L / AST: 433 U/L / GGT: x             Cultures:    RADIOLOGY & ADDITIONAL STUDIES:     SICU CONSULT NOTE    HPI:  89F PMH MVR, hypothyroidism, HTN, HLD, AF (Warfarin, last taken yesterday), PPM for tachy-mamie syndrome, AOx1 recently admitted to SICU in May 2023 for URI and gallbladder/CBD "sludge ball" s/p course of zosyn but not a surgical candidate, GI deferred ERCP and then admitted in August 2023 for SOB and fever, enterorhinovirus+, COVID+ w/ b/l opacities on CXR, at that time patient also had elevated LFTs that started to resolve so patient did not undergo IR drainage and was dc'ed on ursodiol and plan for outpatient perc silvana. Patient now returns with lethargy and increasing abdominal pain. No nausea/vomiting. Tolerating diet, last BM this morning.    Last colonoscopy/EGD - unknown per family member and caregiver.  Denies family hx of IBS, Crohn's, UC, or colon cancer.    ED: afebrile, VSS. WBC 27.95, Hgb 14.5, Cr 0.84, TBili  3.0,  AST  433, ALT  316,  AlkPhos  760, Lipase >3000. Lactate 3.5 --> 4.1 (despite IVF). CT AP w/ increase in now severe intrahepatic and extrahepatic biliary ductal dilatation secondary to a stone in the distal CBD near the ampulla.Heterogeneous appearance of the pancreatic head and peripancreatic edema compatible with interstitial edematous pancreatitis. Small amount of ascites. US - Intrahepatic biliary ductal dilatation. CBD15 mm.  Gallbladder: The gallbladder is dilated. Rounded echogenic focus identified within the gallbladder lumen measuring up to 1.9 cm, previously visualized, tumefactive sludge versus gallstone. Gallbladder wall thickening identified measuring 7-8 mm. Pericholecystic fluid identified.    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 Patient denied any other systemic symptoms at the time including fevers or chills. Was noted to be tolerating diet and having normal bowel function. Patient HD stable with WBC 28K with L shift noted. CT significant for choledocholithiasis with 1.5 cm CBD and stone at the ampulla in conjunction with pancreatitis and inflamed gallbladder. Extensive intra and extrahepatic biliary ductal dilation noted as well concerning for cholangitis. Patient admitted to SICU for HD monitoring and urgent endoscopic evaluation.       MEDICATIONS  (STANDING):  dextrose 5%. 1000 milliLiter(s) (50 mL/Hr) IV Continuous <Continuous>  dextrose 50% Injectable 25 Gram(s) IV Push once  glucagon  Injectable 1 milliGRAM(s) IntraMuscular once  insulin lispro (ADMELOG) corrective regimen sliding scale   SubCutaneous every 6 hours  lactated ringers. 1000 milliLiter(s) (100 mL/Hr) IV Continuous <Continuous>  levothyroxine Injectable 40 MICROGram(s) IV Push at bedtime  metoprolol tartrate Injectable 5 milliGRAM(s) IV Push every 6 hours  pantoprazole  Injectable 40 milliGRAM(s) IV Push daily  phytonadione  IVPB 10 milliGRAM(s) IV Intermittent once  piperacillin/tazobactam IVPB.. 3.375 Gram(s) IV Intermittent every 8 hours    MEDICATIONS  (PRN):  dextrose Oral Gel 15 Gram(s) Oral once PRN Blood Glucose LESS THAN 70 milliGRAM(s)/deciliter  ondansetron Injectable 4 milliGRAM(s) IV Push every 6 hours PRN Nausea      Drips:     ICU Vital Signs Last 24 Hrs  T(C): 36.4 (01 Oct 2023 19:24), Max: 37.1 (01 Oct 2023 11:03)  T(F): 97.5 (01 Oct 2023 19:24), Max: 98.7 (01 Oct 2023 11:03)  HR: 74 (01 Oct 2023 20:00) (74 - 94)  BP: 157/83 (01 Oct 2023 20:00) (114/65 - 157/83)  BP(mean): 119 (01 Oct 2023 20:00) (119 - 119)  RR: 18 (01 Oct 2023 20:00) (16 - 20)  SpO2: 97% (01 Oct 2023 20:00) (91% - 97%)    O2 Parameters below as of 01 Oct 2023 20:00  Patient On (Oxygen Delivery Method): room air            Physical Exam:  General: Resting in bed, NAD  HEENT: NC/AT, EOMI, PERRLA, normal hearing, no oral lesions, neck supple w/o LAD  Pulmonary: Nonlabored breathing, no respiratory distress, CTA-B  Cardiovascular: NSR, no murmurs  Abdominal: soft, moderately distended, tympanic, diffusely tender but especially in the epigastric and RUQ region  Extremities: WWP, 5/5 strength x 4, no clubbing/cyanosis/edema  Neuro: No focal deficits noted  Pulses: palpable distal pulses      Larkin: placed        I&O's Summary      LABS:                        14.5   27.95 )-----------( 223      ( 01 Oct 2023 12:48 )             43.9     10-01    138  |  99  |  21  ----------------------------<  92  4.7   |  24  |  0.84    Ca    10.1      01 Oct 2023 12:48    TPro  7.6  /  Alb  3.6  /  TBili  3.0<H>  /  DBili  2.2<H>  /  AST  433<H>  /  ALT  316<H>  /  AlkPhos  760<H>  10-01    PT/INR - ( 01 Oct 2023 18:31 )   PT: 35.5 sec;   INR: 3.22          PTT - ( 01 Oct 2023 18:31 )  PTT:48.7 sec  Urinalysis Basic - ( 01 Oct 2023 12:48 )    Color: x / Appearance: x / SG: x / pH: x  Gluc: 92 mg/dL / Ketone: x  / Bili: x / Urobili: x   Blood: x / Protein: x / Nitrite: x   Leuk Esterase: x / RBC: x / WBC x   Sq Epi: x / Non Sq Epi: x / Bacteria: x      CAPILLARY BLOOD GLUCOSE        LIVER FUNCTIONS - ( 01 Oct 2023 12:48 )  Alb: 3.6 g/dL / Pro: 7.6 g/dL / ALK PHOS: 760 U/L / ALT: 316 U/L / AST: 433 U/L / GGT: x             Cultures:    RADIOLOGY & ADDITIONAL STUDIES:

## 2023-10-01 NOTE — CONSULT NOTE ADULT - SUBJECTIVE AND OBJECTIVE BOX
HPI  HPI:  89F PMH MVR, hypothyroidism, HTN, HLD, AF (Warfarin, last taken yesterday), PPM for tachy-mamie syndrome, AOx1 recently admitted to SICU in May 2023 for URI and gallbladder/CBD "sludge ball" s/p course of zosyn but not a surgical candidate, GI deferred ERCP and then admitted in August 2023 for SOB and fever, enterorhinovirus+, COVID+ w/ b/l opacities on CXR, at that time patient also had elevated LFTs that started to resolve so patient did not undergo IR drainage and was dc'ed on ursodiol and plan for outpatient perc silvana. Patient now returns with lethargy and increasing abdominal pain. No nausea/vomiting. Tolerating diet, last BM this morning.    Patient found to have cholecystitis, ascending colangitis and gallstone pancreatitis and is planned for ERCP/perc silvana.       Denies CP, dyspnea, palpitations, presyncope, syncope, orthopnea, PND    ROS: A 10-point review of systems was otherwise negative.    PAST MEDICAL & SURGICAL HISTORY:  HTN (hypertension)      High cholesterol      Hypothyroid      FH: mitral valve repair          SOCIAL HISTORY: non smoker  FAMILY HISTORY:  Reviewed and non contributory    ALLERGIES: 	  penicillin (Hives)  chlorhexidine topical (Rash)  fish (Other)            MEDICATIONS:  dextrose 5%. 1000 milliLiter(s) IV Continuous <Continuous>  dextrose 50% Injectable 25 Gram(s) IV Push once  dextrose Oral Gel 15 Gram(s) Oral once PRN  glucagon  Injectable 1 milliGRAM(s) IntraMuscular once  insulin lispro (ADMELOG) corrective regimen sliding scale   SubCutaneous every 6 hours  lactated ringers. 1000 milliLiter(s) IV Continuous <Continuous>  levothyroxine Injectable 40 MICROGram(s) IV Push at bedtime  metoprolol tartrate Injectable 5 milliGRAM(s) IV Push every 6 hours  ondansetron Injectable 4 milliGRAM(s) IV Push every 6 hours PRN  pantoprazole  Injectable 40 milliGRAM(s) IV Push daily  phytonadione  IVPB 10 milliGRAM(s) IV Intermittent once  piperacillin/tazobactam IVPB.. 3.375 Gram(s) IV Intermittent every 8 hours      PHYSICAL EXAM:  T(C): 36.4 (10-01-23 @ 19:24), Max: 37.1 (10-01-23 @ 11:03)  HR: 74 (10-01-23 @ 20:00) (74 - 94)  BP: 157/83 (10-01-23 @ 20:00) (114/65 - 157/83)  RR: 18 (10-01-23 @ 20:00) (16 - 20)  SpO2: 97% (10-01-23 @ 20:00) (91% - 97%)  Wt(kg): --    GEN:  NAD.   HEENT: JVP   cmH2O  RESP: Chest clear bilaterally  CV: S1+s2. No murmur  ABD:   EXT: Warm and well perfused. No edema    LABS:                        14.5   27.95 )-----------( 223      ( 01 Oct 2023 12:48 )             43.9     10-01    138  |  99  |  21  ----------------------------<  92  4.7   |  24  |  0.84    Ca    10.1      01 Oct 2023 12:48    TPro  7.6  /  Alb  3.6  /  TBili  3.0<H>  /  DBili  2.2<H>  /  AST  433<H>  /  ALT  316<H>  /  AlkPhos  760<H>  10-01      Radiographic Imaging, ECG, echocardiography personally reviewed.   HPI  HPI:  89F PMH MVR, hypothyroidism, HTN, HLD, AF (Warfarin, last taken yesterday), PPM for tachy-mamie syndrome, AOx1 recently admitted to SICU in May 2023 for URI and gallbladder/CBD "sludge ball" s/p course of zosyn but not a surgical candidate, GI deferred ERCP and then admitted in August 2023 for SOB and fever, enterorhinovirus+, COVID+ w/ b/l opacities on CXR, at that time patient also had elevated LFTs that started to resolve so patient did not undergo IR drainage and was dc'ed on ursodiol and plan for outpatient perc silvana. Patient now returns with lethargy and increasing abdominal pain. No nausea/vomiting. Tolerating diet, last BM this morning.    Patient found to have cholecystitis, ascending colangitis and gallstone pancreatitis and is planned for ERCP/perc silvana.     Patient unable to provide any history gives one word answers.     PAST MEDICAL & SURGICAL HISTORY:  HTN (hypertension)      High cholesterol      Hypothyroid      FH: mitral valve repair          SOCIAL HISTORY: non smoker  FAMILY HISTORY:  Unable    ALLERGIES: 	  penicillin (Hives)  chlorhexidine topical (Rash)  fish (Other)            MEDICATIONS:  dextrose 5%. 1000 milliLiter(s) IV Continuous <Continuous>  dextrose 50% Injectable 25 Gram(s) IV Push once  dextrose Oral Gel 15 Gram(s) Oral once PRN  glucagon  Injectable 1 milliGRAM(s) IntraMuscular once  insulin lispro (ADMELOG) corrective regimen sliding scale   SubCutaneous every 6 hours  lactated ringers. 1000 milliLiter(s) IV Continuous <Continuous>  levothyroxine Injectable 40 MICROGram(s) IV Push at bedtime  metoprolol tartrate Injectable 5 milliGRAM(s) IV Push every 6 hours  ondansetron Injectable 4 milliGRAM(s) IV Push every 6 hours PRN  pantoprazole  Injectable 40 milliGRAM(s) IV Push daily  phytonadione  IVPB 10 milliGRAM(s) IV Intermittent once  piperacillin/tazobactam IVPB.. 3.375 Gram(s) IV Intermittent every 8 hours      PHYSICAL EXAM:  T(C): 36.4 (10-01-23 @ 19:24), Max: 37.1 (10-01-23 @ 11:03)  HR: 74 (10-01-23 @ 20:00) (74 - 94)  BP: 157/83 (10-01-23 @ 20:00) (114/65 - 157/83)  RR: 18 (10-01-23 @ 20:00) (16 - 20)  SpO2: 97% (10-01-23 @ 20:00) (91% - 97%)  Wt(kg): --    GEN:  Lying on bed, alert and tachypenic     RESP: Chest clear bilaterally  CV: S1+s2. systolic murmur  ABD: soft but tender diffusely  EXT: Cool. No edema    LABS:                        14.5   27.95 )-----------( 223      ( 01 Oct 2023 12:48 )             43.9     10-01    138  |  99  |  21  ----------------------------<  92  4.7   |  24  |  0.84    Ca    10.1      01 Oct 2023 12:48    TPro  7.6  /  Alb  3.6  /  TBili  3.0<H>  /  DBili  2.2<H>  /  AST  433<H>  /  ALT  316<H>  /  AlkPhos  760<H>  10-01      Radiographic Imaging, ECG, echocardiography personally reviewed.

## 2023-10-01 NOTE — ED ADULT NURSE NOTE - OBJECTIVE STATEMENT
89F hx HTN, HLD hypothryoidism, CHF and tachy-=mamie syndrome, presents accompanied by HHA c/o abd pain starting this morning. Patient at baseline AOx1 but reporting diffuse abd pain. HHA reports pt has been in USOH and able to tolerate dinner last night. This morning had normal BM but with c/o abd pain 89F hx HTN, HLD hypothryoidism, CHF and tachy-=mamie syndrome, presents accompanied by HHA c/o abd pain starting this morning. Patient at baseline AOx1 but reporting diffuse abd pain. HHA reports pt has been in USOH and able to tolerate dinner last night. This morning had normal BM but with c/o abd pain. Aide denies patient with fevers, chills, nausea, vomiting, diarrhea, cp, sob. Pt with guarding and ttp of diffuse abdomen with worsening at RLQ.

## 2023-10-01 NOTE — PROGRESS NOTE ADULT - SUBJECTIVE AND OBJECTIVE BOX
History reviewed  Patient is awake Cardiopulmonary exam is stable  Abd is softly distended with tenderness in RUQ and epigastric area  CT scan reviewed and labs noted

## 2023-10-01 NOTE — PROGRESS NOTE ADULT - ASSESSMENT
Obstructive jaundice with cholangitis and pancreatitis  IV fluids and antibiotics given in ER  Discussed in detail with the patients son and reviewed the options for Rx and the high mortality regardless of the Rx  He wishes to proceed with an intervention  Await the surgical team recommendations

## 2023-10-01 NOTE — ED ADULT NURSE NOTE - NSFALLHARMRISKINTERV_ED_ALL_ED
Assistance OOB with selected safe patient handling equipment if applicable/Assistance with ambulation/Communicate risk of Fall with Harm to all staff, patient, and family/Monitor gait and stability/Monitor for mental status changes and reorient to person, place, and time, as needed/Provide visual cue: red socks, yellow wristband, yellow gown, etc/Reinforce activity limits and safety measures with patient and family/Toileting schedule using arm’s reach rule for commode and bathroom/Use of alarms - bed, stretcher, chair and/or video monitoring/Bed in lowest position, wheels locked, appropriate side rails in place/Call bell, personal items and telephone in reach/Instruct patient to call for assistance before getting out of bed/chair/stretcher/Non-slip footwear applied when patient is off stretcher/[place] to call system/Physically safe environment - no spills, clutter or unnecessary equipment/Purposeful Proactive Rounding/Room/bathroom lighting operational, light cord in reach

## 2023-10-01 NOTE — H&P ADULT - HISTORY OF PRESENT ILLNESS
89F PMH MVR, hypothyroidism, HTN, HLD, AF (Warfarin, last taken yesterday), PPM for tachy-mamie syndrome, AOx1 recently admitted to SICU in May 2023 for URI and gallbladder/CBD "sludge ball" s/p course of zosyn but not a surgical candidate, GI deferred ERCP and then admitted in August 2023 for SOB and fever, enterorhinovirus+, COVID+ w/ b/l opacities on CXR, at that time patient also had elevated LFTs that started to resolve so patient did not undergo IR drainage and was dc'ed on ursodiol and plan for outpatient perc silvana. Patient now returns with lethargy and increasing abdominal pain. No nausea/vomiting. Tolerating diet, last BM this morning.    Last colonoscopy/EGD - unknown per family member and caregiver.  Denies family hx of IBS, Crohn's, UC, or colon cancer.    ED: afebrile, VSS. WBC 27.95, Hgb 14.5, Cr 0.84, TBili  3.0,  AST  433, ALT  316,  AlkPhos  760, Lipase >3000. Lactate 3.5 --> 4.1 (despite IVF). CT AP w/ increase in now severe intrahepatic and extrahepatic biliary ductal dilatation secondary to a stone in the distal CBD near the ampulla.Heterogeneous appearance of the pancreatic head and peripancreatic edema compatible with interstitial edematous pancreatitis. Small amount of ascites. US - Intrahepatic biliary ductal dilatation. CBD15 mm.  Gallbladder: The gallbladder is dilated. Rounded echogenic focus identified within the gallbladder lumen measuring up to 1.9 cm, previously visualized, tumefactive sludge versus gallstone. Gallbladder wall thickening identified measuring 7-8 mm. Pericholecystic fluid identified.

## 2023-10-01 NOTE — H&P ADULT - NSHPPHYSICALEXAM_GEN_ALL_CORE
LOS:     VITALS:   T(C): 36.4 (10-01-23 @ 19:24), Max: 37.1 (10-01-23 @ 11:03)  HR: 81 (10-01-23 @ 19:24) (74 - 94)  BP: 127/62 (10-01-23 @ 19:24) (114/65 - 146/76)  RR: 16 (10-01-23 @ 19:24) (16 - 20)  SpO2: 96% (10-01-23 @ 19:24) (91% - 96%)    Physical Exam  General: NAD, resting comfortably  HEENT: NC/AT  Pulmonary: normal resp effort  Cardiovascular: NSR,  Abdominal: soft, distended, tympanic, diffusely tender but especially in the epigastric and RUQ region  Extremities: WWP, normal strength, no clubbing/cyanosis/edema  Neuro: A/O x 3, no focal deficits

## 2023-10-01 NOTE — CONSULT NOTE ADULT - ASSESSMENT
89F MVR (bio) 2016 warfarin, HTN, HLD, a fib, SSS s/p PPM, MYKE closure, HFpEF p/w abdominal pain. Found to have cholecystitis and ascending cholangitis, gallstone pancreatitis. Pre op for ERCP / perc silvana.    #Pre op: ERCP, Percutaneous cholecystectomy   Urgent interventions without which there is high risk of clinical deterioration  No sign of acute ischemia, congestive heart failure, critical valvular disease or significant arrhthymias.   No cardiac contraindication to proceed with this surgery.  low  risk procedure for  intermediate -high risk patient    For warfarin reversal  -- Give 5mg IV Vitamin K given strict NPO. Expect it will take 6-12 hours to see full effect.     #Atrial Fibrillation  [id number]: 4  AC: Warfarin - held. Has MYKE occlusion device.   Rate control: PO metop on hold as NPO. Given sepsis hold metoprolol IV - can reconsider if patient is tachycardiac.       89F MVR (bio) 2016 warfarin, HTN, HLD, a fib, SSS s/p PPM, MYKE closure, HFpEF p/w abdominal pain. Found to have cholecystitis and ascending cholangitis, gallstone pancreatitis. Pre op for ERCP / perc silvana.    #Pre op: ERCP, Percutaneous cholecystectomy   Urgent interventions without which there is high risk of clinical deterioration  No sign of acute ischemia, congestive heart failure, critical valvular disease or significant arrhthymias.   No cardiac contraindication to proceed with this surgery.  low  risk procedure for  intermediate -high risk patient    For warfarin reversal  --is s/p 10mg IV Vitamin. Expect 6-12 hours to see full effect.   Warfarin reintroduction may be prolonged post procedure. Can readdress this post op.  No need for bridging with heparin especially given the surgical MYKE occlusion.     #Atrial Fibrillation  [id number]: 4  AC: Warfarin - held. Has MYKE occlusion device.   Rate control: PO metop on hold as NPO. Given sepsis hold metoprolol IV - can reconsider if patient is tachycardiac.       89F MVR (bio) 2016 warfarin, HTN, HLD, a fib, SSS s/p PPM, MYKE closure, HFpEF p/w abdominal pain. Found to have cholecystitis and ascending cholangitis, gallstone pancreatitis. Pre op for ERCP / perc silvana.  Patient appears ill/toxic    #Pre op: ERCP, Percutaneous cholecystectomy   Urgent intervention without which there is high risk of clinical deterioration  No sign congestive heart failure, critical valvular disease or significant arrhthymias.   No cardiac contraindication to proceed with this urgent procedure.  Unable to assess METs  low  risk procedure for  intermediate -high risk patient    For warfarin reversal  --is s/p 10mg IV Vitamin. Expect 6-12 hours to see full effect.   Warfarin reintroduction may be prolonged post procedure. Can readdress this post op.  No need for bridging with heparin especially given the surgical MYKE occlusion.     #Atrial Fibrillation  [id number]: 4  AC: Warfarin - held. Has MYKE occlusion device.   Rate control: PO metop on hold as NPO. Given sepsis hold metoprolol IV - can reconsider if patient is tachycardiac.

## 2023-10-02 ENCOUNTER — TRANSCRIPTION ENCOUNTER (OUTPATIENT)
Age: 88
End: 2023-10-02

## 2023-10-02 LAB
A1C WITH ESTIMATED AVERAGE GLUCOSE RESULT: 5.5 % — SIGNIFICANT CHANGE UP (ref 4–5.6)
ALBUMIN SERPL ELPH-MCNC: 3 G/DL — LOW (ref 3.3–5)
ALBUMIN SERPL ELPH-MCNC: 3.1 G/DL — LOW (ref 3.3–5)
ALP SERPL-CCNC: 471 U/L — HIGH (ref 40–120)
ALP SERPL-CCNC: 494 U/L — HIGH (ref 40–120)
ALT FLD-CCNC: 187 U/L — HIGH (ref 10–45)
ALT FLD-CCNC: SIGNIFICANT CHANGE UP U/L (ref 10–45)
ANION GAP SERPL CALC-SCNC: 10 MMOL/L — SIGNIFICANT CHANGE UP (ref 5–17)
ANION GAP SERPL CALC-SCNC: 13 MMOL/L — SIGNIFICANT CHANGE UP (ref 5–17)
ANION GAP SERPL CALC-SCNC: 13 MMOL/L — SIGNIFICANT CHANGE UP (ref 5–17)
APPEARANCE UR: ABNORMAL
APTT BLD: 26.2 SEC — SIGNIFICANT CHANGE UP (ref 24.5–35.6)
APTT BLD: 32.9 SEC — SIGNIFICANT CHANGE UP (ref 24.5–35.6)
AST SERPL-CCNC: 215 U/L — HIGH (ref 10–40)
AST SERPL-CCNC: SIGNIFICANT CHANGE UP U/L (ref 10–40)
BACTERIA # UR AUTO: PRESENT /HPF
BILIRUB SERPL-MCNC: 2.8 MG/DL — HIGH (ref 0.2–1.2)
BILIRUB SERPL-MCNC: 3.2 MG/DL — HIGH (ref 0.2–1.2)
BILIRUB UR-MCNC: ABNORMAL
BUN SERPL-MCNC: 17 MG/DL — SIGNIFICANT CHANGE UP (ref 7–23)
BUN SERPL-MCNC: 18 MG/DL — SIGNIFICANT CHANGE UP (ref 7–23)
BUN SERPL-MCNC: 22 MG/DL — SIGNIFICANT CHANGE UP (ref 7–23)
CALCIUM SERPL-MCNC: 9 MG/DL — SIGNIFICANT CHANGE UP (ref 8.4–10.5)
CALCIUM SERPL-MCNC: 9.1 MG/DL — SIGNIFICANT CHANGE UP (ref 8.4–10.5)
CALCIUM SERPL-MCNC: 9.1 MG/DL — SIGNIFICANT CHANGE UP (ref 8.4–10.5)
CHLORIDE SERPL-SCNC: 102 MMOL/L — SIGNIFICANT CHANGE UP (ref 96–108)
CHLORIDE SERPL-SCNC: 103 MMOL/L — SIGNIFICANT CHANGE UP (ref 96–108)
CHLORIDE SERPL-SCNC: 103 MMOL/L — SIGNIFICANT CHANGE UP (ref 96–108)
CO2 SERPL-SCNC: 22 MMOL/L — SIGNIFICANT CHANGE UP (ref 22–31)
CO2 SERPL-SCNC: 23 MMOL/L — SIGNIFICANT CHANGE UP (ref 22–31)
CO2 SERPL-SCNC: 25 MMOL/L — SIGNIFICANT CHANGE UP (ref 22–31)
COD CRY URNS QL: ABNORMAL /HPF
COLOR SPEC: YELLOW — SIGNIFICANT CHANGE UP
CREAT ?TM UR-MCNC: 120 MG/DL — SIGNIFICANT CHANGE UP
CREAT ?TM UR-MCNC: 65 MG/DL — SIGNIFICANT CHANGE UP
CREAT SERPL-MCNC: 0.58 MG/DL — SIGNIFICANT CHANGE UP (ref 0.5–1.3)
CREAT SERPL-MCNC: 0.64 MG/DL — SIGNIFICANT CHANGE UP (ref 0.5–1.3)
CREAT SERPL-MCNC: 0.79 MG/DL — SIGNIFICANT CHANGE UP (ref 0.5–1.3)
DIFF PNL FLD: NEGATIVE — SIGNIFICANT CHANGE UP
EGFR: 69 ML/MIN/1.73M2 — SIGNIFICANT CHANGE UP
EGFR: 81 ML/MIN/1.73M2 — SIGNIFICANT CHANGE UP
EGFR: 83 ML/MIN/1.73M2 — SIGNIFICANT CHANGE UP
EPI CELLS # UR: ABNORMAL /HPF (ref 0–5)
ESTIMATED AVERAGE GLUCOSE: 111 MG/DL — SIGNIFICANT CHANGE UP (ref 68–114)
GLUCOSE BLDC GLUCOMTR-MCNC: 86 MG/DL — SIGNIFICANT CHANGE UP (ref 70–99)
GLUCOSE BLDC GLUCOMTR-MCNC: 91 MG/DL — SIGNIFICANT CHANGE UP (ref 70–99)
GLUCOSE BLDC GLUCOMTR-MCNC: 92 MG/DL — SIGNIFICANT CHANGE UP (ref 70–99)
GLUCOSE BLDC GLUCOMTR-MCNC: 93 MG/DL — SIGNIFICANT CHANGE UP (ref 70–99)
GLUCOSE SERPL-MCNC: 109 MG/DL — HIGH (ref 70–99)
GLUCOSE SERPL-MCNC: 91 MG/DL — SIGNIFICANT CHANGE UP (ref 70–99)
GLUCOSE SERPL-MCNC: 96 MG/DL — SIGNIFICANT CHANGE UP (ref 70–99)
GLUCOSE UR QL: 100
HCT VFR BLD CALC: 31.1 % — LOW (ref 34.5–45)
HGB BLD-MCNC: 10.4 G/DL — LOW (ref 11.5–15.5)
INR BLD: 1.43 — HIGH (ref 0.85–1.18)
KETONES UR-MCNC: NEGATIVE — SIGNIFICANT CHANGE UP
LACTATE SERPL-SCNC: 1.3 MMOL/L — SIGNIFICANT CHANGE UP (ref 0.5–2)
LACTATE SERPL-SCNC: 4.4 MMOL/L — CRITICAL HIGH (ref 0.5–2)
LEUKOCYTE ESTERASE UR-ACNC: NEGATIVE — SIGNIFICANT CHANGE UP
MAGNESIUM SERPL-MCNC: 1.7 MG/DL — SIGNIFICANT CHANGE UP (ref 1.6–2.6)
MAGNESIUM SERPL-MCNC: 1.7 MG/DL — SIGNIFICANT CHANGE UP (ref 1.6–2.6)
MCHC RBC-ENTMCNC: 32.3 PG — SIGNIFICANT CHANGE UP (ref 27–34)
MCHC RBC-ENTMCNC: 33.4 GM/DL — SIGNIFICANT CHANGE UP (ref 32–36)
MCV RBC AUTO: 96.6 FL — SIGNIFICANT CHANGE UP (ref 80–100)
NITRITE UR-MCNC: NEGATIVE — SIGNIFICANT CHANGE UP
NRBC # BLD: 0 /100 WBCS — SIGNIFICANT CHANGE UP (ref 0–0)
OSMOLALITY UR: 573 MOSM/KG — SIGNIFICANT CHANGE UP (ref 300–900)
OSMOLALITY UR: 615 MOSM/KG — SIGNIFICANT CHANGE UP (ref 300–900)
PH UR: 5.5 — SIGNIFICANT CHANGE UP (ref 5–8)
PHOSPHATE SERPL-MCNC: 3.8 MG/DL — SIGNIFICANT CHANGE UP (ref 2.5–4.5)
PHOSPHATE SERPL-MCNC: 4 MG/DL — SIGNIFICANT CHANGE UP (ref 2.5–4.5)
PLATELET # BLD AUTO: 160 K/UL — SIGNIFICANT CHANGE UP (ref 150–400)
POTASSIUM SERPL-MCNC: 3.9 MMOL/L — SIGNIFICANT CHANGE UP (ref 3.5–5.3)
POTASSIUM SERPL-MCNC: 4.2 MMOL/L — SIGNIFICANT CHANGE UP (ref 3.5–5.3)
POTASSIUM SERPL-MCNC: SIGNIFICANT CHANGE UP MMOL/L (ref 3.5–5.3)
POTASSIUM SERPL-SCNC: 3.9 MMOL/L — SIGNIFICANT CHANGE UP (ref 3.5–5.3)
POTASSIUM SERPL-SCNC: 4.2 MMOL/L — SIGNIFICANT CHANGE UP (ref 3.5–5.3)
POTASSIUM SERPL-SCNC: SIGNIFICANT CHANGE UP MMOL/L (ref 3.5–5.3)
POTASSIUM UR-SCNC: 70 MMOL/L — SIGNIFICANT CHANGE UP
POTASSIUM UR-SCNC: 86 MMOL/L — SIGNIFICANT CHANGE UP
PROT ?TM UR-MCNC: 60 MG/DL — HIGH (ref 0–12)
PROT ?TM UR-MCNC: 72 MG/DL — HIGH (ref 0–12)
PROT SERPL-MCNC: 6.6 G/DL — SIGNIFICANT CHANGE UP (ref 6–8.3)
PROT SERPL-MCNC: 6.9 G/DL — SIGNIFICANT CHANGE UP (ref 6–8.3)
PROT UR-MCNC: 30 MG/DL
PROT/CREAT UR-RTO: 0.6 RATIO — HIGH (ref 0–0.2)
PROT/CREAT UR-RTO: 0.9 RATIO — HIGH (ref 0–0.2)
PROTHROM AB SERPL-ACNC: 16.1 SEC — HIGH (ref 9.5–13)
RBC # BLD: 3.22 M/UL — LOW (ref 3.8–5.2)
RBC # FLD: 14.9 % — HIGH (ref 10.3–14.5)
RBC CASTS # UR COMP ASSIST: < 5 /HPF — SIGNIFICANT CHANGE UP
SODIUM SERPL-SCNC: 137 MMOL/L — SIGNIFICANT CHANGE UP (ref 135–145)
SODIUM SERPL-SCNC: 138 MMOL/L — SIGNIFICANT CHANGE UP (ref 135–145)
SODIUM SERPL-SCNC: 139 MMOL/L — SIGNIFICANT CHANGE UP (ref 135–145)
SODIUM UR-SCNC: 24 MMOL/L — SIGNIFICANT CHANGE UP
SODIUM UR-SCNC: 30 MMOL/L — SIGNIFICANT CHANGE UP
SP GR SPEC: >=1.03 — SIGNIFICANT CHANGE UP (ref 1–1.03)
UROBILINOGEN FLD QL: 1 E.U./DL — SIGNIFICANT CHANGE UP
UUN UR-MCNC: 636 MG/DL — SIGNIFICANT CHANGE UP
UUN UR-MCNC: 751 MG/DL — SIGNIFICANT CHANGE UP
WBC # BLD: 22.48 K/UL — HIGH (ref 3.8–10.5)
WBC # FLD AUTO: 22.48 K/UL — HIGH (ref 3.8–10.5)
WBC UR QL: ABNORMAL /HPF

## 2023-10-02 PROCEDURE — 43274 ERCP DUCT STENT PLACEMENT: CPT | Mod: GC

## 2023-10-02 PROCEDURE — 99222 1ST HOSP IP/OBS MODERATE 55: CPT

## 2023-10-02 PROCEDURE — 99233 SBSQ HOSP IP/OBS HIGH 50: CPT | Mod: GC

## 2023-10-02 PROCEDURE — 74328 X-RAY BILE DUCT ENDOSCOPY: CPT | Mod: 26,GC

## 2023-10-02 PROCEDURE — 71045 X-RAY EXAM CHEST 1 VIEW: CPT | Mod: 26

## 2023-10-02 PROCEDURE — 99232 SBSQ HOSP IP/OBS MODERATE 35: CPT | Mod: GC

## 2023-10-02 PROCEDURE — 74329 X-RAY FOR PANCREAS ENDOSCOPY: CPT | Mod: 26,GC

## 2023-10-02 PROCEDURE — 93306 TTE W/DOPPLER COMPLETE: CPT | Mod: 26

## 2023-10-02 PROCEDURE — 99223 1ST HOSP IP/OBS HIGH 75: CPT | Mod: 25

## 2023-10-02 DEVICE — STENT PANC ZIMMON 5FRX4CM: Type: IMPLANTABLE DEVICE | Status: FUNCTIONAL

## 2023-10-02 DEVICE — GWIRE FUSION LOKG DEV: Type: IMPLANTABLE DEVICE | Status: FUNCTIONAL

## 2023-10-02 DEVICE — STENT PANC ZIMMON 5FRX6CM: Type: IMPLANTABLE DEVICE | Status: FUNCTIONAL

## 2023-10-02 DEVICE — STENT PANCREAS CATH PUSH 5FRX170CM: Type: IMPLANTABLE DEVICE | Status: FUNCTIONAL

## 2023-10-02 DEVICE — STENT BIL ADVANIX 10FRX5CM PRELOADED: Type: IMPLANTABLE DEVICE | Status: FUNCTIONAL

## 2023-10-02 DEVICE — HYDRATOME 44: Type: IMPLANTABLE DEVICE | Status: FUNCTIONAL

## 2023-10-02 RX ORDER — SODIUM CHLORIDE 9 MG/ML
1000 INJECTION, SOLUTION INTRAVENOUS
Refills: 0 | Status: DISCONTINUED | OUTPATIENT
Start: 2023-10-02 | End: 2023-10-02

## 2023-10-02 RX ORDER — MAGNESIUM SULFATE 500 MG/ML
2 VIAL (ML) INJECTION ONCE
Refills: 0 | Status: COMPLETED | OUTPATIENT
Start: 2023-10-02 | End: 2023-10-02

## 2023-10-02 RX ORDER — ALBUMIN HUMAN 25 %
250 VIAL (ML) INTRAVENOUS ONCE
Refills: 0 | Status: COMPLETED | OUTPATIENT
Start: 2023-10-02 | End: 2023-10-02

## 2023-10-02 RX ADMIN — PANTOPRAZOLE SODIUM 40 MILLIGRAM(S): 20 TABLET, DELAYED RELEASE ORAL at 13:35

## 2023-10-02 RX ADMIN — PIPERACILLIN AND TAZOBACTAM 25 GRAM(S): 4; .5 INJECTION, POWDER, LYOPHILIZED, FOR SOLUTION INTRAVENOUS at 00:16

## 2023-10-02 RX ADMIN — Medication 25 GRAM(S): at 06:22

## 2023-10-02 RX ADMIN — PIPERACILLIN AND TAZOBACTAM 25 GRAM(S): 4; .5 INJECTION, POWDER, LYOPHILIZED, FOR SOLUTION INTRAVENOUS at 17:09

## 2023-10-02 RX ADMIN — PIPERACILLIN AND TAZOBACTAM 25 GRAM(S): 4; .5 INJECTION, POWDER, LYOPHILIZED, FOR SOLUTION INTRAVENOUS at 07:59

## 2023-10-02 RX ADMIN — Medication 40 MICROGRAM(S): at 22:15

## 2023-10-02 RX ADMIN — Medication 125 MILLILITER(S): at 14:34

## 2023-10-02 NOTE — PROVIDER CONTACT NOTE (MEDICATION) - REASON
69 Nadya Lin is calling to get clarification on glimpride direction on how to take it. Please call [phone] to advise.
Magnesium Repletion Held and Zosyn send to Endoscopy

## 2023-10-02 NOTE — PROGRESS NOTE ADULT - SUBJECTIVE AND OBJECTIVE BOX
ON: immediate postop ERCP with biliary and pancreatic stent by GI on 10/2/23    SUBJECTIVE: Patient seen this morning during rounds, requiring NC 6 L after ERCP. No events overnight     MEDICATIONS  (STANDING):  dextrose 5%. 1000 milliLiter(s) (50 mL/Hr) IV Continuous <Continuous>  dextrose 50% Injectable 25 Gram(s) IV Push once  glucagon  Injectable 1 milliGRAM(s) IntraMuscular once  insulin lispro (ADMELOG) corrective regimen sliding scale   SubCutaneous every 6 hours  lactated ringers. 1000 milliLiter(s) (50 mL/Hr) IV Continuous <Continuous>  levothyroxine Injectable 40 MICROGram(s) IV Push at bedtime  pantoprazole  Injectable 40 milliGRAM(s) IV Push daily  piperacillin/tazobactam IVPB.. 3.375 Gram(s) IV Intermittent every 8 hours    MEDICATIONS  (PRN):  dextrose Oral Gel 15 Gram(s) Oral once PRN Blood Glucose LESS THAN 70 milliGRAM(s)/deciliter  ondansetron Injectable 4 milliGRAM(s) IV Push every 6 hours PRN Nausea      Drips:     ICU Vital Signs Last 24 Hrs  T(C): 36.4 (02 Oct 2023 07:01), Max: 36.4 (01 Oct 2023 16:01)  T(F): 97.6 (02 Oct 2023 05:02), Max: 97.6 (01 Oct 2023 19:19)  HR: 55 (02 Oct 2023 11:30) (51 - 81)  BP: 160/68 (02 Oct 2023 11:30) (110/53 - 160/68)  BP(mean): 98 (02 Oct 2023 11:30) (76 - 119)  ABP: --  ABP(mean): --  RR: 9 (02 Oct 2023 11:30) (9 - 25)  SpO2: 99% (02 Oct 2023 11:30) (89% - 99%)    O2 Parameters below as of 02 Oct 2023 11:30  Patient On (Oxygen Delivery Method): face tent  O2 Flow (L/min): 10  O2 Concentration (%): 35        Physical Exam:  General: NAD. sleeping   HEENT: NC/AT, EOMI, PERRLA  Pulmonary: Nonlabored breathing, no respiratory distress, coarse crackles L > R  Cardiovascular: NSR, systolic murmur  Abdominal: soft, ND, tender in epigastrium and RUQ  Extremities: WWP, no clubbing/cyanosis/edema  Neuro: A/O x1, CNs II-XII grossly intact, normal motor/sensation, no focal deficits  Pulses: palpable distal pulses    Lines/tubes/drains: peripheral iv right arm not working, peripheral IV in left arm duy Larkin: in place	      I&O's Summary    01 Oct 2023 07:01  -  02 Oct 2023 07:00  --------------------------------------------------------  IN: 1285 mL / OUT: 414 mL / NET: 871 mL    02 Oct 2023 07:01  -  02 Oct 2023 12:21  --------------------------------------------------------  IN: 350 mL / OUT: 67 mL / NET: 283 mL        LABS:                        10.4   22.48 )-----------( 160      ( 02 Oct 2023 05:38 )             31.1     10-02    137  |  102  |  18  ----------------------------<  96  4.2   |  22  |  0.64    Ca    9.1      02 Oct 2023 05:19  Phos  3.8     10-02  Mg     1.7     10-02    TPro  6.6  /  Alb  3.1<L>  /  TBili  3.2<H>  /  DBili  x   /  AST  215<H>  /  ALT  187<H>  /  AlkPhos  494<H>  10-02    PT/INR - ( 02 Oct 2023 02:38 )   PT: 16.1 sec;   INR: 1.43          PTT - ( 02 Oct 2023 06:04 )  PTT:32.9 sec  Urinalysis Basic - ( 02 Oct 2023 05:19 )    Color: x / Appearance: x / SG: x / pH: x  Gluc: 96 mg/dL / Ketone: x  / Bili: x / Urobili: x   Blood: x / Protein: x / Nitrite: x   Leuk Esterase: x / RBC: x / WBC x   Sq Epi: x / Non Sq Epi: x / Bacteria: x      CAPILLARY BLOOD GLUCOSE      POCT Blood Glucose.: 92 mg/dL (02 Oct 2023 11:29)  POCT Blood Glucose.: 93 mg/dL (02 Oct 2023 05:46)  POCT Blood Glucose.: 150 mg/dL (01 Oct 2023 23:54)  POCT Blood Glucose.: 79 mg/dL (01 Oct 2023 23:13)  POCT Blood Glucose.: 94 mg/dL (01 Oct 2023 22:08)    LIVER FUNCTIONS - ( 02 Oct 2023 05:19 )  Alb: 3.1 g/dL / Pro: 6.6 g/dL / ALK PHOS: 494 U/L / ALT: 187 U/L / AST: 215 U/L / GGT: x             Cultures:Culture Results:   No growth at 12 hours (10-01 @ 13:27)  Culture Results:   No growth at 12 hours (10-01 @ 13:27)       ON: immediate postop ERCP with biliary and pancreatic stent by GI on 10/2/23    SUBJECTIVE: Patient seen this morning during rounds, requiring NC 6 L after ERCP. No events overnight     MEDICATIONS  (STANDING):  dextrose 5%. 1000 milliLiter(s) (50 mL/Hr) IV Continuous <Continuous>  dextrose 50% Injectable 25 Gram(s) IV Push once  glucagon  Injectable 1 milliGRAM(s) IntraMuscular once  insulin lispro (ADMELOG) corrective regimen sliding scale   SubCutaneous every 6 hours  lactated ringers. 1000 milliLiter(s) (50 mL/Hr) IV Continuous <Continuous>  levothyroxine Injectable 40 MICROGram(s) IV Push at bedtime  pantoprazole  Injectable 40 milliGRAM(s) IV Push daily  piperacillin/tazobactam IVPB.. 3.375 Gram(s) IV Intermittent every 8 hours    MEDICATIONS  (PRN):  dextrose Oral Gel 15 Gram(s) Oral once PRN Blood Glucose LESS THAN 70 milliGRAM(s)/deciliter  ondansetron Injectable 4 milliGRAM(s) IV Push every 6 hours PRN Nausea      Drips:     ICU Vital Signs Last 24 Hrs  T(C): 36.4 (02 Oct 2023 07:01), Max: 36.4 (01 Oct 2023 16:01)  T(F): 97.6 (02 Oct 2023 05:02), Max: 97.6 (01 Oct 2023 19:19)  HR: 55 (02 Oct 2023 11:30) (51 - 81)  BP: 160/68 (02 Oct 2023 11:30) (110/53 - 160/68)  BP(mean): 98 (02 Oct 2023 11:30) (76 - 119)  ABP: --  ABP(mean): --  RR: 9 (02 Oct 2023 11:30) (9 - 25)  SpO2: 99% (02 Oct 2023 11:30) (89% - 99%)    O2 Parameters below as of 02 Oct 2023 11:30  Patient On (Oxygen Delivery Method): face tent  O2 Flow (L/min): 10  O2 Concentration (%): 35        Physical Exam:  General: NAD. sleeping   HEENT: NC/AT, EOMI, PERRLA  Pulmonary: Nonlabored breathing, no respiratory distress, coarse crackles L > R  Cardiovascular: NSR, systolic murmur  Abdominal: soft, ND, tender in epigastrium and RUQ  Extremities: WWP, no clubbing/cyanosis/edema  Neuro: A/O x1, CNs II-XII grossly intact, normal motor/sensation, no focal deficits  Pulses: palpable distal pulses  Skin: no rash    Lines/tubes/drains: peripheral iv right arm not working, peripheral IV in left arm duy Larkin: in place	      I&O's Summary    01 Oct 2023 07:01  -  02 Oct 2023 07:00  --------------------------------------------------------  IN: 1285 mL / OUT: 414 mL / NET: 871 mL    02 Oct 2023 07:01  -  02 Oct 2023 12:21  --------------------------------------------------------  IN: 350 mL / OUT: 67 mL / NET: 283 mL        LABS:                        10.4   22.48 )-----------( 160      ( 02 Oct 2023 05:38 )             31.1     10-02    137  |  102  |  18  ----------------------------<  96  4.2   |  22  |  0.64    Ca    9.1      02 Oct 2023 05:19  Phos  3.8     10-02  Mg     1.7     10-02    TPro  6.6  /  Alb  3.1<L>  /  TBili  3.2<H>  /  DBili  x   /  AST  215<H>  /  ALT  187<H>  /  AlkPhos  494<H>  10-02    PT/INR - ( 02 Oct 2023 02:38 )   PT: 16.1 sec;   INR: 1.43          PTT - ( 02 Oct 2023 06:04 )  PTT:32.9 sec  Urinalysis Basic - ( 02 Oct 2023 05:19 )    Color: x / Appearance: x / SG: x / pH: x  Gluc: 96 mg/dL / Ketone: x  / Bili: x / Urobili: x   Blood: x / Protein: x / Nitrite: x   Leuk Esterase: x / RBC: x / WBC x   Sq Epi: x / Non Sq Epi: x / Bacteria: x      CAPILLARY BLOOD GLUCOSE      POCT Blood Glucose.: 92 mg/dL (02 Oct 2023 11:29)  POCT Blood Glucose.: 93 mg/dL (02 Oct 2023 05:46)  POCT Blood Glucose.: 150 mg/dL (01 Oct 2023 23:54)  POCT Blood Glucose.: 79 mg/dL (01 Oct 2023 23:13)  POCT Blood Glucose.: 94 mg/dL (01 Oct 2023 22:08)    LIVER FUNCTIONS - ( 02 Oct 2023 05:19 )  Alb: 3.1 g/dL / Pro: 6.6 g/dL / ALK PHOS: 494 U/L / ALT: 187 U/L / AST: 215 U/L / GGT: x             Cultures:Culture Results:   No growth at 12 hours (10-01 @ 13:27)  Culture Results:   No growth at 12 hours (10-01 @ 13:27)

## 2023-10-02 NOTE — PATIENT PROFILE ADULT - HAVE YOU BEEN EATING POORLY BECAUSE OF A DECREASED APPETITE?
Yes (1)

## 2023-10-02 NOTE — PRE-ANESTHESIA EVALUATION ADULT - NSANTHOSAYNRD_GEN_A_CORE
No. ATA screening performed.  STOP BANG Legend: 0-2 = LOW Risk; 3-4 = INTERMEDIATE Risk; 5-8 = HIGH Risk

## 2023-10-02 NOTE — PROGRESS NOTE ADULT - ASSESSMENT
89F MVR (bio) 2016 warfarin, HTN, HLD, a fib, SSS s/p PPM, MYKE closure, HFpEF p/w abdominal pain. Found to have cholecystitis and ascending cholangitis, gallstone pancreatitis. Pre op for ERCP / perc silvana.  Patient appears ill/toxic    #Pre op: ERCP, Percutaneous cholecystectomy   Urgent intervention without which there is high risk of clinical deterioration  No sign congestive heart failure, critical valvular disease or significant arrhthymias.   No cardiac contraindication to proceed with this urgent procedure.  Unable to assess METs  low risk procedure for intermediate -high risk patient    For warfarin reversal --is s/p 10mg IV Vitamin. Expect 6-12 hours to see full effect.   Warfarin reintroduction may be prolonged post procedure. Can readdress this post op.  No need for bridging with heparin especially given the surgical MYKE occlusion.     #Atrial Fibrillation  [id number]: 4  AC: Warfarin - held. Has MYKE occlusion device.   Rate control: PO metop on hold as NPO. Given sepsis hold metoprolol IV - can reconsider if patient is tachycardiac.      #MVR (bioprosthetic)  No need for heparin bridging as it is not a mechanical valve       Recommendations above are preliminary pending discussion with an attending cardiologist  We'll continue to follow, thank you for the consultation      Danilo Damon ([id number])  Cardiovascular Disease Fellow                 89F MVR (bio) 2016 warfarin, HTN, HLD, a fib, SSS s/p PPM, MYKE closure (incomplete with sludge), HFpEF p/w abdominal pain. Found to have cholecystitis and ascending cholangitis, gallstone pancreatitis. Pre op for ERCP / perc silvana.  Patient appears ill/toxic    #Pre op: ERCP, Percutaneous cholecystectomy   Urgent intervention without which there is high risk of clinical deterioration  No sign congestive heart failure, critical valvular disease or significant arrhthymias.   No cardiac contraindication to proceed with this urgent procedure.  Unable to assess METs  low risk procedure for intermediate -high risk patient    For warfarin reversal --is s/p 10mg IV Vitamin. Expect 6-12 hours to see full effect.   Warfarin reintroduction may be prolonged post procedure. Can readdress this post op.  No need for bridging given bioprosthetic valve and lower <5 CHADSVASC score     #Atrial Fibrillation  [id number]: 4. S/p incomplete closure of MYKE.   AC: Warfarin - held. Restart when appropriate per primary (home dose 3mg everyday, except Tuesday/Friday 1.5mg)  Rate control: PO metop on hold as NPO. Given sepsis hold metoprolol IV - can reconsider if patient is tachycardiac.      #MVR (bioprosthetic)  No need for heparin bridging as it is not a mechanical valve           Plan was discussed with attending cardiologist  We'll continue to follow, thank you for the consultation      Danilo Damon ([email or web address])  Cardiovascular Disease Fellow                         89F MVR (bio) 2016 warfarin, HTN, HLD, a fib, SSS s/p PPM, MYKE closure (incomplete with sludge), HFpEF p/w abdominal pain. Found to have cholecystitis and ascending cholangitis, gallstone pancreatitis. Pre op for ERCP / perc silvana.  Patient appears ill/toxic    #Pre op: ERCP, Percutaneous cholecystectomy   Urgent intervention without which there is high risk of clinical deterioration  No sign congestive heart failure, critical valvular disease or significant arrhthymias.   No cardiac contraindication to proceed with this urgent procedure.  Unable to assess METs  low risk procedure for intermediate -high risk patient    For warfarin reversal --is s/p 10mg IV Vitamin. Expect 6-12 hours to see full effect.   Warfarin reintroduction may be prolonged post procedure. Can readdress this post op.  No need for bridging given bioprosthetic valve and lower <5 CHADSVASC score     #Atrial Fibrillation  [id number]: 4. S/p incomplete closure of MYKE.   AC: Warfarin - held. Restart when appropriate per primary (home dose 3mg everyday, except Tuesday/Friday 1.5mg)  Rate control: PO metop on hold as NPO. Given sepsis hold metoprolol IV - can reconsider if patient is tachycardiac.      #MVR (bioprosthetic)  No need for heparin bridging as it is not a mechanical valve   - Start ASA 81mg QD           Plan was discussed with attending cardiologist  We'll continue to follow, thank you for the consultation      Danilo Damon ([id number])  Cardiovascular Disease Fellow                         89F MVR (bio) 2016 warfarin, HTN, HLD, a fib, SSS s/p PPM, MYKE closure (incomplete with sludge), HFpEF p/w abdominal pain. Found to have cholecystitis and ascending cholangitis, gallstone pancreatitis. Pre op for ERCP / perc silvana.  Patient appears ill/toxic    #Pre op: ERCP, Percutaneous cholecystectomy   Urgent intervention without which there is high risk of clinical deterioration  No sign congestive heart failure, critical valvular disease or significant arrhthymias.   No cardiac contraindication to proceed with this urgent procedure.  Unable to assess METs  low risk procedure for intermediate -high risk patient    For warfarin reversal --is s/p 10mg IV Vitamin. Expect 6-12 hours to see full effect.   Warfarin reintroduction may be prolonged post procedure. Can readdress this post op.  No need for bridging given bioprosthetic valve and lower <5 CHADSVASC score     #Atrial Fibrillation  [id number]: 5 (age, HTN, HFpEF). S/p incomplete closure of MYKE.   AC: Warfarin - held. Restart when appropriate per primary (home dose 3mg everyday, except Tuesday/Friday 1.5mg)  Rate control: PO metop on hold as NPO. Given sepsis hold metoprolol IV - can reconsider if patient is tachycardiac.      #MVR (bioprosthetic)  No need for heparin bridging as it is not a mechanical valve   - Start ASA 81mg QD           Plan was discussed with attending cardiologist  We'll continue to follow, thank you for the consultation      Danilo Damon ([id number])  Cardiovascular Disease Fellow

## 2023-10-02 NOTE — CONSULT NOTE ADULT - ATTENDING COMMENTS
89 F w MVR, HTN, AF (on warfarin), PPM for tachy-mamie syndrome, hypothyroidism, cholelithiasis, EV/COVID 19 infection presents with abdominal pain and confusion found to have choledocholithiasis with ascending cholangitis and pancreatitis. The patient has supratherapeutic INR. Physical exam as above.   1. Choledocho/cholelithiasis with obstruction  2. Ascending cholangitis   3. Pancreatitis  4. Coagulopathy  5. Sepsis  6. Moderate AS  7. Afib  - Zosyn  - vit K/FFP  - ERCP   - metoprolol iv
Pt seen and d/w fellow.  S/p ERCP today with small sphincterotomy, biliary and pancreatic duct stent placement.  Continue antibiotics.  Clear liquid diet.  Will continue to follow.
Patient is a 89 F with Bioprosthetic MV for Rheumatic MS, Afib with SSS, s/p PPM in 2016, on  warfarin, HTN, HLD, HFpEF p/w abdominal pain Found to have cholecystitis and ascending cholangitis, as well as gallstone pancreatitis.  Cardiology consulted for Preoperative CV risk assessment and Optimization for ERCP / perc silvana.    Review of Studies  - TTE 10/2/23: . Normal left ventricular size and systolic function. Normal right ventricular size and systolic function. Dilated left atrium. Mild aortic regurgitation. Moderate aortic stenosis. The peak transvalvular velocity is 2.80 m/s, the mean transvalvular gradient is 15.00 mmHg, and the LVOT/AV   velocity ratio is 0.53. The aortic valve area (estimated via the continuity method) is 1.07 cm². Bioprosthetic valve is seen in the mitral position. The mean transvalvular gradient is 6.00 mmHg at a heart rate of 58 bpm. There is trace mitral regurgitation. Mild-to-moderate tricuspid regurgitation.   Pulmonary hypertension present, pulmonary artery systolic pressure is 83 mmHg. No pericardial effusion.    #Pre operative CV optimization and clearance: ERCP, Percutaneous cholecystectomy   - Patient presented acutely ill with Lactic acidosis of 5 in setting of acute cholecystitis and ascending cholangitis, as well as gallstone pancreatitis.   - There were no active Cv contraindication to proceeding with emergent intervention (ERCP/sphincterotomy with PD and biliary stenting) as clinically she is not in decompensated HF, AS, without critical valvular heart disease  - Surgical and GI team request Coumadin reversal given high bleeding risk. Patient s/p 10mg IV Vitamin. Expect 6-12 hours to see full effect.     #Atrial Fibrillation s/p Cryoablation, MYKE closure  - Patient with [id number] of 5 placing her at higher thromboembolic risk. She has a history of MYKE occlusion, however MYKE noted to be partially closed with evidence of sludge on last EMERALD from 2019  - S/P IV Vitamine K for coumadin reversal with INR today of 1.43. Would aim to resume coumadin soon as safe from surgical standpoint.   - On Toprol 50 mg daily at home as well as Digoxin 125 mcg  - Currently rate controlled with intermittent pacing  - Please send digoxin level with am labs. Can cont to hold as patient NPO  - If HR persistently in the 's can initiate Lopressor 2.5 mg IV q/6 with holding parameters    #HFpEF  - Cont to hold Toprol, Lasix and Spironolactone for now.    # Moderate AS  - Patient with Moderate aortic stenosis on Echo with peak transvalvular velocity of 2.80 m/s, mean transvalvular gradient is 15.00 mmHg, with SUSY of 1.07 cm² via continuity method  - Please be judicious with fluid resuscitation  - Surveillance Echo if symptomatic otherwise in 1-2 years      Cardiology will continue to follow with you please call With any questions

## 2023-10-02 NOTE — PRE-ANESTHESIA EVALUATION ADULT - NSANTHVITALSIGNSFT_GEN_ALL_CORE
Oxygen sat noted to be 90-88% on 2L NC, poor tracing, lungs are clear and patient is in no respiratory distress.

## 2023-10-02 NOTE — PROGRESS NOTE ADULT - SUBJECTIVE AND OBJECTIVE BOX
LALO PINEDA  89y  Female    Patient is a 89y old  Female who presents with a chief complaint of     INTERVAL HPI/OVERNIGHT EVENTS:      T(C): 36.4 (10-02-23 @ 07:01), Max: 37.1 (10-01-23 @ 11:03)  HR: 56 (10-02-23 @ 07:01) (53 - 94)  BP: 147/65 (10-02-23 @ 07:01) (110/53 - 160/67)  RR: 19 (10-02-23 @ 07:01) (14 - 25)  SpO2: 99% (10-02-23 @ 07:01) (91% - 99%)  Wt(kg): --Vital Signs Last 24 Hrs  T(C): 36.4 (02 Oct 2023 07:01), Max: 37.1 (01 Oct 2023 11:03)  T(F): 97.6 (02 Oct 2023 05:02), Max: 98.7 (01 Oct 2023 11:03)  HR: 56 (02 Oct 2023 07:01) (53 - 94)  BP: 147/65 (02 Oct 2023 07:01) (110/53 - 160/67)  BP(mean): 94 (02 Oct 2023 07:01) (76 - 119)  RR: 19 (02 Oct 2023 07:01) (14 - 25)  SpO2: 99% (02 Oct 2023 07:01) (91% - 99%)    Parameters below as of 02 Oct 2023 07:01      O2 Concentration (%): 2    PHYSICAL EXAM:  GENERAL: NAD, well-groomed, well-developed  HEAD:  Atraumatic, Normocephalic  EYES: EOMI, PERRLA, conjunctiva and sclera clear  ENMT: No tonsillar erythema, exudates, or enlargement; Moist mucous membranes, Good dentition, No lesions  NECK: Supple, No JVD, Normal thyroid  NERVOUS SYSTEM:  Alert & Oriented X3, Good concentration; Motor Strength 5/5 B/L upper and lower extremities; DTRs 2+ intact and symmetric  CHEST/LUNG: Clear to auscultation bilaterally; No rales, rhonchi, wheezing, or rubs  HEART: Regular rate and rhythm; No murmurs, rubs, or gallops  ABDOMEN: Soft, Nontender, Nondistended; Bowel sounds present  EXTREMITIES:  WWP, No clubbing, cyanosis, or edema  Vascular: 2+ peripheral pulses x4  LYMPH: No lymphadenopathy noted  SKIN: No rashes or lesions    Consultant(s) Notes Reviewed:  [x ] YES  [ ] NO  Care Discussed with Consultants/Other Providers [ x] YES  [ ] NO    LABS:                        10.4   22.48 )-----------( 160      ( 02 Oct 2023 05:38 )             31.1     10-02    137  |  102  |  18  ----------------------------<  96  4.2   |  22  |  0.64    Ca    9.1      02 Oct 2023 05:19  Phos  3.8     10-02  Mg     1.7     10-02    TPro  6.6  /  Alb  3.1<L>  /  TBili  3.2<H>  /  DBili  x   /  AST  215<H>  /  ALT  187<H>  /  AlkPhos  494<H>  10-02      PT/INR - ( 02 Oct 2023 02:38 )   PT: 16.1 sec;   INR: 1.43          PTT - ( 02 Oct 2023 06:04 )  PTT:32.9 sec  Urinalysis Basic - ( 02 Oct 2023 05:19 )    Color: x / Appearance: x / SG: x / pH: x  Gluc: 96 mg/dL / Ketone: x  / Bili: x / Urobili: x   Blood: x / Protein: x / Nitrite: x   Leuk Esterase: x / RBC: x / WBC x   Sq Epi: x / Non Sq Epi: x / Bacteria: x      CAPILLARY BLOOD GLUCOSE      POCT Blood Glucose.: 93 mg/dL (02 Oct 2023 05:46)  POCT Blood Glucose.: 150 mg/dL (01 Oct 2023 23:54)  POCT Blood Glucose.: 79 mg/dL (01 Oct 2023 23:13)  POCT Blood Glucose.: 94 mg/dL (01 Oct 2023 22:08)        Urinalysis Basic - ( 02 Oct 2023 05:19 )    Color: x / Appearance: x / SG: x / pH: x  Gluc: 96 mg/dL / Ketone: x  / Bili: x / Urobili: x   Blood: x / Protein: x / Nitrite: x   Leuk Esterase: x / RBC: x / WBC x   Sq Epi: x / Non Sq Epi: x / Bacteria: x        RADIOLOGY & ADDITIONAL TESTS:    Imaging Personally Reviewed:  [ ] YES  [ ] NO    HEALTH ISSUES - PROBLEM Dx:       LALOCARMEN MALDONADOE  89y  Female    Patient is a 89y old  Female who presents with a chief complaint of     INTERVAL HPI/OVERNIGHT EVENTS: Pt sleeping when examined at bedside. Information obtained from family       T(C): 36.4 (10-02-23 @ 07:01), Max: 37.1 (10-01-23 @ 11:03)  HR: 56 (10-02-23 @ 07:01) (53 - 94)  BP: 147/65 (10-02-23 @ 07:01) (110/53 - 160/67)  RR: 19 (10-02-23 @ 07:01) (14 - 25)  SpO2: 99% (10-02-23 @ 07:01) (91% - 99%)  Wt(kg): --Vital Signs Last 24 Hrs  T(C): 36.4 (02 Oct 2023 07:01), Max: 37.1 (01 Oct 2023 11:03)  T(F): 97.6 (02 Oct 2023 05:02), Max: 98.7 (01 Oct 2023 11:03)  HR: 56 (02 Oct 2023 07:01) (53 - 94)  BP: 147/65 (02 Oct 2023 07:01) (110/53 - 160/67)  BP(mean): 94 (02 Oct 2023 07:01) (76 - 119)  RR: 19 (02 Oct 2023 07:01) (14 - 25)  SpO2: 99% (02 Oct 2023 07:01) (91% - 99%)    Parameters below as of 02 Oct 2023 07:01      O2 Concentration (%): 2    PHYSICAL EXAM:  NECK: No JVD  CHEST/LUNG: Clear to auscultation bilaterally; No rales, rhonchi, wheezing, or rubs  HEART: Regular rate and rhythm; No murmurs, rubs, or gallops  EXTREMITIES:  WWP, No clubbing, cyanosis, or edema      Consultant(s) Notes Reviewed:  [x ] YES  [ ] NO  Care Discussed with Consultants/Other Providers [ x] YES  [ ] NO    LABS:                        10.4   22.48 )-----------( 160      ( 02 Oct 2023 05:38 )             31.1     10-02    137  |  102  |  18  ----------------------------<  96  4.2   |  22  |  0.64    Ca    9.1      02 Oct 2023 05:19  Phos  3.8     10-02  Mg     1.7     10-02    TPro  6.6  /  Alb  3.1<L>  /  TBili  3.2<H>  /  DBili  x   /  AST  215<H>  /  ALT  187<H>  /  AlkPhos  494<H>  10-02      PT/INR - ( 02 Oct 2023 02:38 )   PT: 16.1 sec;   INR: 1.43          PTT - ( 02 Oct 2023 06:04 )  PTT:32.9 sec  Urinalysis Basic - ( 02 Oct 2023 05:19 )    Color: x / Appearance: x / SG: x / pH: x  Gluc: 96 mg/dL / Ketone: x  / Bili: x / Urobili: x   Blood: x / Protein: x / Nitrite: x   Leuk Esterase: x / RBC: x / WBC x   Sq Epi: x / Non Sq Epi: x / Bacteria: x      CAPILLARY BLOOD GLUCOSE      POCT Blood Glucose.: 93 mg/dL (02 Oct 2023 05:46)  POCT Blood Glucose.: 150 mg/dL (01 Oct 2023 23:54)  POCT Blood Glucose.: 79 mg/dL (01 Oct 2023 23:13)  POCT Blood Glucose.: 94 mg/dL (01 Oct 2023 22:08)        Urinalysis Basic - ( 02 Oct 2023 05:19 )    Color: x / Appearance: x / SG: x / pH: x  Gluc: 96 mg/dL / Ketone: x  / Bili: x / Urobili: x   Blood: x / Protein: x / Nitrite: x   Leuk Esterase: x / RBC: x / WBC x   Sq Epi: x / Non Sq Epi: x / Bacteria: x        RADIOLOGY & ADDITIONAL TESTS:    Imaging Personally Reviewed:  [ ] YES  [ ] NO    HEALTH ISSUES - PROBLEM Dx:

## 2023-10-02 NOTE — PROVIDER CONTACT NOTE (MEDICATION) - ACTION/TREATMENT ORDERED:
As per Kellee, MD resume Mg when pt returns from procedure. Zosyn sent to endoscopy with RN to be administered  during procedure

## 2023-10-02 NOTE — PRE-ANESTHESIA EVALUATION ADULT - NSANTHPEFT_GEN_ALL_CORE
Alert, pleasant, cachectic but in no distress, oriented to self only  CN grossly intact  Extremities warm without edema, notable for ecchymosis at the IV site  Lungs clear to auscultation  RRR

## 2023-10-02 NOTE — PROGRESS NOTE ADULT - SUBJECTIVE AND OBJECTIVE BOX
Pt seen and examined at bedside in the SICU.   89F with bioprosthetic MVR, Afib on Warfarin presenting to the [facility] with mixed ascending cholangitis and gallstone pancreatitis.   Persistent pain overnight.  Hemodynamics remain normal  Abd is softly distended, tender in epigastrium and RUQ  Leukocytosis with slight improvement,  lactic acidosis and hyperbilirubinemia. INR effectively reversed.  Plan for biliary decompression this morning with ERCP

## 2023-10-02 NOTE — PROGRESS NOTE ADULT - ASSESSMENT
89F PMH MVR (2016 bioMVR), hypothyroidism, HTN, HLD, AF (Warfarin, last taken 9/30), PPM for tachy-mamie syndrome, AOx1 admitted to SICU for cholangitis and possible pancreatitis, now S/P ERCP with CBD and pancreatic stent placement 10/2, stable, tender in epigastrium.     Neuro: tylenol for pain PRN, zofran prn for nausea, depression: hold zoloft  CV: MAP goal > 65, Hx HLD: hold lipitor, Hx of afib: cont metop, hold warfarin; CHF: hold lasix, spironolactone, digoxin; echo (05/16) EF 65-70%, moderate AS, pulmonary HTN. Echo ordered   Pulm: saturating well on RA, IS, chest x-ray   GI: NPO, PPI, S/P ERCP cbd and pancreatic stent 10/2 - Follow GI recs.   : baez for strict Is/Os  ID: Zosyn (10/1-)  Endo: ISS, Hx of hypothyroid: cont synthroid  PPx: SCD,   Lines: PIV   Wounds: none  Heme: hold Warfarin   PT/OT:  not ordered  Dispo: SICU

## 2023-10-02 NOTE — PATIENT PROFILE ADULT - FALL HARM RISK - HARM RISK INTERVENTIONS
Assistance with ambulation/Assistance OOB with selected safe patient handling equipment/Communicate Risk of Fall with Harm to all staff/Discuss with provider need for PT consult/Monitor gait and stability/Provide patient with walking aids - walker, cane, crutches/Reinforce activity limits and safety measures with patient and family/Sit up slowly, dangle for a short time, stand at bedside before walking/Tailored Fall Risk Interventions/Use of alarms - bed, chair and/or voice tab/Visual Cue: Yellow wristband and red socks/Bed in lowest position, wheels locked, appropriate side rails in place/Call bell, personal items and telephone in reach/Instruct patient to call for assistance before getting out of bed or chair/Non-slip footwear when patient is out of bed/[place] to call system/Physically safe environment - no spills, clutter or unnecessary equipment/Purposeful Proactive Rounding/Room/bathroom lighting operational, light cord in reach

## 2023-10-02 NOTE — CONSULT NOTE ADULT - ASSESSMENT
89F PMH MVR, hypothyroidism, HTN, HLD, AF (Warfarin, last taken yesterday), PPM for tachy-mamie syndrome, AOx1 recently admitted to SICU in May 2023 for URI and gallbladder/CBD "sludge ball" s/p course of zosyn but not a surgical candidate, GI deferred ERCP and then admitted in August 2023 for SOB and fever, enterorhinovirus+, COVID+ w/ b/l opacities on CXR, at that time patient also had elevated LFTs that started to resolve so patient did not undergo IR drainage and was dc'ed on ursodiol and plan for outpatient perc silvana, now presenting with lethargy and increasing abdominal pain, found to have elevated liver enzymes, Lipase >3000, CT revealing severe IH/EH biliary dilatation 2/2 distal CBD stone. Advanced endoscopy consulted for cholangitis in the setting of gallstone pancreatitis/choledocolithiasis and for consideration of ERCP.     #Gallstone pancreatitis  #Choledocholithiasis  #Acute cholangitis   #Abnormal liver enzymes  - Keep NPO with plan for ERCP in endoscopy suite today  - Continue to trend CMP and INR   - c/w abxs  - IVF as tolerated     89F PMH MVR, hypothyroidism, HTN, HLD, AF (Warfarin, 9/30/23), PPM for tachy-mamie syndrome, AOx1 recently admitted to SICU in May 2023 for URI and gallbladder/CBD "sludge ball" s/p course of zosyn but not a surgical candidate, GI deferred ERCP and then admitted in August 2023 for SOB and fever, entero-rhinovirus+, COVID+ w/ b/l opacities on CXR, at that time patient also had elevated LFTs that started to resolve so patient did not undergo IR drainage and was dc'ed on ursodiol and plan for outpatient perc silvana, now presenting with lethargy and increasing abdominal pain, found to have elevated liver enzymes, Lipase >3000, CT revealing severe IH/EH biliary dilatation 2/2 distal CBD stone. Advanced endoscopy consulted for cholangitis in the setting of gallstone pancreatitis/choledocolithiasis and for consideration of ERCP.     CT A/P (10/1/23): Since 5/15/2023, increase in now severe intrahepatic and extrahepatic biliary ductal dilatation secondary to a stone in the distal CBD near the ampulla. Heterogeneous appearance of the pancreatic head and peripancreatic edema compatible with interstitial edematous pancreatitis. Small amount of ascites.    ERCP (10/3/23): s/p sphincterotomy with PD and biliary stent placement. S/p Indomethacin    #Gallstone pancreatitis  #Choledocholithiasis  #Acute cholangitis   #Abnormal liver enzymes  - Continue to trend CMP and INR   - c/w abxs  - Okay for CLD today  - IVF as tolerated, frequent lung checks  - Hold AC for least 24 hrs post procedure, will f/u H/H in AM to re-evaluate timing of resuming AC  - F/u outpatient in 2 months with Dr Barahona to discuss timing of stent removal     Case discussed with advanced attending and primary team.     Winter Michelle DO  Gastroenterology Fellow  Pager: [phone]

## 2023-10-02 NOTE — PRE-ANESTHESIA EVALUATION ADULT - NSANTHADDINFOFT_GEN_ALL_CORE
Spoke with the patient's son and the consent obtained regarding the anesthesia and DNI/DNR order to be suspended della-procedure.

## 2023-10-02 NOTE — PRE-ANESTHESIA EVALUATION ADULT - NSANTHPMHFT_GEN_ALL_CORE
89yo F with HTN, HL, MV replacement, pulmnary HTN, A-fib on Coumadin and digoxin, hypothyroidism, multiple admission for bile duct stones, admitted with lethargy, elevated LFT and lactate admitted to SICU and scheduled for ERCP 89yo F with advanced dementia, HTN, HL, MV replacement, pulmnary HTN, A-fib on Coumadin and digoxin, hypothyroidism, multiple admission for bile duct stones, admitted with lethargy, elevated LFT and lactate admitted to SICU and scheduled for ERCP 89yo F with advanced dementia, HTN, HL, MV replacement (2013), pulmonary HTN, A-fib on Coumadin and digoxin, PPM for tachybrady (2019), hypothyroidism, multiple admission for bile duct stones, admitted with lethargy, elevated LFT and lactate admitted to SICU and scheduled for ERCP

## 2023-10-02 NOTE — CONSULT NOTE ADULT - SUBJECTIVE AND OBJECTIVE BOX
HPI:  89F PMH MVR, hypothyroidism, HTN, HLD, AF (Warfarin, last taken yesterday), PPM for tachy-mamie syndrome, AOx1 recently admitted to SICU in May 2023 for URI and gallbladder/CBD "sludge ball" s/p course of zosyn but not a surgical candidate, GI deferred ERCP and then admitted in August 2023 for SOB and fever, enterorhinovirus+, COVID+ w/ b/l opacities on CXR, at that time patient also had elevated LFTs that started to resolve so patient did not undergo IR drainage and was dc'ed on ursodiol and plan for outpatient perc silvana. Patient now returns with lethargy and increasing abdominal pain. No nausea/vomiting. Tolerating diet, last BM this morning.    Last colonoscopy/EGD - unknown per family member and caregiver.  Denies family hx of IBS, Crohn's, UC, or colon cancer.    ED: afebrile, VSS. WBC 27.95, Hgb 14.5, Cr 0.84, TBili  3.0,  AST  433, ALT  316,  AlkPhos  760, Lipase >3000. Lactate 3.5 --> 4.1 (despite IVF). CT AP w/ increase in now severe intrahepatic and extrahepatic biliary ductal dilatation secondary to a stone in the distal CBD near the ampulla.Heterogeneous appearance of the pancreatic head and peripancreatic edema compatible with interstitial edematous pancreatitis. Small amount of ascites. US - Intrahepatic biliary ductal dilatation. CBD15 mm.  Gallbladder: The gallbladder is dilated. Rounded echogenic focus identified within the gallbladder lumen measuring up to 1.9 cm, previously visualized, tumefactive sludge versus gallstone. Gallbladder wall thickening identified measuring 7-8 mm. Pericholecystic fluid identified.   (01 Oct 2023 19:34)    Last dose of Warfarin on Saturday, 9/30. Since admission, administered Vitamin K with reversal of INR.      Advanced endoscopy consulted for cholangitis in the setting of gallstone pancreatitis/choledocolithiasis and for consideration of ERCP.     Allergies  penicillin (Hives)  chlorhexidine topical (Rash)  fish (Other)    Intolerances      MEDICATIONS:  MEDICATIONS  (STANDING):  dextrose 5%. 1000 milliLiter(s) (50 mL/Hr) IV Continuous <Continuous>  dextrose 50% Injectable 25 Gram(s) IV Push once  glucagon  Injectable 1 milliGRAM(s) IntraMuscular once  insulin lispro (ADMELOG) corrective regimen sliding scale   SubCutaneous every 6 hours  lactated ringers. 1000 milliLiter(s) (100 mL/Hr) IV Continuous <Continuous>  levothyroxine Injectable 40 MICROGram(s) IV Push at bedtime  pantoprazole  Injectable 40 milliGRAM(s) IV Push daily  piperacillin/tazobactam IVPB.. 3.375 Gram(s) IV Intermittent every 8 hours    MEDICATIONS  (PRN):  dextrose Oral Gel 15 Gram(s) Oral once PRN Blood Glucose LESS THAN 70 milliGRAM(s)/deciliter  ondansetron Injectable 4 milliGRAM(s) IV Push every 6 hours PRN Nausea    PAST MEDICAL & SURGICAL HISTORY:  HTN (hypertension)      High cholesterol      Hypothyroid      FH: mitral valve repair        FAMILY HISTORY:    SOCIAL HISTORY:  [place]: [ ] Current, [ ] Former, [ ] Never; Pack Years:  Alcohol:  Illicit Drugs:    REVIEW OF SYSTEMS:  Constitutional: No fever, chills, weight loss, or fatigue  ENMT:  No visual changes; No difficulty hearing, tinnitus, vertigo; No sinus or throat pain  Neck: No pain or stiffness  Respiratory: No cough, wheezing, or hemoptysis; No shortness of breath  Cardiovascular: No chest pain, palpitations, dizziness, orthopnea, PND, or leg swelling  Gastrointestinal: No abdominal or epigastric pain. No  nausea, vomiting, or hematemesis. No diarrhea, constipation, or steatorrhea. No melena or hematochezia  Genitourinary: No dysuria, urinary frequency/hesitancy, or hematuria  Skin: No itching, burning, rashes or lesions   Musculoskeletal: No joint pain or swelling; No muscle, back or extremity pain    Vital Signs Last 24 Hrs  T(C): 36.4 (02 Oct 2023 07:01), Max: 37.1 (01 Oct 2023 11:03)  T(F): 97.6 (02 Oct 2023 05:02), Max: 98.7 (01 Oct 2023 11:03)  HR: 56 (02 Oct 2023 07:01) (53 - 94)  BP: 147/65 (02 Oct 2023 07:01) (110/53 - 160/67)  BP(mean): 94 (02 Oct 2023 07:01) (76 - 119)  RR: 19 (02 Oct 2023 07:01) (14 - 25)  SpO2: 99% (02 Oct 2023 07:01) (91% - 99%)    Parameters below as of 02 Oct 2023 07:01      O2 Concentration (%): 2    10-01 @ 07:01  -  10-02 @ 07:00  --------------------------------------------------------  IN: 1285 mL / OUT: 414 mL / NET: 871 mL    10-02 @ 07:01  -  10-02 @ 08:07  --------------------------------------------------------  IN: 150 mL / OUT: 0 mL / NET: 150 mL        PHYSICAL EXAM:    General: elderly,  female; in no acute distress; lying in bed  HEENT: MMM, conjunctiva and sclera clear  Gastrointestinal: Soft, non-tender non-distended; Normal bowel sounds; No rebound or guarding  Extremities: Normal range of motion, No clubbing, cyanosis or edema  Neurological: Alert and oriented x3  Skin: Warm and dry. No obvious rash    LABS:                        10.4   22.48 )-----------( 160      ( 02 Oct 2023 05:38 )             31.1     10-02    137  |  102  |  18  ----------------------------<  96  4.2   |  22  |  0.64    Ca    9.1      02 Oct 2023 05:19  Phos  3.8     10-02  Mg     1.7     10-02    TPro  6.6  /  Alb  3.1<L>  /  TBili  3.2<H>  /  DBili  x   /  AST  215<H>  /  ALT  187<H>  /  AlkPhos  494<H>  10-02      Culture Results:   No growth at 12 hours (10-01 @ 13:27)  Culture Results:   No growth at 12 hours (10-01 @ 13:27)    RADIOLOGY & ADDITIONAL STUDIES:    HPI:  89F PMH MVR, hypothyroidism, HTN, HLD, AF (Warfarin, last taken yesterday), PPM for tachy-mamie syndrome, AOx1 recently admitted to SICU in May 2023 for URI and gallbladder/CBD "sludge ball" s/p course of zosyn but not a surgical candidate, GI deferred ERCP and then admitted in August 2023 for SOB and fever, enterorhinovirus+, COVID+ w/ b/l opacities on CXR, at that time patient also had elevated LFTs that started to resolve so patient did not undergo IR drainage and was dc'ed on ursodiol and plan for outpatient perc silvana. Patient now returns with lethargy and increasing abdominal pain. No nausea/vomiting. Tolerating diet, last BM this morning.    Last colonoscopy/EGD - unknown per family member and caregiver.  Denies family hx of IBS, Crohn's, UC, or colon cancer.    ED: afebrile, VSS. WBC 27.95, Hgb 14.5, Cr 0.84, TBili  3.0,  AST  433, ALT  316,  AlkPhos  760, Lipase >3000. Lactate 3.5 --> 4.1 (despite IVF). CT AP w/ increase in now severe intrahepatic and extrahepatic biliary ductal dilatation secondary to a stone in the distal CBD near the ampulla. Heterogeneous appearance of the pancreatic head and peripancreatic edema compatible with interstitial edematous pancreatitis. Small amount of ascites. US - Intrahepatic biliary ductal dilatation. CBD15 mm.  Gallbladder: The gallbladder is dilated. Rounded echogenic focus identified within the gallbladder lumen measuring up to 1.9 cm, previously visualized, tumefactive sludge versus gallstone. Gallbladder wall thickening identified measuring 7-8 mm. Pericholecystic fluid identified.   (01 Oct 2023 19:34)    Last dose of Warfarin on Saturday, 9/30. Since admission, administered Vitamin K with reversal of INR.      Advanced endoscopy consulted for cholangitis in the setting of gallstone pancreatitis/choledocolithiasis and for consideration of ERCP.     Allergies  penicillin (Hives)  chlorhexidine topical (Rash)  fish (Other)    Intolerances      MEDICATIONS:  MEDICATIONS  (STANDING):  dextrose 5%. 1000 milliLiter(s) (50 mL/Hr) IV Continuous <Continuous>  dextrose 50% Injectable 25 Gram(s) IV Push once  glucagon  Injectable 1 milliGRAM(s) IntraMuscular once  insulin lispro (ADMELOG) corrective regimen sliding scale   SubCutaneous every 6 hours  lactated ringers. 1000 milliLiter(s) (100 mL/Hr) IV Continuous <Continuous>  levothyroxine Injectable 40 MICROGram(s) IV Push at bedtime  pantoprazole  Injectable 40 milliGRAM(s) IV Push daily  piperacillin/tazobactam IVPB.. 3.375 Gram(s) IV Intermittent every 8 hours    MEDICATIONS  (PRN):  dextrose Oral Gel 15 Gram(s) Oral once PRN Blood Glucose LESS THAN 70 milliGRAM(s)/deciliter  ondansetron Injectable 4 milliGRAM(s) IV Push every 6 hours PRN Nausea    PAST MEDICAL & SURGICAL HISTORY:  HTN (hypertension)      High cholesterol      Hypothyroid      FH: mitral valve repair        FAMILY HISTORY:    SOCIAL HISTORY:  [place]: [ ] Current, [ ] Former, [ ] Never; Pack Years:  Alcohol:  Illicit Drugs:    REVIEW OF SYSTEMS:  Constitutional: No fever, chills, weight loss, or fatigue  ENMT:  No visual changes; No difficulty hearing, tinnitus, vertigo; No sinus or throat pain  Neck: No pain or stiffness  Respiratory: No cough, wheezing, or hemoptysis; No shortness of breath  Cardiovascular: No chest pain, palpitations, dizziness, orthopnea, PND, or leg swelling  Gastrointestinal: No abdominal or epigastric pain. No  nausea, vomiting, or hematemesis. No diarrhea, constipation, or steatorrhea. No melena or hematochezia  Genitourinary: No dysuria, urinary frequency/hesitancy, or hematuria  Skin: No itching, burning, rashes or lesions   Musculoskeletal: No joint pain or swelling; No muscle, back or extremity pain    Vital Signs Last 24 Hrs  T(C): 36.4 (02 Oct 2023 07:01), Max: 37.1 (01 Oct 2023 11:03)  T(F): 97.6 (02 Oct 2023 05:02), Max: 98.7 (01 Oct 2023 11:03)  HR: 56 (02 Oct 2023 07:01) (53 - 94)  BP: 147/65 (02 Oct 2023 07:01) (110/53 - 160/67)  BP(mean): 94 (02 Oct 2023 07:01) (76 - 119)  RR: 19 (02 Oct 2023 07:01) (14 - 25)  SpO2: 99% (02 Oct 2023 07:01) (91% - 99%)    Parameters below as of 02 Oct 2023 07:01      O2 Concentration (%): 2    10-01 @ 07:01  -  10-02 @ 07:00  --------------------------------------------------------  IN: 1285 mL / OUT: 414 mL / NET: 871 mL    10-02 @ 07:01  -  10-02 @ 08:07  --------------------------------------------------------  IN: 150 mL / OUT: 0 mL / NET: 150 mL        PHYSICAL EXAM:    General: elderly,  female; in no acute distress; lying in bed  HEENT: MMM, conjunctiva and sclera clear  Gastrointestinal: Soft, non-tender non-distended; Normal bowel sounds; No rebound or guarding  Extremities: Normal range of motion, No clubbing, cyanosis or edema  Neurological: Alert and oriented x3  Skin: Warm and dry. No obvious rash    LABS:                        10.4   22.48 )-----------( 160      ( 02 Oct 2023 05:38 )             31.1     10-02    137  |  102  |  18  ----------------------------<  96  4.2   |  22  |  0.64    Ca    9.1      02 Oct 2023 05:19  Phos  3.8     10-02  Mg     1.7     10-02    TPro  6.6  /  Alb  3.1<L>  /  TBili  3.2<H>  /  DBili  x   /  AST  215<H>  /  ALT  187<H>  /  AlkPhos  494<H>  10-02      Culture Results:   No growth at 12 hours (10-01 @ 13:27)  Culture Results:   No growth at 12 hours (10-01 @ 13:27)    RADIOLOGY & ADDITIONAL STUDIES:

## 2023-10-03 LAB
ALBUMIN SERPL ELPH-MCNC: 2.6 G/DL — LOW (ref 3.3–5)
ALP SERPL-CCNC: 364 U/L — HIGH (ref 40–120)
ALT FLD-CCNC: 119 U/L — HIGH (ref 10–45)
ANION GAP SERPL CALC-SCNC: 10 MMOL/L — SIGNIFICANT CHANGE UP (ref 5–17)
AST SERPL-CCNC: 114 U/L — HIGH (ref 10–40)
BILIRUB DIRECT SERPL-MCNC: 0.7 MG/DL — HIGH (ref 0–0.3)
BILIRUB INDIRECT FLD-MCNC: 0.7 MG/DL — SIGNIFICANT CHANGE UP (ref 0.2–1)
BILIRUB SERPL-MCNC: 1.4 MG/DL — HIGH (ref 0.2–1.2)
BUN SERPL-MCNC: 24 MG/DL — HIGH (ref 7–23)
CALCIUM SERPL-MCNC: 9.3 MG/DL — SIGNIFICANT CHANGE UP (ref 8.4–10.5)
CHLORIDE SERPL-SCNC: 104 MMOL/L — SIGNIFICANT CHANGE UP (ref 96–108)
CO2 SERPL-SCNC: 25 MMOL/L — SIGNIFICANT CHANGE UP (ref 22–31)
CREAT SERPL-MCNC: 0.83 MG/DL — SIGNIFICANT CHANGE UP (ref 0.5–1.3)
EGFR: 65 ML/MIN/1.73M2 — SIGNIFICANT CHANGE UP
GLUCOSE BLDC GLUCOMTR-MCNC: 113 MG/DL — HIGH (ref 70–99)
GLUCOSE BLDC GLUCOMTR-MCNC: 115 MG/DL — HIGH (ref 70–99)
GLUCOSE BLDC GLUCOMTR-MCNC: 68 MG/DL — LOW (ref 70–99)
GLUCOSE BLDC GLUCOMTR-MCNC: 78 MG/DL — SIGNIFICANT CHANGE UP (ref 70–99)
GLUCOSE SERPL-MCNC: 80 MG/DL — SIGNIFICANT CHANGE UP (ref 70–99)
HCT VFR BLD CALC: 33.6 % — LOW (ref 34.5–45)
HGB BLD-MCNC: 10.6 G/DL — LOW (ref 11.5–15.5)
MAGNESIUM SERPL-MCNC: 2.5 MG/DL — SIGNIFICANT CHANGE UP (ref 1.6–2.6)
MCHC RBC-ENTMCNC: 31.3 PG — SIGNIFICANT CHANGE UP (ref 27–34)
MCHC RBC-ENTMCNC: 31.5 GM/DL — LOW (ref 32–36)
MCV RBC AUTO: 99.1 FL — SIGNIFICANT CHANGE UP (ref 80–100)
NRBC # BLD: 0 /100 WBCS — SIGNIFICANT CHANGE UP (ref 0–0)
PHOSPHATE SERPL-MCNC: 3.3 MG/DL — SIGNIFICANT CHANGE UP (ref 2.5–4.5)
PLATELET # BLD AUTO: 143 K/UL — LOW (ref 150–400)
POTASSIUM SERPL-MCNC: 4.6 MMOL/L — SIGNIFICANT CHANGE UP (ref 3.5–5.3)
POTASSIUM SERPL-SCNC: 4.6 MMOL/L — SIGNIFICANT CHANGE UP (ref 3.5–5.3)
PROT SERPL-MCNC: 6.6 G/DL — SIGNIFICANT CHANGE UP (ref 6–8.3)
RBC # BLD: 3.39 M/UL — LOW (ref 3.8–5.2)
RBC # FLD: 14.9 % — HIGH (ref 10.3–14.5)
SODIUM SERPL-SCNC: 139 MMOL/L — SIGNIFICANT CHANGE UP (ref 135–145)
WBC # BLD: 18.64 K/UL — HIGH (ref 3.8–10.5)
WBC # FLD AUTO: 18.64 K/UL — HIGH (ref 3.8–10.5)

## 2023-10-03 PROCEDURE — 99232 SBSQ HOSP IP/OBS MODERATE 35: CPT

## 2023-10-03 PROCEDURE — 71045 X-RAY EXAM CHEST 1 VIEW: CPT | Mod: 26

## 2023-10-03 PROCEDURE — 99232 SBSQ HOSP IP/OBS MODERATE 35: CPT | Mod: GC

## 2023-10-03 RX ORDER — SODIUM CHLORIDE 9 MG/ML
250 INJECTION, SOLUTION INTRAVENOUS ONCE
Refills: 0 | Status: COMPLETED | OUTPATIENT
Start: 2023-10-03 | End: 2023-10-03

## 2023-10-03 RX ORDER — PANTOPRAZOLE SODIUM 20 MG/1
40 TABLET, DELAYED RELEASE ORAL
Refills: 0 | Status: DISCONTINUED | OUTPATIENT
Start: 2023-10-04 | End: 2023-10-06

## 2023-10-03 RX ORDER — WARFARIN SODIUM 2.5 MG/1
3 TABLET ORAL ONCE
Refills: 0 | Status: DISCONTINUED | OUTPATIENT
Start: 2023-10-04 | End: 2023-10-05

## 2023-10-03 RX ORDER — LEVOTHYROXINE SODIUM 125 MCG
50 TABLET ORAL DAILY
Refills: 0 | Status: DISCONTINUED | OUTPATIENT
Start: 2023-10-03 | End: 2023-10-06

## 2023-10-03 RX ORDER — DEXTROSE 50 % IN WATER 50 %
12.5 SYRINGE (ML) INTRAVENOUS ONCE
Refills: 0 | Status: COMPLETED | OUTPATIENT
Start: 2023-10-03 | End: 2023-10-03

## 2023-10-03 RX ORDER — WARFARIN SODIUM 2.5 MG/1
1.5 TABLET ORAL ONCE
Refills: 0 | Status: COMPLETED | OUTPATIENT
Start: 2023-10-03 | End: 2023-10-03

## 2023-10-03 RX ORDER — ACETAMINOPHEN 500 MG
1000 TABLET ORAL ONCE
Refills: 0 | Status: COMPLETED | OUTPATIENT
Start: 2023-10-03 | End: 2023-10-03

## 2023-10-03 RX ORDER — HEPARIN SODIUM 5000 [USP'U]/ML
5000 INJECTION INTRAVENOUS; SUBCUTANEOUS EVERY 8 HOURS
Refills: 0 | Status: DISCONTINUED | OUTPATIENT
Start: 2023-10-03 | End: 2023-10-03

## 2023-10-03 RX ADMIN — SODIUM CHLORIDE 500 MILLILITER(S): 9 INJECTION, SOLUTION INTRAVENOUS at 15:24

## 2023-10-03 RX ADMIN — PIPERACILLIN AND TAZOBACTAM 25 GRAM(S): 4; .5 INJECTION, POWDER, LYOPHILIZED, FOR SOLUTION INTRAVENOUS at 00:19

## 2023-10-03 RX ADMIN — HEPARIN SODIUM 5000 UNIT(S): 5000 INJECTION INTRAVENOUS; SUBCUTANEOUS at 13:04

## 2023-10-03 RX ADMIN — WARFARIN SODIUM 1.5 MILLIGRAM(S): 2.5 TABLET ORAL at 19:27

## 2023-10-03 RX ADMIN — Medication 400 MILLIGRAM(S): at 14:55

## 2023-10-03 RX ADMIN — Medication 12.5 MILLILITER(S): at 06:03

## 2023-10-03 RX ADMIN — PIPERACILLIN AND TAZOBACTAM 25 GRAM(S): 4; .5 INJECTION, POWDER, LYOPHILIZED, FOR SOLUTION INTRAVENOUS at 15:23

## 2023-10-03 RX ADMIN — Medication 1000 MILLIGRAM(S): at 15:10

## 2023-10-03 RX ADMIN — PANTOPRAZOLE SODIUM 40 MILLIGRAM(S): 20 TABLET, DELAYED RELEASE ORAL at 13:03

## 2023-10-03 RX ADMIN — PIPERACILLIN AND TAZOBACTAM 25 GRAM(S): 4; .5 INJECTION, POWDER, LYOPHILIZED, FOR SOLUTION INTRAVENOUS at 07:13

## 2023-10-03 RX ADMIN — SODIUM CHLORIDE 500 MILLILITER(S): 9 INJECTION, SOLUTION INTRAVENOUS at 12:05

## 2023-10-03 RX ADMIN — PIPERACILLIN AND TAZOBACTAM 25 GRAM(S): 4; .5 INJECTION, POWDER, LYOPHILIZED, FOR SOLUTION INTRAVENOUS at 23:18

## 2023-10-03 NOTE — DIETITIAN INITIAL EVALUATION ADULT - NS FNS DIET ORDER
Diet, Pureed:   Consistent Carbohydrate {No Snacks} (CSTCHO)  DASH/TLC {Sodium & Cholesterol Restricted} (DASH)  Low Fat (LOWFAT)  Moderately Thick Liquids (MODTHICKLIQS) (10-03-23 @ 13:30)

## 2023-10-03 NOTE — DIETITIAN INITIAL EVALUATION ADULT - PERTINENT MEDS FT
MEDICATIONS  (STANDING):  dextrose 5%. 1000 milliLiter(s) (50 mL/Hr) IV Continuous <Continuous>  dextrose 50% Injectable 25 Gram(s) IV Push once  glucagon  Injectable 1 milliGRAM(s) IntraMuscular once  heparin   Injectable 5000 Unit(s) SubCutaneous every 8 hours  insulin lispro (ADMELOG) corrective regimen sliding scale   SubCutaneous every 6 hours  levothyroxine Injectable 40 MICROGram(s) IV Push at bedtime  pantoprazole  Injectable 40 milliGRAM(s) IV Push daily  piperacillin/tazobactam IVPB.. 3.375 Gram(s) IV Intermittent every 8 hours    MEDICATIONS  (PRN):  dextrose Oral Gel 15 Gram(s) Oral once PRN Blood Glucose LESS THAN 70 milliGRAM(s)/deciliter  ondansetron Injectable 4 milliGRAM(s) IV Push every 6 hours PRN Nausea

## 2023-10-03 NOTE — DIETITIAN INITIAL EVALUATION ADULT - OTHER CALCULATIONS
Ht: 5'2", CBW: 59kg/130lb, IBW 50kg/110lb +/-10%, %%. IBW used to calculate estimated needs based on Standards of Care given pt >100% IBW. Adjusted for advanced age, clinical course, pressure injury/wound healing.

## 2023-10-03 NOTE — DIETITIAN INITIAL EVALUATION ADULT - CALCULATED FROM (ML/KG)
Follow with GYN for female health & cancer prevention        ==============================  RECOMMENDATIONS FOR FEMALES  ==============================  Your #1 MEDICINE is DAILY EXERCISE - 15-20 minutes of huffing & puffing EVERY DAY.     Prevent the #1 cause of death- cardiovascular disease (HEART ATTACK & STROKE) by checking for normal blood pressure, cholesterol, sugars, & by not smoking.     VACCINES: Yearly FLU shot,   I recommend  high fiber (5 fresh fruits or vegetables daily), low fat diet and aerobic  exercise (huffing/ puffing/ sweating for 20 min straight at least 4 days a week)    Follow up yearly with mammogram, fasting lipids, CMP, CBC prior.   ==============================================================     [id number]

## 2023-10-03 NOTE — PROGRESS NOTE ADULT - SUBJECTIVE AND OBJECTIVE BOX
GASTROENTEROLOGY PROGRESS NOTE  Patient seen and examined at bedside.    PERTINENT REVIEW OF SYSTEMS:  CONSTITUTIONAL: No weakness, fevers or chills  HEENT: No visual changes; No vertigo or throat pain   GASTROINTESTINAL: As above.  NEUROLOGICAL: No numbness or weakness  SKIN: No itching, burning, rashes, or lesions     Allergies    penicillin (Hives)  chlorhexidine topical (Rash)  fish (Other)    Intolerances      MEDICATIONS:  MEDICATIONS  (STANDING):  dextrose 5%. 1000 milliLiter(s) (50 mL/Hr) IV Continuous <Continuous>  dextrose 50% Injectable 25 Gram(s) IV Push once  glucagon  Injectable 1 milliGRAM(s) IntraMuscular once  heparin   Injectable 5000 Unit(s) SubCutaneous every 8 hours  insulin lispro (ADMELOG) corrective regimen sliding scale   SubCutaneous every 6 hours  levothyroxine 50 MICROGram(s) Oral daily  pantoprazole    Tablet 40 milliGRAM(s) Oral before breakfast  piperacillin/tazobactam IVPB.. 3.375 Gram(s) IV Intermittent every 8 hours    MEDICATIONS  (PRN):  dextrose Oral Gel 15 Gram(s) Oral once PRN Blood Glucose LESS THAN 70 milliGRAM(s)/deciliter  ondansetron Injectable 4 milliGRAM(s) IV Push every 6 hours PRN Nausea    Vital Signs Last 24 Hrs  T(C): 36.8 (03 Oct 2023 17:10), Max: 36.8 (03 Oct 2023 17:10)  T(F): 98.2 (03 Oct 2023 17:10), Max: 98.2 (03 Oct 2023 17:10)  HR: 60 (03 Oct 2023 17:00) (50 - 75)  BP: 122/59 (03 Oct 2023 17:00) (122/59 - 176/75)  BP(mean): 85 (03 Oct 2023 17:00) (84 - 108)  RR: 20 (03 Oct 2023 17:00) (14 - 24)  SpO2: 97% (03 Oct 2023 17:00) (93% - 100%)    Parameters below as of 03 Oct 2023 17:00  Patient On (Oxygen Delivery Method): face tent  O2 Flow (L/min): 10  O2 Concentration (%): 35    10-02 @ 07:01  -  10-03 @ 07:00  --------------------------------------------------------  IN: 1100 mL / OUT: 347 mL / NET: 753 mL    10-03 @ 07:01  -  10-03 @ 17:54  --------------------------------------------------------  IN: 725 mL / OUT: 172 mL / NET: 553 mL      PHYSICAL EXAM:    General: in no acute distress  HEENT: MMM, conjunctiva and sclera clear  Gastrointestinal: Soft non-tender non-distended; No rebound or guarding  Skin: Warm and dry. No obvious rash    LABS:                        10.6   18.64 )-----------( 143      ( 03 Oct 2023 05:43 )             33.6     10-03    139  |  104  |  24<H>  ----------------------------<  80  4.6   |  25  |  0.83    Ca    9.3      03 Oct 2023 05:43  Phos  3.3     10-03  Mg     2.5     10-03    TPro  6.6  /  Alb  2.6<L>  /  TBili  1.4<H>  /  DBili  0.7<H>  /  AST  114<H>  /  ALT  119<H>  /  AlkPhos  364<H>  10-03    PT/INR - ( 02 Oct 2023 02:38 )   PT: 16.1 sec;   INR: 1.43          PTT - ( 02 Oct 2023 06:04 )  PTT:32.9 sec      Urinalysis Basic - ( 03 Oct 2023 05:43 )    Color: x / Appearance: x / SG: x / pH: x  Gluc: 80 mg/dL / Ketone: x  / Bili: x / Urobili: x   Blood: x / Protein: x / Nitrite: x   Leuk Esterase: x / RBC: x / WBC x   Sq Epi: x / Non Sq Epi: x / Bacteria: x                Culture - Blood (collected 01 Oct 2023 13:27)  Source: .Blood Blood-Peripheral  Preliminary Report (03 Oct 2023 16:00):    No growth at 2 days.    Culture - Blood (collected 01 Oct 2023 13:27)  Source: .Blood Blood-Peripheral  Preliminary Report (03 Oct 2023 16:00):    No growth at 2 days.      RADIOLOGY & ADDITIONAL STUDIES:  Reviewed GASTROENTEROLOGY PROGRESS NOTE  Patient seen and examined at bedside.  -s/p ERCP (10/2/23): s/p small sphincterotomy with PD and biliary stent placement   -Hgb stable   -LTs downtrending    ROS: limited 2/2 dementia. With some epigastric tenderness on exam. Denies any other complaints.     PERTINENT REVIEW OF SYSTEMS:  CONSTITUTIONAL: No weakness, fevers or chills  HEENT: No visual changes; No vertigo or throat pain   GASTROINTESTINAL: As above.  NEUROLOGICAL: No numbness or weakness  SKIN: No itching, burning, rashes, or lesions     Allergies    penicillin (Hives)  chlorhexidine topical (Rash)  fish (Other)    Intolerances      MEDICATIONS:  MEDICATIONS  (STANDING):  dextrose 5%. 1000 milliLiter(s) (50 mL/Hr) IV Continuous <Continuous>  dextrose 50% Injectable 25 Gram(s) IV Push once  glucagon  Injectable 1 milliGRAM(s) IntraMuscular once  heparin   Injectable 5000 Unit(s) SubCutaneous every 8 hours  insulin lispro (ADMELOG) corrective regimen sliding scale   SubCutaneous every 6 hours  levothyroxine 50 MICROGram(s) Oral daily  pantoprazole    Tablet 40 milliGRAM(s) Oral before breakfast  piperacillin/tazobactam IVPB.. 3.375 Gram(s) IV Intermittent every 8 hours    MEDICATIONS  (PRN):  dextrose Oral Gel 15 Gram(s) Oral once PRN Blood Glucose LESS THAN 70 milliGRAM(s)/deciliter  ondansetron Injectable 4 milliGRAM(s) IV Push every 6 hours PRN Nausea    Vital Signs Last 24 Hrs  T(C): 36.8 (03 Oct 2023 17:10), Max: 36.8 (03 Oct 2023 17:10)  T(F): 98.2 (03 Oct 2023 17:10), Max: 98.2 (03 Oct 2023 17:10)  HR: 60 (03 Oct 2023 17:00) (50 - 75)  BP: 122/59 (03 Oct 2023 17:00) (122/59 - 176/75)  BP(mean): 85 (03 Oct 2023 17:00) (84 - 108)  RR: 20 (03 Oct 2023 17:00) (14 - 24)  SpO2: 97% (03 Oct 2023 17:00) (93% - 100%)    Parameters below as of 03 Oct 2023 17:00  Patient On (Oxygen Delivery Method): face tent  O2 Flow (L/min): 10  O2 Concentration (%): 35    10-02 @ 07:01  -  10-03 @ 07:00  --------------------------------------------------------  IN: 1100 mL / OUT: 347 mL / NET: 753 mL    10-03 @ 07:01  -  10-03 @ 17:54  --------------------------------------------------------  IN: 725 mL / OUT: 172 mL / NET: 553 mL      PHYSICAL EXAM:    General: in no acute distress  HEENT: MMM, conjunctiva and sclera clear  Gastrointestinal: Soft; epigastric tenderness; non-distended; some guarding  Skin: Warm and dry. No obvious rash    LABS:                        10.6   18.64 )-----------( 143      ( 03 Oct 2023 05:43 )             33.6     10-03    139  |  104  |  24<H>  ----------------------------<  80  4.6   |  25  |  0.83    Ca    9.3      03 Oct 2023 05:43  Phos  3.3     10-03  Mg     2.5     10-03    TPro  6.6  /  Alb  2.6<L>  /  TBili  1.4<H>  /  DBili  0.7<H>  /  AST  114<H>  /  ALT  119<H>  /  AlkPhos  364<H>  10-03    PT/INR - ( 02 Oct 2023 02:38 )   PT: 16.1 sec;   INR: 1.43          PTT - ( 02 Oct 2023 06:04 )  PTT:32.9 sec      Urinalysis Basic - ( 03 Oct 2023 05:43 )    Color: x / Appearance: x / SG: x / pH: x  Gluc: 80 mg/dL / Ketone: x  / Bili: x / Urobili: x   Blood: x / Protein: x / Nitrite: x   Leuk Esterase: x / RBC: x / WBC x   Sq Epi: x / Non Sq Epi: x / Bacteria: x                Culture - Blood (collected 01 Oct 2023 13:27)  Source: .Blood Blood-Peripheral  Preliminary Report (03 Oct 2023 16:00):    No growth at 2 days.    Culture - Blood (collected 01 Oct 2023 13:27)  Source: .Blood Blood-Peripheral  Preliminary Report (03 Oct 2023 16:00):    No growth at 2 days.      RADIOLOGY & ADDITIONAL STUDIES:  Reviewed

## 2023-10-03 NOTE — DIETITIAN INITIAL EVALUATION ADULT - OTHER INFO
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 Now presenting with acute cholangitis and pancreatitis. Discussed GOC - would like to proceed with intervention. 10/2: ERCP, sphincterotomy, PD/CBD stent.    Pt seen by RDN on 8EA for length of stay nutrition assessment with caregiver, Lesvia, at bedside. Pt known to writer from prior admission. On assessment, pt resting in bed on 4L NC. Lesvia confirmed pt with allergy to Swordfish. No cultural, Sikh, or other food preferences. At time of assessment, pt NPO and pending SLP evaluation. Lesvia reported pt with difficulty chewing/swallowing and noted the pt was on a Pureed diet with Moderately Thickened Liquids PTA. Pt now ordered for Pureed, CSTCHO, DASH/TLC, Low Fat diet with Moderately Thick Liquids. PTA, Lesvia reported pt with good PO intake. Lesvia prepared the pt’s meals and assists with feeding. Lesvia stated she makes pureed vegetables and meat, or egg, fish and vegetables and provides Ensure 1x/day blended with avocado and banana. Lesvia endorses the pt’s UBW ~100lb x1mo ago. ?Accuracy of admission/dosing wt 59kg/130lb as pt’s prior admission 9/1/23 of 40.87kg/90lb reflective of significant wt change x1mo +40lb/+4.4%. Will continue to monitor wt trends in-house as able. Unable to complete NFPE d/t pt’s condition, however, writer able to appreciate severe muscle wasting in temporal and clavicular regions. Per ASPEN guidelines, pt does not currently meet criteria for malnutrition. Education deferred at time of assessment. Please see nutrition recommendations below. Will continue to follow per RD protocol.    GI: No reported of N/V/D/C. No abd distention noted.  Skin: Ecchymosis. Stage I PI, sacrum. No edema noted at this time. Tomas 13.  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 Now presenting with acute cholangitis and pancreatitis. Discussed GOC - would like to proceed with intervention. 10/2: ERCP, sphincterotomy, PD/CBD stent.    Pt seen by RDN on 8EA for length of stay nutrition assessment with caregiver, Lesvia, at bedside. Pt known to writer from prior admission. On assessment, pt resting in bed on 4L NC. Lesvia confirmed pt with allergy to Swordfish. No cultural, Adventist, or other food preferences. At time of assessment, pt NPO and pending SLP evaluation. Lesvia reported pt with difficulty chewing/swallowing at baseline and stated the pt was on a Pureed diet with Moderately Thickened Liquids PTA. Pt now ordered for Pureed, CSTCHO, DASH/TLC, Low Fat diet with Moderately Thick Liquids. PTA, Lesvia reported pt with good PO intake. Lesvia stated she prepares pureed vegetables and meat, or egg, fish and vegetables and provides Ensure 1x/day blended with avocado and banana. Lesvia endorses the pt’s UBW ~100lb x1mo ago. ?Accuracy of admission/dosing wt 59kg/130lb as pt’s prior admission 9/1/23 of 40.87kg/90lb reflective of significant wt change x1mo +40lb/+4.4%. Will continue to monitor wt trends in-house as able. Unable to complete NFPE d/t pt’s condition, however, able to appreciate severe muscle wasting in temples and clavicle. Per ASPEN guidelines, pt does not currently meet criteria for malnutrition. Education deferred at time of assessment. Please see nutrition recommendations below. Will continue to follow per RD protocol.    GI: No reported of N/V/D/C. No abd distention noted.  Skin: Ecchymosis. Stage I PI, sacrum. No edema noted at this time. Tomas 13.  Pain: Nonverbal indicators absent during assessment

## 2023-10-03 NOTE — PROGRESS NOTE ADULT - SUBJECTIVE AND OBJECTIVE BOX
Pt seen and examined at bedside in the SICU.   89F with bioprosthetic MVR, Afib on Warfarin with mixed ascending cholangitis and gallstone pancreatitis now s/p ERCP with sphincterotomy and biliary stent, with improved abdominal pain.  Return of appetite  Denies fevers, chills, or nausea  Hemodynamics remain normal  Abd is softly distended, tender predominantly in the RUQ without peritoneal findings.  Leukocytosis improving and bilirubin almost down to normal limits.  Continue Abx for 7-10 days unless BCx from ED have delayed growth.  AC 24 hours after ERCP  Advance diet as tolerated

## 2023-10-03 NOTE — DIETITIAN INITIAL EVALUATION ADULT - ADD RECOMMEND
1. Recommend to continue Low Fat diet with Puree texture and Moderately Thickened Liquids per team   >> Consider liberalizing CSTCHO, DASH/TLC restriction in attempts to promote PO intake (liberalizing diet will allow for more menu options).   2. Considering adding Ensure Enlive BID (700 kcal, 40g protein)   3. Monitor PO intake  >> Consistently meet >75% of estimated needs during admission   4. Monitor labs (electrolytes, CMP), wt trends (weekly), GI function, and skin integrity   5. Encourage pt to meet nutritional needs and honor food preferences as able   6. RD to remain available for additional nutrition interventions and diet edu as needed   **Voicemail left for team 1. Recommend to continue Low Fat diet with Puree texture and Moderately Thickened Liquids per team   >> Consider liberalizing CSTCHO, DASH/TLC restriction in attempts to promote PO intake (liberalizing diet will allow for more menu options).   2. Considering adding Ensure Enlive BID (700 kcal, 40g protein)   3. Monitor PO intake  >> Consistently meet >75% of estimated needs during admission   4. Monitor labs (electrolytes, CMP), wt trends (weekly), GI function, and skin integrity   5. Encourage pt to meet nutritional needs and honor food preferences as able   6. Align nutrition recommendations with GOC at all times   7.  RD to remain available for additional nutrition interventions and diet edu as needed   **Voicemail left for team 1. Recommend to continue Low Fat diet with Puree texture and Moderately Thickened Liquids per team   >> Consider liberalizing CSTCHO, DASH/TLC restriction in attempts to promote PO intake (liberalizing diet will allow for more menu options).   2. Considering adding Ensure Max Protein BID (300 kcal, 60g protein)   3. Monitor PO intake  >> Consistently meet >75% of estimated needs during admission   4. Monitor labs (electrolytes, CMP), wt trends (weekly), GI function, and skin integrity   5. Encourage pt to meet nutritional needs and honor food preferences as able   6. Align nutrition interventions with GOC at all times   7.  RD to remain available for additional nutrition interventions and diet edu as needed   **Voicemail left for team

## 2023-10-03 NOTE — PROGRESS NOTE ADULT - ASSESSMENT
89F MVR (bio) 2016 warfarin, HTN, HLD, a fib, SSS s/p PPM, MYKE closure (incomplete with sludge), HFpEF p/w abdominal pain. Found to have cholecystitis and ascending cholangitis, gallstone pancreatitis. Pre op for ERCP / perc silvana.  Patient appears ill/toxic    #Pre op: ERCP, Percutaneous cholecystectomy   Urgent intervention without which there is high risk of clinical deterioration  No sign congestive heart failure, critical valvular disease or significant arrhthymias.   No cardiac contraindication to proceed with this urgent procedure.  Unable to assess METs  low risk procedure for intermediate -high risk patient      #Atrial Fibrillation  [id number]: 5 (age, HTN, HFpEF). S/p incomplete closure of MYKE.   AC: Warfarin - held. Restart when appropriate per primary (home dose 3mg everyday, except Tuesday/Friday 1.5mg). It is important to restart as soon as possible as MYKE occlusion was incomplete, pt still caries high risk of stroke.   Rate control: PO metop on hold as NPO. Given sepsis hold metoprolol IV - can reconsider if patient is tachycardiac.    - Obtain INR    #MVR (bioprosthetic)  No need for heparin bridging as it is not a mechanical valve   - Start ASA 81mg QD           Plan was discussed with attending cardiologist  We'll continue to follow, thank you for the consultation      Danilo Damon ([id number])  Cardiovascular Disease Fellow

## 2023-10-03 NOTE — DIETITIAN INITIAL EVALUATION ADULT - PERTINENT LABORATORY DATA
10-03    139  |  104  |  24<H>  ----------------------------<  80  4.6   |  25  |  0.83    Ca    9.3      03 Oct 2023 05:43  Phos  3.3     10-03  Mg     2.5     10-03    TPro  6.6  /  Alb  2.6<L>  /  TBili  1.4<H>  /  DBili  0.7<H>  /  AST  114<H>  /  ALT  119<H>  /  AlkPhos  364<H>  10-03  POCT Blood Glucose.: 78 mg/dL (10-03-23 @ 11:44)  A1C with Estimated Average Glucose Result: 5.5 % (10-02-23 @ 05:38)  A1C with Estimated Average Glucose Result: 5.4 % (05-16-23 @ 05:30)

## 2023-10-03 NOTE — PROGRESS NOTE ADULT - ASSESSMENT
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 Persistent JEN, FeUrea 39.5%, received 250 bolus x2 during daytime, urine output 20 cc/h, bed side POCUS showing left base consolidation and effusion, chest Xray showing worsening pulmonary congestion      Neuro: tylenol for pain PRN, zofran prn for nausea, depression: hold zoloft  CV: MAP goal > 65, Hx HLD: hold lipitor, Hx of afib: hold metopCHF: hold lasix, spironolactone, digoxin; echo (10/02) EF 65%, moderate AS, pulmonary HTN.   Pulm: saturating well on RA, IS, chest x-ray   GI: Advanced to pure low fat diet, PO PPI, S/P ERCP cbd and pancreatic stent 10/2 - Follow GI recs.   : baez for strict Is/Os  ID: Zosyn (10/1-)  Endo: ISS, Hx of hypothyroid: cont synthroid PO  PPx: SCD,   Lines: PIV   Wounds: none  Heme: started warfarin 10/3 As per cardiology home dose 3mg everyday, except Tuesday/Friday 1.5mg)   PT/OT:  not ordered  Dispo: SICU

## 2023-10-03 NOTE — PROGRESS NOTE ADULT - SUBJECTIVE AND OBJECTIVE BOX
ON: POD 1 RCP with biliary and pancreatic stent by GI on 10/2/23      SUBJECTIVE: Patient seen this morning during rounds, AOx1, persistent low urine output overnight.     MEDICATIONS  (STANDING):  dextrose 5%. 1000 milliLiter(s) (50 mL/Hr) IV Continuous <Continuous>  dextrose 50% Injectable 25 Gram(s) IV Push once  glucagon  Injectable 1 milliGRAM(s) IntraMuscular once  heparin   Injectable 5000 Unit(s) SubCutaneous every 8 hours  insulin lispro (ADMELOG) corrective regimen sliding scale   SubCutaneous every 6 hours  levothyroxine 50 MICROGram(s) Oral daily  pantoprazole    Tablet 40 milliGRAM(s) Oral before breakfast  piperacillin/tazobactam IVPB.. 3.375 Gram(s) IV Intermittent every 8 hours    MEDICATIONS  (PRN):  dextrose Oral Gel 15 Gram(s) Oral once PRN Blood Glucose LESS THAN 70 milliGRAM(s)/deciliter  ondansetron Injectable 4 milliGRAM(s) IV Push every 6 hours PRN Nausea      Drips:     ICU Vital Signs Last 24 Hrs  T(C): 36.8 (03 Oct 2023 17:10), Max: 36.8 (03 Oct 2023 17:10)  T(F): 98.2 (03 Oct 2023 17:10), Max: 98.2 (03 Oct 2023 17:10)  HR: 65 (03 Oct 2023 18:00) (50 - 75)  BP: 137/63 (03 Oct 2023 18:00) (122/59 - 176/75)  BP(mean): 91 (03 Oct 2023 18:00) (84 - 108)  ABP: --  ABP(mean): --  RR: 18 (03 Oct 2023 18:00) (14 - 24)  SpO2: 92% (03 Oct 2023 18:00) (92% - 100%)    O2 Parameters below as of 03 Oct 2023 18:00  Patient On (Oxygen Delivery Method): face tent  O2 Flow (L/min): 10  O2 Concentration (%): 35        Physical Exam:  General: NAD. arousable   HEENT: NC/AT, EOMI, PERRLA  Pulmonary: Nonlabored breathing, no respiratory distress, coarse crackles L > R, decreased breath sounds on left > right   Cardiovascular: NSR, systolic murmur  Abdominal: soft, ND, tender in epigastrium and RUQ  Extremities: WWP, no clubbing/cyanosis/edema, multiple ecchymoses at venipuncture site   Neuro: A/O x1, CNs II-XII grossly intact, normal motor/sensation, no focal deficits  Pulses: palpable distal pulses      Lines/tubes/drains: peripheral IV in left arm working   Chaya: in     I&O's Summary    02 Oct 2023 07:01  -  03 Oct 2023 07:00  --------------------------------------------------------  IN: 1100 mL / OUT: 347 mL / NET: 753 mL    03 Oct 2023 07:01  -  03 Oct 2023 18:41  --------------------------------------------------------  IN: 725 mL / OUT: 172 mL / NET: 553 mL        LABS:                        10.6   18.64 )-----------( 143      ( 03 Oct 2023 05:43 )             33.6     10-03    139  |  104  |  24<H>  ----------------------------<  80  4.6   |  25  |  0.83    Ca    9.3      03 Oct 2023 05:43  Phos  3.3     10-03  Mg     2.5     10-03    TPro  6.6  /  Alb  2.6<L>  /  TBili  1.4<H>  /  DBili  0.7<H>  /  AST  114<H>  /  ALT  119<H>  /  AlkPhos  364<H>  10-03    PT/INR - ( 02 Oct 2023 02:38 )   PT: 16.1 sec;   INR: 1.43          PTT - ( 02 Oct 2023 06:04 )  PTT:32.9 sec  Urinalysis Basic - ( 03 Oct 2023 05:43 )    Color: x / Appearance: x / SG: x / pH: x  Gluc: 80 mg/dL / Ketone: x  / Bili: x / Urobili: x   Blood: x / Protein: x / Nitrite: x   Leuk Esterase: x / RBC: x / WBC x   Sq Epi: x / Non Sq Epi: x / Bacteria: x      CAPILLARY BLOOD GLUCOSE      POCT Blood Glucose.: 113 mg/dL (03 Oct 2023 17:09)  POCT Blood Glucose.: 78 mg/dL (03 Oct 2023 11:44)  POCT Blood Glucose.: 115 mg/dL (03 Oct 2023 06:25)  POCT Blood Glucose.: 68 mg/dL (03 Oct 2023 05:56)  POCT Blood Glucose.: 86 mg/dL (02 Oct 2023 23:29)    LIVER FUNCTIONS - ( 03 Oct 2023 05:43 )  Alb: 2.6 g/dL / Pro: 6.6 g/dL / ALK PHOS: 364 U/L / ALT: 119 U/L / AST: 114 U/L / GGT: x             Cultures 10/1/23: No growth     RADIOLOGY & ADDITIONAL STUDIES:  Xray chest 10/3/23: Increased airspace opacity left lower lobe since the previous study. No other significant interval change.  Small bilateral pleural effusions. prominent pulmonary vascular markings upper lobes, cannot exclude pulmonary vascular hypertension versus early CHF.

## 2023-10-03 NOTE — PROGRESS NOTE ADULT - ASSESSMENT
89F PMH MVR, hypothyroidism, HTN, HLD, AF (Warfarin, 9/30/23), PPM for tachy-mamie syndrome, AOx1 recently admitted to SICU in May 2023 for URI and gallbladder/CBD "sludge ball" s/p course of zosyn but not a surgical candidate, GI deferred ERCP and then admitted in August 2023 for SOB and fever, entero-rhinovirus+, COVID+ w/ b/l opacities on CXR, at that time patient also had elevated LFTs that started to resolve so patient did not undergo IR drainage and was dc'ed on ursodiol and plan for outpatient perc silvana, now presenting with lethargy and increasing abdominal pain, found to have elevated liver enzymes, Lipase >3000, CT revealing severe IH/EH biliary dilatation 2/2 distal CBD stone. Advanced endoscopy consulted for cholangitis in the setting of gallstone pancreatitis/choledocolithiasis and for consideration of ERCP.     CT A/P (10/1/23): Since 5/15/2023, increase in now severe intrahepatic and extrahepatic biliary ductal dilatation secondary to a stone in the distal CBD near the ampulla. Heterogeneous appearance of the pancreatic head and peripancreatic edema compatible with interstitial edematous pancreatitis. Small amount of ascites.    ERCP (10/3/23): s/p sphincterotomy with PD and biliary stent placement. S/p Indomethacin    #Gallstone pancreatitis  #Choledocholithiasis  #Acute cholangitis   #Abnormal liver enzymes  - Continue to trend CMP and INR   - c/w abxs  - Okay for CLD today  - IVF as tolerated, frequent lung checks  - Hold AC for least 24 hrs post procedure, will f/u H/H in AM to re-evaluate timing of resuming AC  - F/u outpatient in 2 months with Dr Barahona to discuss timing of stent removal     Case discussed with advanced attending and primary team.     Winter Michelle DO  Gastroenterology Fellow  Pager: [phone]     89F PMH MVR, hypothyroidism, HTN, HLD, AF (Warfarin, 9/30/23), PPM for tachy-mamie syndrome, AOx1 recently admitted to SICU in May 2023 for URI and gallbladder/CBD "sludge ball" s/p course of zosyn but not a surgical candidate, GI deferred ERCP and then admitted in August 2023 for SOB and fever, entero-rhinovirus+, COVID+ w/ b/l opacities on CXR, at that time patient also had elevated LFTs that started to resolve so patient did not undergo IR drainage and was dc'ed on ursodiol and plan for outpatient perc silvana, now presenting with lethargy and increasing abdominal pain, found to have elevated liver enzymes, Lipase >3000, CT revealing severe IH/EH biliary dilatation 2/2 distal CBD stone. Advanced endoscopy consulted for cholangitis in the setting of gallstone pancreatitis/choledocolithiasis and for consideration of ERCP.     CT A/P (10/1/23): Since 5/15/2023, increase in now severe intrahepatic and extrahepatic biliary ductal dilatation secondary to a stone in the distal CBD near the ampulla. Heterogeneous appearance of the pancreatic head and peripancreatic edema compatible with interstitial edematous pancreatitis. Small amount of ascites.    ERCP (10/3/23): s/p sphincterotomy with PD and biliary stent placement. S/p Indomethacin    #Gallstone pancreatitis  #Choledocholithiasis  #Acute cholangitis   #Abnormal liver enzymes  - Continue to trend CMP and INR   - c/w abxs  - IVF as tolerated, frequent lung checks  - Hgb stable on AM labs with improving LTs/bilirubin, no contraindication to resuming home warfarin. Continue to closely monitor H/H  - F/u outpatient in 2 months with Dr Barahona to discuss timing of stent removal     Case discussed with advanced attending and primary team.     Winter Michelle DO  Gastroenterology Fellow  Pager: [phone]

## 2023-10-03 NOTE — PROGRESS NOTE ADULT - SUBJECTIVE AND OBJECTIVE BOX
PINEDA MAY  89y  Female    Patient is a 89y old  Female who presents with a chief complaint of     INTERVAL HPI/OVERNIGHT EVENTS:      T(C): 35.9 (10-03-23 @ 09:11), Max: 36.4 (10-02-23 @ 14:23)  HR: 62 (10-03-23 @ 12:10) (50 - 63)  BP: 158/69 (10-03-23 @ 12:10) (114/53 - 176/75)  RR: 20 (10-03-23 @ 12:10) (14 - 22)  SpO2: 100% (10-03-23 @ 12:10) (93% - 100%)  Wt(kg): --Vital Signs Last 24 Hrs  T(C): 35.9 (03 Oct 2023 09:11), Max: 36.4 (02 Oct 2023 14:23)  T(F): 96.6 (03 Oct 2023 09:11), Max: 97.5 (02 Oct 2023 14:23)  HR: 62 (03 Oct 2023 12:10) (50 - 63)  BP: 158/69 (03 Oct 2023 12:10) (114/53 - 176/75)  BP(mean): 99 (03 Oct 2023 12:10) (76 - 108)  RR: 20 (03 Oct 2023 12:10) (14 - 22)  SpO2: 100% (03 Oct 2023 12:10) (93% - 100%)    Parameters below as of 03 Oct 2023 12:10  Patient On (Oxygen Delivery Method): face tent  O2 Flow (L/min): 10  O2 Concentration (%): 35    PHYSICAL EXAM:  GENERAL: NAD, well-groomed, well-developed  HEAD:  Atraumatic, Normocephalic  EYES: EOMI, PERRLA, conjunctiva and sclera clear  ENMT: No tonsillar erythema, exudates, or enlargement; Moist mucous membranes, Good dentition, No lesions  NECK: Supple, No JVD, Normal thyroid  NERVOUS SYSTEM:  Alert & Oriented X3, Good concentration; Motor Strength 5/5 B/L upper and lower extremities; DTRs 2+ intact and symmetric  CHEST/LUNG: Clear to auscultation bilaterally; No rales, rhonchi, wheezing, or rubs  HEART: Regular rate and rhythm; No murmurs, rubs, or gallops  ABDOMEN: Soft, Nontender, Nondistended; Bowel sounds present  EXTREMITIES:  WWP, No clubbing, cyanosis, or edema  Vascular: 2+ peripheral pulses x4  LYMPH: No lymphadenopathy noted  SKIN: No rashes or lesions    Consultant(s) Notes Reviewed:  [x ] YES  [ ] NO  Care Discussed with Consultants/Other Providers [ x] YES  [ ] NO    LABS:                        10.6   18.64 )-----------( 143      ( 03 Oct 2023 05:43 )             33.6     10-03    139  |  104  |  24<H>  ----------------------------<  80  4.6   |  25  |  0.83    Ca    9.3      03 Oct 2023 05:43  Phos  3.3     10-03  Mg     2.5     10-03    TPro  6.6  /  Alb  2.6<L>  /  TBili  1.4<H>  /  DBili  0.7<H>  /  AST  114<H>  /  ALT  119<H>  /  AlkPhos  364<H>  10-03      PT/INR - ( 02 Oct 2023 02:38 )   PT: 16.1 sec;   INR: 1.43          PTT - ( 02 Oct 2023 06:04 )  PTT:32.9 sec  Urinalysis Basic - ( 03 Oct 2023 05:43 )    Color: x / Appearance: x / SG: x / pH: x  Gluc: 80 mg/dL / Ketone: x  / Bili: x / Urobili: x   Blood: x / Protein: x / Nitrite: x   Leuk Esterase: x / RBC: x / WBC x   Sq Epi: x / Non Sq Epi: x / Bacteria: x      CAPILLARY BLOOD GLUCOSE      POCT Blood Glucose.: 78 mg/dL (03 Oct 2023 11:44)  POCT Blood Glucose.: 115 mg/dL (03 Oct 2023 06:25)  POCT Blood Glucose.: 68 mg/dL (03 Oct 2023 05:56)  POCT Blood Glucose.: 86 mg/dL (02 Oct 2023 23:29)  POCT Blood Glucose.: 91 mg/dL (02 Oct 2023 16:06)        Urinalysis Basic - ( 03 Oct 2023 05:43 )    Color: x / Appearance: x / SG: x / pH: x  Gluc: 80 mg/dL / Ketone: x  / Bili: x / Urobili: x   Blood: x / Protein: x / Nitrite: x   Leuk Esterase: x / RBC: x / WBC x   Sq Epi: x / Non Sq Epi: x / Bacteria: x        RADIOLOGY & ADDITIONAL TESTS:    Imaging Personally Reviewed:  [ ] YES  [ ] NO    HEALTH ISSUES - PROBLEM Dx:       PINEDA MAY  89y  Female    Patient is a 89y old  Female who presents with a chief complaint of     INTERVAL HPI/OVERNIGHT EVENTS: FRANCISCO o/n       T(C): 35.9 (10-03-23 @ 09:11), Max: 36.4 (10-02-23 @ 14:23)  HR: 62 (10-03-23 @ 12:10) (50 - 63)  BP: 158/69 (10-03-23 @ 12:10) (114/53 - 176/75)  RR: 20 (10-03-23 @ 12:10) (14 - 22)  SpO2: 100% (10-03-23 @ 12:10) (93% - 100%)  Wt(kg): --Vital Signs Last 24 Hrs  T(C): 35.9 (03 Oct 2023 09:11), Max: 36.4 (02 Oct 2023 14:23)  T(F): 96.6 (03 Oct 2023 09:11), Max: 97.5 (02 Oct 2023 14:23)  HR: 62 (03 Oct 2023 12:10) (50 - 63)  BP: 158/69 (03 Oct 2023 12:10) (114/53 - 176/75)  BP(mean): 99 (03 Oct 2023 12:10) (76 - 108)  RR: 20 (03 Oct 2023 12:10) (14 - 22)  SpO2: 100% (03 Oct 2023 12:10) (93% - 100%)    Parameters below as of 03 Oct 2023 12:10  Patient On (Oxygen Delivery Method): face tent  O2 Flow (L/min): 10  O2 Concentration (%): 35    PHYSICAL EXAM:  CHEST/LUNG: Clear to auscultation bilaterally; No rales, rhonchi, wheezing, or rubs  HEART: Regular rate and rhythm; No murmurs, rubs, or gallops  EXTREMITIES:  WWP, No clubbing, cyanosis, or edema    Consultant(s) Notes Reviewed:  [x ] YES  [ ] NO  Care Discussed with Consultants/Other Providers [ x] YES  [ ] NO    LABS:                        10.6   18.64 )-----------( 143      ( 03 Oct 2023 05:43 )             33.6     10-03    139  |  104  |  24<H>  ----------------------------<  80  4.6   |  25  |  0.83    Ca    9.3      03 Oct 2023 05:43  Phos  3.3     10-03  Mg     2.5     10-03    TPro  6.6  /  Alb  2.6<L>  /  TBili  1.4<H>  /  DBili  0.7<H>  /  AST  114<H>  /  ALT  119<H>  /  AlkPhos  364<H>  10-03      PT/INR - ( 02 Oct 2023 02:38 )   PT: 16.1 sec;   INR: 1.43          PTT - ( 02 Oct 2023 06:04 )  PTT:32.9 sec  Urinalysis Basic - ( 03 Oct 2023 05:43 )    Color: x / Appearance: x / SG: x / pH: x  Gluc: 80 mg/dL / Ketone: x  / Bili: x / Urobili: x   Blood: x / Protein: x / Nitrite: x   Leuk Esterase: x / RBC: x / WBC x   Sq Epi: x / Non Sq Epi: x / Bacteria: x      CAPILLARY BLOOD GLUCOSE      POCT Blood Glucose.: 78 mg/dL (03 Oct 2023 11:44)  POCT Blood Glucose.: 115 mg/dL (03 Oct 2023 06:25)  POCT Blood Glucose.: 68 mg/dL (03 Oct 2023 05:56)  POCT Blood Glucose.: 86 mg/dL (02 Oct 2023 23:29)  POCT Blood Glucose.: 91 mg/dL (02 Oct 2023 16:06)        Urinalysis Basic - ( 03 Oct 2023 05:43 )    Color: x / Appearance: x / SG: x / pH: x  Gluc: 80 mg/dL / Ketone: x  / Bili: x / Urobili: x   Blood: x / Protein: x / Nitrite: x   Leuk Esterase: x / RBC: x / WBC x   Sq Epi: x / Non Sq Epi: x / Bacteria: x        RADIOLOGY & ADDITIONAL TESTS:    Imaging Personally Reviewed:  [ ] YES  [ ] NO    HEALTH ISSUES - PROBLEM Dx:

## 2023-10-04 LAB
ALBUMIN SERPL ELPH-MCNC: 3.1 G/DL — LOW (ref 3.3–5)
ALP SERPL-CCNC: 372 U/L — HIGH (ref 40–120)
ALT FLD-CCNC: 84 U/L — HIGH (ref 10–45)
ANION GAP SERPL CALC-SCNC: 9 MMOL/L — SIGNIFICANT CHANGE UP (ref 5–17)
AST SERPL-CCNC: 50 U/L — HIGH (ref 10–40)
BILIRUB SERPL-MCNC: 1 MG/DL — SIGNIFICANT CHANGE UP (ref 0.2–1.2)
BUN SERPL-MCNC: 19 MG/DL — SIGNIFICANT CHANGE UP (ref 7–23)
CALCIUM SERPL-MCNC: 9.5 MG/DL — SIGNIFICANT CHANGE UP (ref 8.4–10.5)
CHLORIDE SERPL-SCNC: 102 MMOL/L — SIGNIFICANT CHANGE UP (ref 96–108)
CO2 SERPL-SCNC: 27 MMOL/L — SIGNIFICANT CHANGE UP (ref 22–31)
CREAT SERPL-MCNC: 0.61 MG/DL — SIGNIFICANT CHANGE UP (ref 0.5–1.3)
EGFR: 82 ML/MIN/1.73M2 — SIGNIFICANT CHANGE UP
GLUCOSE BLDC GLUCOMTR-MCNC: 102 MG/DL — HIGH (ref 70–99)
GLUCOSE BLDC GLUCOMTR-MCNC: 108 MG/DL — HIGH (ref 70–99)
GLUCOSE BLDC GLUCOMTR-MCNC: 121 MG/DL — HIGH (ref 70–99)
GLUCOSE SERPL-MCNC: 91 MG/DL — SIGNIFICANT CHANGE UP (ref 70–99)
HCT VFR BLD CALC: 35.5 % — SIGNIFICANT CHANGE UP (ref 34.5–45)
HGB BLD-MCNC: 11.6 G/DL — SIGNIFICANT CHANGE UP (ref 11.5–15.5)
INR BLD: 0.91 — SIGNIFICANT CHANGE UP (ref 0.85–1.18)
MAGNESIUM SERPL-MCNC: 2.1 MG/DL — SIGNIFICANT CHANGE UP (ref 1.6–2.6)
MCHC RBC-ENTMCNC: 31.6 PG — SIGNIFICANT CHANGE UP (ref 27–34)
MCHC RBC-ENTMCNC: 32.7 GM/DL — SIGNIFICANT CHANGE UP (ref 32–36)
MCV RBC AUTO: 96.7 FL — SIGNIFICANT CHANGE UP (ref 80–100)
NRBC # BLD: 0 /100 WBCS — SIGNIFICANT CHANGE UP (ref 0–0)
PHOSPHATE SERPL-MCNC: 1.8 MG/DL — LOW (ref 2.5–4.5)
PLATELET # BLD AUTO: 195 K/UL — SIGNIFICANT CHANGE UP (ref 150–400)
POTASSIUM SERPL-MCNC: 3.7 MMOL/L — SIGNIFICANT CHANGE UP (ref 3.5–5.3)
POTASSIUM SERPL-SCNC: 3.7 MMOL/L — SIGNIFICANT CHANGE UP (ref 3.5–5.3)
PROT SERPL-MCNC: 7.3 G/DL — SIGNIFICANT CHANGE UP (ref 6–8.3)
PROTHROM AB SERPL-ACNC: 10.4 SEC — SIGNIFICANT CHANGE UP (ref 9.5–13)
RBC # BLD: 3.67 M/UL — LOW (ref 3.8–5.2)
RBC # FLD: 14.5 % — SIGNIFICANT CHANGE UP (ref 10.3–14.5)
SODIUM SERPL-SCNC: 138 MMOL/L — SIGNIFICANT CHANGE UP (ref 135–145)
WBC # BLD: 13.76 K/UL — HIGH (ref 3.8–10.5)
WBC # FLD AUTO: 13.76 K/UL — HIGH (ref 3.8–10.5)

## 2023-10-04 PROCEDURE — 99232 SBSQ HOSP IP/OBS MODERATE 35: CPT

## 2023-10-04 PROCEDURE — 99232 SBSQ HOSP IP/OBS MODERATE 35: CPT | Mod: GC

## 2023-10-04 RX ORDER — POTASSIUM PHOSPHATE, MONOBASIC POTASSIUM PHOSPHATE, DIBASIC 236; 224 MG/ML; MG/ML
21 INJECTION, SOLUTION INTRAVENOUS ONCE
Refills: 0 | Status: DISCONTINUED | OUTPATIENT
Start: 2023-10-04 | End: 2023-10-04

## 2023-10-04 RX ORDER — METOPROLOL TARTRATE 50 MG
12.5 TABLET ORAL EVERY 12 HOURS
Refills: 0 | Status: DISCONTINUED | OUTPATIENT
Start: 2023-10-04 | End: 2023-10-06

## 2023-10-04 RX ORDER — SERTRALINE 25 MG/1
50 TABLET, FILM COATED ORAL DAILY
Refills: 0 | Status: DISCONTINUED | OUTPATIENT
Start: 2023-10-04 | End: 2023-10-06

## 2023-10-04 RX ADMIN — PIPERACILLIN AND TAZOBACTAM 25 GRAM(S): 4; .5 INJECTION, POWDER, LYOPHILIZED, FOR SOLUTION INTRAVENOUS at 15:48

## 2023-10-04 RX ADMIN — Medication 12.5 MILLIGRAM(S): at 17:20

## 2023-10-04 RX ADMIN — PIPERACILLIN AND TAZOBACTAM 25 GRAM(S): 4; .5 INJECTION, POWDER, LYOPHILIZED, FOR SOLUTION INTRAVENOUS at 23:42

## 2023-10-04 RX ADMIN — SERTRALINE 50 MILLIGRAM(S): 25 TABLET, FILM COATED ORAL at 13:56

## 2023-10-04 RX ADMIN — WARFARIN SODIUM 3 MILLIGRAM(S): 2.5 TABLET ORAL at 21:53

## 2023-10-04 RX ADMIN — PIPERACILLIN AND TAZOBACTAM 25 GRAM(S): 4; .5 INJECTION, POWDER, LYOPHILIZED, FOR SOLUTION INTRAVENOUS at 07:06

## 2023-10-04 NOTE — PROGRESS NOTE ADULT - SUBJECTIVE AND OBJECTIVE BOX
Pt seen and examined at bedside in the SICU.   89F with bioprosthetic MVR, Afib on Warfarin with mixed ascending cholangitis and gallstone pancreatitis now s/p ERCP with sphincterotomy and biliary stent, clinically improved.  Denies abdominal pain this morning as well as nausea, vomiting, bloating or cramping, fevers or chills.  AFVSS  Abd is soft, mildly distended, persistent tenderness in the RUQ without peritoneal findings.  Labs pending this morning.  Continue Abx for 7-10 days unless BCx from ED have delayed growth.  AC resumed last night with Warfin, f/u H/H however low clinical concern for post procedural bleeding.  Continue diet as tolerated.  Care as per SICU.

## 2023-10-04 NOTE — PROGRESS NOTE ADULT - SUBJECTIVE AND OBJECTIVE BOX
PINEDA MAY  89y  Female    Patient is a 89y old  Female who presents with a chief complaint of CHOLETITHIASIS WITH CHOLANGITIS     (03 Oct 2023 15:31)      INTERVAL HPI/OVERNIGHT EVENTS:      T(C): 36.9 (10-04-23 @ 00:08), Max: 37 (10-03-23 @ 22:07)  HR: 58 (10-04-23 @ 06:00) (50 - 75)  BP: 163/70 (10-04-23 @ 06:00) (122/59 - 176/75)  RR: 23 (10-04-23 @ 06:00) (14 - 24)  SpO2: 93% (10-04-23 @ 06:00) (92% - 100%)  Wt(kg): --Vital Signs Last 24 Hrs  T(C): 36.9 (04 Oct 2023 00:08), Max: 37 (03 Oct 2023 22:07)  T(F): 98.4 (04 Oct 2023 00:08), Max: 98.6 (03 Oct 2023 22:07)  HR: 58 (04 Oct 2023 06:00) (50 - 75)  BP: 163/70 (04 Oct 2023 06:00) (122/59 - 176/75)  BP(mean): 101 (04 Oct 2023 06:00) (85 - 108)  RR: 23 (04 Oct 2023 06:00) (14 - 24)  SpO2: 93% (04 Oct 2023 06:00) (92% - 100%)    Parameters below as of 04 Oct 2023 06:00  Patient On (Oxygen Delivery Method): face tent  O2 Flow (L/min): 10  O2 Concentration (%): 35    PHYSICAL EXAM:  GENERAL: NAD, well-groomed, well-developed  HEAD:  Atraumatic, Normocephalic  EYES: EOMI, PERRLA, conjunctiva and sclera clear  ENMT: No tonsillar erythema, exudates, or enlargement; Moist mucous membranes, Good dentition, No lesions  NECK: Supple, No JVD, Normal thyroid  NERVOUS SYSTEM:  Alert & Oriented X3, Good concentration; Motor Strength 5/5 B/L upper and lower extremities; DTRs 2+ intact and symmetric  CHEST/LUNG: Clear to auscultation bilaterally; No rales, rhonchi, wheezing, or rubs  HEART: Regular rate and rhythm; No murmurs, rubs, or gallops  ABDOMEN: Soft, Nontender, Nondistended; Bowel sounds present  EXTREMITIES:  WWP, No clubbing, cyanosis, or edema  Vascular: 2+ peripheral pulses x4  LYMPH: No lymphadenopathy noted  SKIN: No rashes or lesions    Consultant(s) Notes Reviewed:  [x ] YES  [ ] NO  Care Discussed with Consultants/Other Providers [ x] YES  [ ] NO    LABS:                        10.6   18.64 )-----------( 143      ( 03 Oct 2023 05:43 )             33.6     10-03    139  |  104  |  24<H>  ----------------------------<  80  4.6   |  25  |  0.83    Ca    9.3      03 Oct 2023 05:43  Phos  3.3     10-03  Mg     2.5     10-03    TPro  6.6  /  Alb  2.6<L>  /  TBili  1.4<H>  /  DBili  0.7<H>  /  AST  114<H>  /  ALT  119<H>  /  AlkPhos  364<H>  10-03        Urinalysis Basic - ( 03 Oct 2023 05:43 )    Color: x / Appearance: x / SG: x / pH: x  Gluc: 80 mg/dL / Ketone: x  / Bili: x / Urobili: x   Blood: x / Protein: x / Nitrite: x   Leuk Esterase: x / RBC: x / WBC x   Sq Epi: x / Non Sq Epi: x / Bacteria: x      CAPILLARY BLOOD GLUCOSE      POCT Blood Glucose.: 113 mg/dL (03 Oct 2023 17:09)  POCT Blood Glucose.: 78 mg/dL (03 Oct 2023 11:44)        Urinalysis Basic - ( 03 Oct 2023 05:43 )    Color: x / Appearance: x / SG: x / pH: x  Gluc: 80 mg/dL / Ketone: x  / Bili: x / Urobili: x   Blood: x / Protein: x / Nitrite: x   Leuk Esterase: x / RBC: x / WBC x   Sq Epi: x / Non Sq Epi: x / Bacteria: x        RADIOLOGY & ADDITIONAL TESTS:    Imaging Personally Reviewed:  [ ] YES  [ ] NO    HEALTH ISSUES - PROBLEM Dx:       PINEDA MAY  89y  Female    Patient is a 89y old  Female who presents with a chief complaint of CHOLETITHIASIS WITH CHOLANGITIS     (03 Oct 2023 15:31)      INTERVAL HPI/OVERNIGHT EVENTS: FRANCISCO o/n      T(C): 36.9 (10-04-23 @ 00:08), Max: 37 (10-03-23 @ 22:07)  HR: 58 (10-04-23 @ 06:00) (50 - 75)  BP: 163/70 (10-04-23 @ 06:00) (122/59 - 176/75)  RR: 23 (10-04-23 @ 06:00) (14 - 24)  SpO2: 93% (10-04-23 @ 06:00) (92% - 100%)  Wt(kg): --Vital Signs Last 24 Hrs  T(C): 36.9 (04 Oct 2023 00:08), Max: 37 (03 Oct 2023 22:07)  T(F): 98.4 (04 Oct 2023 00:08), Max: 98.6 (03 Oct 2023 22:07)  HR: 58 (04 Oct 2023 06:00) (50 - 75)  BP: 163/70 (04 Oct 2023 06:00) (122/59 - 176/75)  BP(mean): 101 (04 Oct 2023 06:00) (85 - 108)  RR: 23 (04 Oct 2023 06:00) (14 - 24)  SpO2: 93% (04 Oct 2023 06:00) (92% - 100%)    Parameters below as of 04 Oct 2023 06:00  Patient On (Oxygen Delivery Method): face tent  O2 Flow (L/min): 10  O2 Concentration (%): 35    PHYSICAL EXAM:  CHEST/LUNG: Clear to auscultation bilaterally; No rales, rhonchi, wheezing, or rubs  HEART: Regular rate and rhythm; No murmurs, rubs, or gallops  EXTREMITIES:  WWP, No clubbing, cyanosis, or edema      Consultant(s) Notes Reviewed:  [x ] YES  [ ] NO  Care Discussed with Consultants/Other Providers [ x] YES  [ ] NO    LABS:                        10.6   18.64 )-----------( 143      ( 03 Oct 2023 05:43 )             33.6     10-03    139  |  104  |  24<H>  ----------------------------<  80  4.6   |  25  |  0.83    Ca    9.3      03 Oct 2023 05:43  Phos  3.3     10-03  Mg     2.5     10-03    TPro  6.6  /  Alb  2.6<L>  /  TBili  1.4<H>  /  DBili  0.7<H>  /  AST  114<H>  /  ALT  119<H>  /  AlkPhos  364<H>  10-03        Urinalysis Basic - ( 03 Oct 2023 05:43 )    Color: x / Appearance: x / SG: x / pH: x  Gluc: 80 mg/dL / Ketone: x  / Bili: x / Urobili: x   Blood: x / Protein: x / Nitrite: x   Leuk Esterase: x / RBC: x / WBC x   Sq Epi: x / Non Sq Epi: x / Bacteria: x      CAPILLARY BLOOD GLUCOSE      POCT Blood Glucose.: 113 mg/dL (03 Oct 2023 17:09)  POCT Blood Glucose.: 78 mg/dL (03 Oct 2023 11:44)        Urinalysis Basic - ( 03 Oct 2023 05:43 )    Color: x / Appearance: x / SG: x / pH: x  Gluc: 80 mg/dL / Ketone: x  / Bili: x / Urobili: x   Blood: x / Protein: x / Nitrite: x   Leuk Esterase: x / RBC: x / WBC x   Sq Epi: x / Non Sq Epi: x / Bacteria: x        RADIOLOGY & ADDITIONAL TESTS:    Imaging Personally Reviewed:  [ ] YES  [ ] NO    HEALTH ISSUES - PROBLEM Dx:

## 2023-10-04 NOTE — PROGRESS NOTE ADULT - ASSESSMENT
89F MVR (bio) 2016 warfarin, HTN, HLD, a fib, SSS s/p PPM, MYKE closure (incomplete with sludge), HFpEF p/w abdominal pain. Found to have cholecystitis and ascending cholangitis, gallstone pancreatitis. Pre op for ERCP / perc silvana.  Patient appears ill/toxic    #Pre op: ERCP, Percutaneous cholecystectomy   Urgent intervention without which there is high risk of clinical deterioration  No sign congestive heart failure, critical valvular disease or significant arrhthymias.   No cardiac contraindication to proceed with this urgent procedure.  Unable to assess METs  low risk procedure for intermediate -high risk patient      #Atrial Fibrillation  [id number]: 5 (age, HTN, HFpEF). S/p incomplete closure of MYKE.   AC: Warfarin - held. Restart when appropriate per primary (home dose 3mg everyday, except Tuesday/Friday 1.5mg). It is important to restart as soon as possible as MYKE occlusion was incomplete, pt still caries high risk of stroke.   Rate control: PO metop on hold as NPO. Given sepsis hold metoprolol IV - can reconsider if patient is tachycardiac.    - Obtain INR, may need higher than home regimen dose to get her to goal (2-3)     #MVR (bioprosthetic)  No need for heparin bridging as it is not a mechanical valve             Recommendations above are preliminary pending discussion with an attending cardiologist  We'll continue to follow, thank you for the consultation      Danilo Damon ([id number])  Cardiovascular Disease Fellow                                   89F MVR (bio) 2016 warfarin, HTN, HLD, a fib, SSS s/p PPM, MYKE closure (incomplete with sludge), HFpEF p/w abdominal pain. Found to have cholecystitis and ascending cholangitis, gallstone pancreatitis. Pre op for ERCP / perc silvana.  Patient appears ill/toxic    #Pre op: ERCP, Percutaneous cholecystectomy   Urgent intervention without which there is high risk of clinical deterioration  No sign congestive heart failure, critical valvular disease or significant arrhthymias.   No cardiac contraindication to proceed with this urgent procedure.  Unable to assess METs  low risk procedure for intermediate -high risk patient      #Atrial Fibrillation  [id number]: 5 (age, HTN, HFpEF). S/p incomplete closure of MYKE.   AC: Warfarin - held. Restart when appropriate per primary (home dose 3mg everyday, except Tuesday/Friday 1.5mg). It is important to restart as soon as possible as MYKE occlusion was incomplete, pt still caries high risk of stroke.   Rate control: PO metop on hold as NPO. Given sepsis hold metoprolol IV - can reconsider if patient is tachycardiac.    - Obtain INR, may need higher than home regimen dose to get her to goal (2-3)     #MVR (bioprosthetic)  - Warfarin as above               Plan was discussed with attending cardiologist  We'll continue to follow, thank you for the consultation      Danilo Damon ([id number])  Cardiovascular Disease Fellow                                             89F MVR (bio) 2016 warfarin, HTN, HLD, a fib, SSS s/p PPM, MYKE closure (incomplete with sludge), HFpEF p/w abdominal pain. Found to have cholecystitis and ascending cholangitis, gallstone pancreatitis. Pre op for ERCP / perc silvana.  Patient appears ill/toxic    #Pre op: ERCP, Percutaneous cholecystectomy   Urgent intervention without which there is high risk of clinical deterioration  No sign congestive heart failure, critical valvular disease or significant arrhthymias.   No cardiac contraindication to proceed with this urgent procedure.  Unable to assess METs  low risk procedure for intermediate -high risk patient      #Atrial Fibrillation  [id number]: 5 (age, HTN, HFpEF). S/p incomplete closure of MYKE.   AC: Warfarin (home dose 3mg everyday, except Tuesday/Friday 1.5mg). It is important to restart as soon as possible as MYKE occlusion was incomplete, pt still caries high risk of stroke.   Rate control: PO metop on hold as NPO. Given sepsis hold metoprolol IV - can reconsider if patient is tachycardiac.    - Obtain INR, may need higher than home regimen dose to get her to goal (2-3)     #MVR (bioprosthetic)  - Warfarin as above               Plan was discussed with attending cardiologist  We'll continue to follow, thank you for the consultation      Danilo Damon ([id number])  Cardiovascular Disease Fellow                                             89F MVR (bio) 2016, HTN, HLD, a fib on warfarin, SSS s/p PPM, MYKE closure (incomplete with sludge), HFpEF p/w abdominal pain. Found to have cholecystitis and ascending cholangitis, gallstone pancreatitis. Pre op for ERCP / perc silvana.  Patient appears ill/toxic    #Pre op: ERCP, Percutaneous cholecystectomy   Urgent intervention without which there is high risk of clinical deterioration  No sign congestive heart failure, critical valvular disease or significant arrhthymias.   No cardiac contraindication to proceed with this urgent procedure.  Unable to assess METs  low risk procedure for intermediate -high risk patient      #Atrial Fibrillation  [id number]: 5 (age, HTN, HFpEF). S/p incomplete closure of MYKE.   AC: Warfarin (home dose 3mg everyday, except Tuesday/Friday 1.5mg). It is important to restart as soon as possible as MYKE occlusion was incomplete and concomitant mitral stenosis, pt still caries high risk of stroke.   Rate control: PO metop on hold as NPO. Given sepsis hold metoprolol IV - can reconsider if patient is tachycardiac.    - Obtain INR, may need higher than home regimen dose to get her to goal (2-3)     #MVR (bioprosthetic)  - Warfarin as above               Plan was discussed with attending cardiologist  We'll continue to follow, thank you for the consultation      Danilo Damon ([id number])  Cardiovascular Disease Fellow

## 2023-10-04 NOTE — PROGRESS NOTE ADULT - ASSESSMENT
89F PMH MVR (2016 bioMVR), hypothyroidism, HTN, HLD, AF (Warfarin, last taken 9/30), PPM for tachy-mamie syndrome, AOx1 admitted to SICU for cholangitis and possible pancreatitis, now POD 2 S/P ERCP with CBD and pancreatic stent placement 10/2, stable, tender in epigastrium. Improving JEN, Cr downtrending 0.61 persisitent urine output 15 to 20 cc/h, tolerating regular diet.      Neuro: tylenol for pain PRN, zofran prn for nausea, depression: resumed zoloft  CV: MAP goal > 65, Hx HLD: hold lipitor, Hx of afib: hold metopCHF: hold lasix, spironolactone, digoxin; echo (10/02) EF 65%, moderate AS, pulmonary HTN.   Pulm: saturating well on RA, IS, chest x-ray   GI: pure low fat diet, PO PPI, S/P ERCP cbd and pancreatic stent 10/2 - Follow GI recs.   : Remove baez  ID: Zosyn (10/1-)  Endo: ISS, Hx of hypothyroid: cont synthroid PO  PPx: SCD,   Lines: PIV   Wounds: none  Dispo: Stepdown

## 2023-10-04 NOTE — PROGRESS NOTE ADULT - SUBJECTIVE AND OBJECTIVE BOX
GASTROENTEROLOGY PROGRESS NOTE  Patient seen and examined at bedside.  -No AM labs  -Given Warfarin 3 mg overnight    ROS: Limited ROS, agitated this morning. Refusing abdominal exam.    PERTINENT REVIEW OF SYSTEMS:  CONSTITUTIONAL: No weakness, fevers or chills  HEENT: No visual changes; No vertigo or throat pain   GASTROINTESTINAL: As above.  NEUROLOGICAL: No numbness or weakness  SKIN: No itching, burning, rashes, or lesions     Allergies    penicillin (Hives)  chlorhexidine topical (Rash)  fish (Other)    Intolerances      MEDICATIONS:  MEDICATIONS  (STANDING):  dextrose 5%. 1000 milliLiter(s) (50 mL/Hr) IV Continuous <Continuous>  dextrose 50% Injectable 25 Gram(s) IV Push once  glucagon  Injectable 1 milliGRAM(s) IntraMuscular once  insulin lispro (ADMELOG) corrective regimen sliding scale   SubCutaneous every 6 hours  levothyroxine 50 MICROGram(s) Oral daily  metoprolol tartrate 12.5 milliGRAM(s) Oral every 12 hours  pantoprazole    Tablet 40 milliGRAM(s) Oral before breakfast  piperacillin/tazobactam IVPB.. 3.375 Gram(s) IV Intermittent every 8 hours  sertraline 50 milliGRAM(s) Oral daily  warfarin 3 milliGRAM(s) Oral once    MEDICATIONS  (PRN):  dextrose Oral Gel 15 Gram(s) Oral once PRN Blood Glucose LESS THAN 70 milliGRAM(s)/deciliter  ondansetron Injectable 4 milliGRAM(s) IV Push every 6 hours PRN Nausea    Vital Signs Last 24 Hrs  T(C): 36.9 (04 Oct 2023 12:00), Max: 37 (03 Oct 2023 22:07)  T(F): 98.4 (04 Oct 2023 12:00), Max: 98.6 (03 Oct 2023 22:07)  HR: 65 (04 Oct 2023 13:00) (57 - 75)  BP: 178/72 (04 Oct 2023 13:00) (122/59 - 186/78)  BP(mean): 103 (04 Oct 2023 13:00) (85 - 112)  RR: 27 (04 Oct 2023 13:00) (18 - 28)  SpO2: 94% (04 Oct 2023 13:00) (92% - 98%)    Parameters below as of 04 Oct 2023 13:00  Patient On (Oxygen Delivery Method): face tent  O2 Flow (L/min): 10  O2 Concentration (%): 35    10-03 @ 07:01  -  10-04 @ 07:00  --------------------------------------------------------  IN: 995 mL / OUT: 409 mL / NET: 586 mL    10-04 @ 07:01  -  10-04 @ 14:08  --------------------------------------------------------  IN: 240 mL / OUT: 135 mL / NET: 105 mL      PHYSICAL EXAM:    General: elderly  female, agitated  HEENT: MMM, conjunctiva and sclera clear  Gastrointestinal: Refused abdominal exam  Skin: Warm and dry. No obvious rash    LABS:                        10.6   18.64 )-----------( 143      ( 03 Oct 2023 05:43 )             33.6     10-03    139  |  104  |  24<H>  ----------------------------<  80  4.6   |  25  |  0.83    Ca    9.3      03 Oct 2023 05:43  Phos  3.3     10-03  Mg     2.5     10-03    TPro  6.6  /  Alb  2.6<L>  /  TBili  1.4<H>  /  DBili  0.7<H>  /  AST  114<H>  /  ALT  119<H>  /  AlkPhos  364<H>  10-03          Urinalysis Basic - ( 03 Oct 2023 05:43 )    Color: x / Appearance: x / SG: x / pH: x  Gluc: 80 mg/dL / Ketone: x  / Bili: x / Urobili: x   Blood: x / Protein: x / Nitrite: x   Leuk Esterase: x / RBC: x / WBC x   Sq Epi: x / Non Sq Epi: x / Bacteria: x                RADIOLOGY & ADDITIONAL STUDIES:  Reviewed

## 2023-10-04 NOTE — PROGRESS NOTE ADULT - SUBJECTIVE AND OBJECTIVE BOX
POD 2 ERCP with biliary and pancreatic stent by GI on 10/2/23      SUBJECTIVE: Patient seen this morning during rounds, AOx1, persistent low urine output overnight, tolerating diet.         MEDICATIONS  (STANDING):  dextrose 5%. 1000 milliLiter(s) (50 mL/Hr) IV Continuous <Continuous>  dextrose 50% Injectable 25 Gram(s) IV Push once  glucagon  Injectable 1 milliGRAM(s) IntraMuscular once  insulin lispro (ADMELOG) corrective regimen sliding scale   SubCutaneous every 6 hours  levothyroxine 50 MICROGram(s) Oral daily  metoprolol tartrate 12.5 milliGRAM(s) Oral every 12 hours  pantoprazole    Tablet 40 milliGRAM(s) Oral before breakfast  piperacillin/tazobactam IVPB.. 3.375 Gram(s) IV Intermittent every 8 hours  sertraline 50 milliGRAM(s) Oral daily  warfarin 3 milliGRAM(s) Oral once    MEDICATIONS  (PRN):  dextrose Oral Gel 15 Gram(s) Oral once PRN Blood Glucose LESS THAN 70 milliGRAM(s)/deciliter  ondansetron Injectable 4 milliGRAM(s) IV Push every 6 hours PRN Nausea      Drips:     ICU Vital Signs Last 24 Hrs  T(C): 37 (04 Oct 2023 17:45), Max: 37 (03 Oct 2023 22:07)  T(F): 98.6 (04 Oct 2023 17:45), Max: 98.6 (03 Oct 2023 22:07)  HR: 72 (04 Oct 2023 17:00) (57 - 73)  BP: 178/94 (04 Oct 2023 17:00) (129/62 - 190/78)  BP(mean): 116 (04 Oct 2023 17:00) (89 - 116)  ABP: --  ABP(mean): --  RR: 25 (04 Oct 2023 17:00) (20 - 28)  SpO2: 92% (04 Oct 2023 17:00) (92% - 98%)    O2 Parameters below as of 04 Oct 2023 17:00  Patient On (Oxygen Delivery Method): face tent  O2 Flow (L/min): 10  O2 Concentration (%): 35      Physical Exam:  General: NAD. arousable   HEENT: NC/AT, EOMI, PERRLA  Pulmonary: Nonlabored breathing, no respiratory distress, coarse crackles L > R, decreased breath sounds on left > right   Cardiovascular: NSR, systolic murmur  Abdominal: soft, ND, slightly tender in epigastrium and RUQ  Extremities: WWP, no clubbing/cyanosis/edema, multiple ecchymoses at venipuncture site   Neuro: A/O x1, CNs II-XII grossly intact, normal motor/sensation, no focal deficits  Pulses: palpable distal pulses      Lines/tubes/drains:  [place]: in place concentrated urine       I&O's Summary    03 Oct 2023 07:01  -  04 Oct 2023 07:00  --------------------------------------------------------  IN: 995 mL / OUT: 409 mL / NET: 586 mL    04 Oct 2023 07:01  -  04 Oct 2023 18:06  --------------------------------------------------------  IN: 340 mL / OUT: 250 mL / NET: 90 mL        LABS:                        11.6   13.76 )-----------( 195      ( 04 Oct 2023 14:39 )             35.5     10-04    138  |  102  |  19  ----------------------------<  91  3.7   |  27  |  0.61    Ca    9.5      04 Oct 2023 14:39  Phos  1.8     10-04  Mg     2.1     10-04    TPro  7.3  /  Alb  3.1<L>  /  TBili  1.0  /  DBili  x   /  AST  50<H>  /  ALT  84<H>  /  AlkPhos  372<H>  10-04    PT/INR - ( 04 Oct 2023 14:39 )   PT: 10.4 sec;   INR: 0.91            Urinalysis Basic - ( 04 Oct 2023 14:39 )    Color: x / Appearance: x / SG: x / pH: x  Gluc: 91 mg/dL / Ketone: x  / Bili: x / Urobili: x   Blood: x / Protein: x / Nitrite: x   Leuk Esterase: x / RBC: x / WBC x   Sq Epi: x / Non Sq Epi: x / Bacteria: x      CAPILLARY BLOOD GLUCOSE      POCT Blood Glucose.: 121 mg/dL (04 Oct 2023 17:23)  POCT Blood Glucose.: 108 mg/dL (04 Oct 2023 11:44)    LIVER FUNCTIONS - ( 04 Oct 2023 14:39 )  Alb: 3.1 g/dL / Pro: 7.3 g/dL / ALK PHOS: 372 U/L / ALT: 84 U/L / AST: 50 U/L / GGT: x

## 2023-10-04 NOTE — PROVIDER CONTACT NOTE (OTHER) - ASSESSMENT
HR 67, /78 (102), RR 28, O2 94%, patient is agitated due to lab draw
Pt is uncooperative. Pt refusing meds, lab draws, am care

## 2023-10-04 NOTE — PROGRESS NOTE ADULT - ASSESSMENT
89F PMH MVR, hypothyroidism, HTN, HLD, AF (Warfarin, 9/30/23), PPM for tachy-mamie syndrome, AOx1 recently admitted to SICU in May 2023 for URI and gallbladder/CBD "sludge ball" s/p course of zosyn but not a surgical candidate, GI deferred ERCP and then admitted in August 2023 for SOB and fever, entero-rhinovirus+, COVID+ w/ b/l opacities on CXR, at that time patient also had elevated LFTs that started to resolve so patient did not undergo IR drainage and was dc'ed on ursodiol and plan for outpatient perc silvana, now presenting with lethargy and increasing abdominal pain, found to have elevated liver enzymes, Lipase >3000, CT revealing severe IH/EH biliary dilatation 2/2 distal CBD stone. Advanced endoscopy consulted for cholangitis in the setting of gallstone pancreatitis/choledocolithiasis and for consideration of ERCP.     CT A/P (10/1/23): Since 5/15/2023, increase in now severe intrahepatic and extrahepatic biliary ductal dilatation secondary to a stone in the distal CBD near the ampulla. Heterogeneous appearance of the pancreatic head and peripancreatic edema compatible with interstitial edematous pancreatitis. Small amount of ascites.    ERCP (10/3/23): s/p sphincterotomy with PD and biliary stent placement. S/p Indomethacin    #Gallstone pancreatitis  #Choledocholithiasis  #Acute cholangitis   #Abnormal liver enzymes  - Continue to trend CBC, CMP and INR, please obtain labs  - c/w abxs  - IVF as tolerated, frequent lung checks  - Hgb stable on AM labs with improving LTs/bilirubin, no contraindication to resuming home warfarin. Continue to closely monitor H/H  - F/u outpatient in 2 months with Dr Barahona to discuss timing of stent removal     Case discussed with advanced attending and primary team.     Winter Michelle DO  Gastroenterology Fellow  Pager: [phone]     89F PMH MVR, hypothyroidism, HTN, HLD, AF (Warfarin, 9/30/23), PPM for tachy-mamie syndrome, AOx1 recently admitted to SICU in May 2023 for URI and gallbladder/CBD "sludge ball" s/p course of zosyn but not a surgical candidate, GI deferred ERCP and then admitted in August 2023 for SOB and fever, entero-rhinovirus+, COVID+ w/ b/l opacities on CXR, at that time patient also had elevated LFTs that started to resolve so patient did not undergo IR drainage and was dc'ed on ursodiol and plan for outpatient perc silvana, now presenting with lethargy and increasing abdominal pain, found to have elevated liver enzymes, Lipase >3000, CT revealing severe IH/EH biliary dilatation 2/2 distal CBD stone. Advanced endoscopy consulted for cholangitis in the setting of gallstone pancreatitis/choledocolithiasis and for consideration of ERCP.     CT A/P (10/1/23): Since 5/15/2023, increase in now severe intrahepatic and extrahepatic biliary ductal dilatation secondary to a stone in the distal CBD near the ampulla. Heterogeneous appearance of the pancreatic head and peripancreatic edema compatible with interstitial edematous pancreatitis. Small amount of ascites.    ERCP (10/3/23): s/p sphincterotomy with PD and biliary stent placement. S/p Indomethacin    #Gallstone pancreatitis  #Choledocholithiasis  #Acute cholangitis   #Abnormal liver enzymes  - Continue to trend CBC, CMP and INR, please obtain labs  - c/w abxs  - IVF as tolerated, frequent lung checks  - Hgb stable on 10/3 AM labs with improving LTs/bilirubin, resumed warfarin 10/3, no labs to review today, no overt bleeding noted. Please obtain BW today  - F/u outpatient in 2 months with Dr Barahona to discuss timing of stent removal     Case discussed with advanced attending and primary team.     Winter Michelle DO  Gastroenterology Fellow  Pager: [phone]

## 2023-10-05 ENCOUNTER — TRANSCRIPTION ENCOUNTER (OUTPATIENT)
Age: 88
End: 2023-10-05

## 2023-10-05 LAB
ALBUMIN SERPL ELPH-MCNC: 2.3 G/DL — LOW (ref 3.3–5)
ALP SERPL-CCNC: 303 U/L — HIGH (ref 40–120)
ALT FLD-CCNC: 56 U/L — HIGH (ref 10–45)
ANION GAP SERPL CALC-SCNC: 9 MMOL/L — SIGNIFICANT CHANGE UP (ref 5–17)
AST SERPL-CCNC: 35 U/L — SIGNIFICANT CHANGE UP (ref 10–40)
BILIRUB SERPL-MCNC: 1 MG/DL — SIGNIFICANT CHANGE UP (ref 0.2–1.2)
BUN SERPL-MCNC: 13 MG/DL — SIGNIFICANT CHANGE UP (ref 7–23)
CALCIUM SERPL-MCNC: 9 MG/DL — SIGNIFICANT CHANGE UP (ref 8.4–10.5)
CHLORIDE SERPL-SCNC: 102 MMOL/L — SIGNIFICANT CHANGE UP (ref 96–108)
CO2 SERPL-SCNC: 21 MMOL/L — LOW (ref 22–31)
CREAT SERPL-MCNC: 0.46 MG/DL — LOW (ref 0.5–1.3)
EGFR: 88 ML/MIN/1.73M2 — SIGNIFICANT CHANGE UP
GLUCOSE BLDC GLUCOMTR-MCNC: 107 MG/DL — HIGH (ref 70–99)
GLUCOSE BLDC GLUCOMTR-MCNC: 110 MG/DL — HIGH (ref 70–99)
GLUCOSE BLDC GLUCOMTR-MCNC: 110 MG/DL — HIGH (ref 70–99)
GLUCOSE SERPL-MCNC: 91 MG/DL — SIGNIFICANT CHANGE UP (ref 70–99)
HCT VFR BLD CALC: 35.5 % — SIGNIFICANT CHANGE UP (ref 34.5–45)
HGB BLD-MCNC: 11.1 G/DL — LOW (ref 11.5–15.5)
INR BLD: 1.05 — SIGNIFICANT CHANGE UP (ref 0.85–1.18)
MAGNESIUM SERPL-MCNC: 1.8 MG/DL — SIGNIFICANT CHANGE UP (ref 1.6–2.6)
MCHC RBC-ENTMCNC: 31.3 GM/DL — LOW (ref 32–36)
MCHC RBC-ENTMCNC: 32.1 PG — SIGNIFICANT CHANGE UP (ref 27–34)
MCV RBC AUTO: 102.6 FL — HIGH (ref 80–100)
NRBC # BLD: 0 /100 WBCS — SIGNIFICANT CHANGE UP (ref 0–0)
PHOSPHATE SERPL-MCNC: 2.5 MG/DL — SIGNIFICANT CHANGE UP (ref 2.5–4.5)
PLATELET # BLD AUTO: 144 K/UL — LOW (ref 150–400)
POTASSIUM SERPL-MCNC: 4.1 MMOL/L — SIGNIFICANT CHANGE UP (ref 3.5–5.3)
POTASSIUM SERPL-SCNC: 4.1 MMOL/L — SIGNIFICANT CHANGE UP (ref 3.5–5.3)
PROT SERPL-MCNC: 6.4 G/DL — SIGNIFICANT CHANGE UP (ref 6–8.3)
PROTHROM AB SERPL-ACNC: 12 SEC — SIGNIFICANT CHANGE UP (ref 9.5–13)
RBC # BLD: 3.46 M/UL — LOW (ref 3.8–5.2)
RBC # FLD: 14.4 % — SIGNIFICANT CHANGE UP (ref 10.3–14.5)
SODIUM SERPL-SCNC: 132 MMOL/L — LOW (ref 135–145)
WBC # BLD: 13.81 K/UL — HIGH (ref 3.8–10.5)
WBC # FLD AUTO: 13.81 K/UL — HIGH (ref 3.8–10.5)

## 2023-10-05 PROCEDURE — 99233 SBSQ HOSP IP/OBS HIGH 50: CPT | Mod: GC

## 2023-10-05 PROCEDURE — 99232 SBSQ HOSP IP/OBS MODERATE 35: CPT | Mod: GC

## 2023-10-05 RX ORDER — WARFARIN SODIUM 2.5 MG/1
3 TABLET ORAL ONCE
Refills: 0 | Status: COMPLETED | OUTPATIENT
Start: 2023-10-05 | End: 2023-10-05

## 2023-10-05 RX ORDER — WARFARIN SODIUM 2.5 MG/1
3 TABLET ORAL ONCE
Refills: 0 | Status: DISCONTINUED | OUTPATIENT
Start: 2023-10-05 | End: 2023-10-05

## 2023-10-05 RX ORDER — SODIUM,POTASSIUM PHOSPHATES 278-250MG
1 POWDER IN PACKET (EA) ORAL ONCE
Refills: 0 | Status: COMPLETED | OUTPATIENT
Start: 2023-10-05 | End: 2023-10-05

## 2023-10-05 RX ORDER — MAGNESIUM SULFATE 500 MG/ML
1 VIAL (ML) INJECTION ONCE
Refills: 0 | Status: COMPLETED | OUTPATIENT
Start: 2023-10-05 | End: 2023-10-05

## 2023-10-05 RX ORDER — LABETALOL HCL 100 MG
10 TABLET ORAL ONCE
Refills: 0 | Status: COMPLETED | OUTPATIENT
Start: 2023-10-05 | End: 2023-10-05

## 2023-10-05 RX ADMIN — Medication 12.5 MILLIGRAM(S): at 18:07

## 2023-10-05 RX ADMIN — WARFARIN SODIUM 3 MILLIGRAM(S): 2.5 TABLET ORAL at 22:04

## 2023-10-05 RX ADMIN — Medication 100 GRAM(S): at 10:04

## 2023-10-05 RX ADMIN — Medication 1 TABLET(S): at 18:07

## 2023-10-05 RX ADMIN — PIPERACILLIN AND TAZOBACTAM 25 GRAM(S): 4; .5 INJECTION, POWDER, LYOPHILIZED, FOR SOLUTION INTRAVENOUS at 07:34

## 2023-10-05 RX ADMIN — SERTRALINE 50 MILLIGRAM(S): 25 TABLET, FILM COATED ORAL at 14:32

## 2023-10-05 RX ADMIN — Medication 1 PACKET(S): at 10:04

## 2023-10-05 RX ADMIN — Medication 10 MILLIGRAM(S): at 03:58

## 2023-10-05 RX ADMIN — Medication 12.5 MILLIGRAM(S): at 05:59

## 2023-10-05 RX ADMIN — PANTOPRAZOLE SODIUM 40 MILLIGRAM(S): 20 TABLET, DELAYED RELEASE ORAL at 05:59

## 2023-10-05 RX ADMIN — Medication 50 MICROGRAM(S): at 05:59

## 2023-10-05 NOTE — DISCHARGE NOTE PROVIDER - CARE PROVIDER_API CALL
Gregorio Shukla  Colon/Rectal Surgery  [address] [place] [address], [address]  [place] [address]  Phone: [phone]  Fax: [phone]  Follow Up Time:     Ag Johnson  Internal Medicine  [address] [address]  Phone: [phone]  Fax: [phone]  Follow Up Time:     Kale Barahona  Gastroenterology  [address]  [place] [address]  Phone: [phone]  Fax: [phone]  Follow Up Time:

## 2023-10-05 NOTE — DISCHARGE NOTE PROVIDER - NSDCFUADDAPPT_GEN_ALL_CORE_FT
Please follow up with Dr. Shukla in 1-2 weeks; you may call the office to make an appointment at your earliest convenience.    Follow up outpatient in 2 months with Dr. Barahona to discuss timing of stent removal.    Please follow up as scheduled with Dr. Johnson.

## 2023-10-05 NOTE — PROGRESS NOTE ADULT - ASSESSMENT
89F MVR (bio) 2016, HTN, HLD, a fib on warfarin, SSS s/p PPM, MYKE closure (incomplete with sludge), HFpEF p/w abdominal pain. Found to have cholecystitis and ascending cholangitis, gallstone pancreatitis. Pre op for ERCP / perc silvana.  Patient appears ill/toxic    #Pre op: ERCP, Percutaneous cholecystectomy   Urgent intervention without which there is high risk of clinical deterioration  No sign congestive heart failure, critical valvular disease or significant arrhthymias.   No cardiac contraindication to proceed with this urgent procedure.  Unable to assess METs  low risk procedure for intermediate -high risk patient      #Atrial Fibrillation  [id number]: 5 (age, HTN, HFpEF). S/p incomplete closure of MYKE.   AC: Warfarin (home dose 3mg everyday, except Tuesday/Friday 1.5mg). It is important to restart as soon as possible as MYKE occlusion was incomplete and concomitant mitral stenosis, pt still caries high risk of stroke.   Rate control: PO metop on hold as NPO. Given sepsis hold metoprolol IV - can reconsider if patient is tachycardiac.    - Obtain INR, may need higher than home regimen dose to get her to goal (2-3)     #MVR (bioprosthetic)  - Warfarin as above               Plan was discussed with attending cardiologist  We'll continue to follow, thank you for the consultation      Danilo Damon ([id number])  Cardiovascular Disease Fellow                                             89F MVR (bio) 2016, HTN, HLD, a fib on warfarin, SSS s/p PPM, MYKE closure (incomplete with sludge), HFpEF p/w abdominal pain. Found to have cholecystitis and ascending cholangitis, gallstone pancreatitis. Pre op for ERCP / perc silvana.  Patient appears ill/toxic    #Pre op: ERCP, Percutaneous cholecystectomy   Urgent intervention without which there is high risk of clinical deterioration  No sign congestive heart failure, critical valvular disease or significant arrhthymias.   No cardiac contraindication to proceed with this urgent procedure.  Unable to assess METs  low risk procedure for intermediate -high risk patient      #Atrial Fibrillation  [id number]: 5 (age, HTN, HFpEF). S/p incomplete closure of MYKE.   AC: Warfarin (home dose 3mg everyday, except Tuesday/Friday 1.5mg). It is important to restart as soon as possible as MYKE occlusion was incomplete and concomitant mitral stenosis, pt still caries high risk of stroke.   Rate control: PO metop on hold as NPO. Given sepsis hold metoprolol IV - can reconsider if patient is tachycardiac.    - Obtain INR, may need higher than home regimen dose to get her to goal (2-3)     #MVR (bioprosthetic)  - Warfarin as above               Plan was discussed with attending cardiologist  We'll be signing off, thank you for the consultation      Danilo Damon ([id number])  Cardiovascular Disease Fellow

## 2023-10-05 NOTE — PROGRESS NOTE ADULT - ASSESSMENT
89F PMH MVR, hypothyroidism, HTN, HLD, AF (Warfarin, 9/30/23), PPM for tachy-mamie syndrome, AOx1 recently admitted to SICU in May 2023 for URI and gallbladder/CBD "sludge ball" s/p course of zosyn but not a surgical candidate, GI deferred ERCP and then admitted in August 2023 for SOB and fever, entero-rhinovirus+, COVID+ w/ b/l opacities on CXR, at that time patient also had elevated LFTs that started to resolve so patient did not undergo IR drainage and was dc'ed on ursodiol and plan for outpatient perc silvana, now presenting with lethargy and increasing abdominal pain, found to have elevated liver enzymes, Lipase >3000, CT revealing severe IH/EH biliary dilatation 2/2 distal CBD stone. Advanced endoscopy consulted for cholangitis in the setting of gallstone pancreatitis/choledocolithiasis and for consideration of ERCP.     CT A/P (10/1/23): Since 5/15/2023, increase in now severe intrahepatic and extrahepatic biliary ductal dilatation secondary to a stone in the distal CBD near the ampulla. Heterogeneous appearance of the pancreatic head and peripancreatic edema compatible with interstitial edematous pancreatitis. Small amount of ascites.    ERCP (10/3/23): s/p sphincterotomy with PD and biliary stent placement. S/p Indomethacin    #Gallstone pancreatitis  #Choledocholithiasis  #Acute cholangitis   #Abnormal liver enzymes  - Continue to trend CMPs daily  - c/w abxs  - F/u outpatient in 2 months with Dr Barahona to discuss timing of stent removal   - Remainder of management as per primary team    Case discussed with advanced attending and primary team.     Winter Michelle DO  Gastroenterology Fellow  Pager: [phone]

## 2023-10-05 NOTE — DISCHARGE NOTE PROVIDER - NSDCFUADDINST_GEN_ALL_CORE_FT
You have been prescribed oral antibiotics. Please be sure to complete the entire course as directed.    Follow up with Dr. Shukla and Dr. Johnson in 1-2 weeks. Additionally, follow up in 2 months with GI Dr. Barahona. Call the offices at the numbers provided to schedule your appointment. You may continue with pureed diet. You should be urinating at least 3-4x per day. Call the office if you experience increasing abdominal pain, nausea, vomiting, or temperature >101.4F.

## 2023-10-05 NOTE — DISCHARGE NOTE PROVIDER - NSDCFUSCHEDAPPT_GEN_ALL_CORE_FT
Mayito De La Torre  [facility]  [facility] [address]  Scheduled Appointment: 12/14/2023    [facility]  [facility] [address]  Scheduled Appointment: 12/27/2023

## 2023-10-05 NOTE — DISCHARGE NOTE PROVIDER - NSDCCPCAREPLAN_GEN_ALL_CORE_FT
PRINCIPAL DISCHARGE DIAGNOSIS  Diagnosis: Cholelithiasis with cholangitis  Assessment and Plan of Treatment:

## 2023-10-05 NOTE — PROGRESS NOTE ADULT - SUBJECTIVE AND OBJECTIVE BOX
PINEDA MAY  89y  Female    Patient is a 89y old  Female who presents with a chief complaint of CHOLETITHIASIS WITH CHOLANGITIS     (03 Oct 2023 15:31)      INTERVAL HPI/OVERNIGHT EVENTS:      T(C): 36.6 (10-05-23 @ 04:52), Max: 37 (10-04-23 @ 17:45)  HR: 72 (10-05-23 @ 04:25) (60 - 74)  BP: 118/75 (10-05-23 @ 04:25) (118/75 - 192/77)  RR: 18 (10-05-23 @ 04:25) (18 - 28)  SpO2: 90% (10-05-23 @ 04:25) (81% - 95%)  Wt(kg): --Vital Signs Last 24 Hrs  T(C): 36.6 (05 Oct 2023 04:52), Max: 37 (04 Oct 2023 17:45)  T(F): 97.8 (05 Oct 2023 04:52), Max: 98.6 (04 Oct 2023 17:45)  HR: 72 (05 Oct 2023 04:25) (60 - 74)  BP: 118/75 (05 Oct 2023 04:25) (118/75 - 192/77)  BP(mean): 92 (05 Oct 2023 04:25) (89 - 118)  RR: 18 (05 Oct 2023 04:25) (18 - 28)  SpO2: 90% (05 Oct 2023 04:25) (81% - 95%)    Parameters below as of 05 Oct 2023 04:25  Patient On (Oxygen Delivery Method): face tent        PHYSICAL EXAM:  GENERAL: NAD, well-groomed, well-developed  HEAD:  Atraumatic, Normocephalic  EYES: EOMI, PERRLA, conjunctiva and sclera clear  ENMT: No tonsillar erythema, exudates, or enlargement; Moist mucous membranes, Good dentition, No lesions  NECK: Supple, No JVD, Normal thyroid  NERVOUS SYSTEM:  Alert & Oriented X3, Good concentration; Motor Strength 5/5 B/L upper and lower extremities; DTRs 2+ intact and symmetric  CHEST/LUNG: Clear to auscultation bilaterally; No rales, rhonchi, wheezing, or rubs  HEART: Regular rate and rhythm; No murmurs, rubs, or gallops  ABDOMEN: Soft, Nontender, Nondistended; Bowel sounds present  EXTREMITIES:  WWP, No clubbing, cyanosis, or edema  Vascular: 2+ peripheral pulses x4  LYMPH: No lymphadenopathy noted  SKIN: No rashes or lesions    Consultant(s) Notes Reviewed:  [x ] YES  [ ] NO  Care Discussed with Consultants/Other Providers [ x] YES  [ ] NO    LABS:                        11.6   13.76 )-----------( 195      ( 04 Oct 2023 14:39 )             35.5     10-04    138  |  102  |  19  ----------------------------<  91  3.7   |  27  |  0.61    Ca    9.5      04 Oct 2023 14:39  Phos  1.8     10-04  Mg     2.1     10-04    TPro  7.3  /  Alb  3.1<L>  /  TBili  1.0  /  DBili  x   /  AST  50<H>  /  ALT  84<H>  /  AlkPhos  372<H>  10-04      PT/INR - ( 04 Oct 2023 14:39 )   PT: 10.4 sec;   INR: 0.91            Urinalysis Basic - ( 04 Oct 2023 14:39 )    Color: x / Appearance: x / SG: x / pH: x  Gluc: 91 mg/dL / Ketone: x  / Bili: x / Urobili: x   Blood: x / Protein: x / Nitrite: x   Leuk Esterase: x / RBC: x / WBC x   Sq Epi: x / Non Sq Epi: x / Bacteria: x      CAPILLARY BLOOD GLUCOSE      POCT Blood Glucose.: 110 mg/dL (05 Oct 2023 06:37)  POCT Blood Glucose.: 102 mg/dL (04 Oct 2023 23:28)  POCT Blood Glucose.: 121 mg/dL (04 Oct 2023 17:23)  POCT Blood Glucose.: 108 mg/dL (04 Oct 2023 11:44)        Urinalysis Basic - ( 04 Oct 2023 14:39 )    Color: x / Appearance: x / SG: x / pH: x  Gluc: 91 mg/dL / Ketone: x  / Bili: x / Urobili: x   Blood: x / Protein: x / Nitrite: x   Leuk Esterase: x / RBC: x / WBC x   Sq Epi: x / Non Sq Epi: x / Bacteria: x        RADIOLOGY & ADDITIONAL TESTS:    Imaging Personally Reviewed:  [ ] YES  [ ] NO    HEALTH ISSUES - PROBLEM Dx:       PINEDA MAY  89y  Female    Patient is a 89y old  Female who presents with a chief complaint of CHOLETITHIASIS WITH CHOLANGITIS     (03 Oct 2023 15:31)      INTERVAL HPI/OVERNIGHT EVENTS: FRANCISCO o/n      T(C): 36.6 (10-05-23 @ 04:52), Max: 37 (10-04-23 @ 17:45)  HR: 72 (10-05-23 @ 04:25) (60 - 74)  BP: 118/75 (10-05-23 @ 04:25) (118/75 - 192/77)  RR: 18 (10-05-23 @ 04:25) (18 - 28)  SpO2: 90% (10-05-23 @ 04:25) (81% - 95%)  Wt(kg): --Vital Signs Last 24 Hrs  T(C): 36.6 (05 Oct 2023 04:52), Max: 37 (04 Oct 2023 17:45)  T(F): 97.8 (05 Oct 2023 04:52), Max: 98.6 (04 Oct 2023 17:45)  HR: 72 (05 Oct 2023 04:25) (60 - 74)  BP: 118/75 (05 Oct 2023 04:25) (118/75 - 192/77)  BP(mean): 92 (05 Oct 2023 04:25) (89 - 118)  RR: 18 (05 Oct 2023 04:25) (18 - 28)  SpO2: 90% (05 Oct 2023 04:25) (81% - 95%)    Parameters below as of 05 Oct 2023 04:25  Patient On (Oxygen Delivery Method): face tent        PHYSICAL EXAM:  GENERAL: NAD  CHEST/LUNG: Clear to auscultation bilaterally; No rales, rhonchi, wheezing, or rubs  HEART: Irregular rate and rhythm; No murmurs, rubs, or gallops  EXTREMITIES:  WWP, No clubbing, cyanosis, or edema    Consultant(s) Notes Reviewed:  [x ] YES  [ ] NO  Care Discussed with Consultants/Other Providers [ x] YES  [ ] NO    LABS:                        11.6   13.76 )-----------( 195      ( 04 Oct 2023 14:39 )             35.5     10-04    138  |  102  |  19  ----------------------------<  91  3.7   |  27  |  0.61    Ca    9.5      04 Oct 2023 14:39  Phos  1.8     10-04  Mg     2.1     10-04    TPro  7.3  /  Alb  3.1<L>  /  TBili  1.0  /  DBili  x   /  AST  50<H>  /  ALT  84<H>  /  AlkPhos  372<H>  10-04      PT/INR - ( 04 Oct 2023 14:39 )   PT: 10.4 sec;   INR: 0.91            Urinalysis Basic - ( 04 Oct 2023 14:39 )    Color: x / Appearance: x / SG: x / pH: x  Gluc: 91 mg/dL / Ketone: x  / Bili: x / Urobili: x   Blood: x / Protein: x / Nitrite: x   Leuk Esterase: x / RBC: x / WBC x   Sq Epi: x / Non Sq Epi: x / Bacteria: x      CAPILLARY BLOOD GLUCOSE      POCT Blood Glucose.: 110 mg/dL (05 Oct 2023 06:37)  POCT Blood Glucose.: 102 mg/dL (04 Oct 2023 23:28)  POCT Blood Glucose.: 121 mg/dL (04 Oct 2023 17:23)  POCT Blood Glucose.: 108 mg/dL (04 Oct 2023 11:44)        Urinalysis Basic - ( 04 Oct 2023 14:39 )    Color: x / Appearance: x / SG: x / pH: x  Gluc: 91 mg/dL / Ketone: x  / Bili: x / Urobili: x   Blood: x / Protein: x / Nitrite: x   Leuk Esterase: x / RBC: x / WBC x   Sq Epi: x / Non Sq Epi: x / Bacteria: x        RADIOLOGY & ADDITIONAL TESTS:    Imaging Personally Reviewed:  [ ] YES  [ ] NO    HEALTH ISSUES - PROBLEM Dx:

## 2023-10-05 NOTE — PROGRESS NOTE ADULT - SUBJECTIVE AND OBJECTIVE BOX
GASTROENTEROLOGY PROGRESS NOTE  Patient seen and examined at bedside.  -BW from 10/4/23, with stable Hgb, received Coumadin 3 mg overnight  -LTs downtrending on 10/4/23 BW  -Awaiting 10/5 labs  -Stepdown from SICU     ROS: limited ROS 2/2 advanced dementia. Denies any pain, nausea/vomiting. Reports has been eating. Confirmed with RN, patient's last BM AM 10/5.     PERTINENT REVIEW OF SYSTEMS:  CONSTITUTIONAL: No weakness, fevers or chills  HEENT: No visual changes; No vertigo or throat pain   GASTROINTESTINAL: As above.  NEUROLOGICAL: No numbness or weakness  SKIN: No itching, burning, rashes, or lesions     Allergies    penicillin (Hives)  chlorhexidine topical (Rash)  fish (Other)    Intolerances      MEDICATIONS:  MEDICATIONS  (STANDING):  dextrose 5%. 1000 milliLiter(s) (50 mL/Hr) IV Continuous <Continuous>  dextrose 50% Injectable 25 Gram(s) IV Push once  glucagon  Injectable 1 milliGRAM(s) IntraMuscular once  insulin lispro (ADMELOG) corrective regimen sliding scale   SubCutaneous every 6 hours  levothyroxine 50 MICROGram(s) Oral daily  metoprolol tartrate 12.5 milliGRAM(s) Oral every 12 hours  pantoprazole    Tablet 40 milliGRAM(s) Oral before breakfast  piperacillin/tazobactam IVPB.. 3.375 Gram(s) IV Intermittent every 8 hours  sertraline 50 milliGRAM(s) Oral daily  warfarin 3 milliGRAM(s) Oral once    MEDICATIONS  (PRN):  dextrose Oral Gel 15 Gram(s) Oral once PRN Blood Glucose LESS THAN 70 milliGRAM(s)/deciliter  ondansetron Injectable 4 milliGRAM(s) IV Push every 6 hours PRN Nausea    Vital Signs Last 24 Hrs  T(C): 36.6 (05 Oct 2023 04:52), Max: 37 (04 Oct 2023 17:45)  T(F): 97.8 (05 Oct 2023 04:52), Max: 98.6 (04 Oct 2023 17:45)  HR: 72 (05 Oct 2023 04:25) (60 - 74)  BP: 118/75 (05 Oct 2023 04:25) (118/75 - 192/77)  BP(mean): 92 (05 Oct 2023 04:25) (89 - 118)  RR: 18 (05 Oct 2023 04:25) (18 - 28)  SpO2: 90% (05 Oct 2023 04:25) (81% - 95%)    Parameters below as of 05 Oct 2023 04:25  Patient On (Oxygen Delivery Method): face tent        10-04 @ 07:01  -  10-05 @ 07:00  --------------------------------------------------------  IN: 340 mL / OUT: 700 mL / NET: -360 mL      PHYSICAL EXAM:    General: elderly  female, lying in bed; in no acute distress  HEENT: MMM, conjunctiva and sclera clear  Gastrointestinal: Soft non-tender non-distended; No rebound or guarding  Skin: Warm and dry. No obvious rash    LABS:                        11.6   13.76 )-----------( 195      ( 04 Oct 2023 14:39 )             35.5     10-04    138  |  102  |  19  ----------------------------<  91  3.7   |  27  |  0.61    Ca    9.5      04 Oct 2023 14:39  Phos  1.8     10-04  Mg     2.1     10-04    TPro  7.3  /  Alb  3.1<L>  /  TBili  1.0  /  DBili  x   /  AST  50<H>  /  ALT  84<H>  /  AlkPhos  372<H>  10-04    PT/INR - ( 04 Oct 2023 14:39 )   PT: 10.4 sec;   INR: 0.91                Urinalysis Basic - ( 04 Oct 2023 14:39 )    Color: x / Appearance: x / SG: x / pH: x  Gluc: 91 mg/dL / Ketone: x  / Bili: x / Urobili: x   Blood: x / Protein: x / Nitrite: x   Leuk Esterase: x / RBC: x / WBC x   Sq Epi: x / Non Sq Epi: x / Bacteria: x                RADIOLOGY & ADDITIONAL STUDIES:  Reviewed

## 2023-10-05 NOTE — PROGRESS NOTE ADULT - ASSESSMENT
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 Now presenting with acute cholangitis and pancreatitis. Discussed GOC - would like to proceed with intervention. Admitted to SICU for hemodynamic monitoring and further intervention s/p ERCP (10/2) w/ sphincterotomy, PD/CBD stent.     Pureed diet  Zosyn  Warfarin (INR goal 2-3)  pain/nausea prn  PPI  OOB/IS  SCD/no SQH  home meds as appropriate

## 2023-10-05 NOTE — DISCHARGE NOTE PROVIDER - NS AS DC PROVIDER CONTACT Y/N MULTI
V2.0  [facility] [place]      Name:  Jace Blanton /Age/Sex: 1959  (61 y.o. male)   MRN & CSN:  [id number] & [id number] Encounter Date/Time: 2022 3:41 PM EDT    Location:  [facility] PCP: Nadir Pan MD       Hospital Day: 4    Assessment and Plan:   Jace Blanton is a 61 y.o. male with PMH of type 2 diabetes, COPD, CAD, hypertension, hyperlipidemia who presents with Necrotizing fasciitis ([facility] [address]    #Right diabetic foot wound with suspected necrotizing fasciitis and right metatarsal osteomyelitis s/p BKA  -X-ray of the right foot suspicious for necrotizing fasciitis and osteomyelitis of fifth metatarsal    Plan:  Continue vancomycin and Zosyn  Pain control with Dilaudid and oxycodone  ID on consult, appreciate recommendation    #uncontrolled type 2 diabetes  -A1c 7.8 in 2022    Plan:  Lantus increased to 12 units + 6U lispro + low dose sliding scale    #Hyponatremia-resolving  -Was initially in the setting of hypoglycemia  -Possibly secondary to pain as well? Plan:  Repeat BMP in the morning    Chronic medical problems:    #PAD  Plan: Continue aspirin    #COPD, not in exacerbation    #HLD  -Not on statin    #Hypertension  -Not on any medication at home  -Blood pressure under control while she is here, will not start any medications at this time    #Tobacco use disorder  Plan: Nicotine patch      Diet ADULT DIET; Regular; 4 carb choices (60 gm/meal); Low Fat/Low Chol/High Fiber/MELISSA  ADULT ORAL NUTRITION SUPPLEMENT; Breakfast, Lunch, Dinner;  Low Calorie/High Protein Oral Supplement   DVT Prophylaxis [x] Lovenox, []  Heparin, [] SCDs, [] Ambulation,  [] Eliquis, [] Xarelto  [] Coumadin   Code Status Full Code   Disposition From: Home  Expected Disposition: Pending PT and OT eval  Estimated Date of Discharge: Greater than 24-hour  Patient requires continued admission due to [address] requiring PT and OT   Surrogate Decision Maker/ RIAZ Han     Subjective:     Chief Complaint: Foot Injury (Patient states a can of beans fell from 6' onto his right foot. The bruise is now black and he is worried because he has diabetes and a chronic wound on the bottom of the same foot. )       Patient still endorses some LE soreness, he is waiting for the nurse to give him pain medications. No other complaints this morning. Review of Systems:    General: No fever, no chills  Heart: No chest pain, no palpitations  Lungs: No shortness of breath, no cough  Abdomen: No abdominal pain, no nausea, no vomiting, no constipation, no diarrhea  : No frequency, no dysuria, no urgency, no decreased urination  MSK: No lower extremity swelling, +right leg pain  Neuro: No confusion, no weakness  Skin: No rashes      Objective:      Intake/Output Summary (Last 24 hours) at 9/1/2022 1447  Last data filed at 9/1/2022 1328  Gross per 24 hour   Intake 5 ml   Output 200 ml   Net -195 ml        Vitals:   Vitals:    09/01/22 1441   BP: 124/70   Pulse: 63   Resp: 14   Temp: 98.4 °F (36.9 °C)   SpO2: 99%       Physical Exam:     General: NAD, AAO x3-4  Eyes: EOMI  HENT: Atraumatic  Respiratory: no respiratory distress  GI: Normal bowel sounds, soft, nondistended, nontender  MSK: Right leg BKA wrapped in Ace bandage and in immobilizer  Skin: Intact, dry, warm, no rashes  Neuro: CN II to XII grossly intact  Psych: Normal mood    Medications:   Medications:    acetaminophen  650 mg Oral Q6H    ampicillin IV  2,000 mg IntraVENous 6 times per day    insulin lispro  0.08 Units/kg SubCUTAneous TID     famotidine  20 mg Oral Daily    nicotine  1 patch TransDERmal Daily    aspirin  81 mg Oral Daily    sodium chloride flush  5-40 mL IntraVENous 2 times per day    enoxaparin  40 mg SubCUTAneous Daily    insulin glargine  10 Units SubCUTAneous Nightly      Infusions:    dextrose      sodium chloride 25 mL/hr at 08/31/22 0928     PRN Meds: HYDROmorphone, 1 mg, Q2H PRN  oxyCODONE, 5 mg, Q3H PRN  methocarbamol, 500 mg, 4x Daily PRN  glucose, 4 tablet, PRN  dextrose bolus, 125 mL, PRN   Or  dextrose bolus, 250 mL, PRN  glucagon (rDNA), 1 mg, PRN  dextrose, , Continuous PRN  sodium chloride flush, 5-40 mL, PRN  sodium chloride, , PRN  ondansetron, 4 mg, Q8H PRN   Or  ondansetron, 4 mg, Q6H PRN  polyethylene glycol, 17 g, Daily PRN      Labs      Recent Results (from the past 24 hour(s))   POCT Glucose    Collection Time: 08/31/22  4:41 PM   Result Value Ref Range    POC Glucose 273 (H) 70 - 99 MG/DL   POCT Glucose    Collection Time: 08/31/22  8:27 PM   Result Value Ref Range    POC Glucose 192 (H) 70 - 99 MG/DL   Creatinine    Collection Time: 09/01/22  5:33 AM   Result Value Ref Range    Creatinine 0.6 (L) 0.9 - 1.3 MG/DL    GFR Non-African American >60 >60 mL/min/1.73m2    GFR African American >60 >60 mL/min/1.73m2   C-Reactive Protein    Collection Time: 09/01/22  5:33 AM   Result Value Ref Range    CRP, High Sensitivity 45.5 mg/L   POCT Glucose    Collection Time: 09/01/22  6:35 AM   Result Value Ref Range    POC Glucose 291 (H) 70 - 99 MG/DL   POCT Glucose    Collection Time: 09/01/22 11:00 AM   Result Value Ref Range    POC Glucose 258 (H) 70 - 99 MG/DL        Imaging/Diagnostics Last 24 Hours   XR FOOT RIGHT (MIN 3 VIEWS)    Result Date: 8/29/2022  EXAMINATION: THREE XRAY VIEWS OF THE RIGHT FOOT 8/29/2022 9:05 am COMPARISON: None. HISTORY: ORDERING SYSTEM PROVIDED HISTORY: drop can of beans of foot, history of DM ulcer, black necrotic tissue present TECHNOLOGIST PROVIDED HISTORY: Reason for exam:->drop can of beans of foot, history of DM ulcer, black necrotic tissue present Reason for Exam: pain-dropped can of beans on foot FINDINGS: There is air in the subcutaneous soft tissues of the midfoot extending slightly into the forefoot and extending proximal into the ankle and there is a soft tissue ulcer on the plantar aspect of the junction of the midfoot and forefoot.   There is cortical loss of the bone adjacent to the ulcer on the lateral view which most likely represents residual base of the 5th metatarsal suspicious for osteomyelitis. Findings suspicious for necrotizing fasciitis. Findings suspicious for osteomyelitis on the lateral view possibly in the region of the base of the 5th metatarsal which is partially amputated. Findings were discussed with Dr. Aedlso Caballero on 08/29/2022 at 9:50 a.m.        Electronically signed by Rocky Gaspar MD on 9/1/2022 at 2:47 PM Yes

## 2023-10-05 NOTE — DISCHARGE NOTE PROVIDER - HOSPITAL COURSE
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 Patient then admitted in August 2023 for SOB and fever, enterorhinovirus+, COVID+ w/ b/l opacities on CXR, at that time patient also had elevated LFTs that started to resolve so patient did not undergo IR drainage. She was dc'ed on ursodiol with a plan for outpatient perc silvana. Most recently, she presented 10/1 with acute cholangitis and pancreatitis. Admitted to SICU for hemodynamic monitoring and further intervention s/p ERCP (10/2) w/ sphincterotomy, PD/CBD stent. Improving JEN, Cr now down to 0.46 with persistent urine output, tolerating regular diet. Switched from zosyn to PO augmentin on 10/5.         10/5: switched to PO augmentin, INR 12pm 1.05 ordered warfarin  ON: SDU to 8 lach, desat to 78 on RA; corrected to 97 w/ nonrebreather, placed on face tent sat 94, PTOV, [id number] HR70; labetalol 10 x1,   -----------SDU----------------  10/4: d/c baez, resume zoloft, stepdown   ON: 3 mg warfarin given. UOP stable O/N  10/3: adv to puree diet. start warfarin tonight. Given total 500 cc in boluses  ON: Cr slightly rising to 0.79. Ulytes: FeUrea 19%, high spec grav, epithelial cells. Possibily MANUELITO. UOP labile - watch for now given diffuse B lines on POCUS. AM FS 67 - given .5 amp, started on pure diet  10/2: Lactate 1.3. Dec fluid to 50cc/hr. UOP low so was given Albumin 250cc bolus. HLIV given CXR.  ON: Repeat Lactate 5. Given 10 vit K and 2 u FFP. FS 79, dowtrending - given 1/2 amp.  POCUS A/B pattern at apices. Repeat lactate and coags 4.4 (5) and INR 1.43 (3.2). D/C lopressor per cards recs. FeUrea 39.5%. UOP improving after blood products. Short interval of pacing that self resolved - mental status improved. 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 Patient then admitted in August 2023 for SOB and fever, enterorhinovirus+, COVID+ w/ b/l opacities on CXR, at that time patient also had elevated LFTs that started to resolve so patient did not undergo IR drainage. She was dc'ed on ursodiol with a plan for outpatient perc silvana. Most recently, she presented 10/1 with acute cholangitis and pancreatitis. Admitted to SICU for hemodynamic monitoring and further intervention s/p ERCP (10/2) w/ sphincterotomy, PD/CBD stent. Developed JEN, Cr now down to 0.46 with persistent urine output, tolerating pureed diet. Switched from zosyn to PO augmentin on 10/5. Restarted home warfarin and metoprolol 10/4.

## 2023-10-05 NOTE — DISCHARGE NOTE PROVIDER - PROVIDER TOKENS
PROVIDER:[TOKEN:[[id number]]],PROVIDER:[TOKEN:[[id number]]],PROVIDER:[TOKEN:[[id number]]]

## 2023-10-05 NOTE — PROGRESS NOTE ADULT - SUBJECTIVE AND OBJECTIVE BOX
SUBJECTIVE: Pt seen and examined at bedside. Patient states feels well, no nausea or vomiting. Patient satting well on room air.      MEDICATIONS  (STANDING):  dextrose 5%. 1000 milliLiter(s) (50 mL/Hr) IV Continuous <Continuous>  dextrose 50% Injectable 25 Gram(s) IV Push once  glucagon  Injectable 1 milliGRAM(s) IntraMuscular once  insulin lispro (ADMELOG) corrective regimen sliding scale   SubCutaneous every 6 hours  levothyroxine 50 MICROGram(s) Oral daily  metoprolol tartrate 12.5 milliGRAM(s) Oral every 12 hours  pantoprazole    Tablet 40 milliGRAM(s) Oral before breakfast  piperacillin/tazobactam IVPB.. 3.375 Gram(s) IV Intermittent every 8 hours  sertraline 50 milliGRAM(s) Oral daily  warfarin 3 milliGRAM(s) Oral once    MEDICATIONS  (PRN):  dextrose Oral Gel 15 Gram(s) Oral once PRN Blood Glucose LESS THAN 70 milliGRAM(s)/deciliter  ondansetron Injectable 4 milliGRAM(s) IV Push every 6 hours PRN Nausea      Vital Signs Last 24 Hrs  T(C): 36.6 (05 Oct 2023 04:52), Max: 37 (04 Oct 2023 17:45)  T(F): 97.8 (05 Oct 2023 04:52), Max: 98.6 (04 Oct 2023 17:45)  HR: 72 (05 Oct 2023 04:25) (60 - 74)  BP: 118/75 (05 Oct 2023 04:25) (118/75 - 192/77)  BP(mean): 92 (05 Oct 2023 04:25) (89 - 118)  RR: 18 (05 Oct 2023 04:25) (18 - 28)  SpO2: 90% (05 Oct 2023 04:25) (81% - 95%)    Parameters below as of 05 Oct 2023 04:25  Patient On (Oxygen Delivery Method): face tent        PHYSICAL EXAM:  General: NAD, pt resting comfortably in bed  Pulm: No respiratory distress, nonlabored breathing, on room air  CVS: NSR, HDS  Abd: Soft, ND, RUQ TTP  Extremities: WWP, no edema          I&O's Detail    04 Oct 2023 07:01  -  05 Oct 2023 07:00  --------------------------------------------------------  IN:    IV PiggyBack: 100 mL    Oral Fluid: 240 mL  Total IN: 340 mL    OUT:    Indwelling Catheter - Urethral (mL): 250 mL    Voided (mL): 450 mL  Total OUT: 700 mL    Total NET: -360 mL          LABS:                        11.6   13.76 )-----------( 195      ( 04 Oct 2023 14:39 )             35.5     10-04    138  |  102  |  19  ----------------------------<  91  3.7   |  27  |  0.61    Ca    9.5      04 Oct 2023 14:39  Phos  1.8     10-04  Mg     2.1     10-04    TPro  7.3  /  Alb  3.1<L>  /  TBili  1.0  /  DBili  x   /  AST  50<H>  /  ALT  84<H>  /  AlkPhos  372<H>  10-04    PT/INR - ( 04 Oct 2023 14:39 )   PT: 10.4 sec;   INR: 0.91            Urinalysis Basic - ( 04 Oct 2023 14:39 )    Color: x / Appearance: x / SG: x / pH: x  Gluc: 91 mg/dL / Ketone: x  / Bili: x / Urobili: x   Blood: x / Protein: x / Nitrite: x   Leuk Esterase: x / RBC: x / WBC x   Sq Epi: x / Non Sq Epi: x / Bacteria: x        RADIOLOGY & ADDITIONAL STUDIES:

## 2023-10-05 NOTE — DISCHARGE NOTE PROVIDER - NSDCMRMEDTOKEN_GEN_ALL_CORE_FT
Digox 125 mcg (0.125 mg) oral tablet: 1 tab(s) orally once a day  Jantoven 3 mg oral tablet: 1 tab(s) orally once a day  Lasix 20 mg oral tablet: 1 tab(s) orally every other day  Lipitor 20 mg oral tablet: 1 tab(s) orally once a day (at bedtime)  metoprolol succinate 50 mg oral tablet, extended release: 1 tab(s) orally 2 times a day  spironolactone 25 mg oral tablet: 1 tab(s) orally every other day  Synthroid 50 mcg (0.05 mg) oral tablet: 1 tab(s) orally once a day  Zoloft 50 mg oral tablet: 1 tab(s) orally once a day   amoxicillin-clavulanate 875 mg-125 mg oral tablet: 1 tab(s) orally 2 times a day  Digox 125 mcg (0.125 mg) oral tablet: 1 tab(s) orally once a day  Jantoven 3 mg oral tablet: 1 tab(s) orally once a day  Lasix 20 mg oral tablet: 1 tab(s) orally every other day  Lipitor 20 mg oral tablet: 1 tab(s) orally once a day (at bedtime)  metoprolol succinate 50 mg oral tablet, extended release: 1 tab(s) orally 2 times a day  sertraline 50 mg oral tablet: 1 tab(s) orally once a day  spironolactone 25 mg oral tablet: 1 tab(s) orally every other day  Synthroid 50 mcg (0.05 mg) oral tablet: 1 tab(s) orally once a day  Zoloft 50 mg oral tablet: 1 tab(s) orally once a day   amoxicillin-clavulanate 875 mg-125 mg oral tablet: 1 tab(s) orally 2 times a day  Digox 125 mcg (0.125 mg) oral tablet: 1 tab(s) orally once a day  Jantoven 3 mg oral tablet: 1 tab(s) orally once a day  Lasix 20 mg oral tablet: 1 tab(s) orally every other day  Lipitor 20 mg oral tablet: 1 tab(s) orally once a day (at bedtime)  metoprolol succinate 50 mg oral tablet, extended release: 1 tab(s) orally 2 times a day  spironolactone 25 mg oral tablet: 1 tab(s) orally every other day  Synthroid 50 mcg (0.05 mg) oral tablet: 1 tab(s) orally once a day  Zoloft 50 mg oral tablet: 1 tab(s) orally once a day

## 2023-10-06 ENCOUNTER — TRANSCRIPTION ENCOUNTER (OUTPATIENT)
Age: 88
End: 2023-10-06

## 2023-10-06 VITALS
RESPIRATION RATE: 16 BRPM | HEART RATE: 88 BPM | SYSTOLIC BLOOD PRESSURE: 141 MMHG | OXYGEN SATURATION: 95 % | DIASTOLIC BLOOD PRESSURE: 51 MMHG

## 2023-10-06 LAB
CULTURE RESULTS: SIGNIFICANT CHANGE UP
CULTURE RESULTS: SIGNIFICANT CHANGE UP
GLUCOSE BLDC GLUCOMTR-MCNC: 101 MG/DL — HIGH (ref 70–99)
GLUCOSE BLDC GLUCOMTR-MCNC: 96 MG/DL — SIGNIFICANT CHANGE UP (ref 70–99)
GLUCOSE BLDC GLUCOMTR-MCNC: 98 MG/DL — SIGNIFICANT CHANGE UP (ref 70–99)
SPECIMEN SOURCE: SIGNIFICANT CHANGE UP
SPECIMEN SOURCE: SIGNIFICANT CHANGE UP

## 2023-10-06 PROCEDURE — 80048 BASIC METABOLIC PNL TOTAL CA: CPT

## 2023-10-06 PROCEDURE — 87040 BLOOD CULTURE FOR BACTERIA: CPT

## 2023-10-06 PROCEDURE — C2625: CPT

## 2023-10-06 PROCEDURE — 81001 URINALYSIS AUTO W/SCOPE: CPT

## 2023-10-06 PROCEDURE — C1769: CPT

## 2023-10-06 PROCEDURE — 82150 ASSAY OF AMYLASE: CPT

## 2023-10-06 PROCEDURE — 74177 CT ABD & PELVIS W/CONTRAST: CPT | Mod: MG

## 2023-10-06 PROCEDURE — 86901 BLOOD TYPING SEROLOGIC RH(D): CPT

## 2023-10-06 PROCEDURE — 84156 ASSAY OF PROTEIN URINE: CPT

## 2023-10-06 PROCEDURE — C1889: CPT

## 2023-10-06 PROCEDURE — G1004: CPT

## 2023-10-06 PROCEDURE — 82962 GLUCOSE BLOOD TEST: CPT

## 2023-10-06 PROCEDURE — 36415 COLL VENOUS BLD VENIPUNCTURE: CPT

## 2023-10-06 PROCEDURE — 83735 ASSAY OF MAGNESIUM: CPT

## 2023-10-06 PROCEDURE — 84100 ASSAY OF PHOSPHORUS: CPT

## 2023-10-06 PROCEDURE — 84300 ASSAY OF URINE SODIUM: CPT

## 2023-10-06 PROCEDURE — 80053 COMPREHEN METABOLIC PANEL: CPT

## 2023-10-06 PROCEDURE — 84540 ASSAY OF URINE/UREA-N: CPT

## 2023-10-06 PROCEDURE — 93306 TTE W/DOPPLER COMPLETE: CPT

## 2023-10-06 PROCEDURE — 96375 TX/PRO/DX INJ NEW DRUG ADDON: CPT

## 2023-10-06 PROCEDURE — 84133 ASSAY OF URINE POTASSIUM: CPT

## 2023-10-06 PROCEDURE — C1877: CPT

## 2023-10-06 PROCEDURE — 82570 ASSAY OF URINE CREATININE: CPT

## 2023-10-06 PROCEDURE — 36430 TRANSFUSION BLD/BLD COMPNT: CPT

## 2023-10-06 PROCEDURE — 71045 X-RAY EXAM CHEST 1 VIEW: CPT

## 2023-10-06 PROCEDURE — ZZZZZ: CPT

## 2023-10-06 PROCEDURE — 83935 ASSAY OF URINE OSMOLALITY: CPT

## 2023-10-06 PROCEDURE — 85730 THROMBOPLASTIN TIME PARTIAL: CPT

## 2023-10-06 PROCEDURE — 86850 RBC ANTIBODY SCREEN: CPT

## 2023-10-06 PROCEDURE — 85025 COMPLETE CBC W/AUTO DIFF WBC: CPT

## 2023-10-06 PROCEDURE — P9059: CPT

## 2023-10-06 PROCEDURE — 83036 HEMOGLOBIN GLYCOSYLATED A1C: CPT

## 2023-10-06 PROCEDURE — 86900 BLOOD TYPING SEROLOGIC ABO: CPT

## 2023-10-06 PROCEDURE — 80076 HEPATIC FUNCTION PANEL: CPT

## 2023-10-06 PROCEDURE — 83690 ASSAY OF LIPASE: CPT

## 2023-10-06 PROCEDURE — 85610 PROTHROMBIN TIME: CPT

## 2023-10-06 PROCEDURE — 82248 BILIRUBIN DIRECT: CPT

## 2023-10-06 PROCEDURE — 99285 EMERGENCY DEPT VISIT HI MDM: CPT

## 2023-10-06 PROCEDURE — 82247 BILIRUBIN TOTAL: CPT

## 2023-10-06 PROCEDURE — 85027 COMPLETE CBC AUTOMATED: CPT

## 2023-10-06 PROCEDURE — P9045: CPT

## 2023-10-06 PROCEDURE — 74330 X-RAY BILE/PANC ENDOSCOPY: CPT

## 2023-10-06 PROCEDURE — 76705 ECHO EXAM OF ABDOMEN: CPT

## 2023-10-06 PROCEDURE — 83605 ASSAY OF LACTIC ACID: CPT

## 2023-10-06 RX ORDER — HYDRALAZINE HCL 50 MG
10 TABLET ORAL ONCE
Refills: 0 | Status: COMPLETED | OUTPATIENT
Start: 2023-10-06 | End: 2023-10-06

## 2023-10-06 RX ORDER — SERTRALINE 25 MG/1
1 TABLET, FILM COATED ORAL
Qty: 0 | Refills: 0 | DISCHARGE
Start: 2023-10-06

## 2023-10-06 RX ORDER — HYDRALAZINE HCL 50 MG
5 TABLET ORAL ONCE
Refills: 0 | Status: COMPLETED | OUTPATIENT
Start: 2023-10-06 | End: 2023-10-06

## 2023-10-06 RX ADMIN — SERTRALINE 50 MILLIGRAM(S): 25 TABLET, FILM COATED ORAL at 11:33

## 2023-10-06 RX ADMIN — Medication 12.5 MILLIGRAM(S): at 06:06

## 2023-10-06 RX ADMIN — Medication 10 MILLIGRAM(S): at 09:02

## 2023-10-06 RX ADMIN — Medication 50 MICROGRAM(S): at 06:05

## 2023-10-06 RX ADMIN — PANTOPRAZOLE SODIUM 40 MILLIGRAM(S): 20 TABLET, DELAYED RELEASE ORAL at 06:05

## 2023-10-06 RX ADMIN — Medication 1 TABLET(S): at 06:05

## 2023-10-06 RX ADMIN — Medication 5 MILLIGRAM(S): at 00:43

## 2023-10-06 NOTE — PROGRESS NOTE ADULT - SUBJECTIVE AND OBJECTIVE BOX
SUBJECTIVE: Patient seen and examined at bedside. Denies abdominal pain as well as nausea, vomiting, bloating or cramping, fevers or chills. Tolerating pureed diet.    amoxicillin  875 milliGRAM(s)/clavulanate 1 Tablet(s) Oral two times a day  metoprolol tartrate 12.5 milliGRAM(s) Oral every 12 hours    MEDICATIONS  (PRN):  dextrose Oral Gel 15 Gram(s) Oral once PRN Blood Glucose LESS THAN 70 milliGRAM(s)/deciliter  ondansetron Injectable 4 milliGRAM(s) IV Push every 6 hours PRN Nausea      I&O's Detail    05 Oct 2023 07:01  -  06 Oct 2023 07:00  --------------------------------------------------------  IN:    Oral Fluid: 720 mL  Total IN: 720 mL    OUT:    Voided (mL): 800 mL  Total OUT: 800 mL    Total NET: -80 mL          T(C): 36.5 (10-06-23 @ 05:00), Max: 36.8 (10-05-23 @ 09:22)  HR: 64 (10-06-23 @ 04:06) (58 - 68)  BP: 164/70 (10-06-23 @ 04:06) (125/56 - 217/82)  RR: 17 (10-06-23 @ 04:06) (17 - 18)  SpO2: 92% (10-06-23 @ 04:06) (91% - 92%)    General: NAD, pt resting comfortably in bed  Pulm: No respiratory distress, nonlabored breathing, on room air  CVS: NSR, HDS  Abd: Soft, ND, RUQ TTP  Extremities: WWP, no edema    LABS:                        11.1   13.81 )-----------( 144      ( 05 Oct 2023 05:30 )             35.5     10-05    132<L>  |  102  |  13  ----------------------------<  91  4.1   |  21<L>  |  0.46<L>    Ca    9.0      05 Oct 2023 05:30  Phos  2.5     10-05  Mg     1.8     10-05    TPro  6.4  /  Alb  2.3<L>  /  TBili  1.0  /  DBili  x   /  AST  35  /  ALT  56<H>  /  AlkPhos  303<H>  10-05    PT/INR - ( 05 Oct 2023 12:26 )   PT: 12.0 sec;   INR: 1.05            Urinalysis Basic - ( 05 Oct 2023 05:30 )    Color: x / Appearance: x / SG: x / pH: x  Gluc: 91 mg/dL / Ketone: x  / Bili: x / Urobili: x   Blood: x / Protein: x / Nitrite: x   Leuk Esterase: x / RBC: x / WBC x   Sq Epi: x / Non Sq Epi: x / Bacteria: x        RADIOLOGY & ADDITIONAL STUDIES:      Culture - Blood (collected 10-01-23 @ 13:27)  Source: .Blood Blood-Peripheral  Preliminary Report (10-05-23 @ 16:00):    No growth at 4 days.    Culture - Blood (collected 10-01-23 @ 13:27)  Source: .Blood Blood-Peripheral  Preliminary Report (10-05-23 @ 16:00):    No growth at 4 days.

## 2023-10-06 NOTE — PROGRESS NOTE ADULT - PROVIDER SPECIALTY LIST ADULT
Cardiology
Cardiology
Internal Medicine
SICU
Surgery
Cardiology
Cardiology
Gastroenterology
Gastroenterology
Internal Medicine
Internal Medicine
SICU
SICU
Surgery
Gastroenterology
Internal Medicine
Surgery

## 2023-10-06 NOTE — PROVIDER CONTACT NOTE (OTHER) - SITUATION
SBP > 200
Pt is a&ox1 to self, reorientated frequently to situation, place, time.
Patient is hypertensive

## 2023-10-06 NOTE — DISCHARGE NOTE NURSING/CASE MANAGEMENT/SOCIAL WORK - PATIENT PORTAL LINK FT
You can access the FollowMyHealth Patient Portal offered by [facility] by registering at the following website: [email or web address]. By joining [facility]’s FollowMyHealth portal, you will also be able to view your health information using other applications (apps) compatible with our system.

## 2023-10-06 NOTE — PROGRESS NOTE ADULT - ASSESSMENT
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 Now presenting with acute cholangitis and pancreatitis. Discussed GOC - would like to proceed with intervention. Admitted to SICU for hemodynamic monitoring and further intervention s/p ERCP (10/2) w/ sphincterotomy, PD/CBD stent.     pureed diet  Augmentin  Warfarin (INR goal 2-3)  pain/nausea prn  PPI  OOB/IS  SCD/no SQH  Dispo: home with home aid 10/6

## 2023-10-25 ENCOUNTER — APPOINTMENT (OUTPATIENT)
Dept: COLORECTAL SURGERY | Facility: CLINIC | Age: 88
End: 2023-10-25
Payer: MEDICARE

## 2023-10-25 VITALS
WEIGHT: 99 LBS | RESPIRATION RATE: 14 BRPM | HEIGHT: 62 IN | HEART RATE: 46 BPM | SYSTOLIC BLOOD PRESSURE: 139 MMHG | DIASTOLIC BLOOD PRESSURE: 60 MMHG | BODY MASS INDEX: 18.22 KG/M2 | OXYGEN SATURATION: 96 %

## 2023-10-25 PROCEDURE — 99213 OFFICE O/P EST LOW 20 MIN: CPT

## 2023-12-08 ENCOUNTER — APPOINTMENT (OUTPATIENT)
Dept: GASTROENTEROLOGY | Facility: CLINIC | Age: 88
End: 2023-12-08
Payer: MEDICARE

## 2023-12-08 VITALS
RESPIRATION RATE: 14 BRPM | HEIGHT: 62 IN | HEART RATE: 50 BPM | DIASTOLIC BLOOD PRESSURE: 57 MMHG | BODY MASS INDEX: 18.4 KG/M2 | WEIGHT: 100 LBS | SYSTOLIC BLOOD PRESSURE: 120 MMHG | OXYGEN SATURATION: 95 % | TEMPERATURE: 96.3 F

## 2023-12-08 DIAGNOSIS — K85.10 BILIARY ACUTE PANCREATITIS WITHOUT NECROSIS OR INFECTION: ICD-10-CM

## 2023-12-08 DIAGNOSIS — K80.50 CALCULUS OF BILE DUCT W/OUT CHOLANGITIS OR CHOLECYSTITIS W/OUT OBSTRUCTION: ICD-10-CM

## 2023-12-08 PROCEDURE — 99214 OFFICE O/P EST MOD 30 MIN: CPT

## 2023-12-10 ENCOUNTER — NON-APPOINTMENT (OUTPATIENT)
Age: 88
End: 2023-12-10

## 2023-12-11 LAB
ALBUMIN SERPL ELPH-MCNC: 3.8 G/DL
ALP BLD-CCNC: 208 U/L
ALT SERPL-CCNC: 19 U/L
ANION GAP SERPL CALC-SCNC: 13 MMOL/L
AST SERPL-CCNC: 28 U/L
BASOPHILS # BLD AUTO: 0.06 K/UL
BASOPHILS NFR BLD AUTO: 0.5 %
BILIRUB SERPL-MCNC: 0.3 MG/DL
BUN SERPL-MCNC: 22 MG/DL
CALCIUM SERPL-MCNC: 9.8 MG/DL
CHLORIDE SERPL-SCNC: 101 MMOL/L
CO2 SERPL-SCNC: 23 MMOL/L
CREAT SERPL-MCNC: 0.56 MG/DL
EGFR: 84 ML/MIN/1.73M2
EOSINOPHIL # BLD AUTO: 0.35 K/UL
EOSINOPHIL NFR BLD AUTO: 3.1 %
GLUCOSE SERPL-MCNC: 124 MG/DL
HCT VFR BLD CALC: 41.4 %
HGB BLD-MCNC: 13.5 G/DL
IMM GRANULOCYTES NFR BLD AUTO: 0.3 %
LYMPHOCYTES # BLD AUTO: 2.71 K/UL
LYMPHOCYTES NFR BLD AUTO: 24.1 %
MAN DIFF?: NORMAL
MCHC RBC-ENTMCNC: 32.2 PG
MCHC RBC-ENTMCNC: 32.6 GM/DL
MCV RBC AUTO: 98.8 FL
MONOCYTES # BLD AUTO: 0.99 K/UL
MONOCYTES NFR BLD AUTO: 8.8 %
NEUTROPHILS # BLD AUTO: 7.09 K/UL
NEUTROPHILS NFR BLD AUTO: 63.2 %
PLATELET # BLD AUTO: 212 K/UL
POTASSIUM SERPL-SCNC: 4.4 MMOL/L
PROT SERPL-MCNC: 7.5 G/DL
RBC # BLD: 4.19 M/UL
RBC # FLD: 13.6 %
SODIUM SERPL-SCNC: 137 MMOL/L
WBC # FLD AUTO: 11.23 K/UL

## 2023-12-14 ENCOUNTER — APPOINTMENT (OUTPATIENT)
Dept: HEART AND VASCULAR | Facility: CLINIC | Age: 88
End: 2023-12-14
Payer: MEDICARE

## 2023-12-14 ENCOUNTER — NON-APPOINTMENT (OUTPATIENT)
Age: 88
End: 2023-12-14

## 2023-12-14 VITALS
WEIGHT: 90 LBS | DIASTOLIC BLOOD PRESSURE: 58 MMHG | HEART RATE: 51 BPM | SYSTOLIC BLOOD PRESSURE: 96 MMHG | BODY MASS INDEX: 16.99 KG/M2 | HEIGHT: 61 IN

## 2023-12-14 DIAGNOSIS — I48.91 UNSPECIFIED ATRIAL FIBRILLATION: ICD-10-CM

## 2023-12-14 DIAGNOSIS — I49.5 SICK SINUS SYNDROME: ICD-10-CM

## 2023-12-14 PROCEDURE — 93280 PM DEVICE PROGR EVAL DUAL: CPT

## 2023-12-20 PROBLEM — I49.5 TACHY-BRADY SYNDROME: Status: ACTIVE | Noted: 2019-03-18

## 2023-12-20 PROBLEM — I48.91 ATRIAL FIBRILLATION: Status: ACTIVE | Noted: 2019-03-18

## 2023-12-20 NOTE — PROCEDURE
[No] : not [NSR] : normal sinus rhythm [Pacemaker] : pacemaker [DDD] : DDD [Threshold Testing Performed] : Threshold testing was performed [Lead Imp:  ___ohms] : lead impedance was [unfilled] ohms [Sensing Amplitude ___mv] : sensing amplitude was [unfilled] mv [___V @] : [unfilled] V [___ ms] : [unfilled] ms [None] : none [de-identified] : [facility]  [de-identified] : [employer] [de-identified] : [id number] [de-identified] : 2/13/19 [de-identified] : 50/120 [de-identified] : 11.5years [de-identified] : %  <1%

## 2023-12-20 NOTE — DISCUSSION/SUMMARY
[Pacemaker Function Normal] : normal pacemaker function [FreeTextEntry1] : 89 year-old female with chronic diastolic CHF (EF 55%), HTN, HLD, hypothyroidism, s/p MVr and MYKE closure (incompletely closed, with sludge) with Dr. Leroy in 2016, pAF (on Coumadin) and tachy mamie syndrome s/p pacemaker who presents for follow up.  Pacemaker interrogation reveals normal function.  All measured data is within normal limits.  She is maintained on Coumadin and has regular INR checks. She will follow up in 6 months or sooner if needed and knows to call with any questions or concerns in the interim

## 2023-12-20 NOTE — HISTORY OF PRESENT ILLNESS
[Palpitations] : no palpitations [SOB] : no dyspnea [Syncope] : no syncope [Dizziness] : no dizziness [Chest Pain] : no chest pain or discomfort [Shoulder Pain] : no shoulder pain [Pain at Site] : no pain at device site [Erythema at Site] : no erythema at device site [Swelling at Site] : no swelling at device site [de-identified] : 89 year-old female with chronic diastolic CHF (EF 55%), HTN, HLD, hypothyroidism, s/p MVr and MYKE closure (incompletely closed, with sludge) with Dr. Leroy in 2016, pAF (on Coumadin) and tachy mamie syndrome s/p pacemaker who presents for follow up.   Since her last visit she has been doing well and has no cardiac complaints.  No chest pain, SOB, syncope, near syncope, orthopnea, palpitations.  No device related issues.  She remains on Coumadin.

## 2023-12-20 NOTE — PHYSICAL EXAM
[General Appearance - Well Developed] : well developed [Normal Appearance] : normal appearance [Well Groomed] : well groomed [General Appearance - Well Nourished] : well nourished [No Deformities] : no deformities [General Appearance - In No Acute Distress] : no acute distress [Heart Rate And Rhythm] : heart rate and rhythm were normal [] : no respiratory distress [Respiration, Rhythm And Depth] : normal respiratory rhythm and effort [Exaggerated Use Of Accessory Muscles For Inspiration] : no accessory muscle use [Left Infraclavicular] : left infraclavicular area [Clean] : clean [Dry] : dry [Well-Healed] : well-healed [Serosanguineous Drainage] : no serosanquineous drainage [Auscultation Breath Sounds / Voice Sounds] : lungs were clear to auscultation bilaterally [Palpable Crepitus] : no palpable crepitus [Bleeding] : no active bleeding [Foul Odor] : no foul smell [Purulent Drainage] : no purulent drainage [Serous Drainage] : no serous drainage [Erythema] : not erythematous [Warm] : not warm [Tender] : not tender [Indurated] : not indurated [Fluctuant] : not fluctuant

## 2023-12-20 NOTE — REVIEW OF SYSTEMS
[Under Stress] : not under stress [Negative] : Neurological [Fever] : no fever [Weight Gain (___ Lbs)] : no recent weight gain [Chills] : no chills [Weight Loss (___ Lbs)] : no recent weight loss [SOB] : no shortness of breath [Chest Discomfort] : no chest discomfort [Palpitations] : no palpitations [Syncope] : no syncope [Cough] : no cough [Abdominal Pain] : no abdominal pain [Rash] : no rash [Depression] : no depression [Easy Bleeding] : no tendency for easy bleeding

## 2024-01-01 NOTE — PROGRESS NOTE ADULT - PROBLEM SELECTOR PLAN 10
Patient Education     Well Child Exam 1 Month   About this topic   Your baby's 1-month well child exam is a visit with the doctor to check your baby's health. The doctor measures your child's weight, height, and head size. The doctor plots these numbers on a growth curve. The growth curve gives a picture of your baby's growth at each visit. The doctor may listen to your baby's heart, lungs, and belly. Your doctor will do a full exam of your baby from the head to the toes.  Your baby may also need shots or blood tests during this visit.  General   Growth and Development   Your doctor will ask you how your baby is developing. The doctor will focus on the skills that most children your child's age are expected to do. During the first month of your child's life, here are some things you can expect.  Movement ? Your baby may:  Start to be more alert and respond to you.  Move arms and legs more smoothly.  Start to put a closed hand to the mouth or in front of the face.  Have problems holding their head up, but can lift their head up briefly while laying on their stomach  Hearing and seeing ? Your baby will likely:  Turn to the sound of your voice.  See best about 8 to 12 inches (20 to 30 cm) away from the face.  Want to look at your face or a black and white pattern.  Still have their eyes cross or wander from time to time.  Feeding ? Your baby needs:  Breast milk or formula for all of their nutrition. Your baby should not be given juice, water, cow's milk, rice cereal, or solid food at this age.  To eat every 2 to 3 hours, based on if you are breast or bottle feeding.  babies should eat about 8 to 12 times per day. Formula fed babies typically eat about 24 ounces total each day. Look for signs your baby is hungry like:  Smacking or licking the lips  Sucking on fingers, hands, tongue, or lips  Opening and closing mouth  Rooting and moving the head from side to side  To be burped often if having problems with 
spitting up.  Your baby may turn away, close the mouth, or relax the arms when full. Do not overfeed your baby.  Always hold your baby when feeding. Do not prop a bottle. Propping the bottle makes it easier for your baby to choke and get ear infections.  Sleep ? Your child:  Sleeps for about 2 to 4 hours at a time  Is likely sleeping about 14 to 17 hours total out of each day, with 4 to 5 daytime naps.  May sleep better when swaddled. Monitor your baby when swaddled. Check to make sure your baby has not rolled over. Also, make sure the swaddle blanket has not come loose. Keep the swaddle blanket loose around your baby's hips. Stop swaddling your baby before your baby starts to roll over. Most times, you will need to stop swaddling your baby by 2 months of age.  Should always sleep on the back, in your child's own bed, on a firm mattress  May soothe to sleep better sucking on a pacifier.  Help for Parents   Play with your baby.  Use tummy time to help your baby grow strong neck muscles. Shake a small rattle to encourage your baby to turn their head to the side.  Talk or sing to your baby often. Let your baby look at your face. Show your baby pictures.  Gently move your baby's arms and legs. Give your baby a gentle massage.  Here are some things you can do to help keep your baby safe and healthy.  Learn CPR and basic first aid. Learn how to take your baby's temperature.  Do not allow anyone to smoke in your home or around your baby. Second hand smoke can harm your baby.  Have the right size car seat for your baby and use it every time your baby is in the car. Your baby should be rear facing until 2 years of age. Check with a local car seat safety inspection station to be sure it is properly installed.  Always place your baby on the back for sleep. Keep soft bedding, bumpers, loose blankets, and toys out of your baby's bed.  Keep one hand on the baby whenever you are changing their diaper or clothes to prevent 
falls.  Keep small toys and objects away from your baby.  Never leave your baby alone in the bath.  Keep your baby in the shade, rather than in the sun. Doctors don’t recommend sunscreen until children are 6 months and older.  Parents need to think about:  A plan for going back to work or school.  A reliable  or  provider  How to handle bouts of crying or colic. It is normal for your baby to have times when they are hard to console. You need a plan for what to do if you are frustrated because it is never OK to shake a baby.  The next well child visit will most likely be when your baby is 2 months old. At this visit your doctor may:  Do a full check up on your baby  Talk about how your baby is sleeping, if your baby has colic or long periods of crying, and how well you are coping with your baby  Give your baby the next set of shots       When do I need to call the doctor?   Fever of 100.4°F (38°C) or higher  Having a hard time breathing  Doesn’t have a wet diaper for more than 8 hours  Problems eating or spits up a lot  Legs and arms are very loose or floppy all the time  Legs and arms are very stiff  Won't stop crying  Doesn't blink or startle with loud sounds  Last Reviewed Date   2021-05-06  Consumer Information Use and Disclaimer   This generalized information is a limited summary of diagnosis, treatment, and/or medication information. It is not meant to be comprehensive and should be used as a tool to help the user understand and/or assess potential diagnostic and treatment options. It does NOT include all information about conditions, treatments, medications, side effects, or risks that may apply to a specific patient. It is not intended to be medical advice or a substitute for the medical advice, diagnosis, or treatment of a health care provider based on the health care provider's examination and assessment of a patient’s specific and unique circumstances. Patients must speak with a health 
care provider for complete information about their health, medical questions, and treatment options, including any risks or benefits regarding use of medications. This information does not endorse any treatments or medications as safe, effective, or approved for treating a specific patient. UpToDate, Inc. and its affiliates disclaim any warranty or liability relating to this information or the use thereof. The use of this information is governed by the Terms of Use, available at https://www.woltersCatchMe!uwer.com/en/know/clinical-effectiveness-terms   Copyright   Copyright © 2024 UpToDate, Inc. and its affiliates and/or licensors. All rights reserved.    
D/c when medically ready     Obtain TTE on Monday  Consider EP consult for further evaluation  PT consulted and recommends home PT (pt has  once a week and goes to [facility] for Rehanb 2 x/week
D/c when medically ready     Obtain TTE on Monday  Consider EP consult for further evaluation  PT consulted and recommends home PT (pt has  once a week and goes to [facility] for Rehanb 2 x/week
D/c when medically ready     PT consulted and recommends home PT (pt has  once a week and goes to [facility] for Rehanb 2 x/week
D/c when medically ready     PT consulted and recommends home PT (pt has  once a week and goes to [facility] for Rehanb 2 x/week
D/c when medically ready     PT consulted and recommends home PT (pt has  once a week and goes to [facility] for Rehanb 2 x/week

## 2024-01-08 NOTE — PRE-ANESTHESIA EVALUATION ADULT - NSANTHBPHIGHRD_ENT_A_CORE
Yes You can access the FollowMyHealth Patient Portal offered by [facility] by registering at the following website: [email or web address]. By joining [facility]’s FollowMyHealth portal, you will also be able to view your health information using other applications (apps) compatible with our system. You can access the FollowMyHealth Patient Portal offered by [facility] by registering at the following website: [email or web address]. By joining [facility]’s FollowMyHealth portal, you will also be able to view your health information using other applications (apps) compatible with our system.

## 2024-01-23 ENCOUNTER — NON-APPOINTMENT (OUTPATIENT)
Age: 89
End: 2024-01-23

## 2024-01-29 ENCOUNTER — INPATIENT (INPATIENT)
Facility: HOSPITAL | Age: 89
LOS: 0 days | Discharge: HOME | DRG: 305 | End: 2024-01-30
Attending: INTERNAL MEDICINE | Admitting: INTERNAL MEDICINE
Payer: COMMERCIAL

## 2024-01-29 ENCOUNTER — APPOINTMENT (OUTPATIENT)
Dept: GASTROENTEROLOGY | Facility: HOSPITAL | Age: 89
End: 2024-01-29

## 2024-01-29 VITALS
TEMPERATURE: 98 F | OXYGEN SATURATION: 91 % | HEART RATE: 85 BPM | SYSTOLIC BLOOD PRESSURE: 163 MMHG | RESPIRATION RATE: 18 BRPM | HEIGHT: 63 IN | WEIGHT: 100.09 LBS | DIASTOLIC BLOOD PRESSURE: 52 MMHG

## 2024-01-29 DIAGNOSIS — E03.9 HYPOTHYROIDISM, UNSPECIFIED: ICD-10-CM

## 2024-01-29 DIAGNOSIS — Z29.9 ENCOUNTER FOR PROPHYLACTIC MEASURES, UNSPECIFIED: ICD-10-CM

## 2024-01-29 DIAGNOSIS — I48.91 UNSPECIFIED ATRIAL FIBRILLATION: ICD-10-CM

## 2024-01-29 DIAGNOSIS — I49.5 SICK SINUS SYNDROME: ICD-10-CM

## 2024-01-29 DIAGNOSIS — Z84.89 FAMILY HISTORY OF OTHER SPECIFIED CONDITIONS: Chronic | ICD-10-CM

## 2024-01-29 DIAGNOSIS — Z98.890 OTHER SPECIFIED POSTPROCEDURAL STATES: ICD-10-CM

## 2024-01-29 DIAGNOSIS — I34.0 NONRHEUMATIC MITRAL (VALVE) INSUFFICIENCY: ICD-10-CM

## 2024-01-29 DIAGNOSIS — I16.0 HYPERTENSIVE URGENCY: ICD-10-CM

## 2024-01-29 DIAGNOSIS — E78.5 HYPERLIPIDEMIA, UNSPECIFIED: ICD-10-CM

## 2024-01-29 LAB
ALBUMIN SERPL ELPH-MCNC: 3.2 G/DL — LOW (ref 3.3–5)
ALP SERPL-CCNC: 308 U/L — HIGH (ref 40–120)
ALT FLD-CCNC: 136 U/L — HIGH (ref 10–45)
ANION GAP SERPL CALC-SCNC: 8 MMOL/L — SIGNIFICANT CHANGE UP (ref 5–17)
AST SERPL-CCNC: 96 U/L — HIGH (ref 10–40)
BASOPHILS # BLD AUTO: 0.02 K/UL — SIGNIFICANT CHANGE UP (ref 0–0.2)
BASOPHILS NFR BLD AUTO: 0.2 % — SIGNIFICANT CHANGE UP (ref 0–2)
BILIRUB SERPL-MCNC: 0.3 MG/DL — SIGNIFICANT CHANGE UP (ref 0.2–1.2)
BUN SERPL-MCNC: 14 MG/DL — SIGNIFICANT CHANGE UP (ref 7–23)
CALCIUM SERPL-MCNC: 9.1 MG/DL — SIGNIFICANT CHANGE UP (ref 8.4–10.5)
CHLORIDE SERPL-SCNC: 100 MMOL/L — SIGNIFICANT CHANGE UP (ref 96–108)
CK MB CFR SERPL CALC: 1.4 NG/ML — SIGNIFICANT CHANGE UP (ref 0–6.7)
CK SERPL-CCNC: 23 U/L — LOW (ref 25–170)
CO2 SERPL-SCNC: 23 MMOL/L — SIGNIFICANT CHANGE UP (ref 22–31)
CREAT SERPL-MCNC: 0.5 MG/DL — SIGNIFICANT CHANGE UP (ref 0.5–1.3)
EGFR: 86 ML/MIN/1.73M2 — SIGNIFICANT CHANGE UP
EOSINOPHIL # BLD AUTO: 0.04 K/UL — SIGNIFICANT CHANGE UP (ref 0–0.5)
EOSINOPHIL NFR BLD AUTO: 0.4 % — SIGNIFICANT CHANGE UP (ref 0–6)
GLUCOSE SERPL-MCNC: 140 MG/DL — HIGH (ref 70–99)
HCT VFR BLD CALC: 36.2 % — SIGNIFICANT CHANGE UP (ref 34.5–45)
HGB BLD-MCNC: 11.9 G/DL — SIGNIFICANT CHANGE UP (ref 11.5–15.5)
IMM GRANULOCYTES NFR BLD AUTO: 0.5 % — SIGNIFICANT CHANGE UP (ref 0–0.9)
LYMPHOCYTES # BLD AUTO: 1.37 K/UL — SIGNIFICANT CHANGE UP (ref 1–3.3)
LYMPHOCYTES # BLD AUTO: 14 % — SIGNIFICANT CHANGE UP (ref 13–44)
MCHC RBC-ENTMCNC: 31.5 PG — SIGNIFICANT CHANGE UP (ref 27–34)
MCHC RBC-ENTMCNC: 32.9 GM/DL — SIGNIFICANT CHANGE UP (ref 32–36)
MCV RBC AUTO: 95.8 FL — SIGNIFICANT CHANGE UP (ref 80–100)
MONOCYTES # BLD AUTO: 0.73 K/UL — SIGNIFICANT CHANGE UP (ref 0–0.9)
MONOCYTES NFR BLD AUTO: 7.5 % — SIGNIFICANT CHANGE UP (ref 2–14)
NEUTROPHILS # BLD AUTO: 7.55 K/UL — HIGH (ref 1.8–7.4)
NEUTROPHILS NFR BLD AUTO: 77.4 % — HIGH (ref 43–77)
NRBC # BLD: 0 /100 WBCS — SIGNIFICANT CHANGE UP (ref 0–0)
PLATELET # BLD AUTO: 162 K/UL — SIGNIFICANT CHANGE UP (ref 150–400)
POTASSIUM SERPL-MCNC: 4.2 MMOL/L — SIGNIFICANT CHANGE UP (ref 3.5–5.3)
POTASSIUM SERPL-SCNC: 4.2 MMOL/L — SIGNIFICANT CHANGE UP (ref 3.5–5.3)
PROT SERPL-MCNC: 7.1 G/DL — SIGNIFICANT CHANGE UP (ref 6–8.3)
RBC # BLD: 3.78 M/UL — LOW (ref 3.8–5.2)
RBC # FLD: 14.1 % — SIGNIFICANT CHANGE UP (ref 10.3–14.5)
SODIUM SERPL-SCNC: 131 MMOL/L — LOW (ref 135–145)
TROPONIN T, HIGH SENSITIVITY RESULT: 15 NG/L — SIGNIFICANT CHANGE UP (ref 0–51)
WBC # BLD: 9.76 K/UL — SIGNIFICANT CHANGE UP (ref 3.8–10.5)
WBC # FLD AUTO: 9.76 K/UL — SIGNIFICANT CHANGE UP (ref 3.8–10.5)

## 2024-01-29 PROCEDURE — 93010 ELECTROCARDIOGRAM REPORT: CPT

## 2024-01-29 PROCEDURE — 74328 X-RAY BILE DUCT ENDOSCOPY: CPT | Mod: 26,59

## 2024-01-29 PROCEDURE — 43262 ENDO CHOLANGIOPANCREATOGRAPH: CPT | Mod: 59

## 2024-01-29 PROCEDURE — 43274 ERCP DUCT STENT PLACEMENT: CPT

## 2024-01-29 PROCEDURE — 99221 1ST HOSP IP/OBS SF/LOW 40: CPT | Mod: GC

## 2024-01-29 PROCEDURE — 43264 ERCP REMOVE DUCT CALCULI: CPT | Mod: 59

## 2024-01-29 PROCEDURE — 43275 ERCP REMOVE FORGN BODY DUCT: CPT | Mod: 59

## 2024-01-29 PROCEDURE — 99223 1ST HOSP IP/OBS HIGH 75: CPT | Mod: 25

## 2024-01-29 DEVICE — BLLN EXTRCTR PRO RX-S 12-15MM: Type: IMPLANTABLE DEVICE | Status: FUNCTIONAL

## 2024-01-29 DEVICE — BLLN EXTRCTR PRO RX-S 15-18MM: Type: IMPLANTABLE DEVICE | Status: FUNCTIONAL

## 2024-01-29 DEVICE — STENT PANCREAS CATH PUSH 5FRX170CM: Type: IMPLANTABLE DEVICE | Status: FUNCTIONAL

## 2024-01-29 DEVICE — IMPLANTABLE DEVICE: Type: IMPLANTABLE DEVICE | Status: FUNCTIONAL

## 2024-01-29 DEVICE — GWIRE FUSION LOKG DEV: Type: IMPLANTABLE DEVICE | Status: FUNCTIONAL

## 2024-01-29 DEVICE — BLLN EXTRCTR PRO RX-S 9-12MM: Type: IMPLANTABLE DEVICE | Status: FUNCTIONAL

## 2024-01-29 DEVICE — HYDRATOME 44: Type: IMPLANTABLE DEVICE | Status: FUNCTIONAL

## 2024-01-29 RX ORDER — SODIUM CHLORIDE 9 MG/ML
1000 INJECTION, SOLUTION INTRAVENOUS
Refills: 0 | Status: DISCONTINUED | OUTPATIENT
Start: 2024-01-29 | End: 2024-01-30

## 2024-01-29 RX ORDER — METOPROLOL TARTRATE 50 MG
50 TABLET ORAL DAILY
Refills: 0 | Status: DISCONTINUED | OUTPATIENT
Start: 2024-01-30 | End: 2024-01-30

## 2024-01-29 RX ORDER — FUROSEMIDE 40 MG
40 TABLET ORAL DAILY
Refills: 0 | Status: DISCONTINUED | OUTPATIENT
Start: 2024-01-29 | End: 2024-01-29

## 2024-01-29 RX ORDER — SPIRONOLACTONE 25 MG/1
25 TABLET, FILM COATED ORAL DAILY
Refills: 0 | Status: DISCONTINUED | OUTPATIENT
Start: 2024-01-29 | End: 2024-01-29

## 2024-01-29 RX ORDER — CIPROFLOXACIN LACTATE 400MG/40ML
500 VIAL (ML) INTRAVENOUS EVERY 12 HOURS
Refills: 0 | Status: DISCONTINUED | OUTPATIENT
Start: 2024-01-29 | End: 2024-01-30

## 2024-01-29 RX ADMIN — SODIUM CHLORIDE 150 MILLILITER(S): 9 INJECTION, SOLUTION INTRAVENOUS at 15:33

## 2024-01-29 RX ADMIN — SODIUM CHLORIDE 150 MILLILITER(S): 9 INJECTION, SOLUTION INTRAVENOUS at 21:35

## 2024-01-29 RX ADMIN — Medication 500 MILLIGRAM(S): at 15:58

## 2024-01-29 NOTE — H&P ADULT - HISTORY OF PRESENT ILLNESS
Patient is a 89y old  Female w PMH of MVR( on coumading), hypothyroidism, HTN, HLD, AF (on Warfarin), PPM for tachy-mamie syndrome, AOx1 recently admitted to SICU in May 2023 for URI and gallbladder/CBD "sludge ball" s/p course of zosyn but not a surgical candidate, GI deferred ERCP and then admitted in August 2023 for SOB and fever, enterorhinovirus+, COVID+ w/ b/l opacities on CXR, at that time patient also had elevated LFTs that started to resolve so patient did not undergo IR drainage and was dc'ed on ursodiol and plan for outpatient perc silvana. Had CBD stent placed in October. Came in today for elective CBD stent exchange, had several stones and sludge cleaned out and sphincterectomy. Patient received prop 50mcgs, 50mcg lidocaine, and 200 of succinycholine. Patient had some transient AWA when BP dropped to 120s/70s. At the time, anesthesia started phenylephrine .5 for around 25 minutes, and patient telemetry changes resolved. Following that, patient BP high to 200s/90s.    Interval: Patient seen and examined at bedside. BP bedside 170s-180s/60s to 80s. Patient is AAOx1, son is bedside. No pertinent ROS prior to procedure. Came in electively. Son is unaware of baseline blood pressure.  No other questions or concerns. Unable to assess ROS as AAOx1.    Last colonoscopy/EGD - unknown per family member and caregiver.  Denies family hx of IBS, Crohn's, UC, or colon cancer.    Consult reason:  ED vitals: T 97.6  HR 50   RR 18 BP As above, 170s-180s/70s  SpO2 90% on RA  Labs signficant: Pending trop, CKMB, CK  Imaging/EKG: EKG, slow afib, HR 50. No ischemic changes.  Interventions: Anesthesia as above 89F with PMH of AFib, MVR (on Coumadin), Tach-mamie syndrome (s/p PPM), hypothyroidism, HTN, HLD, AOx1 recently admitted to SICU in May 2023 for URI and gallbladder/CBD "sludge ball" s/p course of zosyn but not a surgical candidate, GI deferred ERCP and then admitted in August 2023 for SOB and fever, enterorhinovirus+, COVID+ w/ b/l opacities on CXR, at that time patient also had elevated LFTs that started to resolve so patient did not undergo IR drainage and was dc'ed on ursodiol and plan for outpatient perc silvana. Had CBD stent placed in October. Patient arrived on 1/29 for elective CBD stent exchange, had several stones and sludge cleaned out and sphincterectomy. Patient received prop 50mcgs, 50mcg lidocaine, and 200 of succinycholine. Patient had some transient AWA when BP dropped to 120s/70s. At the time, anesthesia started phenylephrine .5 for around 25 minutes, and patient telemetry changes resolved. Following that, patient BP high to 200s/90s.    Initial vitals: T 97.6  HR 50   RR 18 BP As above, 170s-180s/70s  SpO2 90% on RA  Labs signficant: Pending trop, CKMB, CK  Imaging/EKG: EKG, slow afib, HR 50. No ischemic changes.  Interventions: Anesthesia as above 89F with PMH of AFib, MVR (on Coumadin), Tach-mamie syndrome (s/p PPM), hypothyroidism, HTN, HLD, AOx1 recently admitted to SICU in May 2023 for URI and gallbladder/CBD "sludge ball" s/p course of zosyn but not a surgical candidate, GI deferred ERCP and then admitted in August 2023 for SOB and fever, enterorhinovirus+, COVID+ w/ b/l opacities on CXR, at that time patient also had elevated LFTs that started to resolve so patient did not undergo IR drainage and was dc'ed on ursodiol and plan for outpatient perc silvana. Had CBD stent placed in October. Patient arrived on 1/29 for elective CBD stent exchange, had several stones and sludge cleaned out and sphincterectomy. Patient received prop 50mcgs, 50mcg lidocaine, and 200 of succinycholine. Patient had some transient AWA when BP dropped to 120s/70s. At the time, anesthesia started phenylephrine .5 for around 25 minutes, and patient telemetry changes resolved. Following that, patient BP high to 200s/90s.    Initial vitals: T 97.6  HR 50   RR 18 BP As above, 170s-180s/70s  SpO2 90% on RA  Labs significant: Na 131, , AST/ALT 96/136.   Imaging/EKG: slow afib, HR 50. No ischemic changes.  Interventions: Anesthesia as above

## 2024-01-29 NOTE — CONSULT NOTE ADULT - ASSESSMENT
Patient is a 89y old  Female w PMH of MVR( on coumading), hypothyroidism, HTN, HLD, AF (on Warfarin), PPM for tachy-mamie syndrome, recent admission for gallbladder sludge v cholangitis s/p CBD stent. Patient now presented for elective CBD stent exchange. ICU consulted for post-procedural monitoring due to transient telemetry concern for AWA, and hypertension.    #Hypertensive Urgency  - Pt presenting w concern for HTNsive urgency s/p procedure, w BP 200s/90s. Of note, pt w likely chronic HTN, from chart review pt w/ 160s/80s->200s/80s during previous admissions. Drop in BP from sedation during procedure likely caused transient EKG changes  - EKG currently w no ischemic changes  - Can c/w home regimen, lasix 40mg qd, spironolactone 25mg qd, toprol 50mg qd staggered to avoid further relative low blood pressure    #Tachy/Mamie Syndrone s/p PPM/ #MVR  - Hold coumadin for 5d as per GI due to sphincterectomy and stent exchange  - Co-ags in AM  - Will need close f/u w PCP, Dr. Johnson for re-initiation of Coumadin    #S/p ERCP  - Pt is s/p ERCP, w CBD stent exchange and sphincterectomy  - Can advance diet as tolerated pending dysphagia screen  - Hold coumadin x 5 days as above  - Ciprofloxacin 500mg BID x 7 days  - S/p LR 500cc in endoscopy, f/u GI recs for additional fluids if needed for pancreatitis. Caution given hx of AS    Pt can be accepted to [facility], medicine step-down  Seen and discussed w Dr. Littlejohn       Patient is a 89y old  Female w PMH of MVR( on coumading), hypothyroidism, HTN, HLD, AF (on Warfarin), PPM for tachy-mamie syndrome, recent admission for gallbladder sludge v cholangitis s/p CBD stent. Patient now presented for elective CBD stent exchange. ICU consulted for post-procedural monitoring due to transient telemetry concern for AWA, and hypertension.    #Hypertensive Urgency  - Pt presenting w concern for HTNsive urgency s/p procedure, w BP 200s/90s. Of note, pt w likely chronic HTN, from chart review pt w/ 160s/80s->200s/80s during previous admissions. Drop in BP from sedation during procedure likely caused transient EKG changes  - EKG currently w no ischemic changes  - Can c/w home regimen, lasix 40mg qd, spironolactone 25mg qd sp IVF. Can c/w toprol 50mg qd w hold parameters to avoid further relative low blood pressure    #Tachy/Mamie Syndrone s/p PPM/ #MVR  - Hold coumadin for 5d as per GI due to sphincterectomy and stent exchange  - Co-ags in AM  - Will need close f/u w PCP, Dr. Johnson for re-initiation of Coumadin    #S/p ERCP  - Pt is s/p ERCP, w CBD stent exchange and sphincterectomy  - Can advance diet as tolerated pending dysphagia screen  - Hold coumadin x 5 days as above  - Ciprofloxacin 500mg BID x 7 days  - S/p LR 500cc in endoscopy, f/u GI recs for additional fluids if needed for pancreatitis. Caution given hx of AS    Pt can be accepted to [facility], medicine step-down  Seen and discussed w Dr. Littlejohn

## 2024-01-29 NOTE — PACU DISCHARGE NOTE - COMMENTS
Discharged to floor, telemetry unit, following critical care team evaluation. Concern for transient ST segment elevation with blood pressures 120's/70's. Postoperatively, high sensitivity troponin 15 ng/mL, gastroenterology team and critical care team aware. No acute EKG changes on twelve lead EKG in PACU. CK-MB 1.4, within normal range. Patient without any chest pain, palpitation, chest pressure, new onset neck or left upper extremity pain, lightheadedness, shortness of breath, prior to leaving PACU area. Vital signs stable throughout PACU period with permissive hypertension ranging in 170's-200's systolic. Patient's son aware and in agreement with plan to observe overnight, power of  in setting of dementia, patient ANO x 1.

## 2024-01-29 NOTE — H&P ADULT - PROBLEM SELECTOR PLAN 5
- c/w Synthroid 50  - c/w TFTs in AM Home meds: Synthroid 50mcg QD  - continue home Synthroid 50mcg QD

## 2024-01-29 NOTE — H&P ADULT - ATTENDING COMMENTS
Asked to monitor patient post biliary stent for labile BP. Likely effects of anesthesia on board and antiHtn held. Resume as BP stabilizes. Resume anticoagulation per GI.

## 2024-01-29 NOTE — PATIENT PROFILE ADULT - FALL HARM RISK - HARM RISK INTERVENTIONS
Assistance with ambulation/Assistance OOB with selected safe patient handling equipment/Communicate Risk of Fall with Harm to all staff/Discuss with provider need for PT consult/Monitor gait and stability/Provide patient with walking aids - walker, cane, crutches/Reinforce activity limits and safety measures with patient and family/Sit up slowly, dangle for a short time, stand at bedside before walking/Tailored Fall Risk Interventions/Use of alarms - bed, chair and/or voice tab/Visual Cue: Yellow wristband and red socks/Bed in lowest position, wheels locked, appropriate side rails in place/Call bell, personal items and telephone in reach/Instruct patient to call for assistance before getting out of bed or chair/Non-slip footwear when patient is out of bed/[place] to call system/Physically safe environment - no spills, clutter or unnecessary equipment/Purposeful Proactive Rounding/Room/bathroom lighting operational, light cord in reach

## 2024-01-29 NOTE — PRE-ANESTHESIA EVALUATION ADULT - NSANTHPMHFT_GEN_ALL_CORE
Cardiac: Positive for HTN, HLD, Atrial Fibrillation on Warfarin, held for five days, PPM for tachy-mamie syndrome, mitral valve porcine valve replacement, 2013 open sternotomy, pulmonary hypertension, aortic stenosis. Denies MI/Heart Failure/Angina.  Pulmonary: Baseline saturation low 90%'s. Denies COPD, ATA, Asthma  Renal: Denies kidney dysfunction  Hepatic: Denies liver dysfunction, prior elevated LFTs for CBD obstruction 5/2023 SICU stay.   Gastrointestinal: Positive for ERCP and stent placement, now scheduled for stent retrieval.   Endocrine: Positive for thyroid dysfunction on synthroid. Denies DM.  Neurologic: Denies stroke/seizure disorder  Hematologic: Positive for warfarin use, held for five days. Denies anemia, blood clotting disorder.    PSH: Mitral valve repair, 2013. Eye surgery as infant. Cardiac: Positive for HTN, HLD, Atrial Fibrillation on Warfarin, held for five days, PPM for tachy-mamie syndrome, mitral valve porcine valve replacement, 2013 open sternotomy, pulmonary hypertension, aortic stenosis. Denies MI/Heart Failure/Angina.  Pulmonary: Baseline saturation low 90%'s. Denies COPD, ATA, Asthma  Renal: Denies kidney dysfunction  Hepatic: Denies liver dysfunction, prior elevated LFTs for CBD obstruction 5/2023 SICU stay.   Gastrointestinal: Positive for ERCP and stent placement, now scheduled for stent retrieval.   Endocrine: Positive for thyroid dysfunction on synthroid. Denies DM.  Neurologic: Positive for dementia, ANO x 1. Denies stroke/seizure disorder  Hematologic: Positive for warfarin use, held for five days. Denies anemia, blood clotting disorder.    PSH: Mitral valve repair, 2013. Eye surgery as infant.

## 2024-01-29 NOTE — CONSULT NOTE ADULT - ASSESSMENT
89F PMH MVR, hypothyroidism, HTN, HLD, AF (Warfarin, 9/30/23), PPM for tachy-mamie syndrome, AOx1, who underwent ERCP with CBD stent exchange on 01/29. Had transient ST elevation during procedure when BP was lowered, and BPs elevated persistently to 200s after the procedure, so patient was admitted from endoscopy unit to medicine telemetry for post-procedural monitoring.     EKG performed in endoscopy showing no signs of ischemia. Received 500 cc of LR x1 in endoscopy     Recommendations:   - f/u CBC, CMP, trop, and CKMB   - advance diet as tolerated   - start cipro 500 mg PO BID x 7 days   - please give additional LR to the extent that patient can tolerate from a cardiac perspective (start 150 cc/hour)   - hold home warfarin for 5 days total   - abdominal flat plat to assess for CBD passage in 2-3 months   - repeat EGD in 3 months (only if CBD stent still present on imaging)     Case discussed with Dr. Barahona. GI Team will continue to follow.     Shelbi Bethea D.O.   Gastroenterology Fellow  Weekday 7am-5pm Pager: [phone]  Weeknights/Weekend/Holiday Coverage: Please call the  for contact info

## 2024-01-29 NOTE — H&P ADULT - NSHPLABSRESULTS_GEN_ALL_CORE
.  LABS:                         11.9   9.76  )-----------( 162      ( 29 Jan 2024 10:01 )             36.2     01-29    131<L>  |  100  |  14  ----------------------------<  140<H>  4.2   |  23  |  0.50    Ca    9.1      29 Jan 2024 10:01    TPro  7.1  /  Alb  3.2<L>  /  TBili  0.3  /  DBili  x   /  AST  96<H>  /  ALT  136<H>  /  AlkPhos  308<H>  01-29      Urinalysis Basic - ( 29 Jan 2024 10:01 )    Color: x / Appearance: x / SG: x / pH: x  Gluc: 140 mg/dL / Ketone: x  / Bili: x / Urobili: x   Blood: x / Protein: x / Nitrite: x   Leuk Esterase: x / RBC: x / WBC x   Sq Epi: x / Non Sq Epi: x / Bacteria: x      CARDIAC MARKERS ( 29 Jan 2024 10:01 )  x     / x     / 23 U/L / x     / 1.4 ng/mL            RADIOLOGY, EKG & ADDITIONAL TESTS: Reviewed.

## 2024-01-29 NOTE — PATIENT PROFILE ADULT - CHOOSE INDICATION TO IMMUNIZE (AN ORDER WILL BE GENERATED WHEN THIS NOTE IS SAVED):
Call returned to Mrs.Keva Betts, pt states she is currently taking Motegrity 2 mg QD and medication isn't assisting with ongoing constipation. Pt is requesting a new medication prescription to address issue. Please advise    Patient is not pregnant (male or female)

## 2024-01-29 NOTE — CONSULT NOTE ADULT - SUBJECTIVE AND OBJECTIVE BOX
Initial GI Consult Note:     HPI:  89F with PMH of MVR, hypothyroidism, HTN, HLD, AF (on Warfarin), PPM for tachy-mamie syndrome, AOx1 recently admitted to SICU in May 2023 for URI and gallbladder/CBD "sludge ball" s/p course of zosyn but not a surgical candidate, GI deferred ERCP and then admitted in August 2023 for SOB and fever, enterorhinovirus+, COVID+ w/ b/l opacities on CXR, at that time patient also had elevated LFTs that started to resolve so patient did not undergo IR drainage and was dc'ed on ursodiol and plan for outpatient perc silvana. Patient now returns with lethargy and increasing abdominal pain. No nausea/vomiting. Tolerating diet, last BM this morning.    Last colonoscopy/EGD - unknown per family member and caregiver.  Denies family hx of IBS, Crohn's, UC, or colon cancer.        Allergies  chlorhexidine topical (Rash)  fish (Other)  penicillin (Hives)    Intolerances      Home Medications:  amoxicillin-clavulanate 875 mg-125 mg oral tablet: 1 tab(s) orally 2 times a day (06 Oct 2023 10:23)  Digox 125 mcg (0.125 mg) oral tablet: 1 tab(s) orally once a day (01 Oct 2023 19:22)  Jantoven 3 mg oral tablet: 1 tab(s) orally once a day (01 Oct 2023 19:22)  Lasix 20 mg oral tablet: 1 tab(s) orally every other day (01 Oct 2023 19:20)  Lipitor 20 mg oral tablet: 1 tab(s) orally once a day (at bedtime) (01 Oct 2023 19:22)  metoprolol succinate 50 mg oral tablet, extended release: 1 tab(s) orally 2 times a day (01 Oct 2023 19:23)  spironolactone 25 mg oral tablet: 1 tab(s) orally every other day (01 Oct 2023 19:20)  Synthroid 50 mcg (0.05 mg) oral tablet: 1 tab(s) orally once a day (01 Oct 2023 19:22)  Zoloft 50 mg oral tablet: 1 tab(s) orally once a day (01 Oct 2023 19:22)    MEDICATIONS:  MEDICATIONS  (STANDING):    MEDICATIONS  (PRN):    PAST MEDICAL & SURGICAL HISTORY:  HTN (hypertension)      High cholesterol      Hypothyroid      FH: mitral valve repair        FAMILY HISTORY:    SOCIAL HISTORY:  [place]obac[place]: [ ] Current, [ ] Former, [ ] Never; Pack Years:  Alcohol:  Illicit Drugs:    REVIEW OF SYSTEMS:  CONSTITUTIONAL: No weakness, fevers or chills  HEENT: No visual changes; No vertigo or throat pain   NECK: No pain or stiffness  RESPIRATORY: No cough, wheezing, hemoptysis; No shortness of breath  CARDIOVASCULAR: No chest pain or palpitations  GASTROINTESTINAL: No abdominal or epigastric pain. No nausea, vomiting, or hematemesis; No diarrhea or constipation. No melena or hematochezia.  GENITOURINARY: No dysuria, frequency or hematuria  NEUROLOGICAL: No numbness or weakness  SKIN: No itching, burning, rashes, or lesions   All other 10 review of systems is negative unless indicated above.    Vital Signs Last 24 Hrs  T(C): 36.6 (29 Jan 2024 07:00), Max: 36.6 (29 Jan 2024 07:00)  T(F): --  HR: 85 (29 Jan 2024 07:00) (85 - 85)  BP: 163/52 (29 Jan 2024 07:00) (163/52 - 163/52)  BP(mean): --  RR: 18 (29 Jan 2024 07:00) (18 - 18)  SpO2: 91% (29 Jan 2024 07:00) (91% - 91%)          PHYSICAL EXAM:    General: Well developed; well nourished; in no acute distress  Eyes: Anicteric sclerae, moist conjunctivae  HENT: Moist mucous membranes  Neck: Trachea midline, supple  Lungs: Normal respiratory effort, no intercostal retractions  Cardiovascular: RRR  Abdomen: Soft, non-tender non-distended; Normal bowel sounds; No rebound or guarding  Extremities: Normal range of motion, No clubbing, cyanosis or edema  Neurological: Alert and oriented x3  Skin: Warm and dry. No obvious rash    LABS:                  RADIOLOGY & ADDITIONAL STUDIES:     Reviewed   Initial GI Consult Note:     HPI:  89F PMH MVR, hypothyroidism, HTN, HLD, AF (Warfarin, 9/30/23), PPM for tachy-mamie syndrome, AOx1, who underwent ERCP with CBD stent exchange on 01/29. Had transient ST elevation during procedure when BP was lowered, and BPs elevated persistently to 200s after the procedure, so patient was admitted from endoscopy unit to medicine telemetry for post-procedural monitoring.     Additional collateral obtained from chart:   Patient was recently admitted to SICU in May 2023 for URI and gallbladder/CBD "sludge ball" s/p course of zosyn but not a surgical candidate, GI deferred ERCP and then admitted again in August 2023 for SOB and fever, entero-rhinovirus+, COVID+ w/ b/l opacities on CXR, at that time patient also had elevated LFTs that started to resolve so patient did not undergo IR drainage and was dc'ed on ursodiol and plan for outpatient perc silvana. Shortly thereafter, patient re-presented with lethargy and increasing abdominal pain, found to have elevated liver enzymes, Lipase >3000, CT revealing severe IH/EH biliary dilatation 2/2 distal CBD stone. Advanced endoscopy consulted for cholangitis in the setting of gallstone pancreatitis/choledocholithiasis and for consideration of ERCP.     CT A/P (10/1/23): Since 5/15/2023, increase in now severe intrahepatic and extrahepatic biliary ductal dilatation secondary to a stone in the distal CBD near the ampulla. Heterogeneous appearance of the pancreatic head and peripancreatic edema compatible with interstitial edematous pancreatitis. Small amount of ascites.    ERCP (10/3/23): s/p sphincterotomy with PD and biliary stent placement. S/p Indomethacin    Last colonoscopy/EGD - unknown per family member and caregiver.  Denies family hx of IBS, Crohn's, UC, or colon cancer.    Patient seen and examined at bedside in endoscopy unit. Appears comfortable. Left radial a-line reading . Patient's son is present. Explained to son that patient will likely be admitted to medicine telemetry floor given elevated BP and comorbidities post-ERCP. ICU consult team was consulted for further disposition. CBC, CMP, trop, and CKMD ordered as well as EKG.         Allergies  chlorhexidine topical (Rash)  fish (Other)  penicillin (Hives)    Intolerances      Home Medications:  amoxicillin-clavulanate 875 mg-125 mg oral tablet: 1 tab(s) orally 2 times a day (06 Oct 2023 10:23)  Digox 125 mcg (0.125 mg) oral tablet: 1 tab(s) orally once a day (01 Oct 2023 19:22)  Jantoven 3 mg oral tablet: 1 tab(s) orally once a day (01 Oct 2023 19:22)  Lasix 20 mg oral tablet: 1 tab(s) orally every other day (01 Oct 2023 19:20)  Lipitor 20 mg oral tablet: 1 tab(s) orally once a day (at bedtime) (01 Oct 2023 19:22)  metoprolol succinate 50 mg oral tablet, extended release: 1 tab(s) orally 2 times a day (01 Oct 2023 19:23)  spironolactone 25 mg oral tablet: 1 tab(s) orally every other day (01 Oct 2023 19:20)  Synthroid 50 mcg (0.05 mg) oral tablet: 1 tab(s) orally once a day (01 Oct 2023 19:22)  Zoloft 50 mg oral tablet: 1 tab(s) orally once a day (01 Oct 2023 19:22)    MEDICATIONS:  MEDICATIONS  (STANDING):    MEDICATIONS  (PRN):    PAST MEDICAL & SURGICAL HISTORY:  HTN (hypertension)      High cholesterol      Hypothyroid      FH: mitral valve repair        FAMILY HISTORY:    SOCIAL HISTORY:  [place]: [ ] Current, [ ] Former, [ ] Never; Pack Years:  Alcohol:  Illicit Drugs:        Vital Signs Last 24 Hrs  T(C): 36.6 (29 Jan 2024 07:00), Max: 36.6 (29 Jan 2024 07:00)  T(F): --  HR: 85 (29 Jan 2024 07:00) (85 - 85)  BP: 163/52 (29 Jan 2024 07:00) (163/52 - 163/52)  BP(mean): --  RR: 18 (29 Jan 2024 07:00) (18 - 18)  SpO2: 91% (29 Jan 2024 07:00) (91% - 91%)      PHYSICAL EXAM:  General: No acute distress, thin, frail, elderly   Lungs: Normal respiratory effort and no intercostal retractions  Cardiovascular: RRR  Abdomen: Soft, non-tender, non-distended  Neurological: Alert and oriented x1 at baseline   Skin: Warm and dry. No obvious rash          LABS:        RADIOLOGY & ADDITIONAL STUDIES:     Reviewed

## 2024-01-29 NOTE — H&P ADULT - PROBLEM SELECTOR PLAN 2
- Hold coumadin for 5d as per GI due to sphincterectomy and stent exchange  - Co-ags in AM  - Will need close f/u w PCP, Dr. Johnson for re-initiation of Coumadin Home meds: Coumadin 4mg QD (for valvular AFib)  - Hold Coumadin for 5d as per GI due to sphincterectomy and stent exchange (1/29 - 2/2)  - Will need close f/u w PCP, Dr. Johnson for re-initiation of Coumadin

## 2024-01-29 NOTE — CONSULT NOTE ADULT - SUBJECTIVE AND OBJECTIVE BOX
Patient is a 89y old  Female who presents with a chief complaint of     Consult reason:  ED vitals: T   HR   RR  BP  SpO2  Labs signficant:  Imaging/EKG:   Interventions:    HPI:      Allergies    chlorhexidine topical (Rash)  fish (Other)  penicillin (Hives)    Intolerances      Home Medications:  amoxicillin-clavulanate 875 mg-125 mg oral tablet: 1 tab(s) orally 2 times a day (06 Oct 2023 10:23)  Digox 125 mcg (0.125 mg) oral tablet: 1 tab(s) orally once a day (01 Oct 2023 19:22)  Jantoven 3 mg oral tablet: 1 tab(s) orally once a day (01 Oct 2023 19:22)  Lasix 20 mg oral tablet: 1 tab(s) orally every other day (01 Oct 2023 19:20)  Lipitor 20 mg oral tablet: 1 tab(s) orally once a day (at bedtime) (01 Oct 2023 19:22)  metoprolol succinate 50 mg oral tablet, extended release: 1 tab(s) orally 2 times a day (01 Oct 2023 19:23)  spironolactone 25 mg oral tablet: 1 tab(s) orally every other day (01 Oct 2023 19:20)  Synthroid 50 mcg (0.05 mg) oral tablet: 1 tab(s) orally once a day (01 Oct 2023 19:22)  Zoloft 50 mg oral tablet: 1 tab(s) orally once a day (01 Oct 2023 19:22)      SOCIAL HX:     Smoking          ETOH/Illicit drugs          Occupation    PAST MEDICAL & SURGICAL HISTORY:  HTN (hypertension)      High cholesterol      Hypothyroid      FH: mitral valve repair          FAMILY HISTORY:  :    No known cardiovascular or pulmonary family history     ROS:  See HPI     PHYSICAL EXAM    ICU Vital Signs Last 24 Hrs  T(C): 36.6 (29 Jan 2024 07:00), Max: 36.6 (29 Jan 2024 07:00)  T(F): --  HR: 85 (29 Jan 2024 07:00) (85 - 85)  BP: 163/52 (29 Jan 2024 07:00) (163/52 - 163/52)  BP(mean): --  ABP: --  ABP(mean): --  RR: 18 (29 Jan 2024 07:00) (18 - 18)  SpO2: 91% (29 Jan 2024 07:00) (91% - 91%)        General: Not in distress  HEENT:  HAFSA              Lymphatic system: No LN  Lungs: Bilateral BS  Cardiovascular: Regular  Gastrointestinal: Soft, Positive BS  Musculoskeletal: No clubbing.  Moves all extremities.    Skin: Warm.  Intact  Neurological: No motor or sensory deficit         LABS:                                                                                                                                                                                                                                                                               CXR:    ECHO:    MEDICATIONS  (STANDING):    MEDICATIONS  (PRN):         Patient is a 89y old  Female w PMH of MVR( on coumading), hypothyroidism, HTN, HLD, AF (on Warfarin), PPM for tachy-mamie syndrome, AOx1 recently admitted to SICU in May 2023 for URI and gallbladder/CBD "sludge ball" s/p course of zosyn but not a surgical candidate, GI deferred ERCP and then admitted in August 2023 for SOB and fever, enterorhinovirus+, COVID+ w/ b/l opacities on CXR, at that time patient also had elevated LFTs that started to resolve so patient did not undergo IR drainage and was dc'ed on ursodiol and plan for outpatient perc silvana. Had CBD stent placed in October. Came in today for elective CBD stent exchange, had several stones and sludge cleaned out and sphincterectomy. Patient received prop 50mcgs, 50mcg lidocaine, and 200 of succinycholine. Patient had some transient AWA when BP dropped to 120s/70s. At the time, anesthesia started phenylephrine .5 for around 25 minutes, and patient telemetry changes resolved. Following that, patient BP high to 200s/90s.    Interval: Patient seen and examined at bedside. BP bedside 170s-180s/60s to 80s. Patient is AAOx1, son is bedside. No pertinent ROS prior to procedure. Came in electively. Son is unaware of baseline blood pressure.  No other questions or concerns. Unable to assess ROS as AAOx1.    Last colonoscopy/EGD - unknown per family member and caregiver.  Denies family hx of IBS, Crohn's, UC, or colon cancer.    Consult reason:  ED vitals: T 97.6  HR 50   RR 18 BP As above, 170s-180s/70s  SpO2 90% on RA  Labs signficant: Pending trop, CKMB, CK  Imaging/EKG: EKG, slow afib, HR 50. No ischemic changes.  Interventions: Anesthesia as above      Allergies    chlorhexidine topical (Rash)  fish (Other)  penicillin (Hives)    Intolerances      Home Medications:  amoxicillin-clavulanate 875 mg-125 mg oral tablet: 1 tab(s) orally 2 times a day (06 Oct 2023 10:23)  Digox 125 mcg (0.125 mg) oral tablet: 1 tab(s) orally once a day (01 Oct 2023 19:22)  Jantoven 3 mg oral tablet: 1 tab(s) orally once a day (01 Oct 2023 19:22)  Lasix 20 mg oral tablet: 1 tab(s) orally every other day (01 Oct 2023 19:20)  Lipitor 20 mg oral tablet: 1 tab(s) orally once a day (at bedtime) (01 Oct 2023 19:22)  metoprolol succinate 50 mg oral tablet, extended release: 1 tab(s) orally 2 times a day (01 Oct 2023 19:23)  spironolactone 25 mg oral tablet: 1 tab(s) orally every other day (01 Oct 2023 19:20)  Synthroid 50 mcg (0.05 mg) oral tablet: 1 tab(s) orally once a day (01 Oct 2023 19:22)  Zoloft 50 mg oral tablet: 1 tab(s) orally once a day (01 Oct 2023 19:22)      SOCIAL HX:     Smoking          ETOH/Illicit drugs          Occupation    PAST MEDICAL & SURGICAL HISTORY:  HTN (hypertension)      High cholesterol      Hypothyroid      FH: mitral valve repair          FAMILY HISTORY:  :    No known cardiovascular or pulmonary family history     ROS:  See HPI     PHYSICAL EXAM    ICU Vital Signs Last 24 Hrs  T(C): 36.6 (29 Jan 2024 07:00), Max: 36.6 (29 Jan 2024 07:00)  T(F): --  HR: 85 (29 Jan 2024 07:00) (85 - 85)  BP: 163/52 (29 Jan 2024 07:00) (163/52 - 163/52)  BP(mean): --  ABP: --  ABP(mean): --  RR: 18 (29 Jan 2024 07:00) (18 - 18)  SpO2: 91% (29 Jan 2024 07:00) (91% - 91%)      Vital Signs Last 12 Hrs  T(F): --  HR: 85 (01-29-24 @ 07:00) (85 - 85)  BP: 163/52 (01-29-24 @ 07:00) (163/52 - 163/52)  BP(mean): --  RR: 18 (01-29-24 @ 07:00) (18 - 18)  SpO2: 91% (01-29-24 @ 07:00) (91% - 91%)    PHYSICAL EXAM:  Constitutional: Elderly F, AAO1. Follows simple commands.  HEENT: NC/AT, PERRLA  Respiratory: CTA B/L. No w/r/r.   Cardiovascular: Irregular rate and rhythym. No m/r/g. Mid-line scar  Gastrointestinal: +BS, soft NTND, no guarding or rebound tenderness  Vascular: Pulses equal and strong throughout.   Neurological: AAOx1.   Skin: No gross skin abnormalities or rashes      LABS:                         11.9   9.76  )-----------( 162      ( 29 Jan 2024 10:01 )             36.2                         RADIOLOGY, EKG & ADDITIONAL TESTS:                                                                                                                                                                                                                                                                              CXR:    ECHO:    MEDICATIONS  (STANDING):    MEDICATIONS  (PRN):

## 2024-01-29 NOTE — H&P ADULT - ASSESSMENT
Patient is a 89y old  Female w PMH of MVR( on coumading), hypothyroidism, HTN, HLD, AF (on Warfarin), PPM for tachy-mamie syndrome, recent admission for gallbladder sludge v cholangitis s/p CBD stent. Patient now presented for elective CBD stent exchange. ICU consulted for post-procedural monitoring due to transient telemetry concern for AWA, and hypertension. 89F with PMH of AFib, MVR (on Coumadin), Tach-mamie syndrome (s/p PPM), hypothyroidism, HTN, HLD, AOx1 recent admission for gallbladder sludge v cholangitis s/p CBD stent. Patient now presented for elective CBD stent exchange. Admitted to [facility] for post-procedural monitoring due to transient telemetry concern for AWA, and hypertension.

## 2024-01-29 NOTE — H&P ADULT - NSHPPHYSICALEXAM_GEN_ALL_CORE
.  VITAL SIGNS:  T(C): 36.2 (01-29-24 @ 13:50), Max: 36.6 (01-29-24 @ 07:00)  T(F): 97.2 (01-29-24 @ 13:50), Max: 97.2 (01-29-24 @ 13:50)  HR: 50 (01-29-24 @ 12:50) (50 - 85)  BP: 149/64 (01-29-24 @ 12:50) (149/64 - 163/52)  BP(mean): 92 (01-29-24 @ 12:50) (92 - 92)  RR: 17 (01-29-24 @ 12:50) (17 - 18)  SpO2: 94% (01-29-24 @ 12:50) (91% - 94%)  Wt(kg): --    PHYSICAL EXAM:    Constitutional: Resting comfortably in bed; NAD, hard of hearing  Head: NC/AT  Eyes: EOMI, clear conjunctiva  ENT: no nasal discharge; MMM  Neck: supple; no JVD or thyromegaly  Respiratory: CTA B/L; no W/R/R, no retractions  Cardiac: +S1/S2; RRR; no M/R/G;  Gastrointestinal: soft, RUQ TTP  Extremities: WWP, no clubbing or cyanosis; no peripheral edema  Musculoskeletal: NROM x4; no joint swelling, tenderness or erythema  Vascular: 2+ radial, femoral, DP/PT pulses B/L  Dermatologic: skin warm, dry and intact; no rashes, wounds, or scars  Neurologic: AOx1 ; CNII-XII grossly intact; no focal deficits  Psychiatric: affect and characteristics of appearance, verbalizations, behaviors are appropriate

## 2024-01-29 NOTE — H&P ADULT - PROBLEM SELECTOR PLAN 3
s/p ERCP  - Pt is s/p ERCP, w CBD stent exchange and sphincterectomy  - Can advance diet as tolerated pending dysphagia screen  - Hold coumadin x 5 days as above  - Ciprofloxacin 500mg BID x 7 days  - S/p LR 500cc in endoscopy, f/u GI recs for additional fluids if needed for pancreatitis. Caution given hx of AS History of choledocholithiasis with gallstone pancreatitis in October.. Had CBD stent placed in October. Patient arrived on 1/29 for elective CBD stent exchange, had several stones and sludge cleaned out and sphincterectomy  - Advance diet as tolerated  - Ciprofloxacin 500mg BID x 7 days (1/29 - 2/4)  - LR 150mL/hr

## 2024-01-29 NOTE — CONSULT NOTE ADULT - ATTENDING COMMENTS
Pt seen again this evening with son and aide at the bedside.  D/w fellow.  If continuing to tolerate a diet in the morning and no pain can d/c home from a GI standpoint.  Please continue to hold Coumadin.  Thank you.

## 2024-01-29 NOTE — PRE-ANESTHESIA EVALUATION ADULT - NSRADCARDRESULTSFT_GEN_ALL_CORE
TTE 10/2/2023  "CONCLUSIONS:     1. Normal left and right ventricular size and systolic function.   2. Abnormal septal motion seen due to previous cardiac surgery.   3. The aortic valve is moderately thickened. There is moderate aortic stenosis. The peak transvalvular velocity is 2.54 m/s, the mean transvalvular gradient is 12.30 mmHg, and the LVOT/AV velocity ratio is 0.41. The peak transaortic gradient is 25.81 mmHg. The aortic valve area (estimated via the continuity method) is 1.14 cm². There is mild aortic regurgitation.   4. A bioprosthetic valve is noted in the mitral position. The mean transvalvular gradient is 5.34 mmHg at a heart rate of 50 bpm. There is trace mitral regurgitation.   5. Pulmonary hypertension present, pulmonary artery systolic pressure is 44 mmHg.   6. Mild-to-moderate tricuspid regurgitation.   7. No pericardial effusion."

## 2024-01-29 NOTE — PRE-ANESTHESIA EVALUATION ADULT - NSDENTALSD_ENT_ALL_CORE
Missing majority of teeth, very poor dentition for remaining teeth with several chipped, ground/worn teeth, cavities./missing teeth

## 2024-01-29 NOTE — H&P ADULT - PROBLEM SELECTOR PLAN 1
- EKG currently w no ischemic changes  - Can c/w home regimen of Toprol 50mg qd  - Holding lasix 40mg qd, spironolactone 25mg qd per GI recs, will reassess tomorrow. Patient has history of hypertension, home meds: Lasix 40mg QD, Spironolactone 25mg QD, Toprol 50mg QD. TTE 10/3/23: EF 65%, no diastolic dysfunction. Post-procedure on 1/29/24, patient had labile BPs, as high as 200s/90s. EKG nonischemic.   - Can c/w home regimen of Toprol 50mg qd  - Holding Lasix 40mg QD, Spironolactone 25mg QD per GI recs, will reassess tomorrow.

## 2024-01-29 NOTE — H&P ADULT - PROBLEM SELECTOR PLAN 4
- Holding Coumadin 1/29-2/2 per GI  - cipro 500 mg PO BID x 7 days (1/29-)  - Started LR 150cc/hr, reassess daily EKG: slow afib, HR 50. History of mitral regurgitation. Home meds: Toprol 50mg QD, Coumadin 4mg QD (for valvular AFib)  - Rate control: Toprol 50mg QD  - AC: Holding Coumadin 1/29-2/2 per GI

## 2024-01-30 ENCOUNTER — TRANSCRIPTION ENCOUNTER (OUTPATIENT)
Age: 89
End: 2024-01-30

## 2024-01-30 VITALS — TEMPERATURE: 98 F

## 2024-01-30 LAB
ALBUMIN SERPL ELPH-MCNC: 2.8 G/DL — LOW (ref 3.3–5)
ALP SERPL-CCNC: 229 U/L — HIGH (ref 40–120)
ALT FLD-CCNC: 87 U/L — HIGH (ref 10–45)
ANION GAP SERPL CALC-SCNC: 5 MMOL/L — SIGNIFICANT CHANGE UP (ref 5–17)
APTT BLD: 29.1 SEC — SIGNIFICANT CHANGE UP (ref 24.5–35.6)
AST SERPL-CCNC: 46 U/L — HIGH (ref 10–40)
BASOPHILS # BLD AUTO: 0.03 K/UL — SIGNIFICANT CHANGE UP (ref 0–0.2)
BASOPHILS NFR BLD AUTO: 0.4 % — SIGNIFICANT CHANGE UP (ref 0–2)
BILIRUB SERPL-MCNC: 0.5 MG/DL — SIGNIFICANT CHANGE UP (ref 0.2–1.2)
BUN SERPL-MCNC: 14 MG/DL — SIGNIFICANT CHANGE UP (ref 7–23)
CALCIUM SERPL-MCNC: 9.2 MG/DL — SIGNIFICANT CHANGE UP (ref 8.4–10.5)
CHLORIDE SERPL-SCNC: 105 MMOL/L — SIGNIFICANT CHANGE UP (ref 96–108)
CO2 SERPL-SCNC: 27 MMOL/L — SIGNIFICANT CHANGE UP (ref 22–31)
CREAT SERPL-MCNC: 0.59 MG/DL — SIGNIFICANT CHANGE UP (ref 0.5–1.3)
EGFR: 83 ML/MIN/1.73M2 — SIGNIFICANT CHANGE UP
EOSINOPHIL # BLD AUTO: 0.36 K/UL — SIGNIFICANT CHANGE UP (ref 0–0.5)
EOSINOPHIL NFR BLD AUTO: 4.3 % — SIGNIFICANT CHANGE UP (ref 0–6)
GLUCOSE SERPL-MCNC: 90 MG/DL — SIGNIFICANT CHANGE UP (ref 70–99)
HCT VFR BLD CALC: 31.7 % — LOW (ref 34.5–45)
HGB BLD-MCNC: 10.6 G/DL — LOW (ref 11.5–15.5)
IMM GRANULOCYTES NFR BLD AUTO: 0.2 % — SIGNIFICANT CHANGE UP (ref 0–0.9)
INR BLD: 1.05 — SIGNIFICANT CHANGE UP (ref 0.85–1.18)
LACTATE SERPL-SCNC: 2 MMOL/L — SIGNIFICANT CHANGE UP (ref 0.5–2)
LIDOCAIN IGE QN: 76 U/L — HIGH (ref 7–60)
LYMPHOCYTES # BLD AUTO: 1.8 K/UL — SIGNIFICANT CHANGE UP (ref 1–3.3)
LYMPHOCYTES # BLD AUTO: 21.3 % — SIGNIFICANT CHANGE UP (ref 13–44)
MAGNESIUM SERPL-MCNC: 1.7 MG/DL — SIGNIFICANT CHANGE UP (ref 1.6–2.6)
MCHC RBC-ENTMCNC: 32.4 PG — SIGNIFICANT CHANGE UP (ref 27–34)
MCHC RBC-ENTMCNC: 33.4 GM/DL — SIGNIFICANT CHANGE UP (ref 32–36)
MCV RBC AUTO: 96.9 FL — SIGNIFICANT CHANGE UP (ref 80–100)
MONOCYTES # BLD AUTO: 0.76 K/UL — SIGNIFICANT CHANGE UP (ref 0–0.9)
MONOCYTES NFR BLD AUTO: 9 % — SIGNIFICANT CHANGE UP (ref 2–14)
NEUTROPHILS # BLD AUTO: 5.49 K/UL — SIGNIFICANT CHANGE UP (ref 1.8–7.4)
NEUTROPHILS NFR BLD AUTO: 64.8 % — SIGNIFICANT CHANGE UP (ref 43–77)
NRBC # BLD: 0 /100 WBCS — SIGNIFICANT CHANGE UP (ref 0–0)
PHOSPHATE SERPL-MCNC: 3.5 MG/DL — SIGNIFICANT CHANGE UP (ref 2.5–4.5)
PLATELET # BLD AUTO: 158 K/UL — SIGNIFICANT CHANGE UP (ref 150–400)
POTASSIUM SERPL-MCNC: 4.2 MMOL/L — SIGNIFICANT CHANGE UP (ref 3.5–5.3)
POTASSIUM SERPL-SCNC: 4.2 MMOL/L — SIGNIFICANT CHANGE UP (ref 3.5–5.3)
PROT SERPL-MCNC: 5.8 G/DL — LOW (ref 6–8.3)
PROTHROM AB SERPL-ACNC: 11.9 SEC — SIGNIFICANT CHANGE UP (ref 9.5–13)
RBC # BLD: 3.27 M/UL — LOW (ref 3.8–5.2)
RBC # FLD: 14.1 % — SIGNIFICANT CHANGE UP (ref 10.3–14.5)
SODIUM SERPL-SCNC: 137 MMOL/L — SIGNIFICANT CHANGE UP (ref 135–145)
WBC # BLD: 8.46 K/UL — SIGNIFICANT CHANGE UP (ref 3.8–10.5)
WBC # FLD AUTO: 8.46 K/UL — SIGNIFICANT CHANGE UP (ref 3.8–10.5)

## 2024-01-30 PROCEDURE — 85610 PROTHROMBIN TIME: CPT

## 2024-01-30 PROCEDURE — 74328 X-RAY BILE DUCT ENDOSCOPY: CPT

## 2024-01-30 PROCEDURE — C2617: CPT

## 2024-01-30 PROCEDURE — 82553 CREATINE MB FRACTION: CPT

## 2024-01-30 PROCEDURE — 82550 ASSAY OF CK (CPK): CPT

## 2024-01-30 PROCEDURE — 84100 ASSAY OF PHOSPHORUS: CPT

## 2024-01-30 PROCEDURE — 93005 ELECTROCARDIOGRAM TRACING: CPT

## 2024-01-30 PROCEDURE — 85025 COMPLETE CBC W/AUTO DIFF WBC: CPT

## 2024-01-30 PROCEDURE — C1769: CPT

## 2024-01-30 PROCEDURE — 84484 ASSAY OF TROPONIN QUANT: CPT

## 2024-01-30 PROCEDURE — 99232 SBSQ HOSP IP/OBS MODERATE 35: CPT | Mod: GC

## 2024-01-30 PROCEDURE — 36415 COLL VENOUS BLD VENIPUNCTURE: CPT

## 2024-01-30 PROCEDURE — C1889: CPT

## 2024-01-30 PROCEDURE — 83605 ASSAY OF LACTIC ACID: CPT

## 2024-01-30 PROCEDURE — 99238 HOSP IP/OBS DSCHRG MGMT 30/<: CPT

## 2024-01-30 PROCEDURE — 83735 ASSAY OF MAGNESIUM: CPT

## 2024-01-30 PROCEDURE — 80053 COMPREHEN METABOLIC PANEL: CPT

## 2024-01-30 PROCEDURE — 83690 ASSAY OF LIPASE: CPT

## 2024-01-30 PROCEDURE — 85730 THROMBOPLASTIN TIME PARTIAL: CPT

## 2024-01-30 RX ORDER — FUROSEMIDE 40 MG
1 TABLET ORAL
Qty: 0 | Refills: 0 | DISCHARGE
Start: 2024-01-30

## 2024-01-30 RX ORDER — SPIRONOLACTONE 25 MG/1
25 TABLET, FILM COATED ORAL DAILY
Refills: 0 | Status: DISCONTINUED | OUTPATIENT
Start: 2024-01-30 | End: 2024-01-30

## 2024-01-30 RX ORDER — METOPROLOL TARTRATE 50 MG
1 TABLET ORAL
Qty: 0 | Refills: 0 | DISCHARGE
Start: 2024-01-30

## 2024-01-30 RX ORDER — LEVOTHYROXINE SODIUM 125 MCG
50 TABLET ORAL DAILY
Refills: 0 | Status: DISCONTINUED | OUTPATIENT
Start: 2024-01-30 | End: 2024-01-30

## 2024-01-30 RX ORDER — CIPROFLOXACIN LACTATE 400MG/40ML
1 VIAL (ML) INTRAVENOUS
Qty: 10 | Refills: 0
Start: 2024-01-30 | End: 2024-02-03

## 2024-01-30 RX ORDER — FUROSEMIDE 40 MG
40 TABLET ORAL DAILY
Refills: 0 | Status: DISCONTINUED | OUTPATIENT
Start: 2024-01-30 | End: 2024-01-30

## 2024-01-30 RX ORDER — MAGNESIUM SULFATE 500 MG/ML
2 VIAL (ML) INJECTION ONCE
Refills: 0 | Status: COMPLETED | OUTPATIENT
Start: 2024-01-30 | End: 2024-01-30

## 2024-01-30 RX ORDER — LEVOTHYROXINE SODIUM 125 MCG
1 TABLET ORAL
Qty: 0 | Refills: 0 | DISCHARGE
Start: 2024-01-30

## 2024-01-30 RX ORDER — INFLUENZA VIRUS VACCINE 15; 15; 15; 15 UG/.5ML; UG/.5ML; UG/.5ML; UG/.5ML
0.7 SUSPENSION INTRAMUSCULAR ONCE
Refills: 0 | Status: DISCONTINUED | OUTPATIENT
Start: 2024-01-30 | End: 2024-01-30

## 2024-01-30 RX ORDER — LEVOTHYROXINE SODIUM 125 MCG
1 TABLET ORAL
Refills: 0 | DISCHARGE

## 2024-01-30 RX ORDER — SPIRONOLACTONE 25 MG/1
1 TABLET, FILM COATED ORAL
Qty: 0 | Refills: 0 | DISCHARGE
Start: 2024-01-30

## 2024-01-30 RX ADMIN — Medication 500 MILLIGRAM(S): at 18:12

## 2024-01-30 RX ADMIN — Medication 25 GRAM(S): at 09:49

## 2024-01-30 RX ADMIN — SPIRONOLACTONE 25 MILLIGRAM(S): 25 TABLET, FILM COATED ORAL at 10:03

## 2024-01-30 RX ADMIN — SODIUM CHLORIDE 150 MILLILITER(S): 9 INJECTION, SOLUTION INTRAVENOUS at 10:01

## 2024-01-30 RX ADMIN — Medication 50 MICROGRAM(S): at 12:41

## 2024-01-30 RX ADMIN — Medication 500 MILLIGRAM(S): at 04:21

## 2024-01-30 RX ADMIN — SODIUM CHLORIDE 150 MILLILITER(S): 9 INJECTION, SOLUTION INTRAVENOUS at 04:22

## 2024-01-30 NOTE — PROGRESS NOTE ADULT - ATTENDING COMMENTS
Patient seen, examined, and discussed with Dr. Bethea. Agree with above. 89F with a h/o MVR, hypothyroidism, HTN, HLD, AF (Warfarin, 9/30/23), PPM for tachy-mamie syndrome, AOx1, who underwent ERCP with CBD stent exchange on 01/29. Had transient ST elevation during procedure when BP was lowered, and BPs elevated persistently to 200s after the procedure, so patient was admitted from endoscopy unit to medicine telemetry for post-procedural monitoring. Complete 7 day cipro course, can restart warfarin in another 4 days. AXR in 2-3 months to assess stent passage. Minimal pain on exam. Tolerating PO.     Ct Moncada MD  Gastroenterology

## 2024-01-30 NOTE — DISCHARGE NOTE PROVIDER - CARE PROVIDERS DIRECT ADDRESSES
,DirectAddress_Unknown ,DirectAddress_Unknown,[email or web address]

## 2024-01-30 NOTE — DISCHARGE NOTE PROVIDER - NSDCMRMEDTOKEN_GEN_ALL_CORE_FT
ciprofloxacin 500 mg oral tablet: 1 tab(s) orally every 12 hours through 2/4  Digox 125 mcg (0.125 mg) oral tablet: 1 tab(s) orally once a day  furosemide 40 mg oral tablet: 1 tab(s) orally once a day  levothyroxine 50 mcg (0.05 mg) oral tablet: 1 tab(s) orally once a day  Lipitor 20 mg oral tablet: 1 tab(s) orally once a day (at bedtime)  metoprolol succinate 50 mg oral tablet, extended release: 1 tab(s) orally once a day  spironolactone 25 mg oral tablet: 1 tab(s) orally once a day  Zoloft 50 mg oral tablet: 1 tab(s) orally once a day

## 2024-01-30 NOTE — PROGRESS NOTE ADULT - SUBJECTIVE AND OBJECTIVE BOX
GI Consult Progress Note:     OVERNIGHT EVENTS: FRANCISCO.     SUBJECTIVE / INTERVAL HPI:   Patient seen and examined at bedside. States that overall she has some abdominal pain, most appreciable in the RUQ. Otherwise no acute complaints at this time. Received LR at 150 cc/hour overnight.       VITAL SIGNS:  Vital Signs Last 24 Hrs  T(C): 36.2 (30 Jan 2024 08:53), Max: 36.4 (30 Jan 2024 05:07)  T(F): 97.2 (30 Jan 2024 08:53), Max: 97.5 (30 Jan 2024 05:07)  HR: 52 (30 Jan 2024 12:14) (50 - 60)  BP: 155/67 (30 Jan 2024 12:14) (136/61 - 166/75)  BP(mean): 97 (30 Jan 2024 12:14) (88 - 97)  RR: 17 (30 Jan 2024 12:14) (16 - 18)  SpO2: 93% (30 Jan 2024 12:14) (93% - 95%)    Parameters below as of 30 Jan 2024 12:14  Patient On (Oxygen Delivery Method): room air        01-29-24 @ 07:01 - 01-30-24 @ 07:00  --------------------------------------------------------  IN: 2550 mL / OUT: 850 mL / NET: 1700 mL    01-30-24 @ 07:01 - 01-30-24 @ 12:58  --------------------------------------------------------  IN: 500 mL / OUT: 0 mL / NET: 500 mL      PHYSICAL EXAM:  General: No acute distress, thin, frail   Lungs: Normal respiratory effort and no intercostal retractions  Cardiovascular: RRR  Abdomen: Soft, non-distended, RUQ tenderness to palpation   Neurological: Alert and oriented x1, conversant   Skin: Warm and dry. No obvious rash      MEDICATIONS:  MEDICATIONS  (STANDING):  ciprofloxacin     Tablet 500 milliGRAM(s) Oral every 12 hours  furosemide    Tablet 40 milliGRAM(s) Oral daily  influenza  Vaccine (HIGH DOSE) 0.7 milliLiter(s) IntraMuscular once  lactated ringers. 1000 milliLiter(s) (150 mL/Hr) IV Continuous <Continuous>  levothyroxine 50 MICROGram(s) Oral daily  metoprolol succinate ER 50 milliGRAM(s) Oral daily  spironolactone 25 milliGRAM(s) Oral daily    MEDICATIONS  (PRN):      ALLERGIES:  Allergies    chlorhexidine topical (Rash)  fish (Other)  penicillin (Hives)    Intolerances        LABS:                        10.6   8.46  )-----------( 158      ( 30 Jan 2024 05:30 )             31.7     01-30    137  |  105  |  14  ----------------------------<  90  4.2   |  27  |  0.59    Ca    9.2      30 Jan 2024 05:30  Phos  3.5     01-30  Mg     1.7     01-30    TPro  5.8<L>  /  Alb  2.8<L>  /  TBili  0.5  /  DBili  x   /  AST  46<H>  /  ALT  87<H>  /  AlkPhos  229<H>  01-30    PT/INR - ( 30 Jan 2024 05:30 )   PT: 11.9 sec;   INR: 1.05          PTT - ( 30 Jan 2024 05:30 )  PTT:29.1 sec  Urinalysis Basic - ( 30 Jan 2024 05:30 )    Color: x / Appearance: x / SG: x / pH: x  Gluc: 90 mg/dL / Ketone: x  / Bili: x / Urobili: x   Blood: x / Protein: x / Nitrite: x   Leuk Esterase: x / RBC: x / WBC x   Sq Epi: x / Non Sq Epi: x / Bacteria: x      CAPILLARY BLOOD GLUCOSE    RADIOLOGY & ADDITIONAL TESTS: Reviewed.

## 2024-01-30 NOTE — DISCHARGE NOTE PROVIDER - HOSPITAL COURSE
#Discharge: do not delete    Patient is a 89y old  Female w PMH of MVR( on coumadin), hypothyroidism, HTN, HLD, AF (on Warfarin), PPM for tachy-mamie syndrome, recent admission for gallbladder sludge v cholangitis s/p CBD stent. Patient now presented for elective CBD stent exchange. ICU consulted for post-procedural monitoring due to transient telemetry concern for AWA, and hypertension.    Hospital course (by problem):   #Hypertensive urgency.   Patient has history of hypertension, home meds: Lasix 40mg QD, Spironolactone 25mg QD, Toprol 50mg QD. TTE 10/3/23: EF 65%, no diastolic dysfunction. Post-procedure on 1/29/24, patient had labile BPs, as high as 200s/90s. EKG nonischemic. Now with stable BPs in 130s-140s/80s  - continue home meds    Tachy-mamie syndrome.   Home meds: Coumadin 4mg QD (for valvular AFib)  - Hold Coumadin for 5d as per GI due to sphincterectomy and stent exchange (1/29 - 2/2)  - Will need close f/u w PCP, Dr. Johnson for re-initiation of Coumadin.    #S/P biliary surgery.   History of choledocholithiasis with gallstone pancreatitis in October.. Had CBD stent placed in October. Patient arrived on 1/29 for elective CBD stent exchange, had several stones and sludge cleaned out and sphincterectomy  - Ciprofloxacin 500mg BID x 7 days (1/29 - 2/4)    #Atrial fibrillation.   EKG: slow afib, HR 50. History of mitral regurgitation. Home meds: Toprol 50mg QD, Digoxin 125mcg QD, Coumadin 4mg QD (for valvular AFib)  - Rate control: Toprol 50mg QD, Digoxin 125mcg QD  - AC: Holding Coumadin 1/29-2/2 per GI.    #Hypothyroid.   Home meds: Synthroid 50mcg QD  - continue home Synthroid 50mcg QD.    #Hyperlipidemia.   Home meds: Lipitor 20mg QD  - continue home Lipitor 20mg QD.    #Mitral valve regurgitation.   - Holding Coumadin 1/29-2/2 per GI (home medication).    Patient was discharged to: home with [facility]    New medications: Cipro  Changes to old medications: holding Coumadin for 5 days (1/29 - 2/2)  Medications that were stopped: none    Items to follow up as outpatient: PCP         #Discharge: do not delete    Patient is a 89y old  Female w PMH of MVR( on coumadin), hypothyroidism, HTN, HLD, AF (on Warfarin), PPM for tachy-mamie syndrome, recent admission for gallbladder sludge v cholangitis s/p CBD stent. Patient now presented for elective CBD stent exchange. ICU consulted for post-procedural monitoring due to transient telemetry concern for AWA, and hypertension.    Hospital course (by problem):   #Hypertensive urgency.   Patient has history of hypertension, home meds: Lasix 40mg QD, Spironolactone 25mg QD, Toprol 50mg QD. TTE 10/3/23: EF 65%, no diastolic dysfunction. Post-procedure on 1/29/24, patient had labile BPs, as high as 200s/90s. EKG nonischemic. Now with stable BPs in 130s-140s/80s  - continue home meds    Tachy-mamie syndrome.   Home meds: Warfarin 4mg QD (for valvular AFib)  - Hold Warfarin for 5d as per GI due to sphincterectomy and stent exchange (1/29 - 2/2)  - Will need close f/u w PCP, Dr. Johnson for re-initiation of Warfarin.    #S/P biliary surgery.   History of choledocholithiasis with gallstone pancreatitis in October.. Had CBD stent placed in October. Patient arrived on 1/29 for elective CBD stent exchange, had several stones and sludge cleaned out and sphincterectomy. Patient had residual RUQ tenderness, lipase 76, lactate 2.0, low concern for post-ERCP pancreatitis.   - Ciprofloxacin 500mg BID x 7 days (1/29 - 2/4)    #Atrial fibrillation.   EKG: slow afib, HR 50. History of mitral regurgitation. Home meds: Toprol 50mg QD, Digoxin 125mcg QD, Warfarin 4mg QD (for valvular AFib)  - Rate control: Toprol 50mg QD, Digoxin 125mcg QD  - AC: Holding Warfarin 1/29-2/2 per GI.    #Hypothyroid.   Home meds: Synthroid 50mcg QD  - continue home Synthroid 50mcg QD.    #Hyperlipidemia.   Home meds: Lipitor 20mg QD  - continue home Lipitor 20mg QD.    #Mitral valve regurgitation.   - Holding Warfarin 1/29-2/2 per GI (home medication).    Patient was discharged to: home with [facility]    New medications: Cipro  Changes to old medications: holding Warfarin for 5 days (1/29 - 2/2)  Medications that were stopped: none    Items to follow up as outpatient: PCP

## 2024-01-30 NOTE — DISCHARGE NOTE NURSING/CASE MANAGEMENT/SOCIAL WORK - NSDCPEFALRISK_GEN_ALL_CORE
For information on Fall & Injury Prevention, visit: [email or web address] OR  [email or web address] OR  https://www.cdc.gov/steadi/patient.html

## 2024-01-30 NOTE — DISCHARGE NOTE NURSING/CASE MANAGEMENT/SOCIAL WORK - PATIENT PORTAL LINK FT
You can access the FollowMyHealth Patient Portal offered by [facility] by registering at the following website: [email or web address]. By joining [facility]’s FollowMyHealth portal, you will also be able to view your health information using other applications (apps) compatible with our system.

## 2024-01-30 NOTE — DISCHARGE NOTE PROVIDER - NSDCFUSCHEDAPPT_GEN_ALL_CORE_FT
[facility] Physician [facility]  [address]  Scheduled Appointment: 03/13/2024

## 2024-01-30 NOTE — PROGRESS NOTE ADULT - ASSESSMENT
89F PMH MVR, hypothyroidism, HTN, HLD, AF (Warfarin, 9/30/23), PPM for tachy-mamie syndrome, AOx1, who underwent ERCP with CBD stent exchange on 01/29. Had transient ST elevation during procedure when BP was lowered, and BPs elevated persistently to 200s after the procedure, so patient was admitted from endoscopy unit to medicine telemetry for post-procedural monitoring.     EKG performed in endoscopy showing no signs of ischemia. Received 500 cc of LR x1 in endoscopy followed by LR at 150 cc/hour overnight.     Recommendations:   - continue cipro 500 mg PO BID x 7 days   - hold home warfarin for 5 days total   - abdominal flat plat to assess for CBD passage in 2-3 months   - repeat EGD in 3 months (only if CBD stent still present on imaging)     Case discussed with Dr. Moncada. GI Team will continue to follow.     Shelbi Bethea D.O.   Gastroenterology Fellow  Weekday 7am-5pm Pager: [phone]  Weeknights/Weekend/Holiday Coverage: Please call the  for contact info

## 2024-01-30 NOTE — DISCHARGE NOTE PROVIDER - NSDCCPCAREPLAN_GEN_ALL_CORE_FT
PRINCIPAL DISCHARGE DIAGNOSIS  Diagnosis: Hypertensive urgency  Assessment and Plan of Treatment: You have a history of hypertension, which is high blood pressure.      SECONDARY DISCHARGE DIAGNOSES  Diagnosis: Atrial fibrillation  Assessment and Plan of Treatment:      PRINCIPAL DISCHARGE DIAGNOSIS  Diagnosis: Hypertensive urgency  Assessment and Plan of Treatment: You have a history of hypertension, which is high blood pressure. In the hospital, your blood pressure was very high, so you were admitted to the telemetry unit for close monitoring. Your blood pressure then returned to acceptable range. After you leave the hospital, please continue taking your regular blood pressure medications. Please follow-up with your primary care doctor in 1-2 weeks.      SECONDARY DISCHARGE DIAGNOSES  Diagnosis: Atrial fibrillation  Assessment and Plan of Treatment: You have a history of atrial fibrillation, which is an irregular heartbeat that can increase the risk of stroke. You take Warfarin, a blood thinner, for this. After you leave the hospital, please DO NOT take your Warfarin until February 2nd, because of your recent procedure. Please follow-up with your primary care doctor in 1-2 weeks to discuss this further.    Diagnosis: S/P ERCP  Assessment and Plan of Treatment: You came to the hospital for an ERCP, which is an procedure. A stent was exchanged during this procedure. After you leave the hospital, please continue taking the antibiotic Ciprofloxicin as prescribed until February 4th.     PRINCIPAL DISCHARGE DIAGNOSIS  Diagnosis: Hypertensive urgency  Assessment and Plan of Treatment: You have a history of hypertension, which is high blood pressure. In the hospital, your blood pressure was very high, so you were admitted to the telemetry unit for close monitoring. Your blood pressure then returned to acceptable range. After you leave the hospital, please continue taking your regular blood pressure medications. Please follow-up with your primary care doctor in 1-2 weeks.      SECONDARY DISCHARGE DIAGNOSES  Diagnosis: Atrial fibrillation  Assessment and Plan of Treatment: You have a history of atrial fibrillation, which is an irregular heartbeat that can increase the risk of stroke. You take Warfarin, a blood thinner, for this. After you leave the hospital, please DO NOT take your Warfarin until February 2nd, because of your recent procedure. Please follow-up with your primary care doctor in 1-2 weeks to discuss this further.    Diagnosis: S/P ERCP  Assessment and Plan of Treatment: You came to the hospital for an ERCP, which is an procedure. A stent was exchanged during this procedure. After you leave the hospital, please continue taking the antibiotic Ciprofloxicin as prescribed until February 4th. Please follow-up with GI doctor Dr. Kale Barahona within 1-2 weeks.     PRINCIPAL DISCHARGE DIAGNOSIS  Diagnosis: Hypertensive urgency  Assessment and Plan of Treatment: You have a history of hypertension, which is high blood pressure. In the hospital, your blood pressure was very high, so you were admitted to the telemetry unit for close monitoring. Your blood pressure then returned to acceptable range. After you leave the hospital, please continue taking your regular blood pressure medications. Please follow-up with your primary care doctor in 1-2 weeks.      SECONDARY DISCHARGE DIAGNOSES  Diagnosis: Atrial fibrillation  Assessment and Plan of Treatment: You have a history of atrial fibrillation, which is an irregular heartbeat that can increase the risk of stroke. You take Warfarin, a blood thinner, for this. After you leave the hospital, please DO NOT take your Warfarin until February 2nd, because of your recent procedure. Please follow-up with your primary care doctor in 1-2 weeks to discuss this further.    Diagnosis: S/P ERCP  Assessment and Plan of Treatment: You came to the hospital for an ERCP, which is an procedure. A stent was exchanged during this procedure. After you leave the hospital, please continue taking the antibiotic Ciprofloxicin as prescribed until February 4th. Please follow-up with GI doctor Dr. Kale Barahona.

## 2024-01-30 NOTE — DISCHARGE NOTE PROVIDER - PROVIDER TOKENS
PROVIDER:[TOKEN:[[id number]],FOLLOWUP:[2 weeks],ESTABLISHEDPATIENT:[T]] PROVIDER:[TOKEN:[[id number]],FOLLOWUP:[2 weeks],ESTABLISHEDPATIENT:[T]],PROVIDER:[TOKEN:[[id number]],FOLLOWUP:[2 weeks]]

## 2024-01-30 NOTE — DISCHARGE NOTE PROVIDER - CARE PROVIDER_API CALL
Ag Johnson  Internal Medicine  [address] [place] [address]  Phone: [phone]  Fax: [phone]  Established Patient  Follow Up Time: 2 weeks   Ag Johnson  Internal Medicine  [address] [address]  Phone: [phone]  Fax: [phone]  Established Patient  Follow Up Time: 2 weeks    Kale Barahona  Gastroenterology  [address]  [place] [address]  Phone: [phone]  Fax: [phone]  Follow Up Time: 2 weeks

## 2024-02-07 DIAGNOSIS — E78.00 PURE HYPERCHOLESTEROLEMIA, UNSPECIFIED: ICD-10-CM

## 2024-02-07 DIAGNOSIS — I16.0 HYPERTENSIVE URGENCY: ICD-10-CM

## 2024-02-07 DIAGNOSIS — E03.9 HYPOTHYROIDISM, UNSPECIFIED: ICD-10-CM

## 2024-02-07 DIAGNOSIS — Z79.899 OTHER LONG TERM (CURRENT) DRUG THERAPY: ICD-10-CM

## 2024-02-07 DIAGNOSIS — I10 ESSENTIAL (PRIMARY) HYPERTENSION: ICD-10-CM

## 2024-02-07 DIAGNOSIS — Z79.890 HORMONE REPLACEMENT THERAPY: ICD-10-CM

## 2024-02-07 DIAGNOSIS — Z95.2 PRESENCE OF PROSTHETIC HEART VALVE: ICD-10-CM

## 2024-02-07 DIAGNOSIS — I49.5 SICK SINUS SYNDROME: ICD-10-CM

## 2024-02-07 DIAGNOSIS — Z88.0 ALLERGY STATUS TO PENICILLIN: ICD-10-CM

## 2024-02-07 DIAGNOSIS — I34.0 NONRHEUMATIC MITRAL (VALVE) INSUFFICIENCY: ICD-10-CM

## 2024-02-27 NOTE — H&P ADULT - PROBLEM SELECTOR PLAN 8
Spoke with patient regarding MD orders for HH services.  Patient agreeable and has chosen [facility].  Referral Faxed.  [phone].  [phone].  Please notify - when patient discharges and fax DC Summary,  DC med list and any new HH orders.    The Patient and/or patient representative was provided with a choice of provider and agrees   with the discharge plan. [x] Yes [] No    Freedom of choice list was provided with basic dialogue that supports the patient's individualized plan of care/goals, treatment preferences and shares the quality data associated with the providers. [x] Yes [] No  Electronically signed by Geno North on 2/27/2024 at 9:31 AM   metoprolol succinate 50 mg oral tablet, extended release: 1 tab(s) orally once a day  spironolactone 25 mg oral tablet: 1 tab(s) orally once a day  Lasix 20 mg oral tablet: 1 tab(s) orally every 12 hours  Digox 125 mcg (0.125 mg) oral tablet: 1 tab(s) orally once a day - continue home synthroid 50mcg daily    #constipation  - continue home Colace 100 mg oral capsule: 1 cap(s) orally 2 times a day - continue home synthroid 50mcg daily    #constipation  Takes home colace 100mg BID. Not on formulary here.  - senna 2tabs qHS while inpatient  - resume home regimen on discharge

## 2024-03-12 ENCOUNTER — NON-APPOINTMENT (OUTPATIENT)
Age: 89
End: 2024-03-12

## 2024-03-13 ENCOUNTER — APPOINTMENT (OUTPATIENT)
Dept: HEART AND VASCULAR | Facility: CLINIC | Age: 89
End: 2024-03-13
Payer: MEDICARE

## 2024-03-13 PROCEDURE — 93294 REM INTERROG EVL PM/LDLS PM: CPT

## 2024-03-13 PROCEDURE — 93296 REM INTERROG EVL PM/IDS: CPT

## 2024-04-13 ENCOUNTER — OUTPATIENT (OUTPATIENT)
Dept: OUTPATIENT SERVICES | Facility: HOSPITAL | Age: 89
LOS: 1 days | End: 2024-04-13
Payer: MEDICARE

## 2024-04-13 DIAGNOSIS — Z84.89 FAMILY HISTORY OF OTHER SPECIFIED CONDITIONS: Chronic | ICD-10-CM

## 2024-04-13 PROCEDURE — 74019 RADEX ABDOMEN 2 VIEWS: CPT

## 2024-04-13 PROCEDURE — 74019 RADEX ABDOMEN 2 VIEWS: CPT | Mod: 26

## 2024-04-15 ENCOUNTER — NON-APPOINTMENT (OUTPATIENT)
Age: 89
End: 2024-04-15

## 2024-06-12 ENCOUNTER — NON-APPOINTMENT (OUTPATIENT)
Age: 89
End: 2024-06-12

## 2024-06-13 ENCOUNTER — APPOINTMENT (OUTPATIENT)
Dept: HEART AND VASCULAR | Facility: CLINIC | Age: 89
End: 2024-06-13
Payer: MEDICARE

## 2024-06-13 PROCEDURE — 93296 REM INTERROG EVL PM/IDS: CPT

## 2024-06-13 PROCEDURE — 93294 REM INTERROG EVL PM/LDLS PM: CPT

## 2024-09-11 ENCOUNTER — NON-APPOINTMENT (OUTPATIENT)
Age: 89
End: 2024-09-11

## 2024-09-12 ENCOUNTER — APPOINTMENT (OUTPATIENT)
Dept: HEART AND VASCULAR | Facility: CLINIC | Age: 89
End: 2024-09-12
Payer: MEDICARE

## 2024-09-12 PROCEDURE — 93294 REM INTERROG EVL PM/LDLS PM: CPT

## 2024-09-12 PROCEDURE — 93296 REM INTERROG EVL PM/IDS: CPT

## 2024-12-12 ENCOUNTER — APPOINTMENT (OUTPATIENT)
Dept: HEART AND VASCULAR | Facility: CLINIC | Age: 88
End: 2024-12-12

## 2025-02-03 ENCOUNTER — NON-APPOINTMENT (OUTPATIENT)
Age: 89
End: 2025-02-03

## 2025-02-03 ENCOUNTER — APPOINTMENT (OUTPATIENT)
Dept: HEART AND VASCULAR | Facility: CLINIC | Age: 89
End: 2025-02-03
Payer: MEDICARE

## 2025-02-03 PROCEDURE — 93296 REM INTERROG EVL PM/IDS: CPT

## 2025-02-03 PROCEDURE — 93294 REM INTERROG EVL PM/LDLS PM: CPT

## 2025-05-05 ENCOUNTER — NON-APPOINTMENT (OUTPATIENT)
Age: 89
End: 2025-05-05

## 2025-05-05 ENCOUNTER — APPOINTMENT (OUTPATIENT)
Dept: HEART AND VASCULAR | Facility: CLINIC | Age: 89
End: 2025-05-05
Payer: MEDICARE

## 2025-05-05 PROCEDURE — 93294 REM INTERROG EVL PM/LDLS PM: CPT

## 2025-05-05 PROCEDURE — 93296 REM INTERROG EVL PM/IDS: CPT

## 2025-07-14 NOTE — DIETITIAN INITIAL EVALUATION ADULT - ENTER FROM (CAL/KG)
appendix found inferior to cecum/TI. inflamed, edematous and adherent to the surrounding structures. meticulous dissection, penetrated a small abscess cavity that was adjanect to necrotic/gangrenous tip of appendix.with careful dissection was able to complete appendectomy and had clean base. endoGIA 45 gold load. vistaseal used 
35

## 2025-07-23 NOTE — CONSULT NOTE ADULT - ASSESSMENT
Duplicate - prescription request refused.   89yo Female pt with PMHx HTN, HLD, Hypothyroidism; PSHx mitral valve replacement (on warfarin), PPM; presented to [facility] with c/o of difficulty breathing. Elevated LFTs, and questionable sludge ball in CBD, concerns for cholangitis. Admitted to SICU for HD monitoring.    Plan:     Neuro: NTD  HEENT: NTD  CV: MAP > 65; pending med rec  Pulm: Satting well on 2LNC  GI/FEN: NPO, IVF  : Voids (primafit)  Endo: ISS  Heme: SQH, holding warfarin  ID: Cefepime (5/16-)  PPX: SQH, SCDs  L/D/T: PIVs  PT/OT: Not ordered  Dispo: SICU

## 2025-08-04 ENCOUNTER — NON-APPOINTMENT (OUTPATIENT)
Age: 89
End: 2025-08-04

## 2025-08-04 ENCOUNTER — APPOINTMENT (OUTPATIENT)
Dept: HEART AND VASCULAR | Facility: CLINIC | Age: 89
End: 2025-08-04
Payer: MEDICARE

## 2025-08-04 PROCEDURE — 93296 REM INTERROG EVL PM/IDS: CPT

## 2025-08-04 PROCEDURE — 93294 REM INTERROG EVL PM/LDLS PM: CPT

## (undated) DEVICE — ELCTR GROUNDING PAD ADULT COVIDIEN

## (undated) DEVICE — FORCEP RESCUE COMBO